# Patient Record
Sex: FEMALE | Race: WHITE | NOT HISPANIC OR LATINO | Employment: OTHER | URBAN - METROPOLITAN AREA
[De-identification: names, ages, dates, MRNs, and addresses within clinical notes are randomized per-mention and may not be internally consistent; named-entity substitution may affect disease eponyms.]

---

## 2018-02-28 ENCOUNTER — OFFICE VISIT CONVERTED (OUTPATIENT)
Dept: ONCOLOGY | Facility: HOSPITAL | Age: 63
End: 2018-02-28
Attending: INTERNAL MEDICINE

## 2018-03-01 ENCOUNTER — CONVERSION ENCOUNTER (OUTPATIENT)
Dept: GASTROENTEROLOGY | Facility: CLINIC | Age: 63
End: 2018-03-01

## 2018-03-01 ENCOUNTER — OFFICE VISIT CONVERTED (OUTPATIENT)
Dept: GASTROENTEROLOGY | Facility: CLINIC | Age: 63
End: 2018-03-01
Attending: INTERNAL MEDICINE

## 2018-03-28 ENCOUNTER — OFFICE VISIT CONVERTED (OUTPATIENT)
Dept: ONCOLOGY | Facility: HOSPITAL | Age: 63
End: 2018-03-28
Attending: INTERNAL MEDICINE

## 2018-06-29 ENCOUNTER — OFFICE VISIT CONVERTED (OUTPATIENT)
Dept: ONCOLOGY | Facility: HOSPITAL | Age: 63
End: 2018-06-29
Attending: INTERNAL MEDICINE

## 2018-08-30 ENCOUNTER — OFFICE VISIT CONVERTED (OUTPATIENT)
Dept: ORTHOPEDIC SURGERY | Facility: CLINIC | Age: 63
End: 2018-08-30
Attending: ORTHOPAEDIC SURGERY

## 2018-08-31 ENCOUNTER — OFFICE VISIT CONVERTED (OUTPATIENT)
Dept: CARDIOLOGY | Facility: CLINIC | Age: 63
End: 2018-08-31
Attending: INTERNAL MEDICINE

## 2018-09-11 ENCOUNTER — OFFICE VISIT CONVERTED (OUTPATIENT)
Dept: ORTHOPEDIC SURGERY | Facility: CLINIC | Age: 63
End: 2018-09-11
Attending: ORTHOPAEDIC SURGERY

## 2018-09-20 ENCOUNTER — OFFICE VISIT CONVERTED (OUTPATIENT)
Dept: NEUROSURGERY | Facility: CLINIC | Age: 63
End: 2018-09-20
Attending: NEUROLOGICAL SURGERY

## 2019-01-09 ENCOUNTER — HOSPITAL ENCOUNTER (OUTPATIENT)
Dept: PHYSICAL THERAPY | Facility: CLINIC | Age: 64
Setting detail: RECURRING SERIES
Discharge: HOME | End: 2019-01-30
Attending: NEUROLOGICAL SURGERY

## 2019-01-14 ENCOUNTER — OFFICE VISIT CONVERTED (OUTPATIENT)
Dept: NEUROSURGERY | Facility: CLINIC | Age: 64
End: 2019-01-14
Attending: NEUROLOGICAL SURGERY

## 2019-09-13 ENCOUNTER — HOSPITAL ENCOUNTER (OUTPATIENT)
Dept: OTHER | Facility: HOSPITAL | Age: 64
Discharge: HOME | End: 2019-09-13
Attending: INTERNAL MEDICINE

## 2019-09-13 ENCOUNTER — CONVERSION ENCOUNTER (OUTPATIENT)
Dept: CARDIOLOGY | Facility: CLINIC | Age: 64
End: 2019-09-13

## 2019-09-13 ENCOUNTER — OFFICE VISIT CONVERTED (OUTPATIENT)
Dept: CARDIOLOGY | Facility: CLINIC | Age: 64
End: 2019-09-13
Attending: INTERNAL MEDICINE

## 2019-09-13 LAB — BNP SERPL-MCNC: 27 PG/ML (ref 0–900)

## 2019-09-26 ENCOUNTER — HOSPITAL ENCOUNTER (OUTPATIENT)
Dept: OTHER | Facility: HOSPITAL | Age: 64
Discharge: HOME | End: 2019-09-26
Attending: INTERNAL MEDICINE

## 2019-09-26 LAB
ANION GAP SERPL CALC-SCNC: 17 MMOL/L (ref 8–19)
BUN SERPL-MCNC: 27 MG/DL (ref 5–25)
BUN/CREAT SERPL: 26 {RATIO} (ref 6–20)
CALCIUM SERPL-MCNC: 9.4 MG/DL (ref 8.7–10.4)
CHLORIDE SERPL-SCNC: 103 MMOL/L (ref 99–111)
CONV CO2: 24 MMOL/L (ref 22–32)
CREAT UR-MCNC: 1.04 MG/DL (ref 0.5–0.9)
GFR SERPLBLD BASED ON 1.73 SQ M-ARVRAT: 57 ML/MIN/{1.73_M2}
GLUCOSE SERPL-MCNC: 121 MG/DL (ref 65–99)
OSMOLALITY SERPL CALC.SUM OF ELEC: 296 MOSM/KG (ref 273–304)
POTASSIUM SERPL-SCNC: 4.1 MMOL/L (ref 3.5–5.3)
SODIUM SERPL-SCNC: 140 MMOL/L (ref 135–147)

## 2020-05-14 ENCOUNTER — TELEMEDICINE CONVERTED (OUTPATIENT)
Dept: CARDIOLOGY | Facility: CLINIC | Age: 65
End: 2020-05-14
Attending: INTERNAL MEDICINE

## 2020-06-15 ENCOUNTER — HOSPITAL ENCOUNTER (OUTPATIENT)
Dept: SLEEP MEDICINE | Facility: HOSPITAL | Age: 65
Discharge: HOME | End: 2020-06-15
Attending: INTERNAL MEDICINE

## 2020-06-15 ENCOUNTER — OUTSIDE FACILITY SERVICE (OUTPATIENT)
Dept: SLEEP MEDICINE | Facility: HOSPITAL | Age: 65
End: 2020-06-15

## 2020-06-15 PROCEDURE — 99204 OFFICE O/P NEW MOD 45 MIN: CPT | Performed by: INTERNAL MEDICINE

## 2020-07-06 ENCOUNTER — HOSPITAL ENCOUNTER (OUTPATIENT)
Dept: PREADMISSION TESTING | Facility: HOSPITAL | Age: 65
Discharge: HOME | End: 2020-07-06
Attending: INTERNAL MEDICINE

## 2020-07-07 LAB — SARS-COV-2 RNA SPEC QL NAA+PROBE: NOT DETECTED

## 2020-07-08 ENCOUNTER — HOSPITAL ENCOUNTER (OUTPATIENT)
Dept: SLEEP MEDICINE | Facility: HOSPITAL | Age: 65
Discharge: HOME | End: 2020-07-08
Attending: INTERNAL MEDICINE

## 2020-07-15 ENCOUNTER — OUTSIDE FACILITY SERVICE (OUTPATIENT)
Dept: SLEEP MEDICINE | Facility: HOSPITAL | Age: 65
End: 2020-07-15

## 2020-07-15 PROCEDURE — 95811 POLYSOM 6/>YRS CPAP 4/> PARM: CPT | Performed by: INTERNAL MEDICINE

## 2020-07-27 ENCOUNTER — HOSPITAL ENCOUNTER (OUTPATIENT)
Dept: PREADMISSION TESTING | Facility: HOSPITAL | Age: 65
Discharge: HOME | End: 2020-07-27
Attending: INTERNAL MEDICINE

## 2020-07-28 LAB — SARS-COV-2 RNA SPEC QL NAA+PROBE: NOT DETECTED

## 2020-07-29 ENCOUNTER — HOSPITAL ENCOUNTER (OUTPATIENT)
Dept: SLEEP MEDICINE | Facility: HOSPITAL | Age: 65
Discharge: HOME | End: 2020-07-29
Attending: INTERNAL MEDICINE

## 2020-08-03 ENCOUNTER — OUTSIDE FACILITY SERVICE (OUTPATIENT)
Dept: SLEEP MEDICINE | Facility: HOSPITAL | Age: 65
End: 2020-08-03

## 2020-08-03 PROCEDURE — 95811 POLYSOM 6/>YRS CPAP 4/> PARM: CPT | Performed by: INTERNAL MEDICINE

## 2020-09-21 ENCOUNTER — OUTSIDE FACILITY SERVICE (OUTPATIENT)
Dept: SLEEP MEDICINE | Facility: HOSPITAL | Age: 65
End: 2020-09-21

## 2020-09-21 ENCOUNTER — HOSPITAL ENCOUNTER (OUTPATIENT)
Dept: SLEEP MEDICINE | Facility: HOSPITAL | Age: 65
Discharge: HOME | End: 2020-09-21
Attending: INTERNAL MEDICINE

## 2020-09-21 PROCEDURE — 99214 OFFICE O/P EST MOD 30 MIN: CPT | Performed by: INTERNAL MEDICINE

## 2021-01-27 ENCOUNTER — HOSPITAL ENCOUNTER (OUTPATIENT)
Dept: SLEEP MEDICINE | Facility: HOSPITAL | Age: 66
Discharge: HOME | End: 2021-01-27
Attending: INTERNAL MEDICINE

## 2021-01-27 ENCOUNTER — OUTSIDE FACILITY SERVICE (OUTPATIENT)
Dept: SLEEP MEDICINE | Facility: HOSPITAL | Age: 66
End: 2021-01-27

## 2021-01-27 PROCEDURE — 99213 OFFICE O/P EST LOW 20 MIN: CPT | Performed by: INTERNAL MEDICINE

## 2021-02-25 ENCOUNTER — OFFICE VISIT CONVERTED (OUTPATIENT)
Dept: CARDIOLOGY | Facility: CLINIC | Age: 66
End: 2021-02-25
Attending: INTERNAL MEDICINE

## 2021-05-10 ENCOUNTER — OUTSIDE FACILITY SERVICE (OUTPATIENT)
Dept: SLEEP MEDICINE | Facility: HOSPITAL | Age: 66
End: 2021-05-10

## 2021-05-10 ENCOUNTER — HOSPITAL ENCOUNTER (OUTPATIENT)
Dept: SLEEP MEDICINE | Facility: HOSPITAL | Age: 66
Discharge: HOME | End: 2021-05-10
Attending: FAMILY MEDICINE

## 2021-05-10 PROCEDURE — 99213 OFFICE O/P EST LOW 20 MIN: CPT | Performed by: INTERNAL MEDICINE

## 2021-05-13 NOTE — PROGRESS NOTES
Progress Note      Patient Name: Gifty Hoover   Patient ID: [id number]   Sex: Female   YOB: 1955    Primary Care Provider: Bogdan Oden MD   Referring Provider: Aga Guy MD    Visit Date: May 14, 2020    Provider: Titi Zavala MD   Location: [facility]   Location Address: [address] [place] [address], [facility]   [place], [place]  [id number]   Location Phone: [phone]          Chief Complaint     Atrial fibrillation.       History Of Present Illness  Video Conferencing Visit  Gifty Hoover is a 64 year old /White female with paroxysmal atrial fibrillation, CAD, hypertension, and dyslipidemia who has been doing well. She has stable shortness of breath and mild lower extremity edema. No significant weight gain. She is presenting for evaluation via video conferencing. Verbal consent obtained before beginning visit. Telehealth visit due to COVID-19.   The following staff were present during this visit: Provider only.   PAST MEDICAL HISTORY: Coronary artery disease; Dyslipidemia; Hypertension; Paroxysmal atrial fibrillation.   FAMILY HISTORY: Positive for diabetes mellitus and hypertension. Negative for heart disease.   PSYCHOSOCIAL HISTORY: Denies alcohol or tobacco use.   CURRENT MEDICATIONS: Lasix 20 mg p.r.n.; Pradaxa 150 mg b.i.d.; rosuvastatin 40 mg daily; telmisartan 80 mg b.i.d.; hydrochlorothiazide 25 mg daily; levothyroxine 88 mcg daily; aspirin 81 mg daily. The dosage and frequency of the medications were reviewed with the patient.       Review of Systems  · Cardiovascular  o Admits  o : swelling (feet, ankles, hands), shortness of breath while walking or lying flat  o Denies  o : palpitations (fast, fluttering, or skipping beats), chest pain or angina pectoris   · Respiratory  o Denies  o : chronic or frequent cough, asthma or wheezing      Vitals     Per patient, at-home vitals:   Blood pressure 122/74.  Heart rate 85.           Assessment     ASSESSMENT  & PLAN:    1.  Paroxysmal atrial fibrillation, symptomatically maintaining normal sinus rhythm.  On Pradaxa for CVA        prevention.    2.  Coronary artery disease, no angina.  On chronic aspirin 81 mg once a day.  3.  Hyperlipidemia.  Is on a statin and tolerating well.  Goal LDL of less than 70.             Electronically Signed by: Rubia Caro-, Other -Author on May 20, 2020 10:17:38 AM  Electronically Co-signed by: Titi Zavala MD -Reviewer on May 20, 2020 03:47:08 PM

## 2021-05-14 VITALS
SYSTOLIC BLOOD PRESSURE: 118 MMHG | HEART RATE: 100 BPM | WEIGHT: 242 LBS | HEIGHT: 60 IN | BODY MASS INDEX: 47.51 KG/M2 | DIASTOLIC BLOOD PRESSURE: 70 MMHG

## 2021-05-14 NOTE — PROGRESS NOTES
Progress Note      Patient Name: Gifty Hoover   Patient ID: [id number]   Sex: Female   YOB: 1955    Primary Care Provider: Bogdan Oden MD   Referring Provider: Aga Guy MD    Visit Date: February 25, 2021    Provider: Titi Zavala MD   Location: [facility]   Location Address: [address] [place] [address], [facility]   [place], [place]  [id number]   Location Phone: [phone]          Chief Complaint     CAD.       History Of Present Illness  REFERRING CARE PROVIDER: Aga Guy MD   Gifty Hoover is a 65 year old /White female with coronary artery disease, paroxysmal atrial fib, hypertension and dyslipidemia who has had some shortness of breath which has been persistent somewhat since her COVID infection in December. She does wear her CPAP at night. She has chronic lower extremity edema as well as cough issues.   PAST MEDICAL HISTORY: Coronary artery disease; Dyslipidemia; Hypertension; Paroxysmal atrial fibrillation.   PSYCHOSOCIAL HISTORY: Denies alcohol use. Denies tobacco use.   CURRENT MEDICATIONS: Medication list was reviewed and is as documented.      ALLERGIES: No known drug allergies.       Review of Systems  · Cardiovascular  o Admits  o : swelling (feet, ankles, hands), shortness of breath while walking or lying flat  o Denies  o : palpitations (fast, fluttering, or skipping beats), chest pain or angina pectoris   · Respiratory  o Admits  o : chronic or frequent cough      Vitals  Date Time BP Position Site L\R Cuff Size HR RR TEMP (F) WT  HT  BMI kg/m2 BSA m2 O2 Sat FR L/min FiO2 HC       02/25/2021 03:33 /70 Sitting    100 - R   241lbs 16oz 5'   47.26 2.16             Physical Examination  · Constitutional  o Appearance  o : Awake, alert, in no acute distress.   · Eyes  o Conjunctivae  o : Normal.  · Ears, Nose, Mouth and Throat  o Oral Cavity  o :   § Oral Mucosa  § : Normal.  · Neck  o Inspection/Palpation  o : No JVD. Good carotid upstroke. No  thyromegaly.  · Respiratory  o Respiratory  o : Good respiratory effort. Clear to percussion and auscultation.  · Cardiovascular  o Heart  o :   § Auscultation of Heart  § : S1, S2 normal. Regular rate and rhythm without murmurs, gallops, or rubs.  o Peripheral Vascular System  o :   § Extremities  § : Good femoral and pedal pulses. No pedal edema.  · Gastrointestinal  o Abdominal Examination  o : Soft. No tenderness or masses felt. No hepatosplenomegaly. Abdominal aorta is not palpable.          Assessment     1.  Atrial fibrillation, symptomatically. The patient is still maintaining normal sinus rhythm. On Pradaxa for CVA prevention, continue current medications.  2.  Coronary artery disease. No anginal discomfort. On chronic aspirin once a day.  3.  Hyperlipidemia. On statin, stable, high intensity. Goal LDL less than 70.      Titi Zavala MD  /               Electronically Signed by: Staci Fisher-, OT -Author on March 7, 2021 08:20:49 AM  Electronically Co-signed by: Titi Zavala MD -Reviewer on March 8, 2021 02:25:43 PM

## 2021-05-15 VITALS
HEIGHT: 60 IN | SYSTOLIC BLOOD PRESSURE: 102 MMHG | HEART RATE: 100 BPM | BODY MASS INDEX: 47.91 KG/M2 | WEIGHT: 244 LBS | DIASTOLIC BLOOD PRESSURE: 78 MMHG

## 2021-05-16 VITALS
DIASTOLIC BLOOD PRESSURE: 80 MMHG | HEART RATE: 86 BPM | BODY MASS INDEX: 43.23 KG/M2 | WEIGHT: 229 LBS | HEIGHT: 61 IN | RESPIRATION RATE: 12 BRPM | OXYGEN SATURATION: 94 % | SYSTOLIC BLOOD PRESSURE: 139 MMHG

## 2021-05-16 VITALS
WEIGHT: 230 LBS | BODY MASS INDEX: 45.16 KG/M2 | DIASTOLIC BLOOD PRESSURE: 87 MMHG | HEIGHT: 60 IN | HEART RATE: 83 BPM | SYSTOLIC BLOOD PRESSURE: 143 MMHG

## 2021-05-16 VITALS
SYSTOLIC BLOOD PRESSURE: 140 MMHG | WEIGHT: 235.5 LBS | BODY MASS INDEX: 46.23 KG/M2 | HEIGHT: 60 IN | DIASTOLIC BLOOD PRESSURE: 73 MMHG

## 2021-05-16 VITALS — WEIGHT: 231.37 LBS | OXYGEN SATURATION: 96 % | HEART RATE: 80 BPM | BODY MASS INDEX: 45.43 KG/M2 | HEIGHT: 60 IN

## 2021-05-16 VITALS — OXYGEN SATURATION: 97 % | BODY MASS INDEX: 45.25 KG/M2 | HEIGHT: 60 IN | WEIGHT: 230.5 LBS | HEART RATE: 73 BPM

## 2021-05-16 VITALS
DIASTOLIC BLOOD PRESSURE: 59 MMHG | BODY MASS INDEX: 45.94 KG/M2 | SYSTOLIC BLOOD PRESSURE: 112 MMHG | WEIGHT: 234 LBS | HEIGHT: 60 IN

## 2021-05-28 VITALS
WEIGHT: 229.28 LBS | OXYGEN SATURATION: 98 % | TEMPERATURE: 97.7 F | HEIGHT: 61 IN | SYSTOLIC BLOOD PRESSURE: 131 MMHG | DIASTOLIC BLOOD PRESSURE: 72 MMHG | WEIGHT: 226.63 LBS | SYSTOLIC BLOOD PRESSURE: 139 MMHG | OXYGEN SATURATION: 96 % | BODY MASS INDEX: 43.29 KG/M2 | BODY MASS INDEX: 42.79 KG/M2 | TEMPERATURE: 97.7 F | HEIGHT: 61 IN | SYSTOLIC BLOOD PRESSURE: 133 MMHG | HEART RATE: 92 BPM | HEART RATE: 104 BPM | DIASTOLIC BLOOD PRESSURE: 42 MMHG | DIASTOLIC BLOOD PRESSURE: 70 MMHG | TEMPERATURE: 97.5 F | WEIGHT: 225.09 LBS | HEIGHT: 61 IN | BODY MASS INDEX: 42.5 KG/M2 | OXYGEN SATURATION: 95 % | HEART RATE: 77 BPM

## 2021-05-28 NOTE — PROGRESS NOTES
Patient: JOSÉ PETERSON     Acct: [id number]     Report: #[id number]  UNIT #: [id number]     : 1955    Encounter Date:2018  PRIMARY CARE: MAHNAZ FANG  ***Signed***  --------------------------------------------------------------------------------------------------------------------  Chief Complaint      Mar 28, 2018      DVT            Current Plan      -DVT      -2018 shows suspected thrombus in the right common femoral vein, right     external  iliac vein, right internal iliac vein and common femoral vein.       - IVC filter placed.      -History of previous blood clots. Use of Warfarin [id number]. Eliquis  -     2018. Developed most recent clot on Eliquis. Now, on Pradaxa 150 mg     BID.       -CBC with differential to evaluate for anemia.       -Stable. Continue close observation.            -Possible rectal bleeding      -Patient noted a redness on her toilet paper for the last 24 hours.      -We'll check a hemoglobin.  Also recommended a Hemoccult card      -Patient's symptoms worsen instructed patient to go to the ER for GI bleeding      -Counseled patient that if she has worsening GI bleeding to hold blood thinner     she voiced understanding      -Colonoscopy was done on .  Mild diverticulosis.  Grade 1 internal     hemorrhoids.      -Patient no longer has GI bleeding.            -Lymphadenopathy      -Denies any B symptoms.       -History of hysterectomy.       -Most recent CT shows in 2018 shows several prominent retroperitoneal     and iliac lymph nodes present.       -Recommend further imaging in 3 months.       -will order GI consult for colonoscopy at the next visit.       -HIV testing, hepatitis testing to evaluate for chronic viral infections.             -Back pain      -Patient has history of arthritis with worsening back pain.       -Immunoglobulins, serum free light chains, and SPEP to evaluate for plasma cell     dyscrasia's.        -Urinalysis to evaluate for hematuria, proteinuria.             -Peripheral Arterial Disease      -Bilateral lower extremity arterial ulcers.       -Follows with wound care.       -Follows with Dr. Aguilera.            -B12 Deficiency      -Vitamin B-12 level 274.      -B12 injections ordered for infusion center.      -Stable            -Today's Evaluation      -Patient's imaging exams, blood tests, physicians' notes, and any new findings     since our last visit were reviewed today to reassess patient's medical     treatment plan.      -Old medical records were reviewed and summarized in chronological order in the     HPI today to maintain an updated medical record.       -Patient's radiology imaging tests from our last visit were reviewed     independently by me by direct visualization of the images.        -Patients current lab tests and medications were carefully reviewed to evaluate     patient's current treatment plan today.       -Patient was advised to call us right away if there are any new symptoms for an     urgent visit for further evaluation. Patient voiced understanding and agreed to     do so.            Dvt femoral (deep venous thrombosis) - I82.419            Notes      New Medications      * hydrOXYzine HCl 25 MG TABLET: 25 MG PO Q6H PRN ITCHING #90      Intensive Monitor for Toxicity:  Labs Reviewed, Meds\Narcotics Reviewed      Labs Reviewed During Visit?:  Yes      Time Spent:  > 50% /Coord Care      Patient Education Provided:  Yes            History and Present Illness      Mrs. Gifty Hoover is a very pleasant 62 year old  female presenting     for evaluation for deep vein thrombosis. The patient reports going to wound     care appointment approximately 2 weeks ago and was found to have right upper     leg swelling with redness and warmth. She was sent for doppler US and found to     have a DVT in 3 different locations. She has a history of previous history of     DVT in  2011 after heart surgery. Subsequently, had a juliet filter placed     in 2011. She was initiated on Warfarin in 2011 and was changed over to Eliquis     in 2012. She was doing well on Eliquis until she developed a new clot in     February of 2018. She was admitted to [facility] for anticoagulation with Heparin     therapy for 5 days, and now is on Pradaxa therapy.  Previous medical history     includes: peripheral artery disease with frequent wound infections,     hypothyroidism, hypertension, osteoarthritis, previous MI in 2008. Surgical     history juliet filter placement 2011, heart surgery. Familial medical     history includes; daughter with previous blood clot, mother with thyroid cancer    , colorectal cancer, sister with eye cancer, brother with previous stroke.     Social history: Denies any previous alcohol, tobacco use or drug history.             -July 2017. CT Chest -  No pulmonary embolism is identified. Mild bilateral     infiltrates and/or atelectasis are identified, as discussed. Please correlate     clinically.      -February 8, 2018. CT abdomen/pelvis -  Cholelithiasis similar to previous exam     from 2012. Left renal cysts and scarring involving the left kidney.     Hysterectomy. There is an IVC filter. There is suspected thrombus in the right     common femoral vein, right external iliac vein, right internal iliac vein and     common femoral vein. There is contrast in the IVC above the filter but not     below the filter suggesting possible IVC thrombus. There is suspected thrombus     in the left superficial femoral vein and possibly common femoral vein. There is     a dilated vein in the left pelvis measuring up to 2.5 cm slightly greater than     prior exam from 2012. There are several prominent retroperitoneal and iliac     lymph nodes.            Vitals      Height 5 ft 1.22 in / 155.5 cm      Weight 226 lbs 10.126 oz / 102.8 kg      BSA 2.17 m2      BMI 42.5 kg/m2      Temperature 97.5  F / 36.39 C - Temporal      Pulse 104      Blood Pressure 133/70 Sitting, Right Arm      Pulse Oximetry 98%, ROOM AIR            Allergies      Coded Allergies:             NO KNOWN ALLERGIES (Unverified , 3/28/18)            Medications      Last Reconciled on 3/30/18 07:51 by BRINA GE MD      hydrOXYzine HCl (hydrOXYzine HCl) 25 Mg Tablet      25 MG PO Q6H Y for ITCHING, #90 TAB 0 Refills         Reported         3/28/18       Dabigatran (Pradaxa) 150 Mg Capsule      150 MG PO BID for 30 Days, #60 CAP         Prov: EMILY CULLEN onc         3/16/18       Acetaminophen* (Tylenol Extra Strength*) 500 Mg Tablet      1000 MG PO Q4-6H Y for PAIN OR FEVER, #100 TAB 0 Refills         Reported         3/6/18       Nitroglycerin (Nitrolingual 400 MCG/SPRAY*) 4.9 Gm [place]      1 SPRAYS SL ASDIR, SPRAYS         Reported         3/6/18       Albuterol (Proair HFA*) 8.5 Gm Inh      1-2 PUFFS INH RTQ6H Y for SHORTNESS OF BREATH, #1 INH 0 Refills         Reported         3/6/18       Rosuvastatin Calcium (Crestor*) 40 Mg Tablet      40 MG PO HS, #30 TAB 0 Refills         Reported         3/6/18       Ferrous Sulfate (Iron Sulfate*) 325 Mg Tablet      325 MG PO QDAY, #30 TAB         Prov: Brina Ge         2/20/18       Famotidine (Famotidine) 20 Mg Tablet      20 MG PO QDAY for 30 Days, #30 TAB         Prov: James Ortega         2/12/18       Ergocalciferol (Vitamin D2*) 50,000 Unit Capsule      06699 UNITS PO WE, TURNER, #8 CAP 0 Refills         Reported         7/23/17       Hydrochlorothiazide (Hydrochlorothiazide*) 25 Mg Tablet      25 MG PO QDAY, #30 TAB 0 Refills         Reported         11/8/16       Valsartan (Diovan) 320 Mg Tablet      320 MG PO QDAY, TAB         Reported         11/8/16       Levothyroxine (Levothyroxine) 0.088 Mg Tablet      0.088 MG PO QDAY@07, #30 TAB 0 Refills         Reported         11/8/16       Sertraline HCl (Zoloft*) 50 Mg Tablet      50 MG PO DAILY, TAB         Reported          1/19/11            General Information      Referring Provider:  Trevor Rueda      Primary Provider:  MAHNAZ FANG            Past Medical History      No Diabetes Type 1, No Diabetes Type 2, No Thyroid Disease, No COPD, No     Emphysema, Yes Hypertension, No Stroke, Yes High Cholesterol, Yes Heart Attack (    2008), No Bleeding Condition, No Low or High RBC Count, No Low or High WBC Count    , No Low or High Platelet Coun, No Hepatitis, No Kidney Disease, No Depression,     No Alzheimer's Disease, No Mental Disease, No Seizures, Yes Arthritis, No     Osteoporosis, No Osteopenia, No Short of Air, No Sleep apnea, No Liver Disease,     No STD, No Enlarged Prostate, Yes Other (BLOOD CLOTS)      Hematology/oncology:  DENIES HX OF: Previous Treatment for CA, Anemia, Bladder     Cancer, Blood cancer, Brain cancer, Breast cancer, Cervical cancer, Coagulopathy    , Colorectal cancer, Endocrine cancer, Eye cancer, GI cancer,  cancer, Kidney     cancer, Leukemia, Leukocytosis, Leukopenia, Liver cancer, Lung cancer, Lymphoma    , Musculoskeletal cancer, Myeloma, Neurologic cancer, Oral cancer, Ovarian     cancer, Skin cancer, Stomach cancer, Thrombocytopenia, Thyroid cancer, Uterine     cancer, Other cancer history, Other hematologic history      Genetic/metabolic:  DENIES HX OF: Cystic fibrosis, Down syndrome, Other genetic     history, Other metabolic history            Past Surgical History      REPORTS HX OF: Hysterectomy, Other Past Surgical Hx (GREEN FILTER- BLOOD CLOT     REMOVAL, BLADDER TACK), DENIES HX OF: Cataract extraction, Thyroid surgery,     Lung biopsy, CABG surgery, Coronary stent, Valve replacement, Appendectomy,     Cholecystectomy, Splenectomy, Bladder surgery, Nephrectomy, Joint replacement,     Frature repair, Skin cancer removal, Melanoma excision, Spinal surgery, Breast     biopsy, Lumpectomy, Mastectomy, bilateral, Mastectomy, right, Mastectomy, left,     Peg Tube Placement, VAD Placement, Biopsy             Family History      REPORTS HX OF: Anemia (MOTHER), Colorectal cancer (MOTHER), Thyroid cancer (    MOTHER), DENIES HX OF: Blood disorders, Blood Cancer, Breast cancer, Cervical     cancer, Coagulopathy, Endocrine Cancer, Eye Cancer, GI Cancer,  Cancer,     Kidney Cancer, Leukemia, Leukocytosis, Leukopenia, Liver Cancer, Lung cancer,     Lymphoma, Melanoma, Musculoskeletal Cancer, Myeloma, Neurologic Cancer, Oral     Cancer, Ovarian cancer, Prostate cancer, Skin Cancer, Stomach Cancer,     Testicular Cancer, Thrombocytopenia, Uterine cancer, Other Cancer History,     Other Hematology History            Social History      Yes            Tobacco Use      Tobacco status:  Never smoker            Alcohol Use      Alcohol intake:  None      Counseling given:  None            Substance Use      Substance use:  Denies use      Counseling given:  None            ROS      General:  Denies: Appetite change, Excessive sweating, Fatigue, Fever, Night     sweats, Weight gain, Weight loss, Other      Eyes:  Complains of: Corrective lenses, Denies: Blurred vision, Diplopia, Eye     irritation, Eye pain, Eye redness, Spots in vision, Vision loss, Other      Ears, nose, mouth, throat:  Denies: Headache, Seizures, Visual Changes, Hearing     loss, Sinus Congestion, Hoarseness, Sore throat, Other      Cardiovascular:  Denies: Chest pain, Irregular heartbeat, Palpitations, Swollen     ankles/legs, Other      Respiratory:  Denies: Chest pain, Shortness of Air, Productive cough, Coughing     blood, Other      Gastrointestinal:  Denies: Nausea, Vomiting, Problem swallowing, Frequent     heartburn, Constipation, Diarrhea, Tarry stools, Bloody stools, Unable to     control bowels, Other      Kidney/Bladder:  Denies: Painful Urination, Change in urinary stream, Blood in     urine, Incontinence, Frequent Urination, Decreased urine stream, Other      Musculoskeletal:  Denies: New Back pain, Leg Cramps, Painful Joints, Swollen      Joints, Muscle Pain, Muscle weakness, Other      Skin:  COMPLAINS OF: Rash (RASH ON ARMS/NECKLINE), DENIES: Jaundice, Easy     Bleeding, Lesions/changes in moles, Nail changes, Skin Discoloration, Other      Neurological:  Denies: Dizziness, Fainting, Numbness\Tingling, Paralysis,     Seizures, Other      Psychiatric:  Complains of: AAO X 3, Denies: Anxiety, Panic attacks, Depression    , Memory loss, Other      Endocrine:  Diabetes      Hematologic/lymphatic:  Denies: Bruising, Bleeding, Enlarged Lymph Nodes,     Recurrent infections, Other      Reproductive:  Denies Pregnant, Denies Menopause, Denies Still Menstruating,     Denies Heavy Periods, Denies Other            Vitals            Vital Signs              Date Time  Temp Pulse Resp B/P (MAP) Pulse Ox O2 Delivery O2 Flow Rate FiO2             2/28/18 13:20 97.7 92  131/42 95 ROOM AIR              General Appearance:  Alert, Oriented X3, Cooperative, No acute distress      Eyes:  Moist Conjunctiva, PERRLA      HEENT:  Orophraynx clear, No Erythema, No Exudates      Neck:  Supple, Full ROM      Respiratory:  CTAB, No Diminished Breath, No Rales, No Crackels      Breast\Chest:  Symmetrical, No Nipple Discharge      Abdomen\Gastro:  Soft, No NABS, No Hernias      Cardio:  RRR, No Murmur, No, Peripheral Edema      Skin:  Normal Temperature, Normal Tone, Normal Texture and Turgor, Other (    bilateral lower extremities with arterial ulcers. Treated by wound care. )      Psychiatric:  Appropriate Affect, Intact Judgement, AAO x3      Neuro:  Cranial Nerve II-XII Inta, No Focal Sensory Deficit      Genitourinary:  No Freedman Catheter, No Bladder Distention      Muscularskeletal:  Normal Gait and Station, Full ROM of extremeties, Full     muscle strength\tone      Extremities:  No Digital Cyanosis, Pedal Pulses Intact, Pedal Pulses Symetrical    , Normal Gait and station      Lymphatic:  Other (Lymphadenopathy seen on CT scan in February 2018: several     prominent  and retropheritoneal ), No Cervical, No Infraclavicular, No Axillary            Lab Results      Laboratory Tests      2/28/18 14:09            Laboratory Tests            Test        2/28/18      14:09             Ferritin        318 ng/mL      ()            Hx Influenza Vaccination:  Yes      Date Influenza Vaccine Given:  Oct 1, 2017      Influenza Vaccine Declined:  No      2 or More Falls Past Year?:  No      Fall Past Year with Injury?:  No      Hx Pneumococcal Vaccination:  Yes      Encouraged to follow-up with:  PCP regarding preventative exams.      Chart initiated by      ROSALINA GALLEGOS                 Disclaimer: Converted document may not contain table formatting or lab diagrams. Please see [employer] for the authenticated document.

## 2021-05-28 NOTE — PROGRESS NOTES
Patient: JOSÉ PETERSON     Acct: [id number]     Report: #[id number]  UNIT #: [id number]     : 1955    Encounter Date:2018  PRIMARY CARE: MAHNAZ FANG  ***Signed***  --------------------------------------------------------------------------------------------------------------------  Chief Complaint      2018      DVT            Current Plan      -DVT      -2018 shows suspected thrombus in the right common femoral vein, right     external  iliac vein, right internal iliac vein and common femoral vein.       - IVC filter placed.      -History of previous blood clots. Use of Warfarin [id number]. Eliquis  -     2018. Developed most recent clot on Eliquis. Now, on Pradaxa 150 mg     BID.       -CBC with differential to evaluate for anemia.       -CMP, LDH to evaluate for abnormal hepatic and renal dysfunction.      -D-dimer to evaluate for elevated d-dimer.             -Possible rectal bleeding      -Patient noted a redness on her toilet paper for the last 24 hours.      -We'll check a hemoglobin.  Also recommended a Hemoccult card      -Patient's symptoms worsen instructed patient to go to the ER for GI bleeding      -Counseled patient that if she has worsening GI bleeding to hold blood thinner     she voiced understanding            -Lymphadenopathy      -Denies any B symptoms.       -History of hysterectomy.       -Most recent CT shows in 2018 shows several prominent retroperitoneal     and iliac lymph nodes present.       -Recommend further imaging in 3 months.       -will order GI consult for colonoscopy at the next visit.       -HIV testing, hepatitis testing to evaluate for chronic viral infections.             -Back pain      -Patient has history of arthritis with worsening back pain.       -Immunoglobulins, serum free light chains, and SPEP to evaluate for plasma cell     dyscrasia's.       -Urinalysis to evaluate for hematuria, proteinuria.              -Peripheral Arterial Disease      -Bilateral lower extremity arterial ulcers.       -Follows with wound care.       -Follows with Dr. Aguilera.            -B12 Deficiency      -Vitamin B-12 level 274.      -B12 injections ordered for infusion center.            -Today's Evaluation      -Patient's imaging exams, blood tests, physicians' notes, and any new findings     since our last visit were reviewed today to reassess patient's medical     treatment plan.      -Old medical records were reviewed and summarized in chronological order in the     HPI today to maintain an updated medical record.       -Patient's radiology imaging tests from our last visit were reviewed     independently by me by direct visualization of the images.        -Patients current lab tests and medications were carefully reviewed to evaluate     patient's current treatment plan today.       -Patient was advised to call us right away if there are any new symptoms for an     urgent visit for further evaluation. Patient voiced understanding and agreed to     do so.            Dvt femoral (deep venous thrombosis) - I82.419            Notes      New Diagnostics      * CMP Comp Metabolic Panel, As Soon As Possible       Dx: Dvt femoral (deep venous thrombosis) - I82.419      * Iron Profile, As Soon As Possible       Dx: Dvt femoral (deep venous thrombosis) - I82.419      * Serum Ferritin Level, As Soon As Possible       Dx: Dvt femoral (deep venous thrombosis) - I82.419      * CBC, As Soon As Possible       Dx: Dvt femoral (deep venous thrombosis) - I82.419      * Reticulocyte Count, As Soon As Possible       Dx: Dvt femoral (deep venous thrombosis) - I82.419      Intensive Monitor for Toxicity:  Labs Reviewed, Meds\Narcotics Reviewed      Labs Reviewed During Visit?:  Yes      Time Spent:  > 50% /Coord Care      Patient Education Provided:  Yes            History and Present Illness      -July 2017. CT Chest -  No pulmonary embolism is  identified. Mild bilateral     infiltrates and/or atelectasis are identified, as discussed. Please correlate     clinically.      -February 8, 2018. CT abdomen/pelvis -  Cholelithiasis similar to previous exam     from 2012. Left renal cysts and scarring involving the left kidney.     Hysterectomy. There is an IVC filter. There is suspected thrombus in the right     common femoral vein, right external iliac vein, right internal iliac vein and     common femoral vein. There is contrast in the IVC above the filter but not     below the filter suggesting possible IVC thrombus. There is suspected thrombus     in the left superficial femoral vein and possibly common femoral vein. There is     a dilated vein in the left pelvis measuring up to 2.5 cm slightly greater than     prior exam from 2012. There are several prominent retroperitoneal and iliac     lymph nodes.             Mrs. Gifty Hoover is a very pleasant 62 year old  female presenting     for evaluation for deep vein thrombosis. The patient reports going to wound     care appointment approximately 2 weeks ago and was found to have right upper     leg swelling with redness and warmth. She was sent for doppler US and found to     have a DVT in 3 different locations. She has a history of previous history of     DVT in 2011 after heart surgery. Subsequently, had a juliet filter placed     in 2011. She was initiated on Warfarin in 2011 and was changed over to Eliquis     in 2012. She was doing well on Eliquis until she developed a new clot in     February of 2018. She was admitted to [facility] for anticoagulation with Heparin     therapy for 5 days, and now is on Pradaxa therapy.  Previous medical history     includes: peripheral artery disease with frequent wound infections,     hypothyroidism, hypertension, osteoarthritis, previous MI in 2008. Surgical     history juliet filter placement 2011, heart surgery. Familial medical     history includes;  daughter with previous blood clot, mother with thyroid cancer    , colorectal cancer, sister with eye cancer, brother with previous stroke.     Social history: Denies any previous alcohol, tobacco use or drug history.            Vitals      Height 5 ft 1.22 in / 155.5 cm      Weight 229 lbs 4.454 oz / 104.0 kg      BSA 2.18 m2      BMI 43.0 kg/m2      Temperature 97.7 F / 36.5 C - Temporal      Pulse 92      Blood Pressure 131/42 Sitting, Left Arm      Pulse Oximetry 95%, ROOM AIR            Allergies      Coded Allergies:             NO KNOWN ALLERGIES (Unverified , 2/28/18)            Medications      Last Reconciled on 3/1/18 19:51 by BRINA RUEDA MD      Ferrous Sulfate (Iron Sulfate*) 325 Mg Tablet      325 MG PO QDAY, #30 TAB         Prov: Brina Rueda         2/20/18       Dabigatran (Pradaxa) 150 Mg Capsule      150 MG PO BID for 30 Days, #60 CAP         Prov: James Ortega         2/12/18       Famotidine (Famotidine) 20 Mg Tablet      20 MG PO QDAY for 30 Days, #30 TAB         Prov: James Ortega         2/12/18       Ergocalciferol (Vitamin D2*) 50,000 Unit Capsule      62779 UNITS PO WEDNESDAY, #8 CAP 0 Refills         Reported         7/23/17       Rosuvastatin Calcium (Crestor*) 20 Mg Tablet      40 MG PO HS, #60 TAB 0 Refills         Reported         7/23/17       Ergocalciferol (Vitamin D2*) 50,000 Unit Capsule      70496 UNITS PO SUNDAY, #8 CAP 0 Refills         Reported         7/23/17       Hydrochlorothiazide (Hydrochlorothiazide*) 25 Mg Tablet      25 MG PO QDAY, #30 TAB 0 Refills         Reported         11/8/16       Valsartan (Diovan) 320 Mg Tablet      320 MG PO QDAY, TAB         Reported         11/8/16       Levothyroxine (Levothyroxine) 0.088 Mg Tablet      0.088 MG PO QDAY@07, #30 TAB 0 Refills         Reported         11/8/16       Sertraline HCl (Zoloft*) 50 Mg Tablet      1 TAB PO DAILY, TAB         Reported         1/19/11            General Information      Referring Provider:   Trevor Rueda      Primary Provider:  MAHNAZ FANG            Past Medical History      No Diabetes Type 1, No Diabetes Type 2, No Thyroid Disease, No COPD, No     Emphysema, Yes Hypertension, No Stroke, Yes High Cholesterol, Yes Heart Attack (    2008), No Bleeding Condition, No Low or High RBC Count, No Low or High WBC Count    , No Low or High Platelet Coun, No Hepatitis, No Kidney Disease, No Depression,     No Alzheimer's Disease, No Mental Disease, No Seizures, Yes Arthritis, No     Osteoporosis, No Osteopenia, No Short of Air, No Sleep apnea, No Liver Disease,     No STD, No Enlarged Prostate, Yes Other (BLOOD CLOTS)      Hematology/oncology:  DENIES HX OF: Previous Treatment for CA, Anemia, Bladder     Cancer, Blood cancer, Brain cancer, Breast cancer, Cervical cancer, Coagulopathy    , Colorectal cancer, Endocrine cancer, Eye cancer, GI cancer,  cancer, Kidney     cancer, Leukemia, Leukocytosis, Leukopenia, Liver cancer, Lung cancer, Lymphoma    , Musculoskeletal cancer, Myeloma, Neurologic cancer, Oral cancer, Ovarian     cancer, Skin cancer, Stomach cancer, Thrombocytopenia, Thyroid cancer, Uterine     cancer, Other cancer history, Other hematologic history      Genetic/metabolic:  DENIES HX OF: Cystic fibrosis, Down syndrome, Other genetic     history, Other metabolic history            Past Surgical History      REPORTS HX OF: Hysterectomy, Other Past Surgical Hx (GREEN FILTER- BLOOD CLOT     REMOVAL, BLADDER TACK), DENIES HX OF: Cataract extraction, Thyroid surgery,     Lung biopsy, CABG surgery, Coronary stent, Valve replacement, Appendectomy,     Cholecystectomy, Splenectomy, Bladder surgery, Nephrectomy, Joint replacement,     Frature repair, Skin cancer removal, Melanoma excision, Spinal surgery, Breast     biopsy, Lumpectomy, Mastectomy, bilateral, Mastectomy, right, Mastectomy, left,     Peg Tube Placement, VAD Placement, Biopsy            Family History      REPORTS HX OF: Anemia (MOTHER),  Colorectal cancer (MOTHER), Thyroid cancer (    MOTHER), DENIES HX OF: Blood disorders, Blood Cancer, Breast cancer, Cervical     cancer, Coagulopathy, Endocrine Cancer, Eye Cancer, GI Cancer,  Cancer,     Kidney Cancer, Leukemia, Leukocytosis, Leukopenia, Liver Cancer, Lung cancer,     Lymphoma, Melanoma, Musculoskeletal Cancer, Myeloma, Neurologic Cancer, Oral     Cancer, Ovarian cancer, Prostate cancer, Skin Cancer, Stomach Cancer,     Testicular Cancer, Thrombocytopenia, Uterine cancer, Other Cancer History,     Other Hematology History            Social History      No            Tobacco Use      Tobacco status:  Never smoker            Alcohol Use      Alcohol intake:  None      Counseling given:  None            Substance Use      Substance use:  Denies use      Counseling given:  None            ROS      General:  Complains of: Appetite change, Denies: Excessive sweating, Fatigue,     Fever, Night sweats, Weight gain, Weight loss, Other      Eyes:  Denies: Blurred vision, Corrective lenses, Diplopia, Eye irritation, Eye     pain, Eye redness, Spots in vision, Vision loss, Other      Ears, nose, mouth, throat:  Denies: Headache, Seizures, Visual Changes, Hearing     loss, Sinus Congestion, Hoarseness, Sore throat, Other      Cardiovascular:  Denies: Chest pain, Irregular heartbeat, Palpitations, Swollen     ankles/legs, Other      Respiratory:  Denies: Chest pain, Shortness of Air, Productive cough, Coughing     blood, Other      Gastrointestinal:  Denies: Nausea, Vomiting, Problem swallowing, Frequent     heartburn, Constipation, Diarrhea, Tarry stools, Bloody stools, Unable to     control bowels, Other      Kidney/Bladder:  Denies: Painful Urination, Change in urinary stream, Blood in     urine, Incontinence, Frequent Urination, Decreased urine stream, Other      Musculoskeletal:  Denies: New Back pain, Leg Cramps, Painful Joints, Swollen     Joints, Muscle Pain, Muscle weakness, Other      Skin:  DENIES:  Jaundice, Easy Bleeding, Lesions/changes in moles, Nail changes,     Skin Discoloration, Rash, Other      Neurological:  Denies: Dizziness, Fainting, Numbness\Tingling, Paralysis,     Seizures, Other      Psychiatric:  Complains of: AAO X 3, Denies: Anxiety, Panic attacks, Depression    , Memory loss, Other      Endocrine:  DiabetesThyroid DisorderOsteoporosisEndocrine Other      Hematologic/lymphatic:  Denies: Bruising, Bleeding, Enlarged Lymph Nodes,     Recurrent infections, Other      Reproductive:  Denies Pregnant, Denies Menopause, Denies Still Menstruating,     Denies Heavy Periods, Denies Other            Vitals            Vital Signs              Date Time  Temp Pulse Resp B/P (MAP) Pulse Ox O2 Delivery O2 Flow Rate FiO2             2/20/18 11:07 97.7 93  146/74 95 ROOM AIR              General Appearance:  Alert, Oriented X3, Cooperative, No acute distress      Eyes:  Moist Conjunctiva, PERRLA      HEENT:  Orophraynx clear, No Erythema, No Exudates      Neck:  Supple, Full ROM      Respiratory:  CTAB, No Diminished Breath, No Rales, No Crackels      Breast\Chest:  Symmetrical, No Nipple Discharge      Abdomen\Gastro:  Soft, No NABS, No Hernias      Cardio:  RRR, No Murmur, No, Peripheral Edema      Skin:  Normal Temperature, Normal Tone, Normal Texture and Turgor, Other (    bilateral lower extremities with arterial ulcers. Treated by wound care. )      Psychiatric:  Appropriate Affect, Intact Judgement, AAO x3      Neuro:  Cranial Nerve II-XII Inta, No Focal Sensory Deficit      Genitourinary:  No Freedman Catheter, No Bladder Distention      Muscularskeletal:  Normal Gait and Station, Full ROM of extremeties, Full     muscle strength\tone      Extremities:  No Digital Cyanosis, Pedal Pulses Intact, Pedal Pulses Symetrical    , Normal Gait and station      Lymphatic:  Other (Lymphadenopathy seen on CT scan in February 2018: several     prominent and retropheritoneal ), No Cervical, No Infraclavicular, No  Axillary            Lab Results      Laboratory Tests      2/20/18 12:25            Laboratory Tests            Test        2/7/18      23:50             Ferritin        335 ng/mL      ()            Hx Influenza Vaccination:  Yes      Date Influenza Vaccine Given:  Oct 1, 2017      Influenza Vaccine Declined:  No      2 or More Falls Past Year?:  No      Fall Past Year with Injury?:  No      Hx Pneumococcal Vaccination:  Yes      Encouraged to follow-up with:  PCP regarding preventative exams.      Chart initiated by      CHRISSIE FERRELL CMA                 Disclaimer: Converted document may not contain table formatting or lab diagrams. Please see [employer] for the authenticated document.

## 2021-05-28 NOTE — PROGRESS NOTES
Patient: JOSÉ PETERSON     Acct: [id number]     Report: #[id number]  UNIT #: [id number]     : 1955    Encounter Date:2018  PRIMARY CARE: MAHNAZ FANG  ***Signed***  --------------------------------------------------------------------------------------------------------------------  Chief Complaint      2018      DVT            Current Plan      -DVT      -2018 shows suspected thrombus in the right common femoral vein, right     external  iliac vein, right internal iliac vein and common femoral vein.       - IVC filter placed.      -History of previous blood clots. Use of Warfarin [id number]. Eliquis  -     2018. Developed most recent clot on Eliquis. Now, on Pradaxa 150 mg     BID.       -CBC with differential to evaluate for anemia.       -Persistent.  Still having swelling. Will check a d-dimer.             -Possible rectal bleeding      -Patient noted a redness on her toilet paper for the last 24 hours.      -We'll check a hemoglobin.  Also recommended a Hemoccult card      -Patient's symptoms worsen instructed patient to go to the ER for GI bleeding      -Counseled patient that if she has worsening GI bleeding to hold blood thinner     she voiced understanding      -Colonoscopy was done on .  Mild diverticulosis.  Grade 1 internal     hemorrhoids.            -Lymphadenopathy      -Denies any B symptoms.       -History of hysterectomy.       -Most recent CT shows in 2018 shows several prominent retroperitoneal     and iliac lymph nodes present.       -Recommend further imaging in 3 months.       -will order GI consult for colonoscopy at the next visit.       -HIV testing, hepatitis testing to evaluate for chronic viral infections.       -Improved.             -Peripheral Arterial Disease      -Bilateral lower extremity arterial ulcers.       -Follows with wound care.       -Follows with Dr. Aguilera.            -Today's Evaluation       -Patient's imaging exams, blood tests, physicians' notes, and any new findings     since our last visit were reviewed today to reassess patient's medical     treatment plan.      -Old medical records were reviewed and summarized in chronological order in the     HPI today to maintain an updated medical record.       -Patient's radiology imaging tests from our last visit were reviewed     independently by me by direct visualization of the images.        -Patients current lab tests and medications were carefully reviewed to evaluate     patient's current treatment plan today.       -Patient was advised to call us right away if there are any new symptoms for an     urgent visit for further evaluation. Patient voiced understanding and agreed to     do so.            Dvt femoral (deep venous thrombosis) - I82.419            Notes      New Diagnostics      * D-Dimer Quantitative, Stat       Dx: Dvt femoral (deep venous thrombosis) - I82.419      * CBC, As Soon As Possible       Dx: Dvt femoral (deep venous thrombosis) - I82.419      * B12    Dx: Dvt femoral (deep venous thrombosis) - I82.419      Intensive Monitor for Toxicity:  Labs Reviewed, Meds\Narcotics Reviewed      Labs Reviewed During Visit?:  Yes      Time Spent:  > 50% /Coord Care      Patient Education Provided:  Yes            History and Present Illness      Mrs. Gifty Hoover is a very pleasant 62 year old  female presenting     for evaluation for deep vein thrombosis. The patient reports going to wound     care appointment approximately 2 weeks ago and was found to have right upper     leg swelling with redness and warmth. She was sent for doppler US and found to     have a DVT in 3 different locations. She has a history of previous history of     DVT in 2011 after heart surgery. Subsequently, had a juliet filter placed     in 2011. She was initiated on Warfarin in 2011 and was changed over to Eliquis     in 2012. She was doing well  on Eliquis until she developed a new clot in     February of 2018. She was admitted to [facility] for anticoagulation with Heparin     therapy for 5 days, and now is on Pradaxa therapy.  Previous medical history     includes: peripheral artery disease with frequent wound infections,     hypothyroidism, hypertension, osteoarthritis, previous MI in 2008. Surgical     history juliet filter placement 2011, heart surgery. Familial medical     history includes; daughter with previous blood clot, mother with thyroid cancer    , colorectal cancer, sister with eye cancer, brother with previous stroke.     Social history: Denies any previous alcohol, tobacco use or drug history.             -July 2017. CT Chest -  No pulmonary embolism is identified. Mild bilateral     infiltrates and/or atelectasis are identified, as discussed. Please correlate     clinically.      -February 8, 2018. CT abdomen/pelvis -  Cholelithiasis similar to previous exam     from 2012. Left renal cysts and scarring involving the left kidney.     Hysterectomy. There is an IVC filter. There is suspected thrombus in the right     common femoral vein, right external iliac vein, right internal iliac vein and     common femoral vein. There is contrast in the IVC above the filter but not     below the filter suggesting possible IVC thrombus. There is suspected thrombus     in the left superficial femoral vein and possibly common femoral vein. There is     a dilated vein in the left pelvis measuring up to 2.5 cm slightly greater than     prior exam from 2012. There are several prominent retroperitoneal and iliac     lymph nodes.       -June .  Lower extremity exam show persistent edema.            Vitals      Height 5 ft 1.22 in / 155.5 cm      Weight 225 lbs 1.435 oz / 102.1 kg      BSA 2.16 m2      BMI 42.2 kg/m2      Temperature 97.7 F / 36.5 C - Temporal      Pulse 77      Blood Pressure 139/72 Sitting, Right Arm      Pulse Oximetry 96%, ROOM AIR             Allergies      Coded Allergies:             NO KNOWN ALLERGIES (Unverified , 6/29/18)            Medications      Last Reconciled on 7/3/18 20:05 by BRINA GE MD      hydrOXYzine HCl (hydrOXYzine HCl) 25 Mg Tablet      25 MG PO Q6H Y for ITCHING, #90 TAB 0 Refills         Reported         3/28/18       Dabigatran (Pradaxa) 150 Mg Capsule      150 MG PO BID for 30 Days, #60 CAP         Prov: EMILY CULLEN onc         3/16/18       Acetaminophen* (Tylenol Extra Strength*) 500 Mg Tablet      1000 MG PO Q4-6H Y for PAIN OR FEVER, #100 TAB 0 Refills         Reported         3/6/18       Nitroglycerin (Nitrolingual 400 MCG/SPRAY*) 4.9 Gm [place]      1 SPRAYS SL ASDIR, SPRAYS         Reported         3/6/18       Albuterol (Proair HFA*) 8.5 Gm Inh      1-2 PUFFS INH RTQ6H Y for SHORTNESS OF BREATH, #1 INH 0 Refills         Reported         3/6/18       Rosuvastatin Calcium (Crestor*) 40 Mg Tablet      40 MG PO HS, #30 TAB 0 Refills         Reported         3/6/18       Ferrous Sulfate (Iron Sulfate*) 325 Mg Tablet      325 MG PO QDAY, #30 TAB         Prov: Brina Ge         2/20/18       Famotidine (Famotidine) 20 Mg Tablet      20 MG PO QDAY for 30 Days, #30 TAB         Prov: James Ortega         2/12/18       Ergocalciferol (Vitamin D2*) 50,000 Unit Capsule      58435 UNITS PO WE, TURNER, #8 CAP 0 Refills         Reported         7/23/17       Hydrochlorothiazide (Hydrochlorothiazide*) 25 Mg Tablet      25 MG PO QDAY, #30 TAB 0 Refills         Reported         11/8/16       Valsartan (Diovan) 320 Mg Tablet      320 MG PO QDAY, TAB         Reported         11/8/16       Levothyroxine (Levothyroxine) 0.088 Mg Tablet      0.088 MG PO QDAY@07, #30 TAB 0 Refills         Reported         11/8/16       Sertraline HCl (Zoloft*) 50 Mg Tablet      50 MG PO DAILY, TAB         Reported         1/19/11            General Information      Referring Provider:  Brina Ge      Primary Provider:  MAHNAZ FANG             Pain and Fatigue Scales      Pain Assessment:           Experiencing any pain?:  No         Pain Scale Used:  Numerical         Pain Intensity:  0      Fatigue:           Experiencing any fatigue?:  Yes         Fatigue (0-10 scale):  7            PAST, FAMILY   Past Medical History      Past Medical History:  No Diabetes Type 1, No Diabetes Type 2, No Thyroid     Disease, No COPD, No Emphysema, Yes Hypertension, No Stroke, Yes High     Cholesterol, Yes Heart Attack (2008), No Bleeding Condition, No Low or High RBC     Count, No Low or High WBC Count, No Low or High Platelet Coun, No Hepatitis, No     Kidney Disease, No Depression, No Alzheimer's Disease, No Mental Disease, No     Seizures, Yes Arthritis, No Osteoporosis, No Osteopenia, No Short of Air, No     Sleep apnea, No Liver Disease, No STD, No Enlarged Prostate, Yes Other (BLOOD     CLOTS)      Hematology/oncology:  DENIES HX OF: Previous Treatment for CA, Anemia, Bladder     Cancer, Blood cancer, Brain cancer, Breast cancer, Cervical cancer, Coagulopathy    , Colorectal cancer, Endocrine cancer, Eye cancer, GI cancer,  cancer, Kidney     cancer, Leukemia, Leukocytosis, Leukopenia, Liver cancer, Lung cancer, Lymphoma    , Musculoskeletal cancer, Myeloma, Neurologic cancer, Oral cancer, Ovarian     cancer, Skin cancer, Stomach cancer, Thrombocytopenia, Thyroid cancer, Uterine     cancer, Other cancer history, Other hematologic history      Genetic/metabolic:  DENIES HX OF: Cystic fibrosis, Down syndrome, Other genetic     history, Other metabolic history            Past Surgical History      REPORTS HX OF: Hysterectomy, Other Past Surgical Hx (GREEN FILTER- BLOOD CLOT     REMOVAL, BLADDER TACK), DENIES HX OF: Cataract extraction, Thyroid surgery,     Lung biopsy, CABG surgery, Coronary stent, Valve replacement, Appendectomy,     Cholecystectomy, Splenectomy, Bladder surgery, Nephrectomy, Joint replacement,     Frature repair, Skin cancer removal, Melanoma  excision, Spinal surgery, Breast     biopsy, Lumpectomy, Mastectomy, bilateral, Mastectomy, right, Mastectomy, left,     Peg Tube Placement, VAD Placement, Biopsy            Family History      REPORTS HX OF: Anemia (MOTHER), Colorectal cancer (MOTHER), Thyroid cancer (    MOTHER), DENIES HX OF: Blood disorders, Blood Cancer, Breast cancer, Cervical     cancer, Coagulopathy, Endocrine Cancer, Eye Cancer, GI Cancer,  Cancer,     Kidney Cancer, Leukemia, Leukocytosis, Leukopenia, Liver Cancer, Lung cancer,     Lymphoma, Melanoma, Musculoskeletal Cancer, Myeloma, Neurologic Cancer, Oral     Cancer, Ovarian cancer, Prostate cancer, Skin Cancer, Stomach Cancer,     Testicular Cancer, Thrombocytopenia, Uterine cancer, Other Cancer History,     Other Hematology History            Social History      Lives independently:  Yes            Tobacco Use      Tobacco status:  Never smoker            Alcohol Use      Alcohol intake:  None      Counseling given:  None            Substance Use      Substance use:  Denies use      Counseling given:  None            REVIEW OF SYSTEMS      General:  Complains of: Fatigue, Denies: Appetite change, Excessive sweating,     Fever, Night sweats, Weight gain, Weight loss, Other      Eyes:  Denies: Blurred vision, Corrective lenses, Diplopia, Eye irritation, Eye     pain, Eye redness, Spots in vision, Vision loss, Other      Ears, nose, mouth, throat:  Denies: Headache, Seizures, Visual Changes, Hearing     loss, Sinus Congestion, Hoarseness, Sore throat, Other      Cardiovascular:  Denies: Chest pain, Irregular heartbeat, Palpitations, Swollen     ankles/legs, Other      Respiratory:  Denies: Chest pain, Shortness of Air, Productive cough, Coughing     blood, Other      Gastrointestinal:  Denies: Nausea, Vomiting, Problem swallowing, Frequent     heartburn, Constipation, Diarrhea, Tarry stools, Bloody stools, Unable to     control bowels, Other      Kidney/Bladder:  Denies: Painful  Urination, Change in urinary stream, Blood in     urine, Incontinence, Frequent Urination, Decreased urine stream, Other      Musculoskeletal:  Denies: New Back pain, Leg Cramps, Painful Joints, Swollen     Joints, Muscle Pain, Muscle weakness, Other      Skin:  DENIES: Jaundice, Easy Bleeding, Lesions/changes in moles, Nail changes,     Skin Discoloration, Rash, Other      Neurological:  Denies: Dizziness, Fainting, Numbness\Tingling, Paralysis,     Seizures, Other      Psychiatric:  Complains of: AAO X 3, Denies: Anxiety, Panic attacks, Depression    , Memory loss, Other      Endocrine:  DiabetesThyroid DisorderOsteoporosisEndocrine Other      Hematologic/lymphatic:  Denies: Bruising, Bleeding, Enlarged Lymph Nodes,     Recurrent infections, Other      Reproductive:  Denies Pregnant, Denies Menopause, Denies Still Menstruating,     Denies Heavy Periods, Denies Other            General Appearance:  Alert, Oriented X3, Cooperative, No acute distress      Eyes:  Moist Conjunctiva, PERRLA      HEENT:  Orophraynx clear, No Erythema, No Exudates      Neck:  Supple, Full ROM      Respiratory:  CTAB, No Diminished Breath, No Rales, No Crackels      Breast\Chest:  Symmetrical, No Nipple Discharge      Abdomen\Gastro:  Soft, No NABS, No Hernias      Cardio:  RRR, No Murmur, No, Peripheral Edema      Skin:  Normal Temperature, Normal Tone, Normal Texture and Turgor, Other (    bilateral lower extremities with arterial ulcers. Treated by wound care. )      Psychiatric:  Appropriate Affect, Intact Judgement, AAO x3      Neuro:  Cranial Nerve II-XII Inta, No Focal Sensory Deficit      Genitourinary:  No Freedman Catheter, No Bladder Distention      Muscularskeletal:  Normal Gait and Station, Full ROM of extremeties, Full     muscle strength\tone      Extremities:  No Digital Cyanosis, Pedal Pulses Intact, Pedal Pulses Symetrical    , Normal Gait and station      Lymphatic:  Other (Lymphadenopathy seen on CT scan in February 2018:  several     prominent and retropheritoneal ), No Cervical, No Infraclavicular, No Axillary            PREVENTION      Hx Influenza Vaccination:  Yes      Date Influenza Vaccine Given:  Oct 1, 2017      Influenza Vaccine Declined:  No      2 or More Falls Past Year?:  No      Fall Past Year with Injury?:  No      Hx Pneumococcal Vaccination:  Yes      Encouraged to follow-up with:  PCP regarding preventative exams.      Chart initiated by      BRITTANY SIMMONS CMA                 Disclaimer: Converted document may not contain table formatting or lab diagrams. Please see [employer] [facility] for the authenticated document.

## 2021-06-03 NOTE — CONSULTS
Patient: JOSÉ PETERSON     Acct: [id number]     Report: #[id number]  MR #:  [id number]     DOS: 2021     : 1955  DICTATING: IOANA GRANGER  ***Signed***  --------------------------------------------------------------------------------------------------------------------                              [facility]                          [place], [place]  [address]           __________________________________________________________________________         Patient Name:                   Attending Physician:    José Peterson [id number] [facility]         Patient Visit # MR #            Admit Date  Disch Date     Location    [id number]    [id number]      05/10/2021                 SLEEP- -         Date of Birth    1955    __________________________________________________________________________    0814 - SLEEP OFFICE VISIT         DATE OF SERVICE:  05/10/2021         REASON FOR VISIT:    Sleep apnea.         INTERIM HISTORY:    This is a 65 year old female who is here for followup.  I saw her in 2021, but at that time, she was unable to use the CPAP because she    recently had COVID infection and was unable to and now she is recovering and    she is here for compliance check.  She reports that she is using the machine    on a regular basis, as before, and feels much better.  She says she still has    some fatigue.  She already had COVID vaccine.  Normally, she goes to bed    around 12:00 midnight and wakes up around 8:00 a.m.  She wakes up one time to    go to the bathroom. She feels more rested, but has daytime fatigue.         PAST MEDICAL HISTORY:    1.  History of sleep apnea with AHI of 7.5 per hour and REM is 62 per hour on    a CPAP for 10 cm.    2.  Hypertension.    3.  Hypothyroidism.    4.  Heart disease.    5.  History of recent COVID infection in 2020.          SOCIAL HISTORY:    She does not smoke cigarettes. She does not drink alcohol. She drinks about    three cups of coffee.         REVIEW OF SYSTEMS:    She complains of shortness of breath and fatigue with abdominal bloating, but    does not have any nasal congestion or postnasal drip.         PHYSICAL EXAMINATION:    VITAL SIGNS: Her blood pressure is 102/64.  Heart rate is 113.  Body mass    index is 41.  She weighs 248 pounds.    GENERAL: This is an adult female who is very pleasant.    RESPIRATORY:  Breath sounds are equal on both sides.  No wheezes or crackles.    CARDIOVASCULAR:  Heart rate is regular without murmur. There is no S3.    EXTREMITIES:  No cyanosis or clubbing.         DOWNLOAD DATA:    I have reviewed the download with the patient.  The download shows the    following.         For the past six weeks, her compliance is 86%.  Average use is about 4 hours    and 33 minutes, more than 4 hours usage also is 70% and residual AHI is 1.5    per hour, less than 5 is normal on CPAP of 10 cm.  Her DME company is    [facility] [employer] and she uses a full face mask.         ASSESSMENT/PLAN:    1.  Obstructive sleep apnea.  The obstructive sleep apnea is well treated on    the CPAP.  I have talked to the patient about the compliance data and    encouraged her to continue to use the CPAP.  CPAP is benefiting the patient    and is medically necessary.  I have also talked to her about getting the    supplies.  I sent a prescription to [facility] to get her supplies.    2.  Fatigue secondary to COVID recent infection and she is recovering.    3.  Snoring, resolved.    4.  Obesity.  Her body mass index is 41.  Again, I have talked to her about    weight reduction, as she is recovering and encouraged to lose weight, as it    is going to improve her severity of sleep apnea.         To be electronically signed in [email or web address]    [id number] IOANA GRANGER M.D.         RR:true    D:  05/10/2021 11:35    T:  05/11/2021 22:15    #[id number]          CC: SLEEP LAB         Until signed, this is an unconfirmed preliminary report that may contain    errors and is subject to change.         Electronically signed by IOANA GRANGER  05/12/2021 13:57     Disclaimer: Converted hospital document may not contain table formatting or lab diagrams. Please see [employer] [facility] for authenticated document.

## 2021-09-13 ENCOUNTER — OFFICE VISIT (OUTPATIENT)
Dept: CARDIOLOGY | Facility: CLINIC | Age: 66
End: 2021-09-13

## 2021-09-13 VITALS
HEART RATE: 91 BPM | BODY MASS INDEX: 47.91 KG/M2 | WEIGHT: 244 LBS | SYSTOLIC BLOOD PRESSURE: 113 MMHG | HEIGHT: 60 IN | DIASTOLIC BLOOD PRESSURE: 59 MMHG

## 2021-09-13 DIAGNOSIS — I25.10 CORONARY ARTERY DISEASE INVOLVING NATIVE CORONARY ARTERY OF NATIVE HEART WITHOUT ANGINA PECTORIS: Primary | ICD-10-CM

## 2021-09-13 DIAGNOSIS — I48.0 PAROXYSMAL ATRIAL FIBRILLATION (HCC): ICD-10-CM

## 2021-09-13 DIAGNOSIS — E78.5 HYPERLIPIDEMIA LDL GOAL <70: ICD-10-CM

## 2021-09-13 DIAGNOSIS — I10 ESSENTIAL HYPERTENSION: ICD-10-CM

## 2021-09-13 PROCEDURE — 99214 OFFICE O/P EST MOD 30 MIN: CPT | Performed by: NURSE PRACTITIONER

## 2021-09-13 RX ORDER — TELMISARTAN 80 MG/1
160 TABLET ORAL DAILY
COMMUNITY
Start: 2021-06-21 | End: 2022-01-03 | Stop reason: SDUPTHER

## 2021-09-13 RX ORDER — ROSUVASTATIN CALCIUM 40 MG/1
40 TABLET, COATED ORAL DAILY
COMMUNITY
Start: 2021-06-21 | End: 2022-01-03

## 2021-09-13 RX ORDER — HYDROCHLOROTHIAZIDE 25 MG/1
25 TABLET ORAL DAILY
COMMUNITY
Start: 2021-06-21 | End: 2022-01-03 | Stop reason: SDUPTHER

## 2021-09-13 RX ORDER — LEVOTHYROXINE SODIUM 88 UG/1
88 TABLET ORAL DAILY
COMMUNITY
Start: 2021-06-21 | End: 2022-01-03 | Stop reason: SDUPTHER

## 2021-09-13 RX ORDER — ATENOLOL 100 MG/1
150 TABLET ORAL 2 TIMES DAILY
COMMUNITY
Start: 2021-06-21 | End: 2022-01-03 | Stop reason: SDUPTHER

## 2021-09-13 RX ORDER — ASPIRIN 81 MG/1
81 TABLET ORAL DAILY
COMMUNITY

## 2021-09-13 RX ORDER — FUROSEMIDE 20 MG/1
TABLET ORAL
COMMUNITY
End: 2022-01-03 | Stop reason: SDUPTHER

## 2021-09-13 RX ORDER — ALBUTEROL SULFATE 90 UG/1
AEROSOL, METERED RESPIRATORY (INHALATION)
COMMUNITY
End: 2022-01-03 | Stop reason: SDUPTHER

## 2021-09-13 NOTE — PROGRESS NOTES
Chief Complaint  Follow-up    Subjective            History of Present Illness  Gifty Hoover is a 65-year-old white/ female patient who presents to the office today for follow-up.  She has history of coronary artery disease, paroxysmal atrial fib, hypertension and hyperlipidemia.  She denies any issues or problems today.  She reports compliance with all her medications.  She denies any chest pain, shortness of breath, lightheadedness, pedal edema, or palpitations.  Her last echocardiogram was 2019 and showed grossly normal ejection fraction of 55% and no valvular disease.    PMH  Past Medical History:   Diagnosis Date   • Anxiety    • Arthritis    • Bladder problem    • CAD 2021   • Depression    • Essential hypertension 2021   • Fracture of distal end of left fibula 10/29/2015    closed fracture, intitial encounter   • Fracture of distal fibula    • Heart attack    • History of pulmonary embolism    • Hyperlipidemia LDL goal <70 2021   • Lumbar radiculopathy 2018    describes sensory S1 radiculopathy but would favor right L5   • Paroxysmal atrial fibrillation 2021   • Seasonal allergies    • Thyroid disorder    • Vaginal bleeding problems          ALLERGY  No Known Allergies       SURGICALHX  Past Surgical History:   Procedure Laterality Date   • BLADDER SURGERY     • CARDIAC CATHETERIZATION     • CARDIAC SURGERY      Open heart surgery   •  SECTION     • CHOLECYSTECTOMY     • COLONOSCOPY     • NICHOLAS FILTER INSERTION JUGULAR     • HYSTERECTOMY            SOC  Social History     Socioeconomic History   • Marital status:      Spouse name: Not on file   • Number of children: Not on file   • Years of education: Not on file   • Highest education level: Not on file   Tobacco Use   • Smoking status: Never Smoker   • Smokeless tobacco: Never Used   Vaping Use   • Vaping Use: Never used   Substance and Sexual Activity   • Alcohol use: Never  "  • Drug use: Never   • Sexual activity: Defer         FAMHX  Family History   Problem Relation Age of Onset   • Stroke Mother    • Cancer Mother    • Colon cancer Mother 71        still living at 73   • Diabetes Mother    • Arthritis Mother    • Stroke Father    • Arthritis Father    • Osteoporosis Father    • Cancer Sister    • Diabetes Brother    • Stroke Brother    • Bleeding Disorder Daughter           MEDSIGONLY  Current Outpatient Medications on File Prior to Visit   Medication Sig   • albuterol sulfate HFA (ProAir HFA) 108 (90 Base) MCG/ACT inhaler ProAir HFA 90 mcg/actuation inhalation HFA aerosol inhaler inhale 1 - 2 puffs (90 - 180 mcg) by inhalation route every 6 hours as needed   Active   • aspirin (aspirin) 81 MG EC tablet aspirin 81 mg oral tablet,delayed release (DR/EC) take 1 tablet by oral route daily   Active   • furosemide (LASIX) 20 MG tablet furosemide 20 mg tablet   TAKE ONE TABLET BY MOUTH EVERY DAY   • hydroCHLOROthiazide (HYDRODIURIL) 25 MG tablet Take 25 mg by mouth Daily.   • levothyroxine (SYNTHROID, LEVOTHROID) 88 MCG tablet Take 88 mcg by mouth Daily.   • Pradaxa 150 MG capsu Take 150 mg by mouth 2 (Two) Times a Day.   • rosuvastatin (CRESTOR) 40 MG tablet Take 40 mg by mouth Daily.   • telmisartan (MICARDIS) 80 MG tablet Take 160 mg by mouth Daily.     No current facility-administered medications on file prior to visit.         Objective   /59   Pulse 91   Ht 152.4 cm (60\")   Wt 111 kg (244 lb)   BMI 47.65 kg/m²     Physical Exam  Constitutional:       Appearance: She is obese.   HENT:      Head: Normocephalic.   Neck:      Vascular: No carotid bruit.   Cardiovascular:      Rate and Rhythm: Normal rate and regular rhythm.      Pulses: Normal pulses.      Heart sounds: Normal heart sounds. No murmur heard.     Pulmonary:      Effort: Pulmonary effort is normal.      Breath sounds: Normal breath sounds.   Musculoskeletal:      Cervical back: Neck supple.      Right lower leg: " No edema.      Left lower leg: No edema.   Skin:     General: Skin is dry.      Capillary Refill: Capillary refill takes less than 2 seconds.   Neurological:      Mental Status: She is alert and oriented to person, place, and time.   Psychiatric:         Mood and Affect: Mood normal.         Behavior: Behavior normal.         Result Review :   The following data was reviewed by: TIEN Peace on 09/13/2021:  No results found for: PROBNP       No results found for: TSH   No results found for: FREET4   No results found for: DDIMERQUANT  No results found for: MG   No results found for: DIGOXIN   No results found for: TROPONINT        She reports no recent labs within the last year.       Assessment and Plan    Diagnoses and all orders for this visit:    1. CAD (Primary)  Currently without anginal symptoms, continue aspirin 81 mg daily.    2. Paroxysmal atrial fibrillation  She denies any racing heart or palpitations.  Continue Pradaxa 150 mg twice daily for CVA prevention.    3. Essential hypertension  Currently controlled and without adverse effect of medication.  Continue Lasix 20 mg daily, hydrochlorothiazide 25 mg daily and telmisartan 160 mg daily.  Obtain BMP to evaluate renal function.  -     Basic Metabolic Panel; Future    4. Hyperlipidemia LDL goal <70  Last lipid panel was over a year ago.  Continue rosuvastatin 40 mg nightly for now and obtain new fasting lipid panel.  -     Lipid Panel; Future  -     Hepatic Function Panel; Future        Follow Up   Return in about 6 months (around 3/13/2022) for Follow up with Dr Zavala.    Patient was given instructions and counseling regarding her condition or for health maintenance advice. Please see specific information pulled into the AVS if appropriate.       Gifty Hoover  reports that she has never smoked. She has never used smokeless tobacco.           TIEN Peace  09/13/21  15:23 EDT    Dictated Utilizing Dragon Dictation

## 2021-09-13 NOTE — PATIENT INSTRUCTIONS
"Low-Sodium Eating Plan  Sodium, which is an element that makes up salt, helps you maintain a healthy balance of fluids in your body. Too much sodium can increase your blood pressure and cause fluid and waste to be held in your body.  Your health care provider or dietitian may recommend following this plan if you have high blood pressure (hypertension), kidney disease, liver disease, or heart failure. Eating less sodium can help lower your blood pressure, reduce swelling, and protect your heart, liver, and kidneys.  What are tips for following this plan?  Reading food labels  · The Nutrition Facts label lists the amount of sodium in one serving of the food. If you eat more than one serving, you must multiply the listed amount of sodium by the number of servings.  · Choose foods with less than 140 mg of sodium per serving.  · Avoid foods with 300 mg of sodium or more per serving.  Shopping    · Look for lower-sodium products, often labeled as \"low-sodium\" or \"no salt added.\"  · Always check the sodium content, even if foods are labeled as \"unsalted\" or \"no salt added.\"  · Buy fresh foods.  ? Avoid canned foods and pre-made or frozen meals.  ? Avoid canned, cured, or processed meats.  · Buy breads that have less than 80 mg of sodium per slice.    Cooking    · Eat more home-cooked food and less restaurant, buffet, and fast food.  · Avoid adding salt when cooking. Use salt-free seasonings or herbs instead of table salt or sea salt. Check with your health care provider or pharmacist before using salt substitutes.  · Cook with plant-based oils, such as canola, sunflower, or olive oil.    Meal planning  · When eating at a restaurant, ask that your food be prepared with less salt or no salt, if possible. Avoid dishes labeled as brined, pickled, cured, smoked, or made with soy sauce, miso, or teriyaki sauce.  · Avoid foods that contain MSG (monosodium glutamate). MSG is sometimes added to Chinese food, bouillon, and some " "canned foods.  · Make meals that can be grilled, baked, poached, roasted, or steamed. These are generally made with less sodium.  General information  Most people on this plan should limit their sodium intake to 1,500-2,000 mg (milligrams) of sodium each day.  What foods should I eat?  Fruits  Fresh, frozen, or canned fruit. Fruit juice.  Vegetables  Fresh or frozen vegetables. \"No salt added\" canned vegetables. \"No salt added\" tomato sauce and paste. Low-sodium or reduced-sodium tomato and vegetable juice.  Grains  Low-sodium cereals, including oats, puffed wheat and rice, and shredded wheat. Low-sodium crackers. Unsalted rice. Unsalted pasta. Low-sodium bread. Whole-grain breads and whole-grain pasta.  Meats and other proteins  Fresh or frozen (no salt added) meat, poultry, seafood, and fish. Low-sodium canned tuna and salmon. Unsalted nuts. Dried peas, beans, and lentils without added salt. Unsalted canned beans. Eggs. Unsalted nut butters.  Dairy  Milk. Soy milk. Cheese that is naturally low in sodium, such as ricotta cheese, fresh mozzarella, or Swiss cheese. Low-sodium or reduced-sodium cheese. Cream cheese. Yogurt.  Seasonings and condiments  Fresh and dried herbs and spices. Salt-free seasonings. Low-sodium mustard and ketchup. Sodium-free salad dressing. Sodium-free light mayonnaise. Fresh or refrigerated horseradish. Lemon juice. Vinegar.  Other foods  Homemade, reduced-sodium, or low-sodium soups. Unsalted popcorn and pretzels. Low-salt or salt-free chips.  The items listed above may not be a complete list of foods and beverages you can eat. Contact a dietitian for more information.  What foods should I avoid?  Vegetables  Sauerkraut, pickled vegetables, and relishes. Olives. French fries. Onion rings. Regular canned vegetables (not low-sodium or reduced-sodium). Regular canned tomato sauce and paste (not low-sodium or reduced-sodium). Regular tomato and vegetable juice (not low-sodium or reduced-sodium). " Frozen vegetables in sauces.  Grains  Instant hot cereals. Bread stuffing, pancake, and biscuit mixes. Croutons. Seasoned rice or pasta mixes. Noodle soup cups. Boxed or frozen macaroni and cheese. Regular salted crackers. Self-rising flour.  Meats and other proteins  Meat or fish that is salted, canned, smoked, spiced, or pickled. Precooked or cured meat, such as sausages or meat loaves. Meeks. Ham. Pepperoni. Hot dogs. Corned beef. Chipped beef. Salt pork. Jerky. Pickled herring. Anchovies and sardines. Regular canned tuna. Salted nuts.  Dairy  Processed cheese and cheese spreads. Hard cheeses. Cheese curds. Blue cheese. Feta cheese. String cheese. Regular cottage cheese. Buttermilk. Canned milk.  Fats and oils  Salted butter. Regular margarine. Ghee. Meeks fat.  Seasonings and condiments  Onion salt, garlic salt, seasoned salt, table salt, and sea salt. Canned and packaged gravies. Worcestershire sauce. Tartar sauce. Barbecue sauce. Teriyaki sauce. Soy sauce, including reduced-sodium. Steak sauce. Fish sauce. Oyster sauce. Cocktail sauce. Horseradish that you find on the shelf. Regular ketchup and mustard. Meat flavorings and tenderizers. Bouillon cubes. Hot sauce. Pre-made or packaged marinades. Pre-made or packaged taco seasonings. Relishes. Regular salad dressings. Salsa.  Other foods  Salted popcorn and pretzels. Corn chips and puffs. Potato and tortilla chips. Canned or dried soups. Pizza. Frozen entrees and pot pies.  The items listed above may not be a complete list of foods and beverages you should avoid. Contact a dietitian for more information.  Summary  · Eating less sodium can help lower your blood pressure, reduce swelling, and protect your heart, liver, and kidneys.  · Most people on this plan should limit their sodium intake to 1,500-2,000 mg (milligrams) of sodium each day.  · Canned, boxed, and frozen foods are high in sodium. Restaurant foods, fast foods, and pizza are also very high in sodium.  You also get sodium by adding salt to food.  · Try to cook at home, eat more fresh fruits and vegetables, and eat less fast food and canned, processed, or prepared foods.  This information is not intended to replace advice given to you by your health care provider. Make sure you discuss any questions you have with your health care provider.  Document Revised: 01/22/2021 Document Reviewed: 11/18/2020  [employer] Patient Education © 2021 [employer]      Cholesterol Content in Foods  Cholesterol is a waxy, fat-like substance that helps to carry fat in the blood. The body needs cholesterol in small amounts, but too much cholesterol can cause damage to the arteries and heart.  Most people should eat less than 200 milligrams (mg) of cholesterol a day.  Foods with cholesterol    Cholesterol is found in animal-based foods, such as meat, seafood, and dairy. Generally, low-fat dairy and lean meats have less cholesterol than full-fat dairy and fatty meats. The milligrams of cholesterol per serving (mg per serving) of common cholesterol-containing foods are listed below.  Meat and other proteins  · Egg -- one large whole egg has 186 mg.  · Veal shank -- 4 oz has 141 mg.  · Lean ground turkey (93% lean) -- 4 oz has 118 mg.  · Fat-trimmed lamb loin -- 4 oz has 106 mg.  · Lean ground beef (90% lean) -- 4 oz has 100 mg.  · Lobster -- 3.5 oz has 90 mg.  · Pork loin chops -- 4 oz has 86 mg.  · Canned salmon -- 3.5 oz has 83 mg.  · Fat-trimmed beef top loin -- 4 oz has 78 mg.  · Frankfurter -- 1 edil (3.5 oz) has 77 mg.  · Crab -- 3.5 oz has 71 mg.  · Roasted chicken without skin, white meat -- 4 oz has 66 mg.  · Light bologna -- 2 oz has 45 mg.  · Deli-cut turkey -- 2 oz has 31 mg.  · Canned tuna -- 3.5 oz has 31 mg.  · Meeks -- 1 oz has 29 mg.  · Oysters and mussels (raw) -- 3.5 oz has 25 mg.  · Mackerel -- 1 oz has 22 mg.  · Trout -- 1 oz has 20 mg.  · Pork sausage -- 1 link (1 oz) has 17 mg.  · [place] -- 1 oz has 16  mg.  · Tilapia -- 1 oz has 14 mg.  Dairy  · Soft-serve ice cream -- ½ cup (4 oz) has 103 mg.  · Whole-milk yogurt -- 1 cup (8 oz) has 29 mg.  · Cheddar cheese -- 1 oz has 28 mg.  · American cheese -- 1 oz has 28 mg.  · Whole milk -- 1 cup (8 oz) has 23 mg.  · 2% milk -- 1 cup (8 oz) has 18 mg.  · Cream cheese -- 1 tablespoon (Tbsp) has 15 mg.  · Cottage cheese -- ½ cup (4 oz) has 14 mg.  · Low-fat (1%) milk -- 1 cup (8 oz) has 10 mg.  · Sour cream -- 1 Tbsp has 8.5 mg.  · Low-fat yogurt -- 1 cup (8 oz) has 8 mg.  · Nonfat Greek yogurt -- 1 cup (8 oz) has 7 mg.  · Half-and-half cream -- 1 Tbsp has 5 mg.  Fats and oils  · Cod liver oil -- 1 tablespoon (Tbsp) has 82 mg.  · Butter -- 1 Tbsp has 15 mg.  · Lard -- 1 Tbsp has 14 mg.  · Meeks grease -- 1 Tbsp has 14 mg.  · Mayonnaise -- 1 Tbsp has 5-10 mg.  · Margarine -- 1 Tbsp has 3-10 mg.  Exact amounts of cholesterol in these foods may vary depending on specific ingredients and brands.  Foods without cholesterol  Most plant-based foods do not have cholesterol unless you combine them with a food that has cholesterol. Foods without cholesterol include:  · Grains and cereals.  · Vegetables.  · Fruits.  · Vegetable oils, such as olive, canola, and sunflower oil.  · Legumes, such as peas, beans, and lentils.  · Nuts and seeds.  · Egg whites.  Summary  · The body needs cholesterol in small amounts, but too much cholesterol can cause damage to the arteries and heart.  · Most people should eat less than 200 milligrams (mg) of cholesterol a day.  This information is not intended to replace advice given to you by your health care provider. Make sure you discuss any questions you have with your health care provider.  Document Revised: 05/10/2021 Document Reviewed: 05/10/2021  [employer] Patient Education © 2021 [employer]

## 2021-09-14 PROBLEM — I10 ESSENTIAL HYPERTENSION: Status: ACTIVE | Noted: 2021-09-14

## 2021-09-14 PROBLEM — E78.5 HYPERLIPIDEMIA LDL GOAL <70: Status: ACTIVE | Noted: 2021-09-14

## 2021-09-14 PROBLEM — I48.0 PAROXYSMAL ATRIAL FIBRILLATION: Status: ACTIVE | Noted: 2021-09-14

## 2021-09-14 PROBLEM — I25.10 CORONARY ARTERY DISEASE INVOLVING NATIVE CORONARY ARTERY OF NATIVE HEART WITHOUT ANGINA PECTORIS: Status: ACTIVE | Noted: 2021-09-14

## 2021-09-16 ENCOUNTER — TELEPHONE (OUTPATIENT)
Dept: FAMILY MEDICINE CLINIC | Facility: CLINIC | Age: 66
End: 2021-09-16

## 2021-09-16 NOTE — TELEPHONE ENCOUNTER
Caller: Gifty Hoover    Relationship to patient: Self    Best call back number: [phone]    Patient is needing: PATIENT CALLED IN AND SAID BEFORE HER OFFICE VISIT ON 10/4/21 SHE WOULD LIKE TO HAVE HER MEDICAL RECORDS SENT FROM DR. FANG'S OFFICE. SHE CONTACTED THAT OFFICE AND WAS TOLD THAT THEY NEED A RELEASE OF RECORDS REQUEST FROM THIS OFFICE. PLEASE CALL PATIENT AND ADVISE.

## 2021-09-17 ENCOUNTER — LAB (OUTPATIENT)
Dept: LAB | Facility: HOSPITAL | Age: 66
End: 2021-09-17

## 2021-09-17 DIAGNOSIS — I10 ESSENTIAL HYPERTENSION: ICD-10-CM

## 2021-09-17 DIAGNOSIS — E78.5 HYPERLIPIDEMIA LDL GOAL <70: ICD-10-CM

## 2021-09-17 LAB
ALBUMIN SERPL-MCNC: 4 G/DL (ref 3.5–5.2)
ALP SERPL-CCNC: 81 U/L (ref 39–117)
ALT SERPL W P-5'-P-CCNC: 17 U/L (ref 1–33)
ANION GAP SERPL CALCULATED.3IONS-SCNC: 10.8 MMOL/L (ref 5–15)
AST SERPL-CCNC: 12 U/L (ref 1–32)
BILIRUB CONJ SERPL-MCNC: <0.2 MG/DL (ref 0–0.3)
BILIRUB INDIRECT SERPL-MCNC: NORMAL MG/DL
BILIRUB SERPL-MCNC: 0.4 MG/DL (ref 0–1.2)
BUN SERPL-MCNC: 27 MG/DL (ref 8–23)
BUN/CREAT SERPL: 17.2 (ref 7–25)
CALCIUM SPEC-SCNC: 9.5 MG/DL (ref 8.6–10.5)
CHLORIDE SERPL-SCNC: 103 MMOL/L (ref 98–107)
CHOLEST SERPL-MCNC: 178 MG/DL (ref 0–200)
CO2 SERPL-SCNC: 25.2 MMOL/L (ref 22–29)
CREAT SERPL-MCNC: 1.57 MG/DL (ref 0.57–1)
GFR SERPL CREATININE-BSD FRML MDRD: 33 ML/MIN/1.73
GLUCOSE SERPL-MCNC: 138 MG/DL (ref 65–99)
HDLC SERPL-MCNC: 42 MG/DL (ref 40–60)
LDLC SERPL CALC-MCNC: 119 MG/DL (ref 0–100)
LDLC/HDLC SERPL: 2.79 {RATIO}
POTASSIUM SERPL-SCNC: 4.3 MMOL/L (ref 3.5–5.2)
PROT SERPL-MCNC: 6.7 G/DL (ref 6–8.5)
SODIUM SERPL-SCNC: 139 MMOL/L (ref 136–145)
TRIGL SERPL-MCNC: 94 MG/DL (ref 0–150)
VLDLC SERPL-MCNC: 17 MG/DL (ref 5–40)

## 2021-09-17 PROCEDURE — 80048 BASIC METABOLIC PNL TOTAL CA: CPT

## 2021-09-17 PROCEDURE — 80076 HEPATIC FUNCTION PANEL: CPT

## 2021-09-17 PROCEDURE — 36415 COLL VENOUS BLD VENIPUNCTURE: CPT

## 2021-09-17 PROCEDURE — 80061 LIPID PANEL: CPT

## 2021-09-17 NOTE — TELEPHONE ENCOUNTER
After looking in the pt's past office notes, looks like we are able to view previous office notes from Dr. Oden going back to 5/28/20. This includes labs. Called pt advised office notes are in our system from Dr. Oden's office and after being seen for her 1st visit if something additional is needed then will request. Did advise pt she may need to have Mammo/Dexa if she has not had one since 2016 (as seen in her chart). Pt said that is why she is changing providers.

## 2021-09-21 ENCOUNTER — TELEPHONE (OUTPATIENT)
Dept: CARDIOLOGY | Facility: CLINIC | Age: 66
End: 2021-09-21

## 2021-09-21 DIAGNOSIS — E78.5 HYPERLIPIDEMIA LDL GOAL <70: Primary | ICD-10-CM

## 2021-09-21 NOTE — TELEPHONE ENCOUNTER
----- Message from TIEN Francis sent at 9/17/2021 11:24 PM EDT -----  LDL is too high, add zetia 10mg daily. Recheck lipid panel in 3 months

## 2021-09-28 RX ORDER — EZETIMIBE 10 MG/1
10 TABLET ORAL DAILY
Qty: 90 TABLET | Refills: 3 | Status: SHIPPED | OUTPATIENT
Start: 2021-09-28 | End: 2022-09-16 | Stop reason: SDUPTHER

## 2021-10-08 PROBLEM — N32.9 BLADDER PROBLEM: Status: ACTIVE | Noted: 2021-10-08

## 2021-10-08 PROBLEM — I26.99 PULMONARY EMBOLISM (HCC): Status: ACTIVE | Noted: 2021-10-08

## 2021-10-08 PROBLEM — R25.2 MUSCLE CRAMPS: Status: ACTIVE | Noted: 2021-10-08

## 2021-10-08 PROBLEM — M54.16 LUMBAR RADICULOPATHY: Status: ACTIVE | Noted: 2018-09-20

## 2021-10-08 PROBLEM — M79.606 LEG PAIN: Status: ACTIVE | Noted: 2021-10-08

## 2021-10-08 PROBLEM — M79.89 LIMB SWELLING: Status: ACTIVE | Noted: 2021-10-08

## 2021-10-08 PROBLEM — M54.50 LOW BACK PAIN: Status: ACTIVE | Noted: 2018-08-16

## 2021-10-08 PROBLEM — M19.90 ARTHRITIS: Status: ACTIVE | Noted: 2021-10-08

## 2021-10-08 PROBLEM — S82.899A CLOSED FRACTURE OF ANKLE: Status: ACTIVE | Noted: 2021-10-08

## 2021-10-08 PROBLEM — M25.559 HIP PAIN: Status: ACTIVE | Noted: 2018-07-31

## 2021-10-08 PROBLEM — U07.1 COVID-19: Status: ACTIVE | Noted: 2020-12-16

## 2021-10-08 PROBLEM — J30.2 SEASONAL ALLERGIC RHINITIS: Status: ACTIVE | Noted: 2021-10-08

## 2021-10-08 PROBLEM — F41.9 ANXIETY: Status: ACTIVE | Noted: 2021-10-08

## 2021-10-08 PROBLEM — J20.0 ACUTE BRONCHITIS DUE TO MYCOPLASMA PNEUMONIAE: Status: ACTIVE | Noted: 2017-06-29

## 2021-10-08 PROBLEM — I21.9 HEART ATTACK: Status: ACTIVE | Noted: 2021-10-08

## 2021-10-08 PROBLEM — E07.9 THYROID DISORDER: Status: ACTIVE | Noted: 2021-10-08

## 2021-10-08 PROBLEM — F32.A DEPRESSION: Status: ACTIVE | Noted: 2021-10-08

## 2021-10-08 PROBLEM — R06.02 SHORTNESS OF BREATH: Status: ACTIVE | Noted: 2021-10-08

## 2021-12-30 ENCOUNTER — LAB (OUTPATIENT)
Dept: LAB | Facility: HOSPITAL | Age: 66
End: 2021-12-30

## 2021-12-30 DIAGNOSIS — E78.5 HYPERLIPIDEMIA LDL GOAL <70: ICD-10-CM

## 2021-12-30 LAB
CHOLEST SERPL-MCNC: 109 MG/DL (ref 0–200)
HDLC SERPL-MCNC: 55 MG/DL (ref 40–60)
LDLC SERPL CALC-MCNC: 38 MG/DL (ref 0–100)
LDLC/HDLC SERPL: 0.7 {RATIO}
TRIGL SERPL-MCNC: 77 MG/DL (ref 0–150)
VLDLC SERPL-MCNC: 16 MG/DL (ref 5–40)

## 2021-12-30 PROCEDURE — 80061 LIPID PANEL: CPT

## 2021-12-30 PROCEDURE — 36415 COLL VENOUS BLD VENIPUNCTURE: CPT

## 2022-01-03 ENCOUNTER — OFFICE VISIT (OUTPATIENT)
Dept: FAMILY MEDICINE CLINIC | Facility: CLINIC | Age: 67
End: 2022-01-03

## 2022-01-03 VITALS
SYSTOLIC BLOOD PRESSURE: 108 MMHG | WEIGHT: 243 LBS | BODY MASS INDEX: 47.71 KG/M2 | TEMPERATURE: 98.6 F | DIASTOLIC BLOOD PRESSURE: 62 MMHG | HEART RATE: 96 BPM | HEIGHT: 60 IN | OXYGEN SATURATION: 99 %

## 2022-01-03 DIAGNOSIS — Z12.31 VISIT FOR SCREENING MAMMOGRAM: ICD-10-CM

## 2022-01-03 DIAGNOSIS — I10 ESSENTIAL HYPERTENSION: Primary | ICD-10-CM

## 2022-01-03 DIAGNOSIS — I10 PRIMARY HYPERTENSION: ICD-10-CM

## 2022-01-03 DIAGNOSIS — E55.9 VITAMIN D DEFICIENCY: ICD-10-CM

## 2022-01-03 DIAGNOSIS — E03.8 OTHER SPECIFIED HYPOTHYROIDISM: ICD-10-CM

## 2022-01-03 DIAGNOSIS — Z78.0 POST-MENOPAUSAL: ICD-10-CM

## 2022-01-03 DIAGNOSIS — E78.5 HYPERLIPIDEMIA LDL GOAL <70: ICD-10-CM

## 2022-01-03 PROBLEM — U07.1 COVID-19: Status: RESOLVED | Noted: 2020-12-16 | Resolved: 2022-01-03

## 2022-01-03 LAB
25(OH)D3 SERPL-MCNC: 38.4 NG/ML
TSH SERPL DL<=0.05 MIU/L-ACNC: 3.92 UIU/ML (ref 0.27–4.2)

## 2022-01-03 PROCEDURE — 84443 ASSAY THYROID STIM HORMONE: CPT | Performed by: NURSE PRACTITIONER

## 2022-01-03 PROCEDURE — 99214 OFFICE O/P EST MOD 30 MIN: CPT | Performed by: NURSE PRACTITIONER

## 2022-01-03 PROCEDURE — 82306 VITAMIN D 25 HYDROXY: CPT | Performed by: NURSE PRACTITIONER

## 2022-01-03 RX ORDER — ATENOLOL 100 MG/1
150 TABLET ORAL 2 TIMES DAILY
Qty: 180 CAPSULE | Refills: 1 | Status: SHIPPED | OUTPATIENT
Start: 2022-01-03 | End: 2022-06-30 | Stop reason: SDUPTHER

## 2022-01-03 RX ORDER — TELMISARTAN 80 MG/1
80 TABLET ORAL DAILY
Qty: 90 TABLET | Refills: 1 | Status: SHIPPED | OUTPATIENT
Start: 2022-01-03 | End: 2022-06-30 | Stop reason: SDUPTHER

## 2022-01-03 RX ORDER — LEVOTHYROXINE SODIUM 88 UG/1
88 TABLET ORAL DAILY
Qty: 90 TABLET | Refills: 1 | Status: SHIPPED | OUTPATIENT
Start: 2022-01-03 | End: 2022-06-30 | Stop reason: SDUPTHER

## 2022-01-03 RX ORDER — ALBUTEROL SULFATE 90 UG/1
2 AEROSOL, METERED RESPIRATORY (INHALATION) EVERY 6 HOURS PRN
Qty: 18 G | Refills: 0 | Status: SHIPPED | OUTPATIENT
Start: 2022-01-03 | End: 2023-01-09 | Stop reason: SDUPTHER

## 2022-01-03 RX ORDER — ROSUVASTATIN CALCIUM 40 MG/1
40 TABLET, COATED ORAL DAILY
Qty: 90 TABLET | Refills: 1 | Status: SHIPPED | OUTPATIENT
Start: 2022-01-03 | End: 2022-06-30 | Stop reason: SDUPTHER

## 2022-01-03 RX ORDER — FUROSEMIDE 20 MG/1
20 TABLET ORAL EVERY MORNING
Qty: 90 TABLET | Refills: 1 | Status: SHIPPED | OUTPATIENT
Start: 2022-01-03 | End: 2022-06-30 | Stop reason: SDUPTHER

## 2022-01-03 RX ORDER — HYDROCHLOROTHIAZIDE 25 MG/1
25 TABLET ORAL DAILY
Qty: 90 TABLET | Refills: 1 | Status: SHIPPED | OUTPATIENT
Start: 2022-01-03 | End: 2022-06-30 | Stop reason: SDUPTHER

## 2022-01-03 NOTE — PROGRESS NOTES
Chief Complaint  Establish Care (Previous PCP: Dr. Cecille Alvarenga), Back Pain, and Med Refill    Subjective          Gifty Hoover is a 66 y.o. female who presents to [facility]    History of Present Illness    HLD - well controlled, managed per cardiology. Pt denies any myalgias with crestor.     HTN- pt admits to being dizzy at times. Pt reports mostly with position change. Pt has noted low b/p at home no elevated pressures.     Back pain chronic- worse with walking long distances and pt will get sob.     Intermittant SOB - pt uses albuterol approx 1-2 times per month.       PHQ-2 Total Score: 1   PHQ-9 Total Score: 1       Review of Systems   Constitutional: Negative for fatigue.   Respiratory: Negative for cough and shortness of breath.    Cardiovascular: Negative for chest pain and palpitations.   Gastrointestinal: Negative for nausea and vomiting.   Endocrine: Negative for cold intolerance and heat intolerance.   Genitourinary: Negative for difficulty urinating and dysuria.   Musculoskeletal: Positive for back pain. Negative for arthralgias, gait problem and joint swelling.   Neurological: Positive for dizziness. Negative for seizures and headaches.   Hematological: Negative for adenopathy. Does not bruise/bleed easily.   Psychiatric/Behavioral: Negative for confusion, dysphoric mood and sleep disturbance. The patient is not nervous/anxious.           Medical History: has a past medical history of Anxiety, Arthritis, Bladder problem, CAD (9/14/2021), Depression, Essential hypertension (9/14/2021), Fracture of distal end of left fibula (10/29/2015), Fracture of distal fibula, Heart attack (2009), History of pulmonary embolism, Hyperlipidemia LDL goal <70 (9/14/2021), Lumbar radiculopathy (09/2018), Paroxysmal atrial fibrillation (9/14/2021), Seasonal allergies, Thyroid disorder, and Vaginal bleeding problems.     Surgical History: has a past surgical history that includes  Bladder surgery ();  section (); Cholecystectomy; Colonoscopy (); Bebo Filter Insertion Jugular; Hysterectomy (); Cardiac surgery (); and Cardiac catheterization.     Family History: family history includes Arthritis in her father and mother; Bleeding Disorder in her daughter; Cancer in her mother and sister; Colon cancer (age of onset: 71) in her mother; Diabetes in her brother and mother; Osteoporosis in her father; Stroke in her brother, father, and mother.     Social History: reports that she has never smoked. She has never used smokeless tobacco. She reports that she does not drink alcohol and does not use drugs.    Allergies: Patient has no known allergies.      Health Maintenance Due   Topic Date Due   • TDAP/TD VACCINES (1 - Tdap) Never done   • ZOSTER VACCINE (1 of 2) Never done   • MAMMOGRAM  2018   • DXA SCAN  2018   • HEPATITIS C SCREENING  Never done   • ANNUAL WELLNESS VISIT  Never done   • COVID-19 Vaccine (3 - Booster for Pfizer series) 10/26/2021            Current Outpatient Medications:   •  albuterol sulfate HFA (ProAir HFA) 108 (90 Base) MCG/ACT inhaler, Inhale 2 puffs Every 6 (Six) Hours As Needed for Wheezing., Disp: 18 g, Rfl: 0  •  aspirin (aspirin) 81 MG EC tablet, aspirin 81 mg oral tablet,delayed release (DR/EC) take 1 tablet by oral route daily   Active, Disp: , Rfl:   •  ezetimibe (ZETIA) 10 MG tablet, Take 1 tablet by mouth Daily., Disp: 90 tablet, Rfl: 3  •  furosemide (LASIX) 20 MG tablet, Take 1 tablet by mouth Every Morning., Disp: 90 tablet, Rfl: 1  •  hydroCHLOROthiazide (HYDRODIURIL) 25 MG tablet, Take 1 tablet by mouth Daily., Disp: 90 tablet, Rfl: 1  •  levothyroxine (SYNTHROID, LEVOTHROID) 88 MCG tablet, Take 1 tablet by mouth Daily., Disp: 90 tablet, Rfl: 1  •  nitroglycerin (Nitrostat) 0.4 MG SL tablet, Nitrostat 0.4 mg sublingual tablet, sublingual place 1 tablet (0.4 mg) by buccal route at the first sign of an attack; no more  "than 3 tablets are recommended within a 15 minute period.   Suspended, Disp: , Rfl:   •  Pradaxa 150 MG capsu, Take 1 capsule by mouth 2 (Two) Times a Day., Disp: 180 capsule, Rfl: 1  •  raNITIdine (Zantac) 150 MG tablet, Zantac 150 mg oral tablet take 1 tablet (150 mg) by oral route once daily at bedtime   Suspended, Disp: , Rfl:   •  rosuvastatin (CRESTOR) 40 MG tablet, Take 1 tablet by mouth Daily., Disp: 90 tablet, Rfl: 1  •  telmisartan (MICARDIS) 80 MG tablet, Take 1 tablet by mouth Daily., Disp: 90 tablet, Rfl: 1      Immunization History   Administered Date(s) Administered   • COVID-19 (PFIZER) 04/03/2021, 04/26/2021   • Flu Vaccine Split Quad 11/06/2019   • Fluad Quad 65+ 10/01/2021   • Pneumococcal Polysaccharide (PPSV23) 10/13/2017         Objective       Vitals:    01/03/22 0836   BP: 108/62   BP Location: Right arm   Patient Position: Sitting   Cuff Size: Adult   Pulse: 96   Temp: 98.6 °F (37 °C)   TempSrc: Temporal   SpO2: 99%   Weight: 110 kg (243 lb)   Height: 152.4 cm (60\")      Body mass index is 47.46 kg/m².   Wt Readings from Last 3 Encounters:   01/03/22 110 kg (243 lb)   09/13/21 111 kg (244 lb)   02/25/21 110 kg (242 lb)      BP Readings from Last 3 Encounters:   01/03/22 108/62   09/13/21 113/59   02/25/21 118/70        Physical Exam  Constitutional:       General: She is not in acute distress.     Appearance: Normal appearance.   Eyes:      General: Lids are normal. No scleral icterus.        Left eye: No discharge.      Conjunctiva/sclera: Conjunctivae normal.      Pupils: Pupils are equal, round, and reactive to light.   Neck:      Thyroid: No thyroid mass, thyromegaly or thyroid tenderness.      Vascular: No carotid bruit.   Cardiovascular:      Rate and Rhythm: Normal rate.      Pulses: Normal pulses.      Heart sounds: Normal heart sounds. No murmur heard.  No friction rub. No gallop.    Pulmonary:      Effort: Pulmonary effort is normal. No retractions.      Breath sounds: Normal " breath sounds.   Abdominal:      General: Abdomen is flat. Bowel sounds are normal. There is no distension.      Palpations: Abdomen is soft.      Tenderness: There is no abdominal tenderness. There is no guarding.   Musculoskeletal:      Cervical back: Full passive range of motion without pain, normal range of motion and neck supple. No rigidity or tenderness.      Right lower le+ Edema present.      Left lower le+ Edema present.   Lymphadenopathy:      Cervical: No cervical adenopathy.   Skin:     General: Skin is warm and dry.      Findings: No lesion or rash.      Nails: There is no clubbing.   Neurological:      General: No focal deficit present.      Mental Status: She is alert and oriented to person, place, and time.      Coordination: Coordination is intact.      Gait: Gait is intact.   Psychiatric:         Attention and Perception: Attention normal.         Mood and Affect: Mood and affect normal.         Speech: Speech normal.         Behavior: Behavior normal. Behavior is cooperative.         Thought Content: Thought content normal.         Cognition and Memory: Cognition normal.         Judgment: Judgment normal.             Result Review :{Labs  Result Review  Imaging  Med Tab  Media :23}     Common labs    Common Labsle 21    0900 0900 0900    Glucose 138 (A)      BUN 27 (A)      Creatinine 1.57 (A)      eGFR Non African Am 33 (A)      Sodium 139      Potassium 4.3      Chloride 103      Calcium 9.5      Albumin   4.00    Total Bilirubin   0.4    Alkaline Phosphatase   81    AST (SGOT)   12    ALT (SGPT)   17    Total Cholesterol  178  109   Triglycerides  94  77   HDL Cholesterol  42  55   LDL Cholesterol   119 (A)  38   (A) Abnormal value              Data reviewed: mammo due             Assessment and Plan        Diagnoses and all orders for this visit:    1. Essential hypertension (Primary)  Assessment & Plan:  Patient advised to monitor blood pressure at  home and journal readings.  Patient informed that a blood pressure reading at home of more than 130/80 is considered hypertension.  For readings greater than 140/90 or higher patient is advised to follow-up in the office with readings for management.  Patient advised to limit sodium intake.      Orders:  -     telmisartan (MICARDIS) 80 MG tablet; Take 1 tablet by mouth Daily.  Dispense: 90 tablet; Refill: 1  -     furosemide (LASIX) 20 MG tablet; Take 1 tablet by mouth Every Morning.  Dispense: 90 tablet; Refill: 1  -     hydroCHLOROthiazide (HYDRODIURIL) 25 MG tablet; Take 1 tablet by mouth Daily.  Dispense: 90 tablet; Refill: 1    2. Hyperlipidemia LDL goal <70  -     rosuvastatin (CRESTOR) 40 MG tablet; Take 1 tablet by mouth Daily.  Dispense: 90 tablet; Refill: 1    3. Other specified hypothyroidism  -     TSH  -     levothyroxine (SYNTHROID, LEVOTHROID) 88 MCG tablet; Take 1 tablet by mouth Daily.  Dispense: 90 tablet; Refill: 1    4. Vitamin D deficiency  -     Vitamin D 25 Hydroxy    5. Post-menopausal  -     DEXA Bone Density Axial; Future    6. Visit for screening mammogram  -     Mammo Screening Digital Tomosynthesis Bilateral With CAD; Future    7. Primary hypertension    Other orders  -     albuterol sulfate HFA (ProAir HFA) 108 (90 Base) MCG/ACT inhaler; Inhale 2 puffs Every 6 (Six) Hours As Needed for Wheezing.  Dispense: 18 g; Refill: 0  -     Pradaxa 150 MG capsu; Take 1 capsule by mouth 2 (Two) Times a Day.  Dispense: 180 capsule; Refill: 1    Micardis  to once at nightly only. Continue water pills in the morning.       Follow Up     Return in about 6 months (around 7/3/2022) for Next scheduled follow up.    Patient was given instructions and counseling regarding her condition or for health maintenance advice. Please see specific information pulled into the AVS if appropriate.     TIEN Perez

## 2022-01-04 ENCOUNTER — PATIENT ROUNDING (BHMG ONLY) (OUTPATIENT)
Dept: FAMILY MEDICINE CLINIC | Facility: CLINIC | Age: 67
End: 2022-01-04

## 2022-03-04 ENCOUNTER — APPOINTMENT (OUTPATIENT)
Dept: MAMMOGRAPHY | Facility: HOSPITAL | Age: 67
End: 2022-03-04

## 2022-03-04 ENCOUNTER — APPOINTMENT (OUTPATIENT)
Dept: BONE DENSITY | Facility: HOSPITAL | Age: 67
End: 2022-03-04

## 2022-03-22 ENCOUNTER — OFFICE VISIT (OUTPATIENT)
Dept: CARDIOLOGY | Facility: CLINIC | Age: 67
End: 2022-03-22

## 2022-03-22 VITALS
HEART RATE: 64 BPM | HEIGHT: 60 IN | DIASTOLIC BLOOD PRESSURE: 54 MMHG | SYSTOLIC BLOOD PRESSURE: 98 MMHG | WEIGHT: 239 LBS | BODY MASS INDEX: 46.92 KG/M2

## 2022-03-22 DIAGNOSIS — I25.10 CORONARY ARTERY DISEASE INVOLVING NATIVE CORONARY ARTERY OF NATIVE HEART WITHOUT ANGINA PECTORIS: Primary | ICD-10-CM

## 2022-03-22 DIAGNOSIS — E78.5 HYPERLIPIDEMIA LDL GOAL <70: ICD-10-CM

## 2022-03-22 DIAGNOSIS — I48.0 PAROXYSMAL ATRIAL FIBRILLATION: ICD-10-CM

## 2022-03-22 DIAGNOSIS — I10 ESSENTIAL HYPERTENSION: ICD-10-CM

## 2022-03-22 PROCEDURE — 99214 OFFICE O/P EST MOD 30 MIN: CPT | Performed by: INTERNAL MEDICINE

## 2022-03-22 NOTE — ASSESSMENT & PLAN NOTE
Patient symptomatically maintained normal sinus rhythm continue with Pradaxa 150 mg twice daily for CVA prevention

## 2022-03-22 NOTE — PROGRESS NOTES
Chief Complaint  Coronary Artery Disease (Follow Up)    Subjective    Patient still experiencing fatigue issues no problems with chest pain    Past Medical History:   Diagnosis Date   • Anxiety    • Arthritis    • Bladder problem    • CAD 9/14/2021   • Depression    • Essential hypertension 9/14/2021   • Fracture of distal end of left fibula 10/29/2015    closed fracture, intitial encounter   • Fracture of distal fibula    • Heart attack 2009   • History of pulmonary embolism    • Hyperlipidemia LDL goal <70 9/14/2021   • Lumbar radiculopathy 09/2018    describes sensory S1 radiculopathy but would favor right L5   • Paroxysmal atrial fibrillation 9/14/2021   • Seasonal allergies    • Thyroid disorder    • Vaginal bleeding problems          Current Outpatient Medications:   •  albuterol sulfate HFA (ProAir HFA) 108 (90 Base) MCG/ACT inhaler, Inhale 2 puffs Every 6 (Six) Hours As Needed for Wheezing., Disp: 18 g, Rfl: 0  •  aspirin 81 MG EC tablet, aspirin 81 mg oral tablet,delayed release (DR/EC) take 1 tablet by oral route daily   Active, Disp: , Rfl:   •  ezetimibe (ZETIA) 10 MG tablet, Take 1 tablet by mouth Daily., Disp: 90 tablet, Rfl: 3  •  furosemide (LASIX) 20 MG tablet, Take 1 tablet by mouth Every Morning., Disp: 90 tablet, Rfl: 1  •  hydroCHLOROthiazide (HYDRODIURIL) 25 MG tablet, Take 1 tablet by mouth Daily., Disp: 90 tablet, Rfl: 1  •  levothyroxine (SYNTHROID, LEVOTHROID) 88 MCG tablet, Take 1 tablet by mouth Daily., Disp: 90 tablet, Rfl: 1  •  nitroglycerin (NITROSTAT) 0.4 MG SL tablet, Nitrostat 0.4 mg sublingual tablet, sublingual place 1 tablet (0.4 mg) by buccal route at the first sign of an attack; no more than 3 tablets are recommended within a 15 minute period.   Suspended, Disp: , Rfl:   •  Pradaxa 150 MG capsu, Take 1 capsule by mouth 2 (Two) Times a Day., Disp: 180 capsule, Rfl: 1  •  raNITIdine (ZANTAC) 150 MG tablet, Zantac 150 mg oral tablet take 1 tablet (150 mg) by oral route once daily  "at bedtime   Suspended, Disp: , Rfl:   •  rosuvastatin (CRESTOR) 40 MG tablet, Take 1 tablet by mouth Daily., Disp: 90 tablet, Rfl: 1  •  telmisartan (MICARDIS) 80 MG tablet, Take 1 tablet by mouth Daily., Disp: 90 tablet, Rfl: 1    There are no discontinued medications.  No Known Allergies     Social History     Tobacco Use   • Smoking status: Never Smoker   • Smokeless tobacco: Never Used   Vaping Use   • Vaping Use: Never used   Substance Use Topics   • Alcohol use: Never   • Drug use: Never       Family History   Problem Relation Age of Onset   • Stroke Mother    • Cancer Mother    • Colon cancer Mother 71        still living at 73   • Diabetes Mother    • Arthritis Mother    • Stroke Father    • Arthritis Father    • Osteoporosis Father    • Cancer Sister    • Diabetes Brother    • Stroke Brother    • Bleeding Disorder Daughter         Objective     BP 98/54 (BP Location: Left arm, Patient Position: Sitting, Cuff Size: Large Adult)   Pulse 64   Ht 152.4 cm (60\")   Wt 108 kg (239 lb)   BMI 46.68 kg/m²       Physical Exam    General Appearance:   · no acute distress  · Alert and oriented x3  HENT:   · lips not cyanotic  · Atraumatic  Neck:  · No jvd   · supple  Respiratory:  · no respiratory distress  · normal breath sounds  · no rales  Cardiovascular:  · Regular rate and rhythm  · no S3, no S4   · no murmur  · no rub  Extremities  · No cyanosis  · lower extremity edema: none    Skin:   · warm, dry  · No rashes      Result Review :     No results found for: PROBNP  CMP    CMP 9/17/21 9/17/21    0900 0900   Glucose 138 (A)    BUN 27 (A)    Creatinine 1.57 (A)    eGFR Non African Am 33 (A)    Sodium 139    Potassium 4.3    Chloride 103    Calcium 9.5    Albumin  4.00   Total Bilirubin  0.4   Alkaline Phosphatase  81   AST (SGOT)  12   ALT (SGPT)  17   (A) Abnormal value               Lab Results   Component Value Date    TSH 3.920 01/03/2022      No results found for: FREET4   No results found for: " DDIMERQUANT  No results found for: MG   No results found for: DIGOXIN   No results found for: TROPONINT        Lipid Panel    Lipid Panel 9/17/21 12/30/21   Total Cholesterol 178 109   Triglycerides 94 77   HDL Cholesterol 42 55   VLDL Cholesterol 17 16   LDL Cholesterol  119 (A) 38   LDL/HDL Ratio 2.79 0.70   (A) Abnormal value            No results found for: POCTROP                   Diagnoses and all orders for this visit:    1. CAD (Primary)  Assessment & Plan:  Patient is doing well no ongoing angina continue with chronic aspirin 81 mg daily        2. Paroxysmal atrial fibrillation  Assessment & Plan:  Patient symptomatically maintained normal sinus rhythm continue with Pradaxa 150 mg twice daily for CVA prevention      3. Essential hypertension  Assessment & Plan:  Well-controlled continue on telmisartan 80 mg once a day and HCTZ 25 mg once a day  Counseled patient on  • low-sodium diet of less than 2 g  • Aerobic activity 30 minutes a day 5 times a week  • Weight loss          4. Hyperlipidemia LDL goal <70          Follow Up     Return in about 6 months (around 9/22/2022) for Follow with Mine Castillo, EKG with F/U.          Patient was given instructions and counseling regarding her condition or for health maintenance advice. Please see specific information pulled into the AVS if appropriate.

## 2022-03-22 NOTE — ASSESSMENT & PLAN NOTE
Well-controlled continue on telmisartan 80 mg once a day and HCTZ 25 mg once a day  Counseled patient on  • low-sodium diet of less than 2 g  • Aerobic activity 30 minutes a day 5 times a week  • Weight loss

## 2022-05-04 ENCOUNTER — HOSPITAL ENCOUNTER (OUTPATIENT)
Dept: MAMMOGRAPHY | Facility: HOSPITAL | Age: 67
Discharge: HOME | End: 2022-05-04

## 2022-05-04 ENCOUNTER — HOSPITAL ENCOUNTER (OUTPATIENT)
Dept: BONE DENSITY | Facility: HOSPITAL | Age: 67
Discharge: HOME | End: 2022-05-04

## 2022-05-04 DIAGNOSIS — Z12.31 VISIT FOR SCREENING MAMMOGRAM: ICD-10-CM

## 2022-05-04 DIAGNOSIS — Z78.0 POST-MENOPAUSAL: ICD-10-CM

## 2022-05-04 PROCEDURE — 77080 DXA BONE DENSITY AXIAL: CPT

## 2022-05-04 PROCEDURE — 77067 SCR MAMMO BI INCL CAD: CPT

## 2022-05-04 PROCEDURE — 77063 BREAST TOMOSYNTHESIS BI: CPT

## 2022-06-19 DIAGNOSIS — E03.8 OTHER SPECIFIED HYPOTHYROIDISM: ICD-10-CM

## 2022-06-19 DIAGNOSIS — I10 ESSENTIAL HYPERTENSION: ICD-10-CM

## 2022-06-19 DIAGNOSIS — E78.5 HYPERLIPIDEMIA LDL GOAL <70: ICD-10-CM

## 2022-06-20 RX ORDER — HYDROCHLOROTHIAZIDE 25 MG/1
TABLET ORAL
Qty: 90 TABLET | Refills: 1 | OUTPATIENT
Start: 2022-06-20

## 2022-06-20 RX ORDER — LEVOTHYROXINE SODIUM 88 UG/1
TABLET ORAL
Qty: 90 TABLET | Refills: 1 | OUTPATIENT
Start: 2022-06-20

## 2022-06-20 RX ORDER — FUROSEMIDE 20 MG/1
TABLET ORAL
Qty: 90 TABLET | Refills: 1 | OUTPATIENT
Start: 2022-06-20

## 2022-06-20 RX ORDER — ROSUVASTATIN CALCIUM 40 MG/1
TABLET, COATED ORAL
Qty: 90 TABLET | Refills: 1 | OUTPATIENT
Start: 2022-06-20

## 2022-06-20 RX ORDER — TELMISARTAN 80 MG/1
TABLET ORAL
Qty: 90 TABLET | Refills: 1 | OUTPATIENT
Start: 2022-06-20

## 2022-06-30 ENCOUNTER — OFFICE VISIT (OUTPATIENT)
Dept: FAMILY MEDICINE CLINIC | Facility: CLINIC | Age: 67
End: 2022-06-30

## 2022-06-30 VITALS
HEIGHT: 60 IN | SYSTOLIC BLOOD PRESSURE: 94 MMHG | OXYGEN SATURATION: 97 % | TEMPERATURE: 98.3 F | WEIGHT: 235.2 LBS | HEART RATE: 81 BPM | BODY MASS INDEX: 46.17 KG/M2 | DIASTOLIC BLOOD PRESSURE: 52 MMHG

## 2022-06-30 DIAGNOSIS — E03.8 OTHER SPECIFIED HYPOTHYROIDISM: ICD-10-CM

## 2022-06-30 DIAGNOSIS — I48.0 PAROXYSMAL ATRIAL FIBRILLATION: ICD-10-CM

## 2022-06-30 DIAGNOSIS — Z00.00 MEDICARE ANNUAL WELLNESS VISIT, SUBSEQUENT: Primary | ICD-10-CM

## 2022-06-30 DIAGNOSIS — Z23 NEED FOR PNEUMOCOCCAL VACCINATION: ICD-10-CM

## 2022-06-30 DIAGNOSIS — I10 ESSENTIAL HYPERTENSION: ICD-10-CM

## 2022-06-30 DIAGNOSIS — E78.5 HYPERLIPIDEMIA LDL GOAL <70: ICD-10-CM

## 2022-06-30 DIAGNOSIS — N18.32 CHRONIC RENAL IMPAIRMENT, STAGE 3B: ICD-10-CM

## 2022-06-30 DIAGNOSIS — K21.9 GASTROESOPHAGEAL REFLUX DISEASE WITHOUT ESOPHAGITIS: ICD-10-CM

## 2022-06-30 PROCEDURE — 80053 COMPREHEN METABOLIC PANEL: CPT | Performed by: NURSE PRACTITIONER

## 2022-06-30 PROCEDURE — 80061 LIPID PANEL: CPT | Performed by: NURSE PRACTITIONER

## 2022-06-30 PROCEDURE — 1159F MED LIST DOCD IN RCRD: CPT | Performed by: NURSE PRACTITIONER

## 2022-06-30 PROCEDURE — 90677 PCV20 VACCINE IM: CPT | Performed by: NURSE PRACTITIONER

## 2022-06-30 PROCEDURE — 99213 OFFICE O/P EST LOW 20 MIN: CPT | Performed by: NURSE PRACTITIONER

## 2022-06-30 PROCEDURE — 85027 COMPLETE CBC AUTOMATED: CPT | Performed by: NURSE PRACTITIONER

## 2022-06-30 PROCEDURE — 84443 ASSAY THYROID STIM HORMONE: CPT | Performed by: NURSE PRACTITIONER

## 2022-06-30 PROCEDURE — G0439 PPPS, SUBSEQ VISIT: HCPCS | Performed by: NURSE PRACTITIONER

## 2022-06-30 PROCEDURE — 1170F FXNL STATUS ASSESSED: CPT | Performed by: NURSE PRACTITIONER

## 2022-06-30 PROCEDURE — G0009 ADMIN PNEUMOCOCCAL VACCINE: HCPCS | Performed by: NURSE PRACTITIONER

## 2022-06-30 RX ORDER — TELMISARTAN 80 MG/1
80 TABLET ORAL DAILY
Qty: 90 TABLET | Refills: 1 | Status: SHIPPED | OUTPATIENT
Start: 2022-06-30 | End: 2022-12-07 | Stop reason: SDUPTHER

## 2022-06-30 RX ORDER — ROSUVASTATIN CALCIUM 40 MG/1
40 TABLET, COATED ORAL DAILY
Qty: 90 TABLET | Refills: 1 | Status: SHIPPED | OUTPATIENT
Start: 2022-06-30 | End: 2022-12-07 | Stop reason: SDUPTHER

## 2022-06-30 RX ORDER — ATENOLOL 100 MG/1
150 TABLET ORAL 2 TIMES DAILY
Qty: 180 CAPSULE | Refills: 1 | Status: SHIPPED | OUTPATIENT
Start: 2022-06-30 | End: 2022-12-07 | Stop reason: SDUPTHER

## 2022-06-30 RX ORDER — HYDROCHLOROTHIAZIDE 25 MG/1
25 TABLET ORAL DAILY
Qty: 90 TABLET | Refills: 1 | Status: SHIPPED | OUTPATIENT
Start: 2022-06-30 | End: 2022-09-22 | Stop reason: ALTCHOICE

## 2022-06-30 RX ORDER — FUROSEMIDE 20 MG/1
20 TABLET ORAL EVERY MORNING
Qty: 90 TABLET | Refills: 1 | Status: SHIPPED | OUTPATIENT
Start: 2022-06-30 | End: 2022-09-22 | Stop reason: ALTCHOICE

## 2022-06-30 RX ORDER — LEVOTHYROXINE SODIUM 88 UG/1
88 TABLET ORAL DAILY
Qty: 90 TABLET | Refills: 1 | Status: SHIPPED | OUTPATIENT
Start: 2022-06-30 | End: 2022-12-07 | Stop reason: SDUPTHER

## 2022-06-30 RX ORDER — OMEPRAZOLE 40 MG/1
40 CAPSULE, DELAYED RELEASE ORAL DAILY
Qty: 90 CAPSULE | Refills: 1 | Status: SHIPPED | OUTPATIENT
Start: 2022-06-30 | End: 2022-12-07 | Stop reason: SDUPTHER

## 2022-06-30 NOTE — PROGRESS NOTES
The ABCs of the Annual Wellness Visit  Initial Medicare Wellness Visit    Chief Complaint   Patient presents with   • Medicare Wellness-Initial Visit     Subjective   History of Present Illness:  Gifty Hoover is a 66 y.o. female who presents for an Initial Medicare Wellness Visit.    The following portions of the patient's history were reviewed and   updated as appropriate: allergies, current medications, past family history, past medical history, past social history, past surgical history and problem list.     Compared to one year ago, the patient feels her physical   health is the same.    Compared to one year ago, the patient feels her mental   health is the same.    GERD-patient is taking over-the-counter Zantac 1-2 times daily without improvement patient reports that she is having flares 3-4 times a week and this feels similar to her previous MI symptoms.  Patient has made dietary changes without improvement.    Chronic renal insufficiency-patient denies taking any over-the-counter NSAIDs patient only takes Tylenol patient reports good water intake.    Hypertension-blood pressure somewhat low and patient denies any dizziness.        Recent Hospitalizations:  She was not admitted to the hospital during the last year.       Current Medical Providers:  Patient Care Team:  Paris Ace APRN as PCP - General (Nurse Practitioner)    Outpatient Medications Prior to Visit   Medication Sig Dispense Refill   • albuterol sulfate HFA (ProAir HFA) 108 (90 Base) MCG/ACT inhaler Inhale 2 puffs Every 6 (Six) Hours As Needed for Wheezing. 18 g 0   • aspirin 81 MG EC tablet aspirin 81 mg oral tablet,delayed release (DR/EC) take 1 tablet by oral route daily   Active     • ezetimibe (ZETIA) 10 MG tablet Take 1 tablet by mouth Daily. 90 tablet 3   • Calcium Carb-Cholecalciferol (Calcium-Vitamin D) 500-400 MG-UNIT tablet Take 1 tablet by mouth 2 (Two) Times a Day. 180 tablet 1   • furosemide (LASIX) 20 MG tablet Take 1 tablet  by mouth Every Morning. 90 tablet 1   • hydroCHLOROthiazide (HYDRODIURIL) 25 MG tablet Take 1 tablet by mouth Daily. 90 tablet 1   • levothyroxine (SYNTHROID, LEVOTHROID) 88 MCG tablet Take 1 tablet by mouth Daily. 90 tablet 1   • Pradaxa 150 MG capsu Take 1 capsule by mouth 2 (Two) Times a Day. 180 capsule 1   • raNITIdine (ZANTAC) 150 MG tablet Zantac 150 mg oral tablet take 1 tablet (150 mg) by oral route once daily at bedtime   Suspended     • rosuvastatin (CRESTOR) 40 MG tablet Take 1 tablet by mouth Daily. 90 tablet 1   • telmisartan (MICARDIS) 80 MG tablet Take 1 tablet by mouth Daily. 90 tablet 1   • nitroglycerin (NITROSTAT) 0.4 MG SL tablet Nitrostat 0.4 mg sublingual tablet, sublingual place 1 tablet (0.4 mg) by buccal route at the first sign of an attack; no more than 3 tablets are recommended within a 15 minute period.   Suspended       No facility-administered medications prior to visit.       No opioid medication identified on active medication list. I have reviewed chart for other potential  high risk medication/s and harmful drug interactions in the elderly.          Aspirin is on active medication list. Aspirin use is indicated based on review of current medical condition/s. Pros and cons of this therapy have been discussed today. Benefits of this medication outweigh potential harm.  Patient has been encouraged to continue taking this medication.  .      Patient Active Problem List   Diagnosis   • CAD   • Essential hypertension   • Hyperlipidemia LDL goal <70   • Paroxysmal atrial fibrillation   • Acute bronchitis due to Mycoplasma pneumoniae   • Acute upper respiratory infection   • Anxiety   • Arthritis   • Bladder problem   • Closed fracture of ankle   • Closed fracture of left distal fibula   • Deep vein phlebitis and thrombophlebitis of the leg (HCC)   • Depression   • Disorder of bone and articular cartilage   • Acquired hypothyroidism   • Thyroid disorder   • Hypothyroidism   • Encounter for  "screening for malignant neoplasm of breast   • Generalized anxiety disorder   • Heart attack (HCC)   • Generalized abdominal pain   • Hip pain   • Leg pain   • History of thromboembolism of vein   • Limb swelling   • Low back pain   • Lumbar radiculopathy   • Major depression, single episode   • Muscle cramps   • Osteoporosis   • Pulmonary embolism (HCC)   • Rash   • Seasonal allergic rhinitis   • Shortness of breath   • Vitamin D deficiency   • Gastroesophageal reflux disease without esophagitis     Advance Care Planning  Advance Directive is on file.  ACP discussion was held with the patient during this visit. will provide information.    Review of Systems   Constitutional: Negative for fatigue and fever.   Respiratory: Negative for cough, chest tightness and shortness of breath.    Gastrointestinal: Positive for abdominal pain.        Reflux and indigestion   Musculoskeletal: Positive for back pain and gait problem.   Neurological: Negative for dizziness.   Psychiatric/Behavioral: Negative for sleep disturbance.        Objective       Vitals:    06/30/22 1427   BP: 94/52   BP Location: Right arm   Patient Position: Sitting   Cuff Size: Large Adult   Pulse: 81   Temp: 98.3 °F (36.8 °C)   TempSrc: Temporal   SpO2: 97%   Weight: 107 kg (235 lb 3.2 oz)   Height: 152.4 cm (60\")     Estimated body mass index is 45.93 kg/m² as calculated from the following:    Height as of this encounter: 152.4 cm (60\").    Weight as of this encounter: 107 kg (235 lb 3.2 oz).    Class 3 Severe Obesity (BMI >=40). Obesity-related health conditions include the following: obstructive sleep apnea, hypertension, coronary heart disease and dyslipidemias. Obesity is unchanged. BMI is is above average; BMI management plan is completed. We discussed portion control and increasing exercise.      Does the patient have evidence of cognitive impairment? No    Physical Exam  Vitals reviewed.   Constitutional:       Appearance: Normal appearance. She " is well-developed.   HENT:      Head: Normocephalic and atraumatic.   Eyes:      Conjunctiva/sclera: Conjunctivae normal.      Pupils: Pupils are equal, round, and reactive to light.   Cardiovascular:      Rate and Rhythm: Normal rate and regular rhythm.      Heart sounds: Normal heart sounds. No murmur heard.  Pulmonary:      Effort: Pulmonary effort is normal.      Breath sounds: Normal breath sounds. No wheezing or rhonchi.   Abdominal:      General: Bowel sounds are normal. There is no distension.      Palpations: Abdomen is soft.      Tenderness: There is no abdominal tenderness.   Skin:     General: Skin is warm and dry.   Neurological:      Mental Status: She is alert and oriented to person, place, and time.   Psychiatric:         Mood and Affect: Mood and affect normal.         Behavior: Behavior normal.         Thought Content: Thought content normal.         Judgment: Judgment normal.               HEALTH RISK ASSESSMENT    Smoking Status:  Social History     Tobacco Use   Smoking Status Never Smoker   Smokeless Tobacco Never Used     Alcohol Consumption:  Social History     Substance and Sexual Activity   Alcohol Use Never     Fall Risk Screen:    [facility] Fall Risk Assessment was completed, and patient is at LOW risk for falls.Assessment completed on:1/3/2022    Depression Screen:   PHQ-2/PHQ-9 Depression Screening 6/30/2022   Retired PHQ-9 Total Score -   Retired Total Score -   Little Interest or Pleasure in Doing Things 0-->not at all   Feeling Down, Depressed or Hopeless 0-->not at all   PHQ-9: Brief Depression Severity Measure Score 0       Health Habits and Functional and Cognitive Screening:  Functional & Cognitive Status 6/30/2022   Do you have difficulty preparing food and eating? No   Do you have difficulty bathing yourself, getting dressed or grooming yourself? No   Do you have difficulty using the toilet? No   Do you have difficulty moving around from place to place? No   Do you have trouble  with steps or getting out of a bed or a chair? Yes   Current Diet Other   Dental Exam Up to date        Dental Exam Comment bland   Eye Exam Up to date   Exercise (times per week) 4 times per week   Current Exercises Include Home Exercise Program (TV, Computer, Etc.)   Do you need help using the phone?  No   Are you deaf or do you have serious difficulty hearing?  No   Do you need help with transportation? No   Do you need help shopping? No   Do you need help preparing meals?  No   Do you need help with housework?  No   Do you need help with laundry? No   Do you need help taking your medications? No   Do you need help managing money? No   Do you ever drive or ride in a car without wearing a seat belt? No   Have you felt unusual stress, anger or loneliness in the last month? No   Who do you live with? Alone   If you need help, do you have trouble finding someone available to you? No   Have you been bothered in the last four weeks by sexual problems? No   Do you have difficulty concentrating, remembering or making decisions? No       Age-appropriate Screening Schedule:  Refer to the list below for future screening recommendations based on patient's age, sex and/or medical conditions. Orders for these recommended tests are listed in the plan section. The patient has been provided with a written plan.    Health Maintenance   Topic Date Due   • TDAP/TD VACCINES (1 - Tdap) Never done   • ZOSTER VACCINE (1 of 2) Never done   • INFLUENZA VACCINE  10/01/2022   • LIPID PANEL  12/30/2022   • MAMMOGRAM  05/04/2024   • DXA SCAN  05/04/2024            Assessment & Plan   CMS Preventative Services Quick Reference  Risk Factors Identified During Encounter  Cardiovascular Disease  Immunizations Discussed/Encouraged (specific Immunizations; Prevnar 20 (Pneumococcal 20-valent conjugate)  The above risks/problems have been discussed with the patient.  Follow up actions/plans if indicated are seen below in the Assessment/Plan  Section.  Pertinent information has been shared with the patient in the After Visit Summary.    Diagnoses and all orders for this visit:    1. Medicare annual wellness visit, subsequent (Primary)    2. Other specified hypothyroidism  -     levothyroxine (SYNTHROID, LEVOTHROID) 88 MCG tablet; Take 1 tablet by mouth Daily.  Dispense: 90 tablet; Refill: 1  -     TSH    3. Essential hypertension  -     hydroCHLOROthiazide (HYDRODIURIL) 25 MG tablet; Take 1 tablet by mouth Daily.  Dispense: 90 tablet; Refill: 1  -     furosemide (LASIX) 20 MG tablet; Take 1 tablet by mouth Every Morning.  Dispense: 90 tablet; Refill: 1  -     telmisartan (MICARDIS) 80 MG tablet; Take 1 tablet by mouth Daily.  Dispense: 90 tablet; Refill: 1  -     Comprehensive Metabolic Panel    4. Hyperlipidemia LDL goal <70  -     rosuvastatin (CRESTOR) 40 MG tablet; Take 1 tablet by mouth Daily.  Dispense: 90 tablet; Refill: 1  -     Lipid Panel    5. Paroxysmal atrial fibrillation  -     Pradaxa 150 MG capsu; Take 1 capsule by mouth 2 (Two) Times a Day.  Dispense: 180 capsule; Refill: 1  -     CBC (No Diff)    6. Chronic renal impairment, stage 3b (HCC)  -     Comprehensive Metabolic Panel    7. Gastroesophageal reflux disease without esophagitis  -     omeprazole (priLOSEC) 40 MG capsule; Take 1 capsule by mouth Daily.  Dispense: 90 capsule; Refill: 1    8. Need for pneumococcal vaccination  -     Pneumococcal Conjugate Vaccine 20-[facility] All    Other orders  -     Calcium Carb-Cholecalciferol (Calcium-Vitamin D) 500-400 MG-UNIT tablet; Take 1 tablet by mouth 2 (Two) Times a Day.  Dispense: 180 tablet; Refill: 1    Due to uncontrolled reflux with over-the-counter medications, omeprazole 40 mg prescription is started patient will continue with her diet modifications and can take over-the-counter Pepcid if needed for reflux control.    Follow Up:  Return in about 6 months (around 12/30/2022) for Next scheduled follow up.     An After Visit Summary and  PPPS were made available to the patient.    Updated annual wellness visit checklist.  Immunizations discussed.  Screening discussed and/or ordered.  Recommend yearly dental and eye exams. Also discussed monitoring of blood pressure and lipids.        TIEN Perez

## 2022-06-30 NOTE — PATIENT INSTRUCTIONS
Preventive Care 65 Years and Older, Female  Preventive care refers to lifestyle choices and visits with your health care provider that can promote health and wellness. This includes:  A yearly physical exam. This is also called an annual wellness visit.  Regular dental and eye exams.  Immunizations.  Screening for certain conditions.  Healthy lifestyle choices, such as:  Eating a healthy diet.  Getting regular exercise.  Not using drugs or products that contain nicotine and tobacco.  Limiting alcohol use.  What can I expect for my preventive care visit?  Physical exam  Your health care provider will check your:  Height and weight. These may be used to calculate your BMI (body mass index). BMI is a measurement that tells if you are at a healthy weight.  Heart rate and blood pressure.  Body temperature.  Skin for abnormal spots.  Counseling  Your health care provider may ask you questions about your:  Past medical problems.  Family's medical history.  Alcohol, tobacco, and drug use.  Emotional well-being.  Home life and relationship well-being.  Sexual activity.  Diet, exercise, and sleep habits.  History of falls.  Memory and ability to understand (cognition).  Work and work environment.  Pregnancy and menstrual history.  Access to firearms.  What immunizations do I need?    Vaccines are usually given at various ages, according to a schedule. Your health care provider will recommend vaccines for you based on your age, medical history, and lifestyle or other factors, such as travel or where you work.  What tests do I need?  Blood tests  Lipid and cholesterol levels. These may be checked every 5 years, or more often depending on your overall health.  Hepatitis C test.  Hepatitis B test.  Screening  Lung cancer screening. You may have this screening every year starting at age 55 if you have a 30-pack-year history of smoking and currently smoke or have quit within the past 15 years.  Colorectal cancer screening.  All  adults should have this screening starting at age 50 and continuing until age 75.  Your health care provider may recommend screening at age 45 if you are at increased risk.  You will have tests every 1-10 years, depending on your results and the type of screening test.  Diabetes screening.  This is done by checking your blood sugar (glucose) after you have not eaten for a while (fasting).  You may have this done every 1-3 years.  Mammogram.  This may be done every 1-2 years.  Talk with your health care provider about how often you should have regular mammograms.  Abdominal aortic aneurysm (AAA) screening. You may need this if you are a current or former smoker.  BRCA-related cancer screening. This may be done if you have a family history of breast, ovarian, tubal, or peritoneal cancers.  Other tests  STD (sexually transmitted disease) testing, if you are at risk.  Bone density scan. This is done to screen for osteoporosis. You may have this done starting at age 65.  Talk with your health care provider about your test results, treatment options, and if necessary, the need for more tests.  Follow these instructions at home:  Eating and drinking    Eat a diet that includes fresh fruits and vegetables, whole grains, lean protein, and low-fat dairy products. Limit your intake of foods with high amounts of sugar, saturated fats, and salt.  Take vitamin and mineral supplements as recommended by your health care provider.  Do not drink alcohol if your health care provider tells you not to drink.  If you drink alcohol:  Limit how much you have to 0-1 drink a day.  Be aware of how much alcohol is in your drink. In the [place] one drink equals one 12 oz bottle of beer (355 mL), one 5 oz glass of wine (148 mL), or one 1½ oz glass of hard liquor (44 mL).    Lifestyle  Take daily care of your teeth and gums. Brush your teeth every morning and night with fluoride toothpaste. Floss one time each day.  Stay active. Exercise for at  least 30 minutes 5 or more days each week.  Do not use any products that contain nicotine or tobacco, such as cigarettes, e-cigarettes, and chewing tobacco. If you need help quitting, ask your health care provider.  Do not use drugs.  If you are sexually active, practice safe sex. Use a condom or other form of protection in order to prevent STIs (sexually transmitted infections).  Talk with your health care provider about taking a low-dose aspirin or statin.  Find healthy ways to cope with stress, such as:  Meditation, yoga, or listening to music.  Journaling.  Talking to a trusted person.  Spending time with friends and family.  Safety  Always wear your seat belt while driving or riding in a vehicle.  Do not drive:  If you have been drinking alcohol. Do not ride with someone who has been drinking.  When you are tired or distracted.  While texting.  Wear a helmet and other protective equipment during sports activities.  If you have firearms in your house, make sure you follow all gun safety procedures.  What's next?  Visit your health care provider once a year for an annual wellness visit.  Ask your health care provider how often you should have your eyes and teeth checked.  Stay up to date on all vaccines.  This information is not intended to replace advice given to you by your health care provider. Make sure you discuss any questions you have with your health care provider.  Document Revised: 12/08/2021 Document Reviewed: 12/12/2019  [employer] Patient Education © 2021 [employer]  Gastroesophageal Reflux Disease, Adult    Gastroesophageal reflux (ZEB) happens when acid from the stomach flows up into the tube that connects the mouth and the stomach (esophagus). Normally, food travels down the esophagus and stays in the stomach to be digested. With ZEB, food and stomach acid sometimes move back up into the esophagus.  You may have a disease called gastroesophageal reflux disease (GERD) if the reflux:  Happens  often.  Causes frequent or very bad symptoms.  Causes problems such as damage to the esophagus.  When this happens, the esophagus becomes sore and swollen. Over time, GERD can make small holes (ulcers) in the lining of the esophagus.  What are the causes?  This condition is caused by a problem with the muscle between the esophagus and the stomach. When this muscle is weak or not normal, it does not close properly to keep food and acid from coming back up from the stomach.  The muscle can be weak because of:  Tobacco use.  Pregnancy.  Having a certain type of hernia (hiatal hernia).  Alcohol use.  Certain foods and drinks, such as coffee, chocolate, onions, and peppermint.  What increases the risk?  Being overweight.  Having a disease that affects your connective tissue.  Taking NSAIDs, such a ibuprofen.  What are the signs or symptoms?  Heartburn.  Difficult or painful swallowing.  The feeling of having a lump in the throat.  A bitter taste in the mouth.  Bad breath.  Having a lot of saliva.  Having an upset or bloated stomach.  Burping.  Chest pain. Different conditions can cause chest pain. Make sure you see your doctor if you have chest pain.  Shortness of breath or wheezing.  A long-term cough or a cough at night.  Wearing away of the surface of teeth (tooth enamel).  Weight loss.  How is this treated?  Making changes to your diet.  Taking medicine.  Having surgery.  Treatment will depend on how bad your symptoms are.  Follow these instructions at home:  Eating and drinking    Follow a diet as told by your doctor. You may need to avoid foods and drinks such as:  Coffee and tea, with or without caffeine.  Drinks that contain alcohol.  Energy drinks and sports drinks.  Bubbly (carbonated) drinks or sodas.  Chocolate and cocoa.  Peppermint and mint flavorings.  Garlic and onions.  Horseradish.  Spicy and acidic foods. These include peppers, chili powder, barahona powder, vinegar, hot sauces, and BBQ sauce.  Citrus  fruit juices and citrus fruits, such as oranges, sean, and limes.  Tomato-based foods. These include red sauce, chili, salsa, and pizza with red sauce.  Fried and fatty foods. These include donuts, french fries, potato chips, and high-fat dressings.  High-fat meats. These include hot dogs, rib eye steak, sausage, ham, and teresa.  High-fat dairy items, such as whole milk, butter, and cream cheese.  Eat small meals often. Avoid eating large meals.  Avoid drinking large amounts of liquid with your meals.  Avoid eating meals during the 2-3 hours before bedtime.  Avoid lying down right after you eat.  Do not exercise right after you eat.    Lifestyle    Do not smoke or use any products that contain nicotine or tobacco. If you need help quitting, ask your doctor.  Try to lower your stress. If you need help doing this, ask your doctor.  If you are overweight, lose an amount of weight that is healthy for you. Ask your doctor about a safe weight loss goal.    General instructions  Pay attention to any changes in your symptoms.  Take over-the-counter and prescription medicines only as told by your doctor.  Do not take aspirin, ibuprofen, or other NSAIDs unless your doctor says it is okay.  Wear loose clothes. Do not wear anything tight around your waist.  Raise (elevate) the head of your bed about 6 inches (15 cm). You may need to use a wedge to do this.  Avoid bending over if this makes your symptoms worse.  Keep all follow-up visits.  Contact a doctor if:  You have new symptoms.  You lose weight and you do not know why.  You have trouble swallowing or it hurts to swallow.  You have wheezing or a cough that keeps happening.  You have a hoarse voice.  Your symptoms do not get better with treatment.  Get help right away if:  You have sudden pain in your arms, neck, jaw, teeth, or back.  You suddenly feel sweaty, dizzy, or light-headed.  You have chest pain or shortness of breath.  You vomit and the vomit is green, yellow, or  "black, or it looks like blood or coffee grounds.  You faint.  Your poop (stool) is red, bloody, or black.  You cannot swallow, drink, or eat.  These symptoms may represent a serious problem that is an emergency. Do not wait to see if the symptoms will go away. Get medical help right away. Call your local emergency services (911 in the [place] Do not drive yourself to the hospital.  Summary  If a person has gastroesophageal reflux disease (GERD), food and stomach acid move back up into the esophagus and cause symptoms or problems such as damage to the esophagus.  Treatment will depend on how bad your symptoms are.  Follow a diet as told by your doctor.  Take all medicines only as told by your doctor.  This information is not intended to replace advice given to you by your health care provider. Make sure you discuss any questions you have with your health care provider.  Document Revised: 06/28/2021 Document Reviewed: 06/28/2021  [employer] Patient Education © 2021 [employer]  Critical care medicine: Principles of diagnosis and management in the adult (4th ed., pp. 7249-4792). [employer].\"> Caro's anesthesia (8th ed., pp. 232-250). [employer].\">   Advance Directive    Advance directives are legal documents that allow you to make decisions about your health care and medical treatment in case you become unable to communicate for yourself. Advance directives let your wishes be known to family, friends, and health care providers.  Discussing and writing advance directives should happen over time rather than all at once. Advance directives can be changed and updated at any time. There are different types of advance directives, such as:  Medical power of .  Living will.  Do not resuscitate (DNR) order or do not attempt resuscitation (DNAR) order.  Health care proxy and medical power of   A health care proxy is also called a health care agent. This person is appointed to make medical decisions for you when you " are unable to make decisions for yourself. Generally, people ask a trusted friend or family member to act as their proxy and represent their preferences. Make sure you have an agreement with your trusted person to act as your proxy. A proxy may have to make a medical decision on your behalf if your wishes are not known.  A medical power of , also called a durable power of  for health care, is a legal document that names your health care proxy. Depending on the laws in your state, the document may need to be:  Signed.  Notarized.  Dated.  Copied.  Witnessed.  Incorporated into your medical record.  You may also want to appoint a trusted person to manage your money in the event you are unable to do so. This is called a durable power of  for finances. It is a separate legal document from the durable power of  for health care. You may choose your health care proxy or someone different to act as your agent in money matters.  If you do not appoint a proxy, or there is a concern that the proxy is not acting in your best interest, a court may appoint a guardian to act on your behalf.  Living will  A living will is a set of instructions that state your wishes about medical care when you cannot express them yourself. Health care providers should keep a copy of your living will in your medical record. You may want to give a copy to family members or friends. To alert caregivers in case of an emergency, you can place a card in your wallet to let them know that you have a living will and where they can find it. A living will is used if you become:  Terminally ill.  Disabled.  Unable to communicate or make decisions.  The following decisions should be included in your living will:  To use or not to use life support equipment, such as dialysis machines and breathing machines (ventilators).  Whether you want a DNR or DNAR order. This tells health care providers not to use cardiopulmonary  resuscitation (CPR) if breathing or heartbeat stops.  To use or not to use tube feeding.  To be given or not to be given food and fluids.  Whether you want comfort (palliative) care when the goal becomes comfort rather than a cure.  Whether you want to donate your organs and tissues.  A living will does not give instructions for distributing your money and property if you should pass away.  DNR or DNAR  A DNR or DNAR order is a request not to have CPR in the event that your heart stops beating or you stop breathing. If a DNR or DNAR order has not been made and shared, a health care provider will try to help any patient whose heart has stopped or who has stopped breathing. If you plan to have surgery, talk with your health care provider about how your DNR or DNAR order will be followed if problems occur.  What if I do not have an advance directive?  Some states assign family decision makers to act on your behalf if you do not have an advance directive. Each state has its own laws about advance directives. You may want to check with your health care provider, , or state representative about the laws in your state.  Summary  Advance directives are legal documents that allow you to make decisions about your health care and medical treatment in case you become unable to communicate for yourself.  The process of discussing and writing advance directives should happen over time. You can change and update advance directives at any time.  Advance directives may include a medical power of , a living will, and a DNR or DNAR order.  This information is not intended to replace advice given to you by your health care provider. Make sure you discuss any questions you have with your health care provider.  Document Revised: 09/21/2021 Document Reviewed: 09/21/2021  [employer] Patient Education © 2021 [employer].

## 2022-07-01 DIAGNOSIS — D64.9 LOW HEMOGLOBIN: Primary | ICD-10-CM

## 2022-07-01 LAB
ALBUMIN SERPL-MCNC: 4.1 G/DL (ref 3.5–5.2)
ALBUMIN/GLOB SERPL: 1.4 G/DL
ALP SERPL-CCNC: 134 U/L (ref 39–117)
ALT SERPL W P-5'-P-CCNC: 138 U/L (ref 1–33)
ANION GAP SERPL CALCULATED.3IONS-SCNC: 13.3 MMOL/L (ref 5–15)
AST SERPL-CCNC: 66 U/L (ref 1–32)
BILIRUB SERPL-MCNC: 0.5 MG/DL (ref 0–1.2)
BUN SERPL-MCNC: 40 MG/DL (ref 8–23)
BUN/CREAT SERPL: 25 (ref 7–25)
CALCIUM SPEC-SCNC: 9.7 MG/DL (ref 8.6–10.5)
CHLORIDE SERPL-SCNC: 97 MMOL/L (ref 98–107)
CHOLEST SERPL-MCNC: 105 MG/DL (ref 0–200)
CO2 SERPL-SCNC: 25.7 MMOL/L (ref 22–29)
CREAT SERPL-MCNC: 1.6 MG/DL (ref 0.57–1)
DEPRECATED RDW RBC AUTO: 44.5 FL (ref 37–54)
EGFRCR SERPLBLD CKD-EPI 2021: 35.4 ML/MIN/1.73
ERYTHROCYTE [DISTWIDTH] IN BLOOD BY AUTOMATED COUNT: 13.4 % (ref 12.3–15.4)
GLOBULIN UR ELPH-MCNC: 2.9 GM/DL
GLUCOSE SERPL-MCNC: 191 MG/DL (ref 65–99)
HCT VFR BLD AUTO: 29.2 % (ref 34–46.6)
HDLC SERPL-MCNC: 58 MG/DL (ref 40–60)
HGB BLD-MCNC: 9.5 G/DL (ref 12–15.9)
LDLC SERPL CALC-MCNC: 33 MG/DL (ref 0–100)
LDLC/HDLC SERPL: 0.58 {RATIO}
MCH RBC QN AUTO: 29.7 PG (ref 26.6–33)
MCHC RBC AUTO-ENTMCNC: 32.5 G/DL (ref 31.5–35.7)
MCV RBC AUTO: 91.3 FL (ref 79–97)
PLATELET # BLD AUTO: 209 10*3/MM3 (ref 140–450)
PMV BLD AUTO: 10.8 FL (ref 6–12)
POTASSIUM SERPL-SCNC: 4.1 MMOL/L (ref 3.5–5.2)
PROT SERPL-MCNC: 7 G/DL (ref 6–8.5)
RBC # BLD AUTO: 3.2 10*6/MM3 (ref 3.77–5.28)
SODIUM SERPL-SCNC: 136 MMOL/L (ref 136–145)
TRIGL SERPL-MCNC: 66 MG/DL (ref 0–150)
TSH SERPL DL<=0.05 MIU/L-ACNC: 1.21 UIU/ML (ref 0.27–4.2)
VLDLC SERPL-MCNC: 14 MG/DL (ref 5–40)
WBC NRBC COR # BLD: 3.48 10*3/MM3 (ref 3.4–10.8)

## 2022-07-07 ENCOUNTER — CLINICAL SUPPORT (OUTPATIENT)
Dept: FAMILY MEDICINE CLINIC | Facility: CLINIC | Age: 67
End: 2022-07-07

## 2022-07-07 DIAGNOSIS — D64.9 LOW HEMOGLOBIN: ICD-10-CM

## 2022-07-07 LAB
BASOPHILS # BLD AUTO: 0.03 10*3/MM3 (ref 0–0.2)
BASOPHILS NFR BLD AUTO: 0.7 % (ref 0–1.5)
DEPRECATED RDW RBC AUTO: 45.5 FL (ref 37–54)
EOSINOPHIL # BLD AUTO: 0.2 10*3/MM3 (ref 0–0.4)
EOSINOPHIL NFR BLD AUTO: 4.7 % (ref 0.3–6.2)
ERYTHROCYTE [DISTWIDTH] IN BLOOD BY AUTOMATED COUNT: 13.4 % (ref 12.3–15.4)
HCT VFR BLD AUTO: 31.2 % (ref 34–46.6)
HGB BLD-MCNC: 9.9 G/DL (ref 12–15.9)
IMM GRANULOCYTES # BLD AUTO: 0.02 10*3/MM3 (ref 0–0.05)
IMM GRANULOCYTES NFR BLD AUTO: 0.5 % (ref 0–0.5)
LYMPHOCYTES # BLD AUTO: 0.65 10*3/MM3 (ref 0.7–3.1)
LYMPHOCYTES NFR BLD AUTO: 15.2 % (ref 19.6–45.3)
MCH RBC QN AUTO: 29.3 PG (ref 26.6–33)
MCHC RBC AUTO-ENTMCNC: 31.7 G/DL (ref 31.5–35.7)
MCV RBC AUTO: 92.3 FL (ref 79–97)
MONOCYTES # BLD AUTO: 0.39 10*3/MM3 (ref 0.1–0.9)
MONOCYTES NFR BLD AUTO: 9.1 % (ref 5–12)
NEUTROPHILS NFR BLD AUTO: 2.99 10*3/MM3 (ref 1.7–7)
NEUTROPHILS NFR BLD AUTO: 69.8 % (ref 42.7–76)
NRBC BLD AUTO-RTO: 0 /100 WBC (ref 0–0.2)
PLATELET # BLD AUTO: 213 10*3/MM3 (ref 140–450)
PMV BLD AUTO: 10.9 FL (ref 6–12)
RBC # BLD AUTO: 3.38 10*6/MM3 (ref 3.77–5.28)
WBC NRBC COR # BLD: 4.28 10*3/MM3 (ref 3.4–10.8)

## 2022-07-07 PROCEDURE — 85025 COMPLETE CBC W/AUTO DIFF WBC: CPT | Performed by: NURSE PRACTITIONER

## 2022-07-07 PROCEDURE — 36415 COLL VENOUS BLD VENIPUNCTURE: CPT | Performed by: NURSE PRACTITIONER

## 2022-07-12 ENCOUNTER — CONSULT (OUTPATIENT)
Dept: ONCOLOGY | Facility: HOSPITAL | Age: 67
End: 2022-07-12

## 2022-07-12 ENCOUNTER — LAB (OUTPATIENT)
Dept: ONCOLOGY | Facility: HOSPITAL | Age: 67
End: 2022-07-12

## 2022-07-12 VITALS
HEART RATE: 106 BPM | OXYGEN SATURATION: 95 % | TEMPERATURE: 98 F | WEIGHT: 231.04 LBS | BODY MASS INDEX: 45.12 KG/M2 | RESPIRATION RATE: 20 BRPM | DIASTOLIC BLOOD PRESSURE: 51 MMHG | SYSTOLIC BLOOD PRESSURE: 78 MMHG

## 2022-07-12 DIAGNOSIS — D64.9 LOW HEMOGLOBIN: ICD-10-CM

## 2022-07-12 PROBLEM — E66.9 OBESITY: Status: ACTIVE | Noted: 2022-07-12

## 2022-07-12 LAB
FERRITIN SERPL-MCNC: 241.5 NG/ML (ref 13–150)
FOLATE SERPL-MCNC: 12.6 NG/ML (ref 4.78–24.2)
IRON 24H UR-MRATE: 59 MCG/DL (ref 37–145)
IRON SATN MFR SERPL: 15 % (ref 20–50)
TIBC SERPL-MCNC: 390 MCG/DL (ref 298–536)
TRANSFERRIN SERPL-MCNC: 262 MG/DL (ref 200–360)
VIT B12 BLD-MCNC: 395 PG/ML (ref 211–946)

## 2022-07-12 PROCEDURE — 82607 VITAMIN B-12: CPT

## 2022-07-12 PROCEDURE — 99204 OFFICE O/P NEW MOD 45 MIN: CPT | Performed by: INTERNAL MEDICINE

## 2022-07-12 PROCEDURE — 82728 ASSAY OF FERRITIN: CPT

## 2022-07-12 PROCEDURE — G0463 HOSPITAL OUTPT CLINIC VISIT: HCPCS

## 2022-07-12 PROCEDURE — 82746 ASSAY OF FOLIC ACID SERUM: CPT

## 2022-07-12 PROCEDURE — 36415 COLL VENOUS BLD VENIPUNCTURE: CPT

## 2022-07-12 PROCEDURE — 83540 ASSAY OF IRON: CPT

## 2022-07-12 PROCEDURE — 84466 ASSAY OF TRANSFERRIN: CPT

## 2022-07-12 NOTE — PROGRESS NOTES
Gifty Hoover   : 1955     LOCATION: [facility]EMATOLOGY & [facility]OGY     Chief Complaint  Low Hemoglobin    Referring Provider: TIEN Perez  PCP: Paris Ace APRN    Oncology/Hematology History    No history exists.           Subjective        History of Present Illness   Pt presents for consultation eval for anemia  Labs 22 WBC 4.28 hgb 9.9 hct 31.2  plt 215  Pt denies any blood in stool or any GI bleeding   Last colonoscopy 2018  PMH  She  Has CKD with GFR 35mls/min  CAD   HTN  Review of Systems  Current Outpatient Medications on File Prior to Visit   Medication Sig Dispense Refill   • albuterol sulfate HFA (ProAir HFA) 108 (90 Base) MCG/ACT inhaler Inhale 2 puffs Every 6 (Six) Hours As Needed for Wheezing. 18 g 0   • aspirin 81 MG EC tablet aspirin 81 mg oral tablet,delayed release (DR/EC) take 1 tablet by oral route daily   Active     • Calcium Carb-Cholecalciferol (Calcium-Vitamin D) 500-400 MG-UNIT tablet Take 1 tablet by mouth 2 (Two) Times a Day. 180 tablet 1   • ezetimibe (ZETIA) 10 MG tablet Take 1 tablet by mouth Daily. 90 tablet 3   • furosemide (LASIX) 20 MG tablet Take 1 tablet by mouth Every Morning. 90 tablet 1   • hydroCHLOROthiazide (HYDRODIURIL) 25 MG tablet Take 1 tablet by mouth Daily. 90 tablet 1   • levothyroxine (SYNTHROID, LEVOTHROID) 88 MCG tablet Take 1 tablet by mouth Daily. 90 tablet 1   • omeprazole (priLOSEC) 40 MG capsule Take 1 capsule by mouth Daily. 90 capsule 1   • Pradaxa 150 MG capsu Take 1 capsule by mouth 2 (Two) Times a Day. 180 capsule 1   • rosuvastatin (CRESTOR) 40 MG tablet Take 1 tablet by mouth Daily. 90 tablet 1   • telmisartan (MICARDIS) 80 MG tablet Take 1 tablet by mouth Daily. 90 tablet 1     No current facility-administered medications on file prior to visit.       No Known Allergies    Past Medical History:   Diagnosis Date   • Anxiety    • Arthritis    • Bladder problem    • CAD 2021   • Depression    •  Essential hypertension 2021   • Fracture of distal end of left fibula 10/29/2015    closed fracture, intitial encounter   • Fracture of distal fibula    • Heart attack    • History of pulmonary embolism    • Hyperlipidemia LDL goal <70 2021   • Lumbar radiculopathy 2018    describes sensory S1 radiculopathy but would favor right L5   • Paroxysmal atrial fibrillation 2021   • Seasonal allergies    • Thyroid disorder    • Vaginal bleeding problems      Past Surgical History:   Procedure Laterality Date   • BLADDER SURGERY     • CARDIAC CATHETERIZATION     • CARDIAC SURGERY      Open heart surgery   •  SECTION     • CHOLECYSTECTOMY     • COLONOSCOPY     • NICHOLAS FILTER INSERTION JUGULAR     • HYSTERECTOMY       Social History     Socioeconomic History   • Marital status:    Tobacco Use   • Smoking status: Never Smoker   • Smokeless tobacco: Never Used   Vaping Use   • Vaping Use: Never used   Substance and Sexual Activity   • Alcohol use: Never   • Drug use: Never   • Sexual activity: Defer     Family History   Problem Relation Age of Onset   • Stroke Mother    • Cancer Mother    • Colon cancer Mother 71        still living at 73   • Diabetes Mother    • Arthritis Mother    • Stroke Father    • Arthritis Father    • Osteoporosis Father    • Cancer Sister    • Diabetes Brother    • Stroke Brother    • Bleeding Disorder Daughter      Immunization History   Administered Date(s) Administered   • COVID-19 (PFIZER) PURPLE CAP 2021, 2021   • Flu Vaccine Split Quad 2019   • Fluad Quad 65+ 10/01/2021   • Pneumococcal Conjugate 20-Valent (PCV20) 2022   • Pneumococcal Polysaccharide (PPSV23) 10/13/2017       Objective     Vitals:    22 1049   BP: (!) 78/51   Pulse: 106   Resp: 20   Temp: 98 °F (36.7 °C)   TempSrc: Temporal   SpO2: 95%   Weight: 105 kg (231 lb 0.7 oz)     Body mass index is 45.12 kg/m².     Physical Exam  Constitutional:        Appearance: Normal appearance. She is obese.   HENT:      Head: Normocephalic and atraumatic.      Nose: Nose normal.   Eyes:      Pupils: Pupils are equal, round, and reactive to light.   Pulmonary:      Effort: Pulmonary effort is normal.   Musculoskeletal:         General: Normal range of motion.      Cervical back: Normal range of motion.   Skin:     General: Skin is warm and dry.   Neurological:      Mental Status: She is alert.   Psychiatric:         Mood and Affect: Mood normal.               No results found for: IRON, LABIRON, TRANSFERRIN, TIBC, FERRITIN, [id number], FOLATE              PHQ-9 Total Score:           Result Review :   The following data was reviewed by: Caet Shaw MA on 07/12/2022:  Lab Results   Component Value Date    HGB 9.9 (L) 07/07/2022    HCT 31.2 (L) 07/07/2022    MCV 92.3 07/07/2022     07/07/2022    WBC 4.28 07/07/2022    NEUTROABS 2.99 07/07/2022    LYMPHSABS 0.65 (L) 07/07/2022    MONOSABS 0.39 07/07/2022    EOSABS 0.20 07/07/2022    BASOSABS 0.03 07/07/2022     Lab Results   Component Value Date    GLUCOSE 191 (H) 06/30/2022    BUN 40 (H) 06/30/2022    CREATININE 1.60 (H) 06/30/2022     06/30/2022    K 4.1 06/30/2022    CL 97 (L) 06/30/2022    CO2 25.7 06/30/2022    CALCIUM 9.7 06/30/2022    PROTEINTOT 7.0 06/30/2022    ALBUMIN 4.10 06/30/2022    BILITOT 0.5 06/30/2022    ALKPHOS 134 (H) 06/30/2022    AST 66 (H) 06/30/2022     (H) 06/30/2022         Data reviewed: labs          Assessment and Plan      ASSESSMENT   anemia  PLAN/RECOMMENDATIONS  Will check iron profile, B12 and folate to r/o deficiencies  Anemia could be related to her CKD and pt may benefit  from erythropoietin  I do not suspect primary bone marrow process given the normal wbc and the normal platelet count    2 Bp is low-- I advised pt to contact pcp re BP management    3 May benefit from nephrology evaluation for her CKD.    Diagnoses and all orders for this visit:    1. Low  hemoglobin      I spent 30 minutes caring for Gifty on this date of service. This time includes time spent by me in the following activities: reviewing tests, obtaining and/or reviewing a separately obtained history, counseling and educating the patient/family/caregiver, ordering medications, tests, or procedures, documenting information in the medical record and independently interpreting results and communicating that information with the patient/family/caregiver    Patient was given instructions and counseling regarding her condition or for health maintenance advice. Please see specific information pulled into the AVS if appropriate.     Cate Shaw MA    7/12/2022

## 2022-07-13 ENCOUNTER — TELEPHONE (OUTPATIENT)
Dept: FAMILY MEDICINE CLINIC | Facility: CLINIC | Age: 67
End: 2022-07-13

## 2022-07-13 NOTE — TELEPHONE ENCOUNTER
Caller: Gifty Hoover    Relationship: Self    Best call back number:[phone]    What is the best time to reach you: ANY ONE     Who are you requesting to speak with (clinical staff, provider,  specific staff member) CLINCIAL         What was the call regarding: PATIENT STATES THAT SHE SAW HER HEMATOLOGIST ON 07/12/2022 AND WAS ADVISED TO DISCUSS WITH PCP ABOUT STOPPING BLOOD PRESSURE MEDICATION AND WATER PILLS UNTIL LAB RESULTS ARE BACK IN DUE TO THE PATIENT'S BLOOD PRESSURE BEING LOW     Do you require a callback: YES

## 2022-07-14 ENCOUNTER — LAB (OUTPATIENT)
Dept: LAB | Facility: HOSPITAL | Age: 67
End: 2022-07-14

## 2022-07-14 LAB — HEMOCCULT STL QL IA: POSITIVE

## 2022-07-14 PROCEDURE — 82274 ASSAY TEST FOR BLOOD FECAL: CPT | Performed by: INTERNAL MEDICINE

## 2022-07-21 ENCOUNTER — TELEPHONE (OUTPATIENT)
Dept: ONCOLOGY | Facility: HOSPITAL | Age: 67
End: 2022-07-21

## 2022-07-21 DIAGNOSIS — R19.5 POSITIVE OCCULT STOOL BLOOD TEST: Primary | ICD-10-CM

## 2022-07-21 NOTE — TELEPHONE ENCOUNTER
----- Message from Blanca King MD sent at 7/15/2022  4:55 PM EDT -----  Please advise pt of positive stool occult blood   Please get Amy's permission to refer to GI for eval  ----- Message -----  From: Lab, Background User  Sent: 7/12/2022  12:24 PM EDT  To: Blanca King MD

## 2022-07-21 NOTE — TELEPHONE ENCOUNTER
Spoke with patient and informed her that  Her stool was positive for blood and that can indicate a GI bleed. She needs to see a GI doctor. Patient states that she has seen Dr. Vasquez in the past. I tld her that someone will call her with that appt.

## 2022-07-27 ENCOUNTER — APPOINTMENT (OUTPATIENT)
Dept: ONCOLOGY | Facility: HOSPITAL | Age: 67
End: 2022-07-27

## 2022-08-03 ENCOUNTER — OFFICE VISIT (OUTPATIENT)
Dept: ONCOLOGY | Facility: HOSPITAL | Age: 67
End: 2022-08-03

## 2022-08-03 ENCOUNTER — LAB (OUTPATIENT)
Dept: ONCOLOGY | Facility: HOSPITAL | Age: 67
End: 2022-08-03

## 2022-08-03 VITALS
SYSTOLIC BLOOD PRESSURE: 142 MMHG | WEIGHT: 229.72 LBS | OXYGEN SATURATION: 100 % | HEART RATE: 107 BPM | TEMPERATURE: 97.6 F | BODY MASS INDEX: 44.86 KG/M2 | DIASTOLIC BLOOD PRESSURE: 73 MMHG | RESPIRATION RATE: 18 BRPM

## 2022-08-03 DIAGNOSIS — D64.9 LOW HEMOGLOBIN: ICD-10-CM

## 2022-08-03 DIAGNOSIS — N18.32 STAGE 3B CHRONIC KIDNEY DISEASE: Primary | ICD-10-CM

## 2022-08-03 DIAGNOSIS — R19.5 POSITIVE OCCULT STOOL BLOOD TEST: ICD-10-CM

## 2022-08-03 DIAGNOSIS — R74.01 TRANSAMINITIS: ICD-10-CM

## 2022-08-03 DIAGNOSIS — N18.32 STAGE 3B CHRONIC KIDNEY DISEASE: ICD-10-CM

## 2022-08-03 DIAGNOSIS — R79.89 ELEVATED FERRITIN: ICD-10-CM

## 2022-08-03 LAB
ALBUMIN SERPL-MCNC: 3.58 G/DL (ref 3.5–5.2)
ALBUMIN/GLOB SERPL: 1.2 G/DL
ALP SERPL-CCNC: 158 U/L (ref 39–117)
ALT SERPL W P-5'-P-CCNC: 71 U/L (ref 1–33)
ANION GAP SERPL CALCULATED.3IONS-SCNC: 12.2 MMOL/L (ref 5–15)
AST SERPL-CCNC: 32 U/L (ref 1–32)
BASOPHILS # BLD AUTO: 0.03 10*3/MM3 (ref 0–0.2)
BASOPHILS NFR BLD AUTO: 0.9 % (ref 0–1.5)
BILIRUB SERPL-MCNC: 0.5 MG/DL (ref 0–1.2)
BUN SERPL-MCNC: 17 MG/DL (ref 8–23)
BUN/CREAT SERPL: 15.9 (ref 7–25)
CALCIUM SPEC-SCNC: 9.4 MG/DL (ref 8.6–10.5)
CHLORIDE SERPL-SCNC: 105 MMOL/L (ref 98–107)
CO2 SERPL-SCNC: 21.8 MMOL/L (ref 22–29)
CREAT SERPL-MCNC: 1.07 MG/DL (ref 0.57–1)
DEPRECATED RDW RBC AUTO: 48.3 FL (ref 37–54)
EGFRCR SERPLBLD CKD-EPI 2021: 57.4 ML/MIN/1.73
EOSINOPHIL # BLD AUTO: 0.1 10*3/MM3 (ref 0–0.4)
EOSINOPHIL NFR BLD AUTO: 3.1 % (ref 0.3–6.2)
ERYTHROCYTE [DISTWIDTH] IN BLOOD BY AUTOMATED COUNT: 14.1 % (ref 12.3–15.4)
ERYTHROCYTE [SEDIMENTATION RATE] IN BLOOD: 35 MM/HR (ref 0–30)
GLOBULIN UR ELPH-MCNC: 2.9 GM/DL
GLUCOSE SERPL-MCNC: 165 MG/DL (ref 65–99)
HAV IGM SERPL QL IA: NORMAL
HBV CORE IGM SERPL QL IA: NORMAL
HBV SURFACE AG SERPL QL IA: NORMAL
HCT VFR BLD AUTO: 32.2 % (ref 34–46.6)
HCV AB SER DONR QL: NORMAL
HGB BLD-MCNC: 10.1 G/DL (ref 12–15.9)
IMM GRANULOCYTES # BLD AUTO: 0.01 10*3/MM3 (ref 0–0.05)
IMM GRANULOCYTES NFR BLD AUTO: 0.3 % (ref 0–0.5)
LYMPHOCYTES # BLD AUTO: 0.44 10*3/MM3 (ref 0.7–3.1)
LYMPHOCYTES NFR BLD AUTO: 13.5 % (ref 19.6–45.3)
MCH RBC QN AUTO: 28.9 PG (ref 26.6–33)
MCHC RBC AUTO-ENTMCNC: 31.4 G/DL (ref 31.5–35.7)
MCV RBC AUTO: 92.3 FL (ref 79–97)
MONOCYTES # BLD AUTO: 0.32 10*3/MM3 (ref 0.1–0.9)
MONOCYTES NFR BLD AUTO: 9.8 % (ref 5–12)
NEUTROPHILS NFR BLD AUTO: 2.37 10*3/MM3 (ref 1.7–7)
NEUTROPHILS NFR BLD AUTO: 72.4 % (ref 42.7–76)
PLATELET # BLD AUTO: 241 10*3/MM3 (ref 140–450)
PMV BLD AUTO: 10.2 FL (ref 6–12)
POTASSIUM SERPL-SCNC: 3.5 MMOL/L (ref 3.5–5.2)
PROT SERPL-MCNC: 6.5 G/DL (ref 6–8.5)
RBC # BLD AUTO: 3.49 10*6/MM3 (ref 3.77–5.28)
SODIUM SERPL-SCNC: 139 MMOL/L (ref 136–145)
WBC NRBC COR # BLD: 3.27 10*3/MM3 (ref 3.4–10.8)

## 2022-08-03 PROCEDURE — 80074 ACUTE HEPATITIS PANEL: CPT

## 2022-08-03 PROCEDURE — 85652 RBC SED RATE AUTOMATED: CPT | Performed by: NURSE PRACTITIONER

## 2022-08-03 PROCEDURE — 99214 OFFICE O/P EST MOD 30 MIN: CPT | Performed by: NURSE PRACTITIONER

## 2022-08-03 PROCEDURE — G0463 HOSPITAL OUTPT CLINIC VISIT: HCPCS | Performed by: NURSE PRACTITIONER

## 2022-08-03 PROCEDURE — 84165 PROTEIN E-PHORESIS SERUM: CPT

## 2022-08-03 PROCEDURE — 36415 COLL VENOUS BLD VENIPUNCTURE: CPT | Performed by: NURSE PRACTITIONER

## 2022-08-03 PROCEDURE — 85025 COMPLETE CBC W/AUTO DIFF WBC: CPT | Performed by: NURSE PRACTITIONER

## 2022-08-03 PROCEDURE — 80053 COMPREHEN METABOLIC PANEL: CPT | Performed by: NURSE PRACTITIONER

## 2022-08-03 NOTE — PROGRESS NOTES
Chief Complaint  low hemoglobin    Malka, Paris, APRN  Ace, Paris, APRN      Subjective          Gifty Hoover presents to [facility] for anemia    History of Present Illness   Ms. Gifty Hoover presents for follow after initial consultation for anemia with Dr. King. On day of consult, she was hypotensive. She reports her BP medications have been adjusted now. BP today in office is 142/73. Reports she feels the fatigue has improved as well. Has positive occult blood on consult labs. GI referral was made and does not have consult appointment until 10/14/22. Previous colonoscopy with diverticulosis and grade I hemmorrhoids.     Also with transaminitis on labs from 6/30/33. Hemoglobin is in the 9.9 range. She has CKD as well with cr of 1.60 and GFR of 35. Iron studies with normal iron level, but iron sat was 15%, ferritin was elevated at 241. No prior labs to compare to on the ferritin.       Oncology/Hematology History    No history exists.       Review of Systems   Constitutional: Positive for fatigue.   HENT: Negative.    Eyes: Negative.    Respiratory: Negative.    Cardiovascular: Negative.    Gastrointestinal: Negative.    Endocrine: Negative.    Genitourinary: Positive for frequency (pt is urinating less- yellow colored urine).   Musculoskeletal: Negative.    Allergic/Immunologic: Negative.    Neurological: Negative.    Hematological: Negative.    Psychiatric/Behavioral: Negative.    All other systems reviewed and are negative.      Current Outpatient Medications on File Prior to Visit   Medication Sig Dispense Refill   • albuterol sulfate HFA (ProAir HFA) 108 (90 Base) MCG/ACT inhaler Inhale 2 puffs Every 6 (Six) Hours As Needed for Wheezing. 18 g 0   • aspirin 81 MG EC tablet aspirin 81 mg oral tablet,delayed release (DR/EC) take 1 tablet by oral route daily   Active     • Calcium Carb-Cholecalciferol (Calcium-Vitamin D) 500-400 MG-UNIT tablet Take 1 tablet by  mouth 2 (Two) Times a Day. 180 tablet 1   • ezetimibe (ZETIA) 10 MG tablet Take 1 tablet by mouth Daily. 90 tablet 3   • furosemide (LASIX) 20 MG tablet Take 1 tablet by mouth Every Morning. 90 tablet 1   • hydroCHLOROthiazide (HYDRODIURIL) 25 MG tablet Take 1 tablet by mouth Daily. 90 tablet 1   • levothyroxine (SYNTHROID, LEVOTHROID) 88 MCG tablet Take 1 tablet by mouth Daily. 90 tablet 1   • omeprazole (priLOSEC) 40 MG capsule Take 1 capsule by mouth Daily. 90 capsule 1   • Pradaxa 150 MG capsu Take 1 capsule by mouth 2 (Two) Times a Day. 180 capsule 1   • rosuvastatin (CRESTOR) 40 MG tablet Take 1 tablet by mouth Daily. 90 tablet 1   • telmisartan (MICARDIS) 80 MG tablet Take 1 tablet by mouth Daily. 90 tablet 1     No current facility-administered medications on file prior to visit.       No Known Allergies  Past Medical History:   Diagnosis Date   • Anxiety    • Arthritis    • Bladder problem    • CAD 2021   • Depression    • Essential hypertension 2021   • Fracture of distal end of left fibula 10/29/2015    closed fracture, intitial encounter   • Fracture of distal fibula    • Heart attack    • History of pulmonary embolism    • Hyperlipidemia LDL goal <70 2021   • Lumbar radiculopathy 2018    describes sensory S1 radiculopathy but would favor right L5   • Paroxysmal atrial fibrillation 2021   • Seasonal allergies    • Thyroid disorder    • Vaginal bleeding problems      Past Surgical History:   Procedure Laterality Date   • BLADDER SURGERY     • CARDIAC CATHETERIZATION     • CARDIAC SURGERY      Open heart surgery   •  SECTION     • CHOLECYSTECTOMY     • COLONOSCOPY     • NICHOLAS FILTER INSERTION JUGULAR     • HYSTERECTOMY       Social History     Socioeconomic History   • Marital status:    Tobacco Use   • Smoking status: Never Smoker   • Smokeless tobacco: Never Used   Vaping Use   • Vaping Use: Never used   Substance and Sexual Activity   •  Alcohol use: Never   • Drug use: Never   • Sexual activity: Defer     Family History   Problem Relation Age of Onset   • Stroke Mother    • Cancer Mother    • Colon cancer Mother 71        still living at 73   • Diabetes Mother    • Arthritis Mother    • Stroke Father    • Arthritis Father    • Osteoporosis Father    • Cancer Sister    • Diabetes Brother    • Stroke Brother    • Bleeding Disorder Daughter      Immunization History   Administered Date(s) Administered   • COVID-19 (PFIZER) PURPLE CAP 04/03/2021, 04/26/2021   • Flu Vaccine Split Quad 11/06/2019   • Fluad Quad 65+ 10/01/2021   • Pneumococcal Conjugate 20-Valent (PCV20) 06/30/2022   • Pneumococcal Polysaccharide (PPSV23) 10/13/2017       Objective   Physical Exam  Vitals and nursing note reviewed.   Constitutional:       Appearance: Normal appearance. She is obese.   HENT:      Head: Normocephalic.      Nose: Nose normal.      Mouth/Throat:      Mouth: Mucous membranes are moist.   Eyes:      Pupils: Pupils are equal, round, and reactive to light.   Cardiovascular:      Rate and Rhythm: Normal rate and regular rhythm.      Pulses: Normal pulses.      Heart sounds: Normal heart sounds.   Pulmonary:      Effort: Pulmonary effort is normal.      Breath sounds: Normal breath sounds.   Abdominal:      General: Bowel sounds are normal.      Palpations: Abdomen is soft.   Musculoskeletal:         General: Normal range of motion.      Cervical back: Normal range of motion and neck supple.   Skin:     General: Skin is warm and dry.      Capillary Refill: Capillary refill takes less than 2 seconds.   Neurological:      General: No focal deficit present.      Mental Status: She is alert and oriented to person, place, and time.   Psychiatric:         Mood and Affect: Mood normal.         Behavior: Behavior normal.         Thought Content: Thought content normal.         Judgment: Judgment normal.         Vitals:    08/03/22 1139   BP: 142/73   Pulse: 107   Resp: 18    Temp: 97.6 °F (36.4 °C)   SpO2: 100%   Weight: 104 kg (229 lb 11.5 oz)   PainSc: 0-No pain     ECOG score: 1         ECOG: (1) Restricted in Physically Strenuous Activity, Ambulatory & Able to Do Work of Light Nature  Fall Risk Assessment was completed, and patient is at low risk for falls.  PHQ-9 Total Score: 0       The patient is  experiencing fatigue. Fatigue score: 7    PT/OT Functional Screening: PT fx screen: No needs identified  Speech Functional Screening: Speech fx screen: No needs identified  Rehab to be ordered: Rehab to be ordered: No needs identified        Result Review :   The following data was reviewed by: TIEN Ruiz on 08/03/2022:  Lab Results   Component Value Date    HGB 9.9 (L) 07/07/2022    HCT 31.2 (L) 07/07/2022    MCV 92.3 07/07/2022     07/07/2022    WBC 4.28 07/07/2022    NEUTROABS 2.99 07/07/2022    LYMPHSABS 0.65 (L) 07/07/2022    MONOSABS 0.39 07/07/2022    EOSABS 0.20 07/07/2022    BASOSABS 0.03 07/07/2022     Lab Results   Component Value Date    GLUCOSE 191 (H) 06/30/2022    BUN 40 (H) 06/30/2022    CREATININE 1.60 (H) 06/30/2022     06/30/2022    K 4.1 06/30/2022    CL 97 (L) 06/30/2022    CO2 25.7 06/30/2022    CALCIUM 9.7 06/30/2022    PROTEINTOT 7.0 06/30/2022    ALBUMIN 4.10 06/30/2022    BILITOT 0.5 06/30/2022    ALKPHOS 134 (H) 06/30/2022    AST 66 (H) 06/30/2022     (H) 06/30/2022          Assessment and Plan    Diagnoses and all orders for this visit:    1. Stage 3b chronic kidney disease (HCC) (Primary)  -     CBC & Differential  -     Protein Elec + Interp, Serum; Future    2. Positive occult stool blood test  -     CBC & Differential    3. Low hemoglobin  -     CBC & Differential    4. Transaminitis  -     Comprehensive Metabolic Panel  -     US Abdomen Complete; Future  -     Hepatitis Panel, Acute; Future    5. Elevated ferritin  -     Sedimentation Rate    Recheck CBC today with diff and peripheral smear today. Will check SPEP  since she has anemia and  CKD. Also, with transaminitis. No other labs to compare to. Will check a hepatitis panel and check a abdominal US for fatty liver.     Has GI consult scheduled for October 14,2022. Will see if we can move this consult up closer since she has anemia, OB + and transaminitis. Needs a nephrology consult as well.     Consideration for starting erythropoietin agents once GI consult completed to evaluate for any bleeding of the colon.     She will follow up in 6 weeks with MD for results of lab work.     I spent 20 minutes caring for Gifty on this date of service. This time includes time spent by me in the following activities:preparing for the visit, reviewing tests, obtaining and/or reviewing a separately obtained history, performing a medically appropriate examination and/or evaluation , counseling and educating the patient/family/caregiver, ordering medications, tests, or procedures, referring and communicating with other health care professionals , documenting information in the medical record and independently interpreting results and communicating that information with the patient/family/caregiver    Patient Follow Up: 6 weeks with MD  Patient was given instructions and counseling regarding her condition or for health maintenance advice. Please see specific information pulled into the AVS if appropriate.     Shobha Zaman, APRN    8/3/2022

## 2022-08-04 LAB
ALBUMIN SERPL ELPH-MCNC: 3.1 G/DL (ref 2.9–4.4)
ALBUMIN/GLOB SERPL: 1.1 {RATIO} (ref 0.7–1.7)
ALPHA1 GLOB SERPL ELPH-MCNC: 0.2 G/DL (ref 0–0.4)
ALPHA2 GLOB SERPL ELPH-MCNC: 0.8 G/DL (ref 0.4–1)
B-GLOBULIN SERPL ELPH-MCNC: 0.9 G/DL (ref 0.7–1.3)
GAMMA GLOB SERPL ELPH-MCNC: 0.9 G/DL (ref 0.4–1.8)
GLOBULIN SER CALC-MCNC: 2.9 G/DL (ref 2.2–3.9)
LABORATORY COMMENT REPORT: NORMAL
M PROTEIN SERPL ELPH-MCNC: NORMAL G/DL
PROT PATTERN SERPL ELPH-IMP: NORMAL
PROT SERPL-MCNC: 6 G/DL (ref 6–8.5)

## 2022-08-11 ENCOUNTER — HOSPITAL ENCOUNTER (OUTPATIENT)
Dept: ULTRASOUND IMAGING | Facility: HOSPITAL | Age: 67
Discharge: HOME | End: 2022-08-11
Admitting: NURSE PRACTITIONER

## 2022-08-11 DIAGNOSIS — R74.01 TRANSAMINITIS: ICD-10-CM

## 2022-08-11 PROCEDURE — 76700 US EXAM ABDOM COMPLETE: CPT

## 2022-09-07 ENCOUNTER — OFFICE VISIT (OUTPATIENT)
Dept: GASTROENTEROLOGY | Facility: CLINIC | Age: 67
End: 2022-09-07

## 2022-09-07 ENCOUNTER — PREP FOR SURGERY (OUTPATIENT)
Dept: OTHER | Facility: HOSPITAL | Age: 67
End: 2022-09-07

## 2022-09-07 ENCOUNTER — TELEPHONE (OUTPATIENT)
Dept: ONCOLOGY | Facility: HOSPITAL | Age: 67
End: 2022-09-07

## 2022-09-07 VITALS
SYSTOLIC BLOOD PRESSURE: 134 MMHG | WEIGHT: 229.9 LBS | HEIGHT: 60 IN | DIASTOLIC BLOOD PRESSURE: 79 MMHG | HEART RATE: 103 BPM | BODY MASS INDEX: 45.14 KG/M2 | OXYGEN SATURATION: 99 %

## 2022-09-07 DIAGNOSIS — D50.9 IRON DEFICIENCY ANEMIA, UNSPECIFIED IRON DEFICIENCY ANEMIA TYPE: ICD-10-CM

## 2022-09-07 DIAGNOSIS — R93.2 ABNORMAL FINDINGS ON DIAGNOSTIC IMAGING OF LIVER AND BILIARY TRACT: ICD-10-CM

## 2022-09-07 DIAGNOSIS — R74.8 ELEVATED LIVER ENZYMES: ICD-10-CM

## 2022-09-07 DIAGNOSIS — R94.5 ABNORMAL RESULTS OF LIVER FUNCTION STUDIES: ICD-10-CM

## 2022-09-07 DIAGNOSIS — R14.0 BLOATING: ICD-10-CM

## 2022-09-07 DIAGNOSIS — R19.5 OCCULT BLOOD POSITIVE STOOL: ICD-10-CM

## 2022-09-07 DIAGNOSIS — Z86.010 HISTORY OF COLON POLYPS: ICD-10-CM

## 2022-09-07 DIAGNOSIS — K21.9 GASTROESOPHAGEAL REFLUX DISEASE, UNSPECIFIED WHETHER ESOPHAGITIS PRESENT: Primary | ICD-10-CM

## 2022-09-07 DIAGNOSIS — K76.0 HEPATIC STEATOSIS: ICD-10-CM

## 2022-09-07 DIAGNOSIS — E66.01 CLASS 3 SEVERE OBESITY WITH BODY MASS INDEX (BMI) OF 40.0 TO 44.9 IN ADULT, UNSPECIFIED OBESITY TYPE, UNSPECIFIED WHETHER SERIOUS COMORBIDITY PRESENT: ICD-10-CM

## 2022-09-07 PROBLEM — Z86.0100 HISTORY OF COLON POLYPS: Status: ACTIVE | Noted: 2022-09-07

## 2022-09-07 PROCEDURE — 99214 OFFICE O/P EST MOD 30 MIN: CPT

## 2022-09-07 RX ORDER — FAMOTIDINE 40 MG/1
40 TABLET, FILM COATED ORAL
Qty: 90 TABLET | Refills: 1 | Status: SHIPPED | OUTPATIENT
Start: 2022-09-07 | End: 2023-03-30

## 2022-09-07 RX ORDER — POLYETHYLENE GLYCOL 3350, SODIUM SULFATE ANHYDROUS, SODIUM BICARBONATE, SODIUM CHLORIDE, POTASSIUM CHLORIDE 227.1; 21.5; 6.36; 5.53; .754 G/L; G/L; G/L; G/L; G/L
4000 POWDER, FOR SOLUTION ORAL ONCE
Qty: 1 EACH | Refills: 0 | Status: SHIPPED | OUTPATIENT
Start: 2022-09-07 | End: 2022-09-07

## 2022-09-07 RX ORDER — FERROUS FUMARATE 324(106)MG
1 TABLET ORAL EVERY OTHER DAY
COMMUNITY
Start: 2022-08-08

## 2022-09-07 NOTE — PROGRESS NOTES
Chief Complaint  Heartburn (- every time pt eats ), positive occult stool, and Bloated    Gifty Hoover is a 66 y.o. female who presents to [facility] on referral from [facility] for a gastroenterology evaluation of heartburn, anemia, occult blood positive and elevated liver enzymes..        History of Present Illness  New patient presents to the office for heartburn, anemia, occult blood positive and elevated liver enzymes.  Patient was placed on omeprazole 40 mg over the summer and reports some improvement in heartburn. She still has heartburn about 3 to 4 times a week. She has a lot of bloating. Denies nausea, vomiting, epigastric pain, and dysphagia.  She had occult blood positive with her PCP but denies seeing any obvious blood in her stool.  Denies change in bowel habits or unintentional weight loss.    US abdomen 2020 hepatic steatosis, CBD 3 mm, simple cysts on left kidney, cholelithiasis without acute cholecystitis    Colonoscopy 3/8/2018 by Dr. Vasquez-diverticula in the sigmoid colon, grade 1 internal hemorrhoids, tattoo site of prior polypectomy in ascending colon with no recurrent polyps seen.  Recommended repeat colonoscopy in 5 years.      Past Medical History:   Diagnosis Date   • Anxiety    • Arthritis    • Bladder problem    • CAD 2021   • Depression    • Essential hypertension 2021   • Fracture of distal end of left fibula 10/29/2015    closed fracture, intitial encounter   • Fracture of distal fibula    • Heart attack    • History of pulmonary embolism    • Hyperlipidemia LDL goal <70 2021   • Lumbar radiculopathy 2018    describes sensory S1 radiculopathy but would favor right L5   • Paroxysmal atrial fibrillation 2021   • Seasonal allergies    • Thyroid disorder    • Vaginal bleeding problems        Past Surgical History:   Procedure Laterality Date   • ABDOMINAL SURGERY         • BLADDER  SURGERY     • CARDIAC CATHETERIZATION     • CARDIAC SURGERY      Open heart surgery   •  SECTION     • CHOLECYSTECTOMY     • COLONOSCOPY  2018   • NICHOLAS FILTER INSERTION JUGULAR     • HYSTERECTOMY           Current Outpatient Medications:   •  albuterol sulfate HFA (ProAir HFA) 108 (90 Base) MCG/ACT inhaler, Inhale 2 puffs Every 6 (Six) Hours As Needed for Wheezing., Disp: 18 g, Rfl: 0  •  aspirin 81 MG EC tablet, aspirin 81 mg oral tablet,delayed release (DR/EC) take 1 tablet by oral route daily   Active, Disp: , Rfl:   •  Calcium Carb-Cholecalciferol (Calcium-Vitamin D) 500-400 MG-UNIT tablet, Take 1 tablet by mouth 2 (Two) Times a Day., Disp: 180 tablet, Rfl: 1  •  ezetimibe (ZETIA) 10 MG tablet, Take 1 tablet by mouth Daily., Disp: 90 tablet, Rfl: 3  •  Ferrous Fumarate 324 (106 Fe) MG tablet, Take 1 tablet by mouth Every Other Day., Disp: , Rfl:   •  levothyroxine (SYNTHROID, LEVOTHROID) 88 MCG tablet, Take 1 tablet by mouth Daily., Disp: 90 tablet, Rfl: 1  •  omeprazole (priLOSEC) 40 MG capsule, Take 1 capsule by mouth Daily., Disp: 90 capsule, Rfl: 1  •  Pradaxa 150 MG capsu, Take 1 capsule by mouth 2 (Two) Times a Day., Disp: 180 capsule, Rfl: 1  •  rosuvastatin (CRESTOR) 40 MG tablet, Take 1 tablet by mouth Daily., Disp: 90 tablet, Rfl: 1  •  telmisartan (MICARDIS) 80 MG tablet, Take 1 tablet by mouth Daily., Disp: 90 tablet, Rfl: 1  •  famotidine (Pepcid) 40 MG tablet, Take 1 tablet by mouth every night at bedtime., Disp: 90 tablet, Rfl: 1  •  furosemide (LASIX) 20 MG tablet, Take 1 tablet by mouth Every Morning., Disp: 90 tablet, Rfl: 1  •  hydroCHLOROthiazide (HYDRODIURIL) 25 MG tablet, Take 1 tablet by mouth Daily., Disp: 90 tablet, Rfl: 1  •  PEG 3350-KCl-NaBcb-NaCl-NaSulf (Golytely) 227.1 g pack, Take 4,000 mL by mouth 1 (One) Time for 1 dose. Take as directed by the office for colon prep, Disp: 1 each, Rfl: 0     No Known Allergies    Family History   Problem Relation Age  "of Onset   • Stroke Mother    • Cancer Mother    • Colon cancer Mother 71        still living at 73   • Diabetes Mother    • Arthritis Mother    • Colon polyps Mother    • Stroke Father    • Arthritis Father    • Osteoporosis Father    • Inflammatory bowel disease Father    • Cancer Sister    • Diabetes Brother    • Stroke Brother    • Bleeding Disorder Daughter         Social History     Social History Narrative   • Not on file       Immunization:  Immunization History   Administered Date(s) Administered   • COVID-19 (PFIZER) PURPLE CAP 04/03/2021, 04/26/2021   • Flu Vaccine Split Quad 11/06/2019   • Fluad Quad 65+ 10/01/2021   • Pneumococcal Conjugate 20-Valent (PCV20) 06/30/2022   • Pneumococcal Polysaccharide (PPSV23) 10/13/2017        Objective     Vital Signs:   /79 (BP Location: Left arm, Patient Position: Sitting, Cuff Size: Adult)   Pulse 103   Ht 152.4 cm (60\")   Wt 104 kg (229 lb 14.4 oz)   SpO2 99%   BMI 44.90 kg/m²       Physical Exam  Constitutional:       Appearance: Normal appearance.   HENT:      Head: Normocephalic.   Cardiovascular:      Rate and Rhythm: Normal rate and regular rhythm.      Heart sounds: Normal heart sounds.   Pulmonary:      Effort: Pulmonary effort is normal.      Breath sounds: Normal breath sounds.   Abdominal:      General: Bowel sounds are normal.      Palpations: Abdomen is soft.   Skin:     General: Skin is warm and dry.   Neurological:      Mental Status: She is alert and oriented to person, place, and time. Mental status is at baseline.   Psychiatric:         Mood and Affect: Mood normal.         Behavior: Behavior normal.         Thought Content: Thought content normal.         Judgment: Judgment normal.         Result Review :     CBC w/diff    CBC w/Diff 6/30/22 7/7/22 8/3/22   WBC 3.48 4.28 3.27 (A)   RBC 3.20 (A) 3.38 (A) 3.49 (A)   Hemoglobin 9.5 (A) 9.9 (A) 10.1 (A)   Hematocrit 29.2 (A) 31.2 (A) 32.2 (A)   MCV 91.3 92.3 92.3   MCH 29.7 29.3 28.9   MCHC " 32.5 31.7 31.4 (A)   RDW 13.4 13.4 14.1   Platelets 209 213 241   Neutrophil Rel %  69.8 72.4   Immature Granulocyte Rel %  0.5 0.3   Lymphocyte Rel %  15.2 (A) 13.5 (A)   Monocyte Rel %  9.1 9.8   Eosinophil Rel %  4.7 3.1   Basophil Rel %  0.7 0.9   (A) Abnormal value            CMP    CMP 9/17/21 9/17/21 6/30/22 8/3/22 8/3/22    0900 0900  1226 1226   Glucose 138 (A)  191 (A) 165 (A)    BUN 27 (A)  40 (A) 17    Creatinine 1.57 (A)  1.60 (A) 1.07 (A)    eGFR Non African Am 33 (A)       Sodium 139  136 139    Potassium 4.3  4.1 3.5    Chloride 103  97 (A) 105    Calcium 9.5  9.7 9.4    Total Protein     6.0   Albumin  4.00 4.10 3.58 3.1   Globulin     2.9   Total Bilirubin  0.4 0.5 0.5    Alkaline Phosphatase  81 134 (A) 158 (A)    AST (SGOT)  12 66 (A) 32    ALT (SGPT)  17 138 (A) 71 (A)    (A) Abnormal value              Iron Profile   Iron   Date Value Ref Range Status   07/12/2022 59 37 - 145 mcg/dL Final     TIBC   Date Value Ref Range Status   07/12/2022 390 298 - 536 mcg/dL Final     Iron Saturation   Date Value Ref Range Status   07/12/2022 15 (L) 20 - 50 % Final     Transferrin   Date Value Ref Range Status   07/12/2022 262 200 - 360 mg/dL Final     Ferritin   Ferritin   Date Value Ref Range Status   07/12/2022 241.50 (H) 13.00 - 150.00 ng/mL Final     Acute HEP Panel   Hepatitis B Surface Ag   Date Value Ref Range Status   08/03/2022 Non-Reactive Non-Reactive Final     Hep A IgM   Date Value Ref Range Status   08/03/2022 Non-Reactive Non-Reactive Final     Hep B C IgM   Date Value Ref Range Status   08/03/2022 Non-Reactive Non-Reactive Final     Hepatitis C Ab   Date Value Ref Range Status   08/03/2022 Non-Reactive Non-Reactive Final           Assessment and Plan    Diagnoses and all orders for this visit:    1. Gastroesophageal reflux disease, unspecified whether esophagitis present (Primary)    2. Bloating    3. Occult blood positive stool    4. Iron deficiency anemia, unspecified iron deficiency anemia  type    5. History of colon polyps    6. Elevated liver enzymes  -     AFP Tumor Marker; Future  -     Alpha - 1 - Antitrypsin; Future  -     FABIAN; Future  -     Hepatic Function Panel; Future  -     Iron Profile; Future  -     Ferritin; Future  -     Copper, Serum; Future  -     Protime-INR; Future  -     Ceruloplasmin; Future  -     Mitochondrial Antibodies, M2; Future  -     Anti-Smooth Muscle Antibody Titer; Future    7. Hepatic steatosis    8. Class 3 severe obesity with body mass index (BMI) of 40.0 to 44.9 in adult, unspecified obesity type, unspecified whether serious comorbidity present (HCC)    9. Abnormal findings on diagnostic imaging of liver and biliary tract   -     AFP Tumor Marker; Future    10. Abnormal results of liver function studies   -     Protime-INR; Future    Other orders  -     PEG 3350-KCl-NaBcb-NaCl-NaSulf (Golytely) 227.1 g pack; Take 4,000 mL by mouth 1 (One) Time for 1 dose. Take as directed by the office for colon prep  Dispense: 1 each; Refill: 0  -     famotidine (Pepcid) 40 MG tablet; Take 1 tablet by mouth every night at bedtime.  Dispense: 90 tablet; Refill: 1    66-year-old patient presents to the office for anemia, occult blood positive, persistent heartburn, bloating, history of colon polyps, elevated liver enzymes, and hepatic steatosis on most recent ultrasound.  Patient will continue omeprazole 40 mg in the morning and I have added Pepcid 40 mg at night.  I have advised patient to proceed with EGD and colonoscopy, we will schedule at her earliest convenience.  Discussed elevated liver enzymes and liver work-up ordered with the labs listed above. We have discussed the possible etiology related to elevated liver enzymes. We discussed fatty liver disease and the importance of living a healthy lifestyle to include weight loss of 10%, cutting back on carbs, sugars, and fried fatty foods, limiting intake of soda and sugary drinks, and abstain from alcohol. Patient is agreeable to  this plan and will call with any questions or concerns.    EGD/COLONOSCOPY Surgical Risk and Benefits: Possible risk/complications, benefits, and alternatives to surgical or invasive procedure have been explained to patient and/or legal guardian. Risks include bleeding, infection, and perforation. Patient has been evaluated and can tolerate anesthesia and/or sedation. Risk, benefits, and alternatives to anesthesia and sedation have been explained to patient and/or legal guardian.     Follow Up   No follow-ups on file.  Patient was given instructions and counseling regarding her condition or for health maintenance advice. Please see specific information pulled into the AVS if appropriate.

## 2022-09-09 ENCOUNTER — PATIENT ROUNDING (BHMG ONLY) (OUTPATIENT)
Dept: GASTROENTEROLOGY | Facility: CLINIC | Age: 67
End: 2022-09-09

## 2022-09-09 ENCOUNTER — LAB (OUTPATIENT)
Dept: LAB | Facility: HOSPITAL | Age: 67
End: 2022-09-09

## 2022-09-09 DIAGNOSIS — R94.5 ABNORMAL RESULTS OF LIVER FUNCTION STUDIES: ICD-10-CM

## 2022-09-09 DIAGNOSIS — R74.8 ELEVATED LIVER ENZYMES: ICD-10-CM

## 2022-09-09 DIAGNOSIS — R93.2 ABNORMAL FINDINGS ON DIAGNOSTIC IMAGING OF LIVER AND BILIARY TRACT: ICD-10-CM

## 2022-09-09 LAB
ALBUMIN SERPL-MCNC: 3.6 G/DL (ref 3.5–5.2)
ALP SERPL-CCNC: 200 U/L (ref 39–117)
ALPHA-FETOPROTEIN: <2 NG/ML (ref 0–8.3)
ALPHA1 GLOB MFR UR ELPH: 180 MG/DL (ref 90–200)
ALT SERPL W P-5'-P-CCNC: 158 U/L (ref 1–33)
AST SERPL-CCNC: 115 U/L (ref 1–32)
BILIRUB CONJ SERPL-MCNC: 0.3 MG/DL (ref 0–0.3)
BILIRUB INDIRECT SERPL-MCNC: 0.6 MG/DL
BILIRUB SERPL-MCNC: 0.9 MG/DL (ref 0–1.2)
CERULOPLASMIN SERPL-MCNC: 30 MG/DL (ref 19–39)
FERRITIN SERPL-MCNC: 88.9 NG/ML (ref 13–150)
INR PPP: 1.37 (ref 0.86–1.15)
IRON 24H UR-MRATE: 51 MCG/DL (ref 37–145)
IRON SATN MFR SERPL: 12 % (ref 20–50)
PROT SERPL-MCNC: 7.5 G/DL (ref 6–8.5)
PROTHROMBIN TIME: 17.1 SECONDS (ref 11.8–14.9)
TIBC SERPL-MCNC: 429 MCG/DL (ref 298–536)
TRANSFERRIN SERPL-MCNC: 288 MG/DL (ref 200–360)

## 2022-09-09 PROCEDURE — 82525 ASSAY OF COPPER: CPT

## 2022-09-09 PROCEDURE — 86015 ACTIN ANTIBODY EACH: CPT

## 2022-09-09 PROCEDURE — 83540 ASSAY OF IRON: CPT

## 2022-09-09 PROCEDURE — 82103 ALPHA-1-ANTITRYPSIN TOTAL: CPT

## 2022-09-09 PROCEDURE — 80076 HEPATIC FUNCTION PANEL: CPT

## 2022-09-09 PROCEDURE — 86038 ANTINUCLEAR ANTIBODIES: CPT

## 2022-09-09 PROCEDURE — 86225 DNA ANTIBODY NATIVE: CPT

## 2022-09-09 PROCEDURE — 36415 COLL VENOUS BLD VENIPUNCTURE: CPT

## 2022-09-09 PROCEDURE — 84466 ASSAY OF TRANSFERRIN: CPT

## 2022-09-09 PROCEDURE — 82728 ASSAY OF FERRITIN: CPT

## 2022-09-09 PROCEDURE — 82105 ALPHA-FETOPROTEIN SERUM: CPT

## 2022-09-09 PROCEDURE — 86381 MITOCHONDRIAL ANTIBODY EACH: CPT

## 2022-09-09 PROCEDURE — 82390 ASSAY OF CERULOPLASMIN: CPT

## 2022-09-09 PROCEDURE — 85610 PROTHROMBIN TIME: CPT

## 2022-09-09 NOTE — PROGRESS NOTES
A [employer] message has been sent to the patient on behalf of [employer] [employer] with [facility] [place] [facility] [place].

## 2022-09-10 LAB
DSDNA IGG SERPL IA-ACNC: NEGATIVE [IU]/ML
MITOCHONDRIA M2 IGG SER-ACNC: <20 UNITS (ref 0–20)
NUCLEAR IGG SER IA-RTO: NEGATIVE
SMA IGG SER-ACNC: 6 UNITS (ref 0–19)

## 2022-09-14 ENCOUNTER — APPOINTMENT (OUTPATIENT)
Dept: ONCOLOGY | Facility: HOSPITAL | Age: 67
End: 2022-09-14

## 2022-09-14 LAB — COPPER SERPL-MCNC: 143 UG/DL (ref 80–158)

## 2022-09-16 ENCOUNTER — TELEPHONE (OUTPATIENT)
Dept: GASTROENTEROLOGY | Facility: CLINIC | Age: 67
End: 2022-09-16

## 2022-09-16 RX ORDER — EZETIMIBE 10 MG/1
10 TABLET ORAL DAILY
Qty: 90 TABLET | Refills: 3 | Status: SHIPPED | OUTPATIENT
Start: 2022-09-16

## 2022-09-16 NOTE — TELEPHONE ENCOUNTER
S/w pt. Aware of results and suggested diet. Agrees to repeat labs in a month ---- Message from TIEN Durant sent at 9/14/2022  4:19 PM EDT -----  I have reviewed the patient's most recent liver work-up which is normal ruling out underlying hepatitis or autoimmune cause for elevated liver enzymes.  Liver enzymes continue to increase.  Please emphasize the importance of maintaining a healthy lifestyle: weight loss of 10%, cutting back on carbs, sugars, and fried fatty foods, limiting intake of soda and sugary drinks, and abstain from alcohol. Please have patient repeat liver enzymes 1 month after following recommendations.

## 2022-09-22 ENCOUNTER — OFFICE VISIT (OUTPATIENT)
Dept: CARDIOLOGY | Facility: CLINIC | Age: 67
End: 2022-09-22

## 2022-09-22 VITALS
WEIGHT: 224.38 LBS | SYSTOLIC BLOOD PRESSURE: 120 MMHG | DIASTOLIC BLOOD PRESSURE: 68 MMHG | BODY MASS INDEX: 44.05 KG/M2 | HEART RATE: 84 BPM | HEIGHT: 60 IN

## 2022-09-22 DIAGNOSIS — I10 ESSENTIAL HYPERTENSION: ICD-10-CM

## 2022-09-22 DIAGNOSIS — I25.10 CORONARY ARTERY DISEASE INVOLVING NATIVE CORONARY ARTERY OF NATIVE HEART WITHOUT ANGINA PECTORIS: Primary | ICD-10-CM

## 2022-09-22 DIAGNOSIS — E78.5 HYPERLIPIDEMIA LDL GOAL <70: ICD-10-CM

## 2022-09-22 DIAGNOSIS — I48.0 PAROXYSMAL ATRIAL FIBRILLATION: ICD-10-CM

## 2022-09-22 PROBLEM — N18.32 STAGE 3B CHRONIC KIDNEY DISEASE: Status: ACTIVE | Noted: 2022-08-08

## 2022-09-22 PROCEDURE — 93000 ELECTROCARDIOGRAM COMPLETE: CPT | Performed by: NURSE PRACTITIONER

## 2022-09-22 PROCEDURE — 99214 OFFICE O/P EST MOD 30 MIN: CPT | Performed by: NURSE PRACTITIONER

## 2022-09-27 ENCOUNTER — APPOINTMENT (OUTPATIENT)
Dept: ONCOLOGY | Facility: HOSPITAL | Age: 67
End: 2022-09-27

## 2022-09-27 ENCOUNTER — TELEPHONE (OUTPATIENT)
Dept: ONCOLOGY | Facility: HOSPITAL | Age: 67
End: 2022-09-27

## 2022-10-05 ENCOUNTER — TELEPHONE (OUTPATIENT)
Dept: GASTROENTEROLOGY | Facility: CLINIC | Age: 67
End: 2022-10-05

## 2022-10-06 ENCOUNTER — TRANSCRIBE ORDERS (OUTPATIENT)
Dept: LAB | Facility: HOSPITAL | Age: 67
End: 2022-10-06

## 2022-10-06 ENCOUNTER — OFFICE VISIT (OUTPATIENT)
Dept: ONCOLOGY | Facility: HOSPITAL | Age: 67
End: 2022-10-06

## 2022-10-06 ENCOUNTER — LAB (OUTPATIENT)
Dept: LAB | Facility: HOSPITAL | Age: 67
End: 2022-10-06

## 2022-10-06 VITALS
HEART RATE: 100 BPM | WEIGHT: 223.55 LBS | RESPIRATION RATE: 18 BRPM | SYSTOLIC BLOOD PRESSURE: 118 MMHG | DIASTOLIC BLOOD PRESSURE: 68 MMHG | BODY MASS INDEX: 43.66 KG/M2 | OXYGEN SATURATION: 98 % | TEMPERATURE: 97.3 F

## 2022-10-06 DIAGNOSIS — E11.9 DIABETES MELLITUS WITHOUT COMPLICATION: ICD-10-CM

## 2022-10-06 DIAGNOSIS — R74.8 ELEVATED LIVER ENZYMES: ICD-10-CM

## 2022-10-06 DIAGNOSIS — D64.9 LOW HEMOGLOBIN: Primary | ICD-10-CM

## 2022-10-06 DIAGNOSIS — E55.9 VITAMIN D DEFICIENCY: ICD-10-CM

## 2022-10-06 DIAGNOSIS — E83.42 HYPOMAGNESEMIA: ICD-10-CM

## 2022-10-06 DIAGNOSIS — R94.5 ABNORMAL RESULTS OF LIVER FUNCTION STUDIES: ICD-10-CM

## 2022-10-06 DIAGNOSIS — E83.42 HYPOMAGNESEMIA: Primary | ICD-10-CM

## 2022-10-06 DIAGNOSIS — I10 HYPERTENSION, ESSENTIAL: ICD-10-CM

## 2022-10-06 LAB
25(OH)D3 SERPL-MCNC: 31.2 NG/ML (ref 30–100)
ALBUMIN SERPL-MCNC: 4 G/DL (ref 3.5–5.2)
ALBUMIN UR-MCNC: <1.2 MG/DL
ALP SERPL-CCNC: 201 U/L (ref 39–117)
ALT SERPL W P-5'-P-CCNC: 129 U/L (ref 1–33)
ANION GAP SERPL CALCULATED.3IONS-SCNC: 10 MMOL/L (ref 5–15)
AST SERPL-CCNC: 57 U/L (ref 1–32)
BACTERIA UR QL AUTO: ABNORMAL /HPF
BASOPHILS # BLD AUTO: 0.03 10*3/MM3 (ref 0–0.2)
BASOPHILS NFR BLD AUTO: 0.7 % (ref 0–1.5)
BILIRUB CONJ SERPL-MCNC: 0.4 MG/DL (ref 0–0.3)
BILIRUB INDIRECT SERPL-MCNC: 0.5 MG/DL
BILIRUB SERPL-MCNC: 0.9 MG/DL (ref 0–1.2)
BILIRUB UR QL STRIP: NEGATIVE
BUN SERPL-MCNC: 16 MG/DL (ref 8–23)
BUN/CREAT SERPL: 16.2 (ref 7–25)
CALCIUM SPEC-SCNC: 9.3 MG/DL (ref 8.6–10.5)
CHLORIDE SERPL-SCNC: 103 MMOL/L (ref 98–107)
CHOLEST SERPL-MCNC: 114 MG/DL (ref 0–200)
CLARITY UR: ABNORMAL
CO2 SERPL-SCNC: 26 MMOL/L (ref 22–29)
COD CRY URNS QL: ABNORMAL /HPF
COLOR UR: ABNORMAL
CREAT SERPL-MCNC: 0.99 MG/DL (ref 0.57–1)
CREAT UR-MCNC: 127.9 MG/DL
CREAT UR-MCNC: 152.8 MG/DL
DEPRECATED RDW RBC AUTO: 42.1 FL (ref 37–54)
EGFRCR SERPLBLD CKD-EPI 2021: 63 ML/MIN/1.73
EOSINOPHIL # BLD AUTO: 0.12 10*3/MM3 (ref 0–0.4)
EOSINOPHIL NFR BLD AUTO: 3 % (ref 0.3–6.2)
ERYTHROCYTE [DISTWIDTH] IN BLOOD BY AUTOMATED COUNT: 12.8 % (ref 12.3–15.4)
GLUCOSE SERPL-MCNC: 199 MG/DL (ref 65–99)
GLUCOSE UR STRIP-MCNC: NEGATIVE MG/DL
HBA1C MFR BLD: 7.5 % (ref 4.8–5.6)
HCT VFR BLD AUTO: 35.7 % (ref 34–46.6)
HDLC SERPL-MCNC: 39 MG/DL (ref 40–60)
HGB BLD-MCNC: 11.5 G/DL (ref 12–15.9)
HGB UR QL STRIP.AUTO: NEGATIVE
HYALINE CASTS UR QL AUTO: ABNORMAL /LPF
IMM GRANULOCYTES # BLD AUTO: 0.01 10*3/MM3 (ref 0–0.05)
IMM GRANULOCYTES NFR BLD AUTO: 0.2 % (ref 0–0.5)
INR PPP: 1.66 (ref 0.86–1.15)
KETONES UR QL STRIP: NEGATIVE
LDLC SERPL CALC-MCNC: 59 MG/DL (ref 0–100)
LDLC/HDLC SERPL: 1.53 {RATIO}
LEUKOCYTE ESTERASE UR QL STRIP.AUTO: ABNORMAL
LYMPHOCYTES # BLD AUTO: 0.53 10*3/MM3 (ref 0.7–3.1)
LYMPHOCYTES NFR BLD AUTO: 13.2 % (ref 19.6–45.3)
MAGNESIUM SERPL-MCNC: 2 MG/DL (ref 1.6–2.4)
MCH RBC QN AUTO: 29 PG (ref 26.6–33)
MCHC RBC AUTO-ENTMCNC: 32.2 G/DL (ref 31.5–35.7)
MCV RBC AUTO: 89.9 FL (ref 79–97)
MICROALBUMIN/CREAT UR: NORMAL MG/G{CREAT}
MONOCYTES # BLD AUTO: 0.36 10*3/MM3 (ref 0.1–0.9)
MONOCYTES NFR BLD AUTO: 9 % (ref 5–12)
NEUTROPHILS NFR BLD AUTO: 2.97 10*3/MM3 (ref 1.7–7)
NEUTROPHILS NFR BLD AUTO: 73.9 % (ref 42.7–76)
NITRITE UR QL STRIP: NEGATIVE
NRBC BLD AUTO-RTO: 0 /100 WBC (ref 0–0.2)
PH UR STRIP.AUTO: 6 [PH] (ref 5–8)
PLATELET # BLD AUTO: 218 10*3/MM3 (ref 140–450)
PMV BLD AUTO: 11.1 FL (ref 6–12)
POTASSIUM SERPL-SCNC: 3.4 MMOL/L (ref 3.5–5.2)
PROT ?TM UR-MCNC: 12 MG/DL
PROT SERPL-MCNC: 7.3 G/DL (ref 6–8.5)
PROT UR QL STRIP: ABNORMAL
PROT/CREAT UR: 0.08 MG/G{CREAT}
PROTHROMBIN TIME: 19.9 SECONDS (ref 11.8–14.9)
RBC # BLD AUTO: 3.97 10*6/MM3 (ref 3.77–5.28)
RBC # UR STRIP: ABNORMAL /HPF
REF LAB TEST METHOD: ABNORMAL
SODIUM SERPL-SCNC: 139 MMOL/L (ref 136–145)
SP GR UR STRIP: 1.02 (ref 1–1.03)
SQUAMOUS #/AREA URNS HPF: ABNORMAL /HPF
TRIGL SERPL-MCNC: 76 MG/DL (ref 0–150)
UROBILINOGEN UR QL STRIP: ABNORMAL
VLDLC SERPL-MCNC: 16 MG/DL (ref 5–40)
WBC # UR STRIP: ABNORMAL /HPF
WBC NRBC COR # BLD: 4.02 10*3/MM3 (ref 3.4–10.8)

## 2022-10-06 PROCEDURE — 80048 BASIC METABOLIC PNL TOTAL CA: CPT

## 2022-10-06 PROCEDURE — 36415 COLL VENOUS BLD VENIPUNCTURE: CPT

## 2022-10-06 PROCEDURE — 85610 PROTHROMBIN TIME: CPT

## 2022-10-06 PROCEDURE — 82570 ASSAY OF URINE CREATININE: CPT

## 2022-10-06 PROCEDURE — 81001 URINALYSIS AUTO W/SCOPE: CPT

## 2022-10-06 PROCEDURE — 82043 UR ALBUMIN QUANTITATIVE: CPT

## 2022-10-06 PROCEDURE — 84156 ASSAY OF PROTEIN URINE: CPT

## 2022-10-06 PROCEDURE — 82306 VITAMIN D 25 HYDROXY: CPT

## 2022-10-06 PROCEDURE — G0463 HOSPITAL OUTPT CLINIC VISIT: HCPCS

## 2022-10-06 PROCEDURE — 83735 ASSAY OF MAGNESIUM: CPT

## 2022-10-06 PROCEDURE — 99213 OFFICE O/P EST LOW 20 MIN: CPT | Performed by: INTERNAL MEDICINE

## 2022-10-06 PROCEDURE — 83036 HEMOGLOBIN GLYCOSYLATED A1C: CPT

## 2022-10-06 PROCEDURE — 80076 HEPATIC FUNCTION PANEL: CPT

## 2022-10-06 PROCEDURE — 85025 COMPLETE CBC W/AUTO DIFF WBC: CPT

## 2022-10-06 PROCEDURE — 80061 LIPID PANEL: CPT

## 2022-10-06 NOTE — PROGRESS NOTES
Chief Complaint  Low blood counts    Malka, Paris, APRN  Ace, Paris, APRN      Subjective          Gifty Hoover presents to [facility] for anemia    History of Present Illness   Ms. Gifty Hoover is a 67 yo F presenting for follow up for anemia She has a history of CAD, HTN, hepatic steatosis, GERD, obesity, HLD.  She remains fatigued daily.  She does try and remain active.  She denies any bleeding, blood in stool, dark stool.  She has been put on iron replacement pill by her nephrologist.  She takes this every other day and is tolerating it well so far.  She denies any fevers or infections at this time.  She has EGD scheduled for November with gastroenterology.      Hematology History   7/7/22 CBC with Hgb 9.9, WBC 4.28, plt 213, MCV 92.3. Positive occult blood. Also with transaminitis on labs from 6/30/22. Hemoglobin is in the 9.9 range. She has CKD as well with cr of 1.60 and GFR of 35. Iron studies with normal iron level, but iron sat was 15%, ferritin was elevated at 241. No prior labs to compare to on the ferritin. Previous colonoscopy with diverticulosis and grade I hemmorrhoids    8/3 CBC with improved hgb to 10.1, MCV 92.3, plt 241, WBC of 3.27. Abdominal ultrasound showed hepatic steatosis. SPEP without M-spike.     9/9/22 Iron studies: Ferritin down to 88.9, T sat of 12%, TIBC 429.     Oncology/Hematology History    No history exists.       Review of Systems   Constitutional: Positive for fatigue.   HENT: Negative.    Eyes: Negative.    Respiratory: Positive for shortness of breath.    Cardiovascular: Negative.    Gastrointestinal: Negative.    Endocrine: Negative.    Genitourinary: Positive for frequency (pt is urinating less- yellow colored urine).   Musculoskeletal: Negative.    Allergic/Immunologic: Negative.    Neurological: Negative.    Hematological: Negative.    Psychiatric/Behavioral: Negative.    All other systems reviewed and are  negative.      Current Outpatient Medications on File Prior to Visit   Medication Sig Dispense Refill   • albuterol sulfate HFA (ProAir HFA) 108 (90 Base) MCG/ACT inhaler Inhale 2 puffs Every 6 (Six) Hours As Needed for Wheezing. 18 g 0   • aspirin 81 MG EC tablet aspirin 81 mg oral tablet,delayed release (DR/EC) take 1 tablet by oral route daily   Active     • Calcium Carb-Cholecalciferol (Calcium-Vitamin D) 500-400 MG-UNIT tablet Take 1 tablet by mouth 2 (Two) Times a Day. 180 tablet 1   • ezetimibe (ZETIA) 10 MG tablet Take 1 tablet by mouth Daily. 90 tablet 3   • famotidine (Pepcid) 40 MG tablet Take 1 tablet by mouth every night at bedtime. 90 tablet 1   • Ferrous Fumarate 324 (106 Fe) MG tablet Take 1 tablet by mouth Every Other Day.     • levothyroxine (SYNTHROID, LEVOTHROID) 88 MCG tablet Take 1 tablet by mouth Daily. 90 tablet 1   • omeprazole (priLOSEC) 40 MG capsule Take 1 capsule by mouth Daily. 90 capsule 1   • Pradaxa 150 MG capsu Take 1 capsule by mouth 2 (Two) Times a Day. 180 capsule 1   • rosuvastatin (CRESTOR) 40 MG tablet Take 1 tablet by mouth Daily. 90 tablet 1   • telmisartan (MICARDIS) 80 MG tablet Take 1 tablet by mouth Daily. 90 tablet 1     No current facility-administered medications on file prior to visit.       No Known Allergies  Past Medical History:   Diagnosis Date   • Anxiety    • Arthritis    • Bladder problem    • CAD 9/14/2021   • Depression    • Essential hypertension 9/14/2021   • Fracture of distal end of left fibula 10/29/2015    closed fracture, intitial encounter   • Fracture of distal fibula    • Heart attack 2009   • History of pulmonary embolism    • Hyperlipidemia LDL goal <70 9/14/2021   • Lumbar radiculopathy 09/2018    describes sensory S1 radiculopathy but would favor right L5   • Paroxysmal atrial fibrillation 9/14/2021   • Seasonal allergies    • Thyroid disorder    • Vaginal bleeding problems      Past Surgical History:   Procedure Laterality Date   • ABDOMINAL  SURGERY  1982       • BLADDER SURGERY     • CARDIAC CATHETERIZATION     • CARDIAC SURGERY      Open heart surgery   •  SECTION     • CHOLECYSTECTOMY     • COLONOSCOPY     • NICHOLAS FILTER INSERTION JUGULAR     • HYSTERECTOMY       Social History     Socioeconomic History   • Marital status:    Tobacco Use   • Smoking status: Never Smoker   • Smokeless tobacco: Never Used   Vaping Use   • Vaping Use: Never used   Substance and Sexual Activity   • Alcohol use: Never   • Drug use: Never   • Sexual activity: Not Currently     Family History   Problem Relation Age of Onset   • Stroke Mother    • Cancer Mother    • Colon cancer Mother 71        still living at 73   • Diabetes Mother    • Arthritis Mother    • Colon polyps Mother    • Stroke Father    • Arthritis Father    • Osteoporosis Father    • Inflammatory bowel disease Father    • Cancer Sister    • Diabetes Brother    • Stroke Brother    • Bleeding Disorder Daughter      Immunization History   Administered Date(s) Administered   • COVID-19 (PFIZER) PURPLE CAP 2021, 2021   • Flu Vaccine Split Quad 2019   • Fluad Quad 65+ 10/01/2021   • Pneumococcal Conjugate 20-Valent (PCV20) 2022   • Pneumococcal Polysaccharide (PPSV23) 10/13/2017       Objective   Physical Exam  Vitals and nursing note reviewed.   Constitutional:       Appearance: Normal appearance. She is obese.   HENT:      Head: Normocephalic.      Nose: Nose normal.      Mouth/Throat:      Mouth: Mucous membranes are moist.   Pulmonary:      Effort: Pulmonary effort is normal.   Abdominal:      General: Bowel sounds are normal.      Palpations: Abdomen is soft.   Musculoskeletal:         General: Normal range of motion.      Cervical back: Normal range of motion and neck supple.   Skin:     General: Skin is warm and dry.   Neurological:      General: No focal deficit present.      Mental Status: She is alert and oriented to person, place,  and time.   Psychiatric:         Mood and Affect: Mood normal.         Behavior: Behavior normal.         Thought Content: Thought content normal.         Judgment: Judgment normal.         Vitals:    10/06/22 0801   PainSc: 0-No pain     ECOG score: 0         ECOG: (1) Restricted in Physically Strenuous Activity, Ambulatory & Able to Do Work of Light Nature  Fall Risk Assessment was completed, and patient is at low risk for falls.  PHQ-9 Total Score:         The patient is  experiencing fatigue. Fatigue score: 7    PT/OT Functional Screening: PT fx screen: No needs identified  Speech Functional Screening: Speech fx screen: No needs identified  Rehab to be ordered: Rehab to be ordered: No needs identified        Result Review :   The following data was reviewed by: Consuelo Oswald MA on 08/03/2022:  Lab Results   Component Value Date    HGB 10.1 (L) 08/03/2022    HCT 32.2 (L) 08/03/2022    MCV 92.3 08/03/2022     08/03/2022    WBC 3.27 (L) 08/03/2022    NEUTROABS 2.37 08/03/2022    LYMPHSABS 0.44 (L) 08/03/2022    MONOSABS 0.32 08/03/2022    EOSABS 0.10 08/03/2022    BASOSABS 0.03 08/03/2022     Lab Results   Component Value Date    GLUCOSE 165 (H) 08/03/2022    BUN 17 08/03/2022    CREATININE 1.07 (H) 08/03/2022     08/03/2022    K 3.5 08/03/2022     08/03/2022    CO2 21.8 (L) 08/03/2022    CALCIUM 9.4 08/03/2022    PROTEINTOT 7.5 09/09/2022    ALBUMIN 3.60 09/09/2022    BILITOT 0.9 09/09/2022    ALKPHOS 200 (H) 09/09/2022     (H) 09/09/2022     (H) 09/09/2022     Iron labs reviewed and revealed low T saturation and ferritin down to 88.9.     Abdominal ultrasound report reviewed and consistent with hepatic steatosis.      Assessment and Plan    Diagnoses and all orders for this visit:    1. Low hemoglobin (Primary)  -     CBC & Differential; Future  -     Iron Profile; Future  -     Ferritin; Future    Patient's hemoglobin is slowly improving and is up to 10.1 on most recent check  with normal MCV. However, her iron studies on 9/9/22 are slightly worse but her ferritin remains in normal range at 88.9. She is on iron supplementation orally every other day and is tolerating it well. Recommend to continue this. Patient has EGD per GI in November. This will help evaluate for cause of slow bleeding in GI tract. Recommend continued diet and exercise due to fatty liver. Plan to repeat CBC with iron studies in 6 months to allow time for iron levels to improve after EGD.         CKD  SPEP has been checked and was without M-spike. Patient is following with nephrology.       I spent 20 minutes caring for Gifty on this date of service. This time includes time spent by me in the following activities:preparing for the visit, reviewing tests, obtaining and/or reviewing a separately obtained history, performing a medically appropriate examination and/or evaluation , counseling and educating the patient/family/caregiver, ordering medications, tests, or procedures, referring and communicating with other health care professionals , documenting information in the medical record and independently interpreting results and communicating that information with the patient/family/caregiver    Patient Follow Up: 6 months with labs  Patient was given instructions and counseling regarding her condition or for health maintenance advice. Please see specific information pulled into the AVS if appropriate.     Toño Carroll MD    10/6/2022

## 2022-10-07 ENCOUNTER — TELEPHONE (OUTPATIENT)
Dept: GASTROENTEROLOGY | Facility: CLINIC | Age: 67
End: 2022-10-07

## 2022-10-07 NOTE — TELEPHONE ENCOUNTER
----- Message from TIEN Durant sent at 10/6/2022  4:00 PM EDT -----  I reviewed patient's most recent liver enzymes which have improved.  Emphasized the importance of continuing to maintain a healthy lifestyle: weight loss of 10%, cutting back on carbs, sugars, and fried fatty foods, limiting intake of soda and sugary drinks, and abstain from alcohol.

## 2022-10-25 ENCOUNTER — TELEPHONE (OUTPATIENT)
Dept: GASTROENTEROLOGY | Facility: CLINIC | Age: 67
End: 2022-10-25

## 2022-11-08 ENCOUNTER — TELEPHONE (OUTPATIENT)
Dept: GASTROENTEROLOGY | Facility: CLINIC | Age: 67
End: 2022-11-08

## 2022-11-08 NOTE — TELEPHONE ENCOUNTER
I left  reminding Ms Sneha of her scheduled egd/colonoscopy on 11.22.2022, arrival time of 10:00am. Reminded of liquid diet the day prior. Reminded of bowel prep and instructions.   Stated I would also send [employer] message too..    Still waiting on Pradaxa clearance from cardio, waiting after echo on 11.11.22.     melvi

## 2022-11-11 ENCOUNTER — LAB (OUTPATIENT)
Dept: LAB | Facility: HOSPITAL | Age: 67
End: 2022-11-11

## 2022-11-11 ENCOUNTER — TRANSCRIBE ORDERS (OUTPATIENT)
Dept: LAB | Facility: HOSPITAL | Age: 67
End: 2022-11-11

## 2022-11-11 DIAGNOSIS — N18.30 STAGE 3 CHRONIC KIDNEY DISEASE, UNSPECIFIED WHETHER STAGE 3A OR 3B CKD: Primary | ICD-10-CM

## 2022-11-11 DIAGNOSIS — N18.30 STAGE 3 CHRONIC KIDNEY DISEASE, UNSPECIFIED WHETHER STAGE 3A OR 3B CKD: ICD-10-CM

## 2022-11-11 LAB
ALBUMIN SERPL-MCNC: 3.8 G/DL (ref 3.5–5.2)
ALBUMIN/GLOB SERPL: 1.3 G/DL
ALP SERPL-CCNC: 160 U/L (ref 39–117)
ALT SERPL W P-5'-P-CCNC: 23 U/L (ref 1–33)
ANION GAP SERPL CALCULATED.3IONS-SCNC: 9.3 MMOL/L (ref 5–15)
AST SERPL-CCNC: 24 U/L (ref 1–32)
BILIRUB SERPL-MCNC: 0.4 MG/DL (ref 0–1.2)
BUN SERPL-MCNC: 19 MG/DL (ref 8–23)
BUN/CREAT SERPL: 20 (ref 7–25)
CALCIUM SPEC-SCNC: 9.5 MG/DL (ref 8.6–10.5)
CHLORIDE SERPL-SCNC: 106 MMOL/L (ref 98–107)
CO2 SERPL-SCNC: 25.7 MMOL/L (ref 22–29)
CREAT SERPL-MCNC: 0.95 MG/DL (ref 0.57–1)
EGFRCR SERPLBLD CKD-EPI 2021: 66.2 ML/MIN/1.73
GLOBULIN UR ELPH-MCNC: 3 GM/DL
GLUCOSE SERPL-MCNC: 115 MG/DL (ref 65–99)
POTASSIUM SERPL-SCNC: 3.7 MMOL/L (ref 3.5–5.2)
PROT SERPL-MCNC: 6.8 G/DL (ref 6–8.5)
SODIUM SERPL-SCNC: 141 MMOL/L (ref 136–145)

## 2022-11-11 PROCEDURE — 36415 COLL VENOUS BLD VENIPUNCTURE: CPT

## 2022-11-11 PROCEDURE — 80053 COMPREHEN METABOLIC PANEL: CPT

## 2022-11-14 ENCOUNTER — TELEPHONE (OUTPATIENT)
Dept: CARDIOLOGY | Facility: CLINIC | Age: 67
End: 2022-11-14

## 2022-11-14 NOTE — TELEPHONE ENCOUNTER
----- Message from TIEN Francis sent at 11/11/2022  3:20 PM EST -----  Notify pt echo result: EF 57% and no valve disease noted. Follow up as scheduled

## 2022-11-14 NOTE — TELEPHONE ENCOUNTER
Procedure: Colonoscopy and/or EGD    Med Directive: Pradaxa    PMH: CAD, HTN, HLD, afib    Last Seen: 9/22/22    ECHO 11/11/22-normal

## 2022-11-15 ENCOUNTER — TELEPHONE (OUTPATIENT)
Dept: GASTROENTEROLOGY | Facility: CLINIC | Age: 67
End: 2022-11-15

## 2022-11-16 ENCOUNTER — TELEPHONE (OUTPATIENT)
Dept: GASTROENTEROLOGY | Facility: CLINIC | Age: 67
End: 2022-11-16

## 2022-11-16 NOTE — TELEPHONE ENCOUNTER
SPOKE TO PT, WENT OVER HOW SHE NEEDS TO STOP HER PRADAXA 3 DAYS PRIOR TO HER SCOPE. SHE STATED HER UNDERSTANDING. MARILEE

## 2022-11-18 PROBLEM — R14.0 BLOATING: Status: ACTIVE | Noted: 2022-09-07

## 2022-11-22 ENCOUNTER — HOSPITAL ENCOUNTER (OUTPATIENT)
Facility: HOSPITAL | Age: 67
Setting detail: HOSPITAL OUTPATIENT SURGERY
Discharge: HOME | End: 2022-11-22
Attending: INTERNAL MEDICINE | Admitting: INTERNAL MEDICINE

## 2022-11-22 ENCOUNTER — ANESTHESIA (OUTPATIENT)
Dept: GASTROENTEROLOGY | Facility: HOSPITAL | Age: 67
End: 2022-11-22

## 2022-11-22 ENCOUNTER — ANESTHESIA EVENT (OUTPATIENT)
Dept: GASTROENTEROLOGY | Facility: HOSPITAL | Age: 67
End: 2022-11-22

## 2022-11-22 VITALS
HEART RATE: 80 BPM | DIASTOLIC BLOOD PRESSURE: 66 MMHG | BODY MASS INDEX: 41.76 KG/M2 | WEIGHT: 213.85 LBS | RESPIRATION RATE: 19 BRPM | OXYGEN SATURATION: 98 % | SYSTOLIC BLOOD PRESSURE: 103 MMHG | TEMPERATURE: 97.7 F

## 2022-11-22 DIAGNOSIS — D50.9 IRON DEFICIENCY ANEMIA, UNSPECIFIED IRON DEFICIENCY ANEMIA TYPE: ICD-10-CM

## 2022-11-22 DIAGNOSIS — R19.5 OCCULT BLOOD POSITIVE STOOL: ICD-10-CM

## 2022-11-22 DIAGNOSIS — R14.0 BLOATING: ICD-10-CM

## 2022-11-22 DIAGNOSIS — K21.9 GASTROESOPHAGEAL REFLUX DISEASE, UNSPECIFIED WHETHER ESOPHAGITIS PRESENT: ICD-10-CM

## 2022-11-22 DIAGNOSIS — Z86.010 HISTORY OF COLON POLYPS: ICD-10-CM

## 2022-11-22 LAB — GLUCOSE BLDC GLUCOMTR-MCNC: 99 MG/DL (ref 70–99)

## 2022-11-22 PROCEDURE — 88305 TISSUE EXAM BY PATHOLOGIST: CPT | Performed by: INTERNAL MEDICINE

## 2022-11-22 PROCEDURE — 82962 GLUCOSE BLOOD TEST: CPT

## 2022-11-22 PROCEDURE — 43239 EGD BIOPSY SINGLE/MULTIPLE: CPT | Performed by: INTERNAL MEDICINE

## 2022-11-22 PROCEDURE — G0105 COLORECTAL SCRN; HI RISK IND: HCPCS | Performed by: INTERNAL MEDICINE

## 2022-11-22 PROCEDURE — 25010000002 PROPOFOL 10 MG/ML EMULSION: Performed by: NURSE ANESTHETIST, CERTIFIED REGISTERED

## 2022-11-22 RX ORDER — LIDOCAINE HYDROCHLORIDE 20 MG/ML
INJECTION, SOLUTION EPIDURAL; INFILTRATION; INTRACAUDAL; PERINEURAL AS NEEDED
Status: DISCONTINUED | OUTPATIENT
Start: 2022-11-22 | End: 2022-11-22 | Stop reason: SURG

## 2022-11-22 RX ORDER — PROPOFOL 10 MG/ML
VIAL (ML) INTRAVENOUS AS NEEDED
Status: DISCONTINUED | OUTPATIENT
Start: 2022-11-22 | End: 2022-11-22 | Stop reason: SURG

## 2022-11-22 RX ORDER — SODIUM CHLORIDE, SODIUM LACTATE, POTASSIUM CHLORIDE, CALCIUM CHLORIDE 600; 310; 30; 20 MG/100ML; MG/100ML; MG/100ML; MG/100ML
30 INJECTION, SOLUTION INTRAVENOUS CONTINUOUS
Status: DISCONTINUED | OUTPATIENT
Start: 2022-11-22 | End: 2022-11-22 | Stop reason: HOSPADM

## 2022-11-22 RX ADMIN — SODIUM CHLORIDE, POTASSIUM CHLORIDE, SODIUM LACTATE AND CALCIUM CHLORIDE 30 ML/HR: 600; 310; 30; 20 INJECTION, SOLUTION INTRAVENOUS at 11:04

## 2022-11-22 RX ADMIN — PROPOFOL 80 MG: 10 INJECTION, EMULSION INTRAVENOUS at 12:07

## 2022-11-22 RX ADMIN — PROPOFOL 175 MCG/KG/MIN: 10 INJECTION, EMULSION INTRAVENOUS at 12:07

## 2022-11-22 RX ADMIN — LIDOCAINE HYDROCHLORIDE 100 MG: 20 INJECTION, SOLUTION EPIDURAL; INFILTRATION; INTRACAUDAL; PERINEURAL at 12:07

## 2022-11-22 NOTE — H&P
Pre Procedure History & Physical    Chief Complaint:   gerd  Bloating  Iron def anemia    Subjective     HPI:   As above    Past Medical History:   Past Medical History:   Diagnosis Date   • Anxiety    • Arthritis    • Bladder problem    • CAD 2021   • Depression    • Diabetes mellitus (HCC)    • Elevated cholesterol    • Essential hypertension 2021   • Fracture of distal end of left fibula 10/29/2015    closed fracture, intitial encounter   • Fracture of distal fibula    • GERD (gastroesophageal reflux disease)    • Heart attack    • History of pulmonary embolism    • Hyperlipidemia LDL goal <70 2021   • Lumbar radiculopathy 2018    describes sensory S1 radiculopathy but would favor right L5   • Paroxysmal atrial fibrillation 2021   • Pulmonary embolism (HCC)    • Seasonal allergies    • Sleep apnea     cpap   • SOB (shortness of breath) on exertion    • Thyroid disorder    • Vaginal bleeding problems        Past Surgical History:  Past Surgical History:   Procedure Laterality Date   • BLADDER SURGERY     • CARDIAC CATHETERIZATION     • CARDIAC SURGERY      Open heart surgery   •  SECTION     • COLONOSCOPY     • NICHOLAS FILTER INSERTION JUGULAR     • HYSTERECTOMY         Family History:  Family History   Problem Relation Age of Onset   • Stroke Mother    • Cancer Mother    • Colon cancer Mother 71        still living at 73   • Diabetes Mother    • Arthritis Mother    • Colon polyps Mother    • Stroke Father    • Arthritis Father    • Osteoporosis Father    • Inflammatory bowel disease Father    • Cancer Sister    • Diabetes Brother    • Stroke Brother    • Bleeding Disorder Daughter    • Malig Hyperthermia Neg Hx        Social History:   reports that she has never smoked. She has never used smokeless tobacco. She reports that she does not drink alcohol and does not use drugs.    Medications:   Medications Prior to Admission   Medication Sig  Dispense Refill Last Dose   • aspirin 81 MG EC tablet aspirin 81 mg oral tablet,delayed release (DR/EC) take 1 tablet by oral route daily   Active   11/20/2022   • Calcium Carb-Cholecalciferol (Calcium-Vitamin D) 500-400 MG-UNIT tablet Take 1 tablet by mouth 2 (Two) Times a Day. 180 tablet 1 11/20/2022   • ezetimibe (ZETIA) 10 MG tablet Take 1 tablet by mouth Daily. 90 tablet 3 11/21/2022   • famotidine (Pepcid) 40 MG tablet Take 1 tablet by mouth every night at bedtime. 90 tablet 1 11/21/2022   • Ferrous Fumarate 324 (106 Fe) MG tablet Take 1 tablet by mouth Every Other Day.   Past Week   • levothyroxine (SYNTHROID, LEVOTHROID) 88 MCG tablet Take 1 tablet by mouth Daily. 90 tablet 1 11/21/2022   • metFORMIN (GLUCOPHAGE) 500 MG tablet Take 500 mg by mouth 2 (Two) Times a Day With Meals.   11/21/2022 at 1000   • omeprazole (priLOSEC) 40 MG capsule Take 1 capsule by mouth Daily. 90 capsule 1 11/21/2022   • Pradaxa 150 MG capsu Take 1 capsule by mouth 2 (Two) Times a Day. 180 capsule 1 11/18/2022 at 2200   • rosuvastatin (CRESTOR) 40 MG tablet Take 1 tablet by mouth Daily. 90 tablet 1 11/21/2022   • telmisartan (MICARDIS) 80 MG tablet Take 1 tablet by mouth Daily. 90 tablet 1 11/20/2022   • albuterol sulfate HFA (ProAir HFA) 108 (90 Base) MCG/ACT inhaler Inhale 2 puffs Every 6 (Six) Hours As Needed for Wheezing. 18 g 0 Unknown       Allergies:  Patient has no known allergies.        Objective     Blood pressure 119/71, pulse 82, temperature 98.2 °F (36.8 °C), temperature source Temporal, resp. rate 20, weight 97 kg (213 lb 13.5 oz), SpO2 96 %.    Physical Exam   Constitutional: Pt is oriented to person, place, and time and well-developed, well-nourished, and in no distress.   Mouth/Throat: Oropharynx is clear and moist.   Neck: Normal range of motion.   Cardiovascular: Normal rate, regular rhythm and normal heart sounds.    Pulmonary/Chest: Effort normal and breath sounds normal.   Abdominal: Soft. Nontender  Skin:  Skin is warm and dry.   Psychiatric: Mood, memory, affect and judgment normal.     Assessment & Plan     Diagnosis:  gerd   Bloating  Iron def anemia    Anticipated Surgical Procedure:  egd  colonoscopy    The risks, benefits, and alternatives of this procedure have been discussed with the patient or the responsible party- the patient understands and agrees to proceed.

## 2022-11-22 NOTE — ANESTHESIA POSTPROCEDURE EVALUATION
Patient: Gifty Hoover    Procedure Summary     Date: 11/22/22 Room / Location: [facility] ENDOSCOPY 2 / [facility]    Anesthesia Start: 1204 Anesthesia Stop: 1235    Procedures:       ESOPHAGOGASTRODUODENOSCOPY WITH BX      COLONOSCOPY Diagnosis:       Gastroesophageal reflux disease, unspecified whether esophagitis present      Bloating      Occult blood positive stool      Iron deficiency anemia, unspecified iron deficiency anemia type      History of colon polyps      (Gastroesophageal reflux disease, unspecified whether esophagitis present [K21.9])      (Bloating [R14.0])      (Occult blood positive stool [R19.5])      (Iron deficiency anemia, unspecified iron deficiency anemia type [D50.9])      (History of colon polyps [Z86.010])    Surgeons: Ta Vasquez MD Provider: Sanjay Wood CRNA    Anesthesia Type: general ASA Status: 4          Anesthesia Type: general    Vitals  Vitals Value Taken Time   /66 11/22/22 1303   Temp 36.5 °C (97.7 °F) 11/22/22 1303   Pulse 80 11/22/22 1303   Resp 19 11/22/22 1303   SpO2 98 % 11/22/22 1303           Post Anesthesia Care and Evaluation    Patient location during evaluation: bedside  Patient participation: complete - patient participated  Level of consciousness: awake  Pain management: adequate    Airway patency: patent  Anesthetic complications: No anesthetic complications  PONV Status: none  Cardiovascular status: acceptable and stable  Respiratory status: acceptable  Hydration status: acceptable    Comments: An Anesthesiologist personally participated in the most demanding procedures (including induction and emergence if applicable) in the anesthesia plan, monitored the course of anesthesia administration at frequent intervals and remained physically present and available for immediate diagnosis and treatment of emergencies.

## 2022-11-22 NOTE — ANESTHESIA PREPROCEDURE EVALUATION
Anesthesia Evaluation     Patient summary reviewed and Nursing notes reviewed   no history of anesthetic complications:  NPO Solid Status: > 8 hours  NPO Liquid Status: > 2 hours           Airway   Mallampati: II  TM distance: >3 FB  Neck ROM: full  No difficulty expected  Dental    (+) upper dentures and lower dentures    Pulmonary - normal exam    breath sounds clear to auscultation  (+) sleep apnea,   Cardiovascular - normal exam  Exercise tolerance: good (4-7 METS)    Rhythm: regular  Rate: normal    (+) hypertension, past MI , CAD, dysrhythmias Paroxysmal Atrial Fib,       Neuro/Psych- negative ROS  GI/Hepatic/Renal/Endo    (+)  GERD,  renal disease CRI, diabetes mellitus, thyroid problem     Musculoskeletal (-) negative ROS    Abdominal    Substance History - negative use     OB/GYN negative ob/gyn ROS         Other - negative ROS       ROS/Med Hx Other: PAT Nursing Notes unavailable.                   Anesthesia Plan    ASA 4     general       Anesthetic plan, risks, benefits, and alternatives have been provided, discussed and informed consent has been obtained with: patient and child.        CODE STATUS:

## 2022-11-23 ENCOUNTER — APPOINTMENT (OUTPATIENT)
Dept: CT IMAGING | Facility: HOSPITAL | Age: 67
End: 2022-11-23

## 2022-11-23 ENCOUNTER — HOSPITAL ENCOUNTER (INPATIENT)
Facility: HOSPITAL | Age: 67
LOS: 3 days | Discharge: HOME | End: 2022-11-26
Attending: EMERGENCY MEDICINE | Admitting: INTERNAL MEDICINE

## 2022-11-23 ENCOUNTER — APPOINTMENT (OUTPATIENT)
Dept: MRI IMAGING | Facility: HOSPITAL | Age: 67
End: 2022-11-23

## 2022-11-23 ENCOUNTER — TELEPHONE (OUTPATIENT)
Dept: GASTROENTEROLOGY | Facility: CLINIC | Age: 67
End: 2022-11-23

## 2022-11-23 DIAGNOSIS — K80.50 CHOLEDOCHOLITHIASIS: ICD-10-CM

## 2022-11-23 DIAGNOSIS — R10.13 EPIGASTRIC ABDOMINAL PAIN: Primary | ICD-10-CM

## 2022-11-23 DIAGNOSIS — R93.5 ABNORMAL ABDOMINAL CT SCAN: ICD-10-CM

## 2022-11-23 PROBLEM — R79.89 ELEVATED LFTS: Status: ACTIVE | Noted: 2022-11-23

## 2022-11-23 PROBLEM — K80.20 CALCULUS OF GALLBLADDER: Status: ACTIVE | Noted: 2022-11-23

## 2022-11-23 PROBLEM — D64.89 OTHER SPECIFIED ANEMIAS: Status: ACTIVE | Noted: 2022-11-23

## 2022-11-23 LAB
ALBUMIN SERPL-MCNC: 4.3 G/DL (ref 3.5–5.2)
ALBUMIN/GLOB SERPL: 1.4 G/DL
ALP SERPL-CCNC: 239 U/L (ref 39–117)
ALT SERPL W P-5'-P-CCNC: 121 U/L (ref 1–33)
ANION GAP SERPL CALCULATED.3IONS-SCNC: 13.7 MMOL/L (ref 5–15)
APTT PPP: 40.5 SECONDS (ref 24.2–34.2)
AST SERPL-CCNC: 209 U/L (ref 1–32)
BACTERIA UR QL AUTO: NORMAL /HPF
BASOPHILS # BLD AUTO: 0.02 10*3/MM3 (ref 0–0.2)
BASOPHILS NFR BLD AUTO: 0.3 % (ref 0–1.5)
BILIRUB SERPL-MCNC: 1.1 MG/DL (ref 0–1.2)
BILIRUB UR QL STRIP: NEGATIVE
BUN SERPL-MCNC: 18 MG/DL (ref 8–23)
BUN/CREAT SERPL: 18.8 (ref 7–25)
CALCIUM SPEC-SCNC: 9.6 MG/DL (ref 8.6–10.5)
CHLORIDE SERPL-SCNC: 104 MMOL/L (ref 98–107)
CLARITY UR: CLEAR
CO2 SERPL-SCNC: 24.3 MMOL/L (ref 22–29)
COLOR UR: ABNORMAL
CREAT SERPL-MCNC: 0.96 MG/DL (ref 0.57–1)
CYTO UR: NORMAL
D-LACTATE SERPL-SCNC: 1.4 MMOL/L (ref 0.5–2)
DEPRECATED RDW RBC AUTO: 44.8 FL (ref 37–54)
EGFRCR SERPLBLD CKD-EPI 2021: 65.4 ML/MIN/1.73
EOSINOPHIL # BLD AUTO: 0.02 10*3/MM3 (ref 0–0.4)
EOSINOPHIL NFR BLD AUTO: 0.3 % (ref 0.3–6.2)
ERYTHROCYTE [DISTWIDTH] IN BLOOD BY AUTOMATED COUNT: 14 % (ref 12.3–15.4)
GLOBULIN UR ELPH-MCNC: 3.1 GM/DL
GLUCOSE BLDC GLUCOMTR-MCNC: 187 MG/DL (ref 70–99)
GLUCOSE SERPL-MCNC: 209 MG/DL (ref 65–99)
GLUCOSE UR STRIP-MCNC: NEGATIVE MG/DL
HCT VFR BLD AUTO: 30.6 % (ref 34–46.6)
HGB BLD-MCNC: 9.5 G/DL (ref 12–15.9)
HGB UR QL STRIP.AUTO: ABNORMAL
HOLD SPECIMEN: NORMAL
HOLD SPECIMEN: NORMAL
HYALINE CASTS UR QL AUTO: NORMAL /LPF
IMM GRANULOCYTES # BLD AUTO: 0.01 10*3/MM3 (ref 0–0.05)
IMM GRANULOCYTES NFR BLD AUTO: 0.1 % (ref 0–0.5)
INR PPP: 1.21 (ref 0.86–1.15)
KETONES UR QL STRIP: ABNORMAL
LAB AP CASE REPORT: NORMAL
LAB AP CLINICAL INFORMATION: NORMAL
LEUKOCYTE ESTERASE UR QL STRIP.AUTO: NEGATIVE
LIPASE SERPL-CCNC: 77 U/L (ref 13–60)
LYMPHOCYTES # BLD AUTO: 0.28 10*3/MM3 (ref 0.7–3.1)
LYMPHOCYTES NFR BLD AUTO: 4.1 % (ref 19.6–45.3)
MCH RBC QN AUTO: 27.2 PG (ref 26.6–33)
MCHC RBC AUTO-ENTMCNC: 31 G/DL (ref 31.5–35.7)
MCV RBC AUTO: 87.7 FL (ref 79–97)
MONOCYTES # BLD AUTO: 0.3 10*3/MM3 (ref 0.1–0.9)
MONOCYTES NFR BLD AUTO: 4.4 % (ref 5–12)
NEUTROPHILS NFR BLD AUTO: 6.15 10*3/MM3 (ref 1.7–7)
NEUTROPHILS NFR BLD AUTO: 90.8 % (ref 42.7–76)
NITRITE UR QL STRIP: NEGATIVE
NRBC BLD AUTO-RTO: 0 /100 WBC (ref 0–0.2)
PATH REPORT.FINAL DX SPEC: NORMAL
PATH REPORT.GROSS SPEC: NORMAL
PH UR STRIP.AUTO: 6 [PH] (ref 5–8)
PLATELET # BLD AUTO: 245 10*3/MM3 (ref 140–450)
PMV BLD AUTO: 10.2 FL (ref 6–12)
POTASSIUM SERPL-SCNC: 3.7 MMOL/L (ref 3.5–5.2)
PROT SERPL-MCNC: 7.4 G/DL (ref 6–8.5)
PROT UR QL STRIP: ABNORMAL
PROTHROMBIN TIME: 15.5 SECONDS (ref 11.8–14.9)
QT INTERVAL: 351 MS
RBC # BLD AUTO: 3.49 10*6/MM3 (ref 3.77–5.28)
RBC # UR STRIP: NORMAL /HPF
REF LAB TEST METHOD: NORMAL
SODIUM SERPL-SCNC: 142 MMOL/L (ref 136–145)
SP GR UR STRIP: >1.03 (ref 1–1.03)
SQUAMOUS #/AREA URNS HPF: NORMAL /HPF
TROPONIN I SERPL-MCNC: 0 NG/ML (ref 0–0.08)
UROBILINOGEN UR QL STRIP: ABNORMAL
WBC # UR STRIP: NORMAL /HPF
WBC NRBC COR # BLD: 6.78 10*3/MM3 (ref 3.4–10.8)
WHOLE BLOOD HOLD COAG: NORMAL
WHOLE BLOOD HOLD SPECIMEN: NORMAL

## 2022-11-23 PROCEDURE — 99222 1ST HOSP IP/OBS MODERATE 55: CPT | Performed by: INTERNAL MEDICINE

## 2022-11-23 PROCEDURE — 85025 COMPLETE CBC W/AUTO DIFF WBC: CPT | Performed by: EMERGENCY MEDICINE

## 2022-11-23 PROCEDURE — 93005 ELECTROCARDIOGRAM TRACING: CPT | Performed by: EMERGENCY MEDICINE

## 2022-11-23 PROCEDURE — 82962 GLUCOSE BLOOD TEST: CPT

## 2022-11-23 PROCEDURE — 25010000002 PIPERACILLIN SOD-TAZOBACTAM PER 1 G: Performed by: INTERNAL MEDICINE

## 2022-11-23 PROCEDURE — 83690 ASSAY OF LIPASE: CPT | Performed by: EMERGENCY MEDICINE

## 2022-11-23 PROCEDURE — 85730 THROMBOPLASTIN TIME PARTIAL: CPT | Performed by: INTERNAL MEDICINE

## 2022-11-23 PROCEDURE — 63710000001 INSULIN REGULAR HUMAN PER 5 UNITS: Performed by: INTERNAL MEDICINE

## 2022-11-23 PROCEDURE — 81001 URINALYSIS AUTO W/SCOPE: CPT | Performed by: EMERGENCY MEDICINE

## 2022-11-23 PROCEDURE — 25010000002 ONDANSETRON PER 1 MG: Performed by: EMERGENCY MEDICINE

## 2022-11-23 PROCEDURE — 0 IOPAMIDOL PER 1 ML: Performed by: EMERGENCY MEDICINE

## 2022-11-23 PROCEDURE — 80053 COMPREHEN METABOLIC PANEL: CPT | Performed by: EMERGENCY MEDICINE

## 2022-11-23 PROCEDURE — 25010000002 HYDROMORPHONE 1 MG/ML SOLUTION: Performed by: EMERGENCY MEDICINE

## 2022-11-23 PROCEDURE — 84484 ASSAY OF TROPONIN QUANT: CPT

## 2022-11-23 PROCEDURE — 85610 PROTHROMBIN TIME: CPT | Performed by: INTERNAL MEDICINE

## 2022-11-23 PROCEDURE — 74177 CT ABD & PELVIS W/CONTRAST: CPT

## 2022-11-23 PROCEDURE — 74181 MRI ABDOMEN W/O CONTRAST: CPT

## 2022-11-23 PROCEDURE — 83605 ASSAY OF LACTIC ACID: CPT | Performed by: EMERGENCY MEDICINE

## 2022-11-23 PROCEDURE — 99285 EMERGENCY DEPT VISIT HI MDM: CPT

## 2022-11-23 PROCEDURE — 99223 1ST HOSP IP/OBS HIGH 75: CPT | Performed by: INTERNAL MEDICINE

## 2022-11-23 RX ORDER — BISACODYL 10 MG
10 SUPPOSITORY, RECTAL RECTAL DAILY PRN
Status: DISCONTINUED | OUTPATIENT
Start: 2022-11-23 | End: 2022-11-26 | Stop reason: HOSPADM

## 2022-11-23 RX ORDER — PANTOPRAZOLE SODIUM 40 MG/1
40 TABLET, DELAYED RELEASE ORAL EVERY MORNING
Status: DISCONTINUED | OUTPATIENT
Start: 2022-11-24 | End: 2022-11-26 | Stop reason: HOSPADM

## 2022-11-23 RX ORDER — CALCIUM CARBONATE 200(500)MG
2 TABLET,CHEWABLE ORAL 2 TIMES DAILY PRN
Status: DISCONTINUED | OUTPATIENT
Start: 2022-11-23 | End: 2022-11-26 | Stop reason: HOSPADM

## 2022-11-23 RX ORDER — NICOTINE POLACRILEX 4 MG
15 LOZENGE BUCCAL
Status: DISCONTINUED | OUTPATIENT
Start: 2022-11-23 | End: 2022-11-26 | Stop reason: HOSPADM

## 2022-11-23 RX ORDER — ACETAMINOPHEN 650 MG/1
650 SUPPOSITORY RECTAL EVERY 4 HOURS PRN
Status: DISCONTINUED | OUTPATIENT
Start: 2022-11-23 | End: 2022-11-26 | Stop reason: HOSPADM

## 2022-11-23 RX ORDER — ALUMINA, MAGNESIA, AND SIMETHICONE 2400; 2400; 240 MG/30ML; MG/30ML; MG/30ML
15 SUSPENSION ORAL EVERY 6 HOURS PRN
Status: DISCONTINUED | OUTPATIENT
Start: 2022-11-23 | End: 2022-11-26 | Stop reason: HOSPADM

## 2022-11-23 RX ORDER — ACETAMINOPHEN 160 MG/5ML
650 SOLUTION ORAL EVERY 4 HOURS PRN
Status: DISCONTINUED | OUTPATIENT
Start: 2022-11-23 | End: 2022-11-26 | Stop reason: HOSPADM

## 2022-11-23 RX ORDER — ACETAMINOPHEN 325 MG/1
650 TABLET ORAL EVERY 4 HOURS PRN
Status: DISCONTINUED | OUTPATIENT
Start: 2022-11-23 | End: 2022-11-26 | Stop reason: HOSPADM

## 2022-11-23 RX ORDER — SODIUM CHLORIDE 9 MG/ML
100 INJECTION, SOLUTION INTRAVENOUS CONTINUOUS
Status: DISCONTINUED | OUTPATIENT
Start: 2022-11-23 | End: 2022-11-25

## 2022-11-23 RX ORDER — TRAMADOL HYDROCHLORIDE 50 MG/1
50 TABLET ORAL EVERY 6 HOURS PRN
Status: DISCONTINUED | OUTPATIENT
Start: 2022-11-23 | End: 2022-11-26 | Stop reason: HOSPADM

## 2022-11-23 RX ORDER — DEXTROSE MONOHYDRATE 25 G/50ML
25 INJECTION, SOLUTION INTRAVENOUS
Status: DISCONTINUED | OUTPATIENT
Start: 2022-11-23 | End: 2022-11-26 | Stop reason: HOSPADM

## 2022-11-23 RX ORDER — KETOROLAC TROMETHAMINE 15 MG/ML
15 INJECTION, SOLUTION INTRAMUSCULAR; INTRAVENOUS EVERY 6 HOURS PRN
Status: DISCONTINUED | OUTPATIENT
Start: 2022-11-23 | End: 2022-11-26 | Stop reason: HOSPADM

## 2022-11-23 RX ORDER — NALOXONE HCL 0.4 MG/ML
0.4 VIAL (ML) INJECTION
Status: DISCONTINUED | OUTPATIENT
Start: 2022-11-23 | End: 2022-11-26 | Stop reason: HOSPADM

## 2022-11-23 RX ORDER — FERROUS SULFATE 325(65) MG
325 TABLET ORAL
Status: DISCONTINUED | OUTPATIENT
Start: 2022-11-24 | End: 2022-11-26 | Stop reason: HOSPADM

## 2022-11-23 RX ORDER — AMOXICILLIN 250 MG
2 CAPSULE ORAL 2 TIMES DAILY
Status: DISCONTINUED | OUTPATIENT
Start: 2022-11-23 | End: 2022-11-26 | Stop reason: HOSPADM

## 2022-11-23 RX ORDER — ONDANSETRON 2 MG/ML
4 INJECTION INTRAMUSCULAR; INTRAVENOUS ONCE
Status: COMPLETED | OUTPATIENT
Start: 2022-11-23 | End: 2022-11-23

## 2022-11-23 RX ORDER — SODIUM CHLORIDE 0.9 % (FLUSH) 0.9 %
10 SYRINGE (ML) INJECTION AS NEEDED
Status: DISCONTINUED | OUTPATIENT
Start: 2022-11-23 | End: 2022-11-26 | Stop reason: HOSPADM

## 2022-11-23 RX ORDER — ONDANSETRON 2 MG/ML
4 INJECTION INTRAMUSCULAR; INTRAVENOUS EVERY 6 HOURS PRN
Status: DISCONTINUED | OUTPATIENT
Start: 2022-11-23 | End: 2022-11-26 | Stop reason: HOSPADM

## 2022-11-23 RX ORDER — OXYCODONE HYDROCHLORIDE 5 MG/1
5 TABLET ORAL EVERY 8 HOURS PRN
Status: DISCONTINUED | OUTPATIENT
Start: 2022-11-23 | End: 2022-11-26 | Stop reason: HOSPADM

## 2022-11-23 RX ORDER — SODIUM CHLORIDE 0.9 % (FLUSH) 0.9 %
10 SYRINGE (ML) INJECTION EVERY 12 HOURS SCHEDULED
Status: DISCONTINUED | OUTPATIENT
Start: 2022-11-23 | End: 2022-11-26 | Stop reason: HOSPADM

## 2022-11-23 RX ORDER — BISACODYL 5 MG/1
5 TABLET, DELAYED RELEASE ORAL DAILY PRN
Status: DISCONTINUED | OUTPATIENT
Start: 2022-11-23 | End: 2022-11-26 | Stop reason: HOSPADM

## 2022-11-23 RX ORDER — POLYETHYLENE GLYCOL 3350 17 G/17G
17 POWDER, FOR SOLUTION ORAL DAILY PRN
Status: DISCONTINUED | OUTPATIENT
Start: 2022-11-23 | End: 2022-11-26 | Stop reason: HOSPADM

## 2022-11-23 RX ORDER — SODIUM CHLORIDE 0.9 % (FLUSH) 0.9 %
10 SYRINGE (ML) INJECTION AS NEEDED
Status: DISCONTINUED | OUTPATIENT
Start: 2022-11-23 | End: 2022-11-23

## 2022-11-23 RX ORDER — LEVOTHYROXINE SODIUM 88 UG/1
88 TABLET ORAL DAILY
Status: DISCONTINUED | OUTPATIENT
Start: 2022-11-23 | End: 2022-11-26 | Stop reason: HOSPADM

## 2022-11-23 RX ORDER — SODIUM CHLORIDE 9 MG/ML
40 INJECTION, SOLUTION INTRAVENOUS AS NEEDED
Status: DISCONTINUED | OUTPATIENT
Start: 2022-11-23 | End: 2022-11-26 | Stop reason: HOSPADM

## 2022-11-23 RX ORDER — FAMOTIDINE 20 MG/1
40 TABLET, FILM COATED ORAL DAILY
Status: DISCONTINUED | OUTPATIENT
Start: 2022-11-23 | End: 2022-11-26 | Stop reason: HOSPADM

## 2022-11-23 RX ORDER — CHOLECALCIFEROL (VITAMIN D3) 125 MCG
5 CAPSULE ORAL NIGHTLY PRN
Status: DISCONTINUED | OUTPATIENT
Start: 2022-11-23 | End: 2022-11-26 | Stop reason: HOSPADM

## 2022-11-23 RX ADMIN — HYDROMORPHONE HYDROCHLORIDE 1 MG: 1 INJECTION, SOLUTION INTRAMUSCULAR; INTRAVENOUS; SUBCUTANEOUS at 11:39

## 2022-11-23 RX ADMIN — FAMOTIDINE 40 MG: 20 TABLET ORAL at 21:05

## 2022-11-23 RX ADMIN — LEVOTHYROXINE SODIUM 88 MCG: 0.09 TABLET ORAL at 21:06

## 2022-11-23 RX ADMIN — Medication 10 ML: at 21:06

## 2022-11-23 RX ADMIN — TAZOBACTAM SODIUM AND PIPERACILLIN SODIUM 3.38 G: 375; 3 INJECTION, SOLUTION INTRAVENOUS at 21:05

## 2022-11-23 RX ADMIN — IOPAMIDOL 100 ML: 755 INJECTION, SOLUTION INTRAVENOUS at 12:12

## 2022-11-23 RX ADMIN — ONDANSETRON 4 MG: 2 INJECTION INTRAMUSCULAR; INTRAVENOUS at 11:39

## 2022-11-23 RX ADMIN — INSULIN HUMAN 2 UNITS: 100 INJECTION, SOLUTION PARENTERAL at 21:05

## 2022-11-23 RX ADMIN — SENNOSIDES AND DOCUSATE SODIUM 2 TABLET: 8.6; 5 TABLET ORAL at 21:06

## 2022-11-23 RX ADMIN — SODIUM CHLORIDE 100 ML/HR: 9 INJECTION, SOLUTION INTRAVENOUS at 21:05

## 2022-11-24 ENCOUNTER — PREP FOR SURGERY (OUTPATIENT)
Dept: OTHER | Facility: HOSPITAL | Age: 67
End: 2022-11-24

## 2022-11-24 DIAGNOSIS — K80.50 CHOLEDOCHOLITHIASIS: Primary | ICD-10-CM

## 2022-11-24 LAB
ALBUMIN SERPL-MCNC: 3.3 G/DL (ref 3.5–5.2)
ALBUMIN/GLOB SERPL: 1.3 G/DL
ALP SERPL-CCNC: 165 U/L (ref 39–117)
ALT SERPL W P-5'-P-CCNC: 142 U/L (ref 1–33)
ANION GAP SERPL CALCULATED.3IONS-SCNC: 7.5 MMOL/L (ref 5–15)
AST SERPL-CCNC: 155 U/L (ref 1–32)
BASOPHILS # BLD AUTO: 0.02 10*3/MM3 (ref 0–0.2)
BASOPHILS NFR BLD AUTO: 0.3 % (ref 0–1.5)
BILIRUB SERPL-MCNC: 1.1 MG/DL (ref 0–1.2)
BUN SERPL-MCNC: 20 MG/DL (ref 8–23)
BUN/CREAT SERPL: 19.4 (ref 7–25)
CALCIUM SPEC-SCNC: 8.8 MG/DL (ref 8.6–10.5)
CHLORIDE SERPL-SCNC: 107 MMOL/L (ref 98–107)
CO2 SERPL-SCNC: 26.5 MMOL/L (ref 22–29)
CREAT SERPL-MCNC: 1.03 MG/DL (ref 0.57–1)
DEPRECATED RDW RBC AUTO: 45 FL (ref 37–54)
EGFRCR SERPLBLD CKD-EPI 2021: 60.1 ML/MIN/1.73
EOSINOPHIL # BLD AUTO: 0.01 10*3/MM3 (ref 0–0.4)
EOSINOPHIL NFR BLD AUTO: 0.1 % (ref 0.3–6.2)
ERYTHROCYTE [DISTWIDTH] IN BLOOD BY AUTOMATED COUNT: 13.8 % (ref 12.3–15.4)
GLOBULIN UR ELPH-MCNC: 2.6 GM/DL
GLUCOSE BLDC GLUCOMTR-MCNC: 100 MG/DL (ref 70–99)
GLUCOSE BLDC GLUCOMTR-MCNC: 144 MG/DL (ref 70–99)
GLUCOSE BLDC GLUCOMTR-MCNC: 88 MG/DL (ref 70–99)
GLUCOSE BLDC GLUCOMTR-MCNC: 91 MG/DL (ref 70–99)
GLUCOSE SERPL-MCNC: 111 MG/DL (ref 65–99)
HCT VFR BLD AUTO: 25.7 % (ref 34–46.6)
HGB BLD-MCNC: 7.9 G/DL (ref 12–15.9)
IMM GRANULOCYTES # BLD AUTO: 0.03 10*3/MM3 (ref 0–0.05)
IMM GRANULOCYTES NFR BLD AUTO: 0.4 % (ref 0–0.5)
IRON 24H UR-MRATE: 16 MCG/DL (ref 37–145)
IRON SATN MFR SERPL: 4 % (ref 20–50)
LYMPHOCYTES # BLD AUTO: 0.7 10*3/MM3 (ref 0.7–3.1)
LYMPHOCYTES NFR BLD AUTO: 9.1 % (ref 19.6–45.3)
MCH RBC QN AUTO: 27.3 PG (ref 26.6–33)
MCHC RBC AUTO-ENTMCNC: 30.7 G/DL (ref 31.5–35.7)
MCV RBC AUTO: 88.9 FL (ref 79–97)
MONOCYTES # BLD AUTO: 0.67 10*3/MM3 (ref 0.1–0.9)
MONOCYTES NFR BLD AUTO: 8.7 % (ref 5–12)
NEUTROPHILS NFR BLD AUTO: 6.27 10*3/MM3 (ref 1.7–7)
NEUTROPHILS NFR BLD AUTO: 81.4 % (ref 42.7–76)
NRBC BLD AUTO-RTO: 0 /100 WBC (ref 0–0.2)
PLATELET # BLD AUTO: 181 10*3/MM3 (ref 140–450)
PMV BLD AUTO: 10.4 FL (ref 6–12)
POTASSIUM SERPL-SCNC: 4.1 MMOL/L (ref 3.5–5.2)
PROT SERPL-MCNC: 5.9 G/DL (ref 6–8.5)
RBC # BLD AUTO: 2.89 10*6/MM3 (ref 3.77–5.28)
SODIUM SERPL-SCNC: 141 MMOL/L (ref 136–145)
TIBC SERPL-MCNC: 417 MCG/DL (ref 298–536)
TRANSFERRIN SERPL-MCNC: 280 MG/DL (ref 200–360)
WBC NRBC COR # BLD: 7.7 10*3/MM3 (ref 3.4–10.8)

## 2022-11-24 PROCEDURE — 84466 ASSAY OF TRANSFERRIN: CPT | Performed by: INTERNAL MEDICINE

## 2022-11-24 PROCEDURE — 25010000002 PIPERACILLIN SOD-TAZOBACTAM PER 1 G: Performed by: INTERNAL MEDICINE

## 2022-11-24 PROCEDURE — 25010000002 NA FERRIC GLUC CPLX PER 12.5 MG: Performed by: INTERNAL MEDICINE

## 2022-11-24 PROCEDURE — 99233 SBSQ HOSP IP/OBS HIGH 50: CPT | Performed by: INTERNAL MEDICINE

## 2022-11-24 PROCEDURE — 83540 ASSAY OF IRON: CPT | Performed by: INTERNAL MEDICINE

## 2022-11-24 PROCEDURE — 80053 COMPREHEN METABOLIC PANEL: CPT | Performed by: INTERNAL MEDICINE

## 2022-11-24 PROCEDURE — 85025 COMPLETE CBC W/AUTO DIFF WBC: CPT | Performed by: INTERNAL MEDICINE

## 2022-11-24 PROCEDURE — 82962 GLUCOSE BLOOD TEST: CPT

## 2022-11-24 PROCEDURE — 99232 SBSQ HOSP IP/OBS MODERATE 35: CPT | Performed by: INTERNAL MEDICINE

## 2022-11-24 RX ADMIN — TAZOBACTAM SODIUM AND PIPERACILLIN SODIUM 3.38 G: 375; 3 INJECTION, SOLUTION INTRAVENOUS at 13:05

## 2022-11-24 RX ADMIN — LEVOTHYROXINE SODIUM 88 MCG: 0.09 TABLET ORAL at 10:40

## 2022-11-24 RX ADMIN — TAZOBACTAM SODIUM AND PIPERACILLIN SODIUM 3.38 G: 375; 3 INJECTION, SOLUTION INTRAVENOUS at 20:21

## 2022-11-24 RX ADMIN — SENNOSIDES AND DOCUSATE SODIUM 2 TABLET: 8.6; 5 TABLET ORAL at 10:40

## 2022-11-24 RX ADMIN — FERROUS SULFATE TAB 325 MG (65 MG ELEMENTAL FE) 325 MG: 325 (65 FE) TAB at 10:40

## 2022-11-24 RX ADMIN — SODIUM CHLORIDE 125 MG: 9 INJECTION, SOLUTION INTRAVENOUS at 10:39

## 2022-11-24 RX ADMIN — ACETAMINOPHEN 650 MG: 325 TABLET ORAL at 11:01

## 2022-11-24 RX ADMIN — ACETAMINOPHEN 650 MG: 325 TABLET ORAL at 22:06

## 2022-11-24 RX ADMIN — Medication 10 ML: at 20:23

## 2022-11-24 RX ADMIN — PANTOPRAZOLE SODIUM 40 MG: 40 TABLET, DELAYED RELEASE ORAL at 10:40

## 2022-11-24 RX ADMIN — FAMOTIDINE 40 MG: 20 TABLET ORAL at 10:40

## 2022-11-24 RX ADMIN — SODIUM CHLORIDE 100 ML/HR: 9 INJECTION, SOLUTION INTRAVENOUS at 12:41

## 2022-11-25 ENCOUNTER — ANESTHESIA EVENT (OUTPATIENT)
Dept: GASTROENTEROLOGY | Facility: HOSPITAL | Age: 67
End: 2022-11-25

## 2022-11-25 ENCOUNTER — ANESTHESIA (OUTPATIENT)
Dept: GASTROENTEROLOGY | Facility: HOSPITAL | Age: 67
End: 2022-11-25

## 2022-11-25 ENCOUNTER — APPOINTMENT (OUTPATIENT)
Dept: GENERAL RADIOLOGY | Facility: HOSPITAL | Age: 67
End: 2022-11-25

## 2022-11-25 LAB
ABO GROUP BLD: NORMAL
ABO GROUP BLD: NORMAL
ALBUMIN SERPL-MCNC: 3.1 G/DL (ref 3.5–5.2)
ALBUMIN/GLOB SERPL: 1.2 G/DL
ALP SERPL-CCNC: 145 U/L (ref 39–117)
ALT SERPL W P-5'-P-CCNC: 94 U/L (ref 1–33)
ANION GAP SERPL CALCULATED.3IONS-SCNC: 8.6 MMOL/L (ref 5–15)
AST SERPL-CCNC: 74 U/L (ref 1–32)
BASOPHILS # BLD AUTO: 0.02 10*3/MM3 (ref 0–0.2)
BASOPHILS NFR BLD AUTO: 0.5 % (ref 0–1.5)
BILIRUB SERPL-MCNC: 0.5 MG/DL (ref 0–1.2)
BLD GP AB SCN SERPL QL: NEGATIVE
BUN SERPL-MCNC: 15 MG/DL (ref 8–23)
BUN/CREAT SERPL: 14 (ref 7–25)
CALCIUM SPEC-SCNC: 8.6 MG/DL (ref 8.6–10.5)
CHLORIDE SERPL-SCNC: 109 MMOL/L (ref 98–107)
CO2 SERPL-SCNC: 23.4 MMOL/L (ref 22–29)
CREAT SERPL-MCNC: 1.07 MG/DL (ref 0.57–1)
DEPRECATED RDW RBC AUTO: 46.4 FL (ref 37–54)
EGFRCR SERPLBLD CKD-EPI 2021: 57.4 ML/MIN/1.73
EOSINOPHIL # BLD AUTO: 0.13 10*3/MM3 (ref 0–0.4)
EOSINOPHIL NFR BLD AUTO: 3.6 % (ref 0.3–6.2)
ERYTHROCYTE [DISTWIDTH] IN BLOOD BY AUTOMATED COUNT: 13.9 % (ref 12.3–15.4)
GLOBULIN UR ELPH-MCNC: 2.6 GM/DL
GLUCOSE BLDC GLUCOMTR-MCNC: 112 MG/DL (ref 70–99)
GLUCOSE BLDC GLUCOMTR-MCNC: 80 MG/DL (ref 70–99)
GLUCOSE BLDC GLUCOMTR-MCNC: 87 MG/DL (ref 70–99)
GLUCOSE BLDC GLUCOMTR-MCNC: 97 MG/DL (ref 70–99)
GLUCOSE SERPL-MCNC: 79 MG/DL (ref 65–99)
HCT VFR BLD AUTO: 25.1 % (ref 34–46.6)
HGB BLD-MCNC: 7.6 G/DL (ref 12–15.9)
IMM GRANULOCYTES # BLD AUTO: 0.01 10*3/MM3 (ref 0–0.05)
IMM GRANULOCYTES NFR BLD AUTO: 0.3 % (ref 0–0.5)
LYMPHOCYTES # BLD AUTO: 0.55 10*3/MM3 (ref 0.7–3.1)
LYMPHOCYTES NFR BLD AUTO: 15.1 % (ref 19.6–45.3)
MCH RBC QN AUTO: 27.3 PG (ref 26.6–33)
MCHC RBC AUTO-ENTMCNC: 30.3 G/DL (ref 31.5–35.7)
MCV RBC AUTO: 90.3 FL (ref 79–97)
MONOCYTES # BLD AUTO: 0.45 10*3/MM3 (ref 0.1–0.9)
MONOCYTES NFR BLD AUTO: 12.4 % (ref 5–12)
NEUTROPHILS NFR BLD AUTO: 2.48 10*3/MM3 (ref 1.7–7)
NEUTROPHILS NFR BLD AUTO: 68.1 % (ref 42.7–76)
NRBC BLD AUTO-RTO: 0 /100 WBC (ref 0–0.2)
PLATELET # BLD AUTO: 158 10*3/MM3 (ref 140–450)
PMV BLD AUTO: 11 FL (ref 6–12)
POTASSIUM SERPL-SCNC: 4.1 MMOL/L (ref 3.5–5.2)
PROT SERPL-MCNC: 5.7 G/DL (ref 6–8.5)
RBC # BLD AUTO: 2.78 10*6/MM3 (ref 3.77–5.28)
RH BLD: NEGATIVE
RH BLD: NEGATIVE
SODIUM SERPL-SCNC: 141 MMOL/L (ref 136–145)
T&S EXPIRATION DATE: NORMAL
WBC NRBC COR # BLD: 3.64 10*3/MM3 (ref 3.4–10.8)

## 2022-11-25 PROCEDURE — C1769 GUIDE WIRE: HCPCS | Performed by: INTERNAL MEDICINE

## 2022-11-25 PROCEDURE — 86900 BLOOD TYPING SEROLOGIC ABO: CPT | Performed by: INTERNAL MEDICINE

## 2022-11-25 PROCEDURE — 43273 ENDOSCOPIC PANCREATOSCOPY: CPT | Performed by: INTERNAL MEDICINE

## 2022-11-25 PROCEDURE — 25010000002 PIPERACILLIN SOD-TAZOBACTAM PER 1 G: Performed by: INTERNAL MEDICINE

## 2022-11-25 PROCEDURE — 94799 UNLISTED PULMONARY SVC/PX: CPT

## 2022-11-25 PROCEDURE — 86900 BLOOD TYPING SEROLOGIC ABO: CPT

## 2022-11-25 PROCEDURE — 99233 SBSQ HOSP IP/OBS HIGH 50: CPT | Performed by: INTERNAL MEDICINE

## 2022-11-25 PROCEDURE — C2625 STENT, NON-COR, TEM W/DEL SY: HCPCS | Performed by: INTERNAL MEDICINE

## 2022-11-25 PROCEDURE — 74330 X-RAY BILE/PANC ENDOSCOPY: CPT

## 2022-11-25 PROCEDURE — 82962 GLUCOSE BLOOD TEST: CPT

## 2022-11-25 PROCEDURE — 43264 ERCP REMOVE DUCT CALCULI: CPT | Performed by: INTERNAL MEDICINE

## 2022-11-25 PROCEDURE — 0F798DZ DILATION OF COMMON BILE DUCT WITH INTRALUMINAL DEVICE, VIA NATURAL OR ARTIFICIAL OPENING ENDOSCOPIC: ICD-10-PCS | Performed by: INTERNAL MEDICINE

## 2022-11-25 PROCEDURE — 86901 BLOOD TYPING SEROLOGIC RH(D): CPT

## 2022-11-25 PROCEDURE — 0FC98ZZ EXTIRPATION OF MATTER FROM COMMON BILE DUCT, VIA NATURAL OR ARTIFICIAL OPENING ENDOSCOPIC: ICD-10-PCS | Performed by: INTERNAL MEDICINE

## 2022-11-25 PROCEDURE — 80053 COMPREHEN METABOLIC PANEL: CPT | Performed by: INTERNAL MEDICINE

## 2022-11-25 PROCEDURE — 86850 RBC ANTIBODY SCREEN: CPT | Performed by: INTERNAL MEDICINE

## 2022-11-25 PROCEDURE — 86901 BLOOD TYPING SEROLOGIC RH(D): CPT | Performed by: INTERNAL MEDICINE

## 2022-11-25 PROCEDURE — 85025 COMPLETE CBC W/AUTO DIFF WBC: CPT | Performed by: INTERNAL MEDICINE

## 2022-11-25 PROCEDURE — 43274 ERCP DUCT STENT PLACEMENT: CPT | Performed by: INTERNAL MEDICINE

## 2022-11-25 PROCEDURE — BF131ZZ FLUOROSCOPY OF GALLBLADDER AND BILE DUCTS USING LOW OSMOLAR CONTRAST: ICD-10-PCS | Performed by: INTERNAL MEDICINE

## 2022-11-25 PROCEDURE — 25010000002 PROPOFOL 10 MG/ML EMULSION: Performed by: NURSE ANESTHETIST, CERTIFIED REGISTERED

## 2022-11-25 DEVICE — BILIARY STENT
Type: IMPLANTABLE DEVICE | Site: BILE DUCT | Status: FUNCTIONAL
Brand: ADVANIX™ BILIARY

## 2022-11-25 RX ORDER — LIDOCAINE HYDROCHLORIDE 20 MG/ML
INJECTION, SOLUTION EPIDURAL; INFILTRATION; INTRACAUDAL; PERINEURAL AS NEEDED
Status: DISCONTINUED | OUTPATIENT
Start: 2022-11-25 | End: 2022-11-25 | Stop reason: SURG

## 2022-11-25 RX ORDER — SODIUM CHLORIDE, SODIUM LACTATE, POTASSIUM CHLORIDE, CALCIUM CHLORIDE 600; 310; 30; 20 MG/100ML; MG/100ML; MG/100ML; MG/100ML
INJECTION, SOLUTION INTRAVENOUS CONTINUOUS PRN
Status: DISCONTINUED | OUTPATIENT
Start: 2022-11-25 | End: 2022-11-25 | Stop reason: SURG

## 2022-11-25 RX ORDER — INDOMETHACIN 100 MG
100 SUPPOSITORY, RECTAL RECTAL
Status: COMPLETED | OUTPATIENT
Start: 2022-11-25 | End: 2022-11-25

## 2022-11-25 RX ORDER — PROPOFOL 10 MG/ML
VIAL (ML) INTRAVENOUS AS NEEDED
Status: DISCONTINUED | OUTPATIENT
Start: 2022-11-25 | End: 2022-11-25 | Stop reason: SURG

## 2022-11-25 RX ADMIN — TAZOBACTAM SODIUM AND PIPERACILLIN SODIUM 3.38 G: 375; 3 INJECTION, SOLUTION INTRAVENOUS at 12:21

## 2022-11-25 RX ADMIN — SENNOSIDES AND DOCUSATE SODIUM 2 TABLET: 8.6; 5 TABLET ORAL at 20:13

## 2022-11-25 RX ADMIN — SODIUM CHLORIDE, POTASSIUM CHLORIDE, SODIUM LACTATE AND CALCIUM CHLORIDE: 600; 310; 30; 20 INJECTION, SOLUTION INTRAVENOUS at 08:23

## 2022-11-25 RX ADMIN — TAZOBACTAM SODIUM AND PIPERACILLIN SODIUM 3.38 G: 375; 3 INJECTION, SOLUTION INTRAVENOUS at 20:13

## 2022-11-25 RX ADMIN — SENNOSIDES AND DOCUSATE SODIUM 2 TABLET: 8.6; 5 TABLET ORAL at 10:13

## 2022-11-25 RX ADMIN — Medication 10 ML: at 10:13

## 2022-11-25 RX ADMIN — LIDOCAINE HYDROCHLORIDE 60 MG: 20 INJECTION, SOLUTION EPIDURAL; INFILTRATION; INTRACAUDAL; PERINEURAL at 08:23

## 2022-11-25 RX ADMIN — Medication 100 MG: at 08:04

## 2022-11-25 RX ADMIN — Medication 10 ML: at 20:13

## 2022-11-25 RX ADMIN — FERROUS SULFATE TAB 325 MG (65 MG ELEMENTAL FE) 325 MG: 325 (65 FE) TAB at 10:13

## 2022-11-25 RX ADMIN — PROPOFOL 150 MCG/KG/MIN: 10 INJECTION, EMULSION INTRAVENOUS at 08:23

## 2022-11-25 RX ADMIN — FAMOTIDINE 40 MG: 20 TABLET ORAL at 10:13

## 2022-11-25 RX ADMIN — LEVOTHYROXINE SODIUM 88 MCG: 0.09 TABLET ORAL at 10:14

## 2022-11-25 RX ADMIN — PROPOFOL 80 MG: 10 INJECTION, EMULSION INTRAVENOUS at 08:23

## 2022-11-25 RX ADMIN — TAZOBACTAM SODIUM AND PIPERACILLIN SODIUM 3.38 G: 375; 3 INJECTION, SOLUTION INTRAVENOUS at 04:00

## 2022-11-25 NOTE — ANESTHESIA POSTPROCEDURE EVALUATION
Patient: Gifty Hoover    Procedure Summary     Date: 11/25/22 Room / Location: [facility] ENDOSCOPY 4 / [facility] ENDOSCOPY    Anesthesia Start: 0820 Anesthesia Stop: 0853    Procedure: ENDOSCOPIC RETROGRADE CHOLANGIOPANCREATOGRAPHY WITH SPHINCTEROTOMY, STONE REMOVAL, STENT PLACEMENT Diagnosis:       Choledocholithiasis      (Choledocholithiasis [K80.50])    Surgeons: Arden Garcia MD Provider: Renato Smart MD    Anesthesia Type: general ASA Status: 4          Anesthesia Type: general    Vitals  Vitals Value Taken Time   /61 11/25/22 0850   Temp 37 °C (98.6 °F) 11/25/22 0850   Pulse 73 11/25/22 0854   Resp 16 11/25/22 0850   SpO2 100 % 11/25/22 0854   Vitals shown include unvalidated device data.        Post Anesthesia Care and Evaluation    Patient location during evaluation: bedside  Patient participation: complete - patient participated  Level of consciousness: awake  Pain management: adequate    Airway patency: patent  Anesthetic complications: No anesthetic complications  PONV Status: none  Cardiovascular status: acceptable and stable  Respiratory status: acceptable  Hydration status: acceptable    Comments: An Anesthesiologist personally participated in the most demanding procedures (including induction and emergence if applicable) in the anesthesia plan, monitored the course of anesthesia administration at frequent intervals and remained physically present and available for immediate diagnosis and treatment of emergencies.

## 2022-11-25 NOTE — ANESTHESIA PREPROCEDURE EVALUATION
Anesthesia Evaluation     Patient summary reviewed and Nursing notes reviewed                Airway   Mallampati: I  TM distance: >3 FB  Neck ROM: full  No difficulty expected  Dental      Pulmonary - normal exam    breath sounds clear to auscultation  (+) pulmonary embolism, shortness of breath, sleep apnea,   Cardiovascular - normal exam    Rhythm: regular  Rate: normal    (+) hypertension, past MI , CAD, dysrhythmias Paroxysmal Atrial Fib, hyperlipidemia,       Neuro/Psych  (+) numbness, psychiatric history Anxiety and Depression,    GI/Hepatic/Renal/Endo    (+) morbid obesity, GERD,  renal disease, diabetes mellitus, thyroid problem     Musculoskeletal     Abdominal   (+) obese,    Substance History - negative use     OB/GYN negative ob/gyn ROS         Other   arthritis,                      Anesthesia Plan    ASA 4     general     intravenous induction     Anesthetic plan, risks, benefits, and alternatives have been provided, discussed and informed consent has been obtained with: patient.        CODE STATUS:    Code Status (Patient has no pulse and is not breathing): CPR (Attempt to Resuscitate)  Medical Interventions (Patient has pulse or is breathing): Full Support

## 2022-11-26 ENCOUNTER — READMISSION MANAGEMENT (OUTPATIENT)
Dept: CALL CENTER | Facility: HOSPITAL | Age: 67
End: 2022-11-26

## 2022-11-26 VITALS
BODY MASS INDEX: 42.98 KG/M2 | OXYGEN SATURATION: 96 % | HEIGHT: 60 IN | HEART RATE: 69 BPM | RESPIRATION RATE: 16 BRPM | SYSTOLIC BLOOD PRESSURE: 132 MMHG | TEMPERATURE: 98.4 F | WEIGHT: 218.92 LBS | DIASTOLIC BLOOD PRESSURE: 61 MMHG

## 2022-11-26 PROBLEM — K80.50 CHOLEDOCHOLITHIASIS: Status: RESOLVED | Noted: 2022-11-23 | Resolved: 2022-11-26

## 2022-11-26 PROBLEM — E66.01 OBESITY, CLASS III, BMI 40-49.9 (MORBID OBESITY): Status: ACTIVE | Noted: 2022-11-26

## 2022-11-26 PROBLEM — R10.13 EPIGASTRIC PAIN: Status: RESOLVED | Noted: 2022-11-23 | Resolved: 2022-11-26

## 2022-11-26 PROBLEM — E66.813 OBESITY, CLASS III, BMI 40-49.9 (MORBID OBESITY): Status: ACTIVE | Noted: 2022-11-26

## 2022-11-26 PROBLEM — R10.13 EPIGASTRIC ABDOMINAL PAIN: Status: RESOLVED | Noted: 2022-11-23 | Resolved: 2022-11-26

## 2022-11-26 LAB
ALBUMIN SERPL-MCNC: 3.2 G/DL (ref 3.5–5.2)
ALBUMIN/GLOB SERPL: 1.2 G/DL
ALP SERPL-CCNC: 128 U/L (ref 39–117)
ALT SERPL W P-5'-P-CCNC: 68 U/L (ref 1–33)
ANION GAP SERPL CALCULATED.3IONS-SCNC: 7.6 MMOL/L (ref 5–15)
AST SERPL-CCNC: 39 U/L (ref 1–32)
BASOPHILS # BLD AUTO: 0.02 10*3/MM3 (ref 0–0.2)
BASOPHILS NFR BLD AUTO: 0.6 % (ref 0–1.5)
BILIRUB SERPL-MCNC: 0.5 MG/DL (ref 0–1.2)
BUN SERPL-MCNC: 12 MG/DL (ref 8–23)
BUN/CREAT SERPL: 11.1 (ref 7–25)
CALCIUM SPEC-SCNC: 8.9 MG/DL (ref 8.6–10.5)
CHLORIDE SERPL-SCNC: 109 MMOL/L (ref 98–107)
CO2 SERPL-SCNC: 25.4 MMOL/L (ref 22–29)
CREAT SERPL-MCNC: 1.08 MG/DL (ref 0.57–1)
DEPRECATED RDW RBC AUTO: 44.6 FL (ref 37–54)
EGFRCR SERPLBLD CKD-EPI 2021: 56.8 ML/MIN/1.73
EOSINOPHIL # BLD AUTO: 0.16 10*3/MM3 (ref 0–0.4)
EOSINOPHIL NFR BLD AUTO: 5 % (ref 0.3–6.2)
ERYTHROCYTE [DISTWIDTH] IN BLOOD BY AUTOMATED COUNT: 13.8 % (ref 12.3–15.4)
GLOBULIN UR ELPH-MCNC: 2.7 GM/DL
GLUCOSE BLDC GLUCOMTR-MCNC: 115 MG/DL (ref 70–99)
GLUCOSE BLDC GLUCOMTR-MCNC: 93 MG/DL (ref 70–99)
GLUCOSE SERPL-MCNC: 97 MG/DL (ref 65–99)
HCT VFR BLD AUTO: 25.1 % (ref 34–46.6)
HGB BLD-MCNC: 7.8 G/DL (ref 12–15.9)
IMM GRANULOCYTES # BLD AUTO: 0.03 10*3/MM3 (ref 0–0.05)
IMM GRANULOCYTES NFR BLD AUTO: 0.9 % (ref 0–0.5)
LYMPHOCYTES # BLD AUTO: 0.51 10*3/MM3 (ref 0.7–3.1)
LYMPHOCYTES NFR BLD AUTO: 15.8 % (ref 19.6–45.3)
MCH RBC QN AUTO: 27.5 PG (ref 26.6–33)
MCHC RBC AUTO-ENTMCNC: 31.1 G/DL (ref 31.5–35.7)
MCV RBC AUTO: 88.4 FL (ref 79–97)
MONOCYTES # BLD AUTO: 0.44 10*3/MM3 (ref 0.1–0.9)
MONOCYTES NFR BLD AUTO: 13.6 % (ref 5–12)
NEUTROPHILS NFR BLD AUTO: 2.07 10*3/MM3 (ref 1.7–7)
NEUTROPHILS NFR BLD AUTO: 64.1 % (ref 42.7–76)
NRBC BLD AUTO-RTO: 0 /100 WBC (ref 0–0.2)
PLATELET # BLD AUTO: 184 10*3/MM3 (ref 140–450)
PMV BLD AUTO: 10.1 FL (ref 6–12)
POTASSIUM SERPL-SCNC: 4.2 MMOL/L (ref 3.5–5.2)
PROT SERPL-MCNC: 5.9 G/DL (ref 6–8.5)
RBC # BLD AUTO: 2.84 10*6/MM3 (ref 3.77–5.28)
SODIUM SERPL-SCNC: 142 MMOL/L (ref 136–145)
WBC NRBC COR # BLD: 3.23 10*3/MM3 (ref 3.4–10.8)

## 2022-11-26 PROCEDURE — 82962 GLUCOSE BLOOD TEST: CPT

## 2022-11-26 PROCEDURE — 99232 SBSQ HOSP IP/OBS MODERATE 35: CPT | Performed by: INTERNAL MEDICINE

## 2022-11-26 PROCEDURE — 25010000002 PIPERACILLIN SOD-TAZOBACTAM PER 1 G: Performed by: INTERNAL MEDICINE

## 2022-11-26 PROCEDURE — 99239 HOSP IP/OBS DSCHRG MGMT >30: CPT | Performed by: INTERNAL MEDICINE

## 2022-11-26 PROCEDURE — 80053 COMPREHEN METABOLIC PANEL: CPT | Performed by: INTERNAL MEDICINE

## 2022-11-26 PROCEDURE — 85025 COMPLETE CBC W/AUTO DIFF WBC: CPT | Performed by: INTERNAL MEDICINE

## 2022-11-26 PROCEDURE — 94799 UNLISTED PULMONARY SVC/PX: CPT

## 2022-11-26 RX ADMIN — LEVOTHYROXINE SODIUM 88 MCG: 0.09 TABLET ORAL at 08:13

## 2022-11-26 RX ADMIN — PIPERACILLIN SODIUM AND TAZOBACTAM SODIUM 4.5 G: 4; .5 INJECTION, POWDER, LYOPHILIZED, FOR SOLUTION INTRAVENOUS at 13:17

## 2022-11-26 RX ADMIN — FERROUS SULFATE TAB 325 MG (65 MG ELEMENTAL FE) 325 MG: 325 (65 FE) TAB at 08:14

## 2022-11-26 RX ADMIN — ACETAMINOPHEN 650 MG: 325 TABLET ORAL at 07:06

## 2022-11-26 RX ADMIN — PANTOPRAZOLE SODIUM 40 MG: 40 TABLET, DELAYED RELEASE ORAL at 07:04

## 2022-11-26 RX ADMIN — FAMOTIDINE 40 MG: 20 TABLET ORAL at 08:13

## 2022-11-26 RX ADMIN — TAZOBACTAM SODIUM AND PIPERACILLIN SODIUM 3.38 G: 375; 3 INJECTION, SOLUTION INTRAVENOUS at 05:33

## 2022-11-26 NOTE — OUTREACH NOTE
Prep Survey    Flowsheet Row Responses   [facility] facility patient discharged from? [facility]   Is LACE score < 7 ? No   Emergency Room discharge w/ pulse ox? No   Eligibility [facility]   Date of Admission 11/23/22   Date of Discharge 11/26/22   Discharge Disposition Home or Self Care   Discharge diagnosis Endoscopic sphincterotomy stone removal/stent placement   Does the patient have one of the following disease processes/diagnoses(primary or secondary)? Other   Does the patient have Home health ordered? No   Is there a DME ordered? No   Prep survey completed? Yes          [facility] - Registered Nurse

## 2022-11-28 ENCOUNTER — TRANSITIONAL CARE MANAGEMENT TELEPHONE ENCOUNTER (OUTPATIENT)
Dept: CALL CENTER | Facility: HOSPITAL | Age: 67
End: 2022-11-28

## 2022-11-28 DIAGNOSIS — R79.89 ELEVATED LFTS: ICD-10-CM

## 2022-11-28 DIAGNOSIS — D64.9 LOW HEMOGLOBIN: Primary | ICD-10-CM

## 2022-11-28 NOTE — OUTREACH NOTE
Call Center TCM Note    Flowsheet Row Responses   [facility] patient discharged from? [facility]   Does the patient have one of the following disease processes/diagnoses(primary or secondary)? Other   TCM attempt successful? Yes   Call start time 1029   Call end time 1033   Discharge diagnosis Endoscopic sphincterotomy stone removal/stent placement   Meds reviewed with patient/caregiver? Yes   Does the patient have all medications ordered at discharge? N/A   Is the patient taking all medications as directed (includes completed medication regime)? Yes   Medication comments No med changes. Pt has RTn meds   Comments HOSP DC FU appt 12/7/22 @ 2:15 pm.    Does the patient have an appointment with their PCP within 7 days of discharge? Yes   Has home health visited the patient within 72 hours of discharge? N/A   Psychosocial issues? No   Did the patient receive a copy of their discharge instructions? Yes   Nursing interventions Reviewed instructions with patient   What is the patient's perception of their health status since discharge? Improving   Is the patient/caregiver able to teach back signs and symptoms related to disease process for when to call PCP? Yes   Is the patient/caregiver able to teach back signs and symptoms related to disease process for when to call 911? Yes   Is the patient/caregiver able to teach back the hierarchy of who to call/visit for symptoms/problems? PCP, Specialist, Home health nurse, Urgent Care, ED, 911 Yes   TCM call completed? Yes   Wrap up additional comments Pt reports she is doing ok. Pt aware to call GI for appt.    Call end time 1033          Rufina Camilo RN    11/28/2022, 10:35 EST

## 2022-11-29 ENCOUNTER — LAB (OUTPATIENT)
Dept: LAB | Facility: HOSPITAL | Age: 67
End: 2022-11-29

## 2022-11-29 DIAGNOSIS — R79.89 ELEVATED LFTS: ICD-10-CM

## 2022-11-29 DIAGNOSIS — D64.9 LOW HEMOGLOBIN: ICD-10-CM

## 2022-11-29 PROCEDURE — 36415 COLL VENOUS BLD VENIPUNCTURE: CPT

## 2022-11-29 PROCEDURE — 80053 COMPREHEN METABOLIC PANEL: CPT

## 2022-11-29 PROCEDURE — 85025 COMPLETE CBC W/AUTO DIFF WBC: CPT

## 2022-11-30 ENCOUNTER — TELEPHONE (OUTPATIENT)
Dept: GASTROENTEROLOGY | Facility: CLINIC | Age: 67
End: 2022-11-30

## 2022-11-30 LAB
ALBUMIN SERPL-MCNC: 3.6 G/DL (ref 3.5–5.2)
ALBUMIN/GLOB SERPL: 1.2 G/DL
ALP SERPL-CCNC: 121 U/L (ref 39–117)
ALT SERPL W P-5'-P-CCNC: 31 U/L (ref 1–33)
ANION GAP SERPL CALCULATED.3IONS-SCNC: 11.8 MMOL/L (ref 5–15)
AST SERPL-CCNC: 22 U/L (ref 1–32)
BASOPHILS # BLD AUTO: 0.03 10*3/MM3 (ref 0–0.2)
BASOPHILS NFR BLD AUTO: 0.7 % (ref 0–1.5)
BILIRUB SERPL-MCNC: 0.3 MG/DL (ref 0–1.2)
BUN SERPL-MCNC: 12 MG/DL (ref 8–23)
BUN/CREAT SERPL: 14 (ref 7–25)
CALCIUM SPEC-SCNC: 9.3 MG/DL (ref 8.6–10.5)
CHLORIDE SERPL-SCNC: 107 MMOL/L (ref 98–107)
CO2 SERPL-SCNC: 23.2 MMOL/L (ref 22–29)
CREAT SERPL-MCNC: 0.86 MG/DL (ref 0.57–1)
DEPRECATED RDW RBC AUTO: 44.1 FL (ref 37–54)
EGFRCR SERPLBLD CKD-EPI 2021: 74.6 ML/MIN/1.73
EOSINOPHIL # BLD AUTO: 0.15 10*3/MM3 (ref 0–0.4)
EOSINOPHIL NFR BLD AUTO: 3.4 % (ref 0.3–6.2)
ERYTHROCYTE [DISTWIDTH] IN BLOOD BY AUTOMATED COUNT: 13.6 % (ref 12.3–15.4)
GLOBULIN UR ELPH-MCNC: 3 GM/DL
GLUCOSE SERPL-MCNC: 96 MG/DL (ref 65–99)
HCT VFR BLD AUTO: 32.4 % (ref 34–46.6)
HGB BLD-MCNC: 9.9 G/DL (ref 12–15.9)
IMM GRANULOCYTES # BLD AUTO: 0.07 10*3/MM3 (ref 0–0.05)
IMM GRANULOCYTES NFR BLD AUTO: 1.6 % (ref 0–0.5)
LYMPHOCYTES # BLD AUTO: 0.83 10*3/MM3 (ref 0.7–3.1)
LYMPHOCYTES NFR BLD AUTO: 18.8 % (ref 19.6–45.3)
MCH RBC QN AUTO: 27.2 PG (ref 26.6–33)
MCHC RBC AUTO-ENTMCNC: 30.6 G/DL (ref 31.5–35.7)
MCV RBC AUTO: 89 FL (ref 79–97)
MONOCYTES # BLD AUTO: 0.28 10*3/MM3 (ref 0.1–0.9)
MONOCYTES NFR BLD AUTO: 6.3 % (ref 5–12)
NEUTROPHILS NFR BLD AUTO: 3.06 10*3/MM3 (ref 1.7–7)
NEUTROPHILS NFR BLD AUTO: 69.2 % (ref 42.7–76)
NRBC BLD AUTO-RTO: 0 /100 WBC (ref 0–0.2)
PLATELET # BLD AUTO: 248 10*3/MM3 (ref 140–450)
PMV BLD AUTO: 10.5 FL (ref 6–12)
POTASSIUM SERPL-SCNC: 3.7 MMOL/L (ref 3.5–5.2)
PROT SERPL-MCNC: 6.6 G/DL (ref 6–8.5)
RBC # BLD AUTO: 3.64 10*6/MM3 (ref 3.77–5.28)
SODIUM SERPL-SCNC: 142 MMOL/L (ref 136–145)
WBC NRBC COR # BLD: 4.42 10*3/MM3 (ref 3.4–10.8)

## 2022-11-30 RX ORDER — CIPROFLOXACIN 500 MG/1
TABLET, FILM COATED ORAL
COMMUNITY
Start: 2022-10-13 | End: 2022-12-07

## 2022-11-30 NOTE — TELEPHONE ENCOUNTER
I have reviewed the patients upper endoscopy and pathology. Esophageal, stomach, and duodenum biopsies are  Normal. Biopsies are negative for H. Pylori, dysplasia, metaplasia, and malignancy.    I have reviewed the patients colonoscopy report. The report showed a normal colonoscopy with diverticulosis.  No polyps removed or specimens collected.  Repeat colonoscopy in 5 years due to history of colon polyps. Please place in recall.

## 2022-11-30 NOTE — TELEPHONE ENCOUNTER
Per Dr. Garcia, pt should be set up for ERCP with stent removal after she has her GB removed (4 weeks after). I have s/w pt. She is going to call me tomorrow after her visit with Dr. Dacosta. Once she is scheduled for her GB removal, her and I will pick out a date for her ERCP w/ Stent Removal.

## 2022-12-01 ENCOUNTER — OFFICE VISIT (OUTPATIENT)
Dept: SURGERY | Facility: CLINIC | Age: 67
End: 2022-12-01

## 2022-12-01 VITALS — BODY MASS INDEX: 42.8 KG/M2 | RESPIRATION RATE: 17 BRPM | WEIGHT: 218 LBS | HEIGHT: 60 IN

## 2022-12-01 DIAGNOSIS — K80.20 CALCULUS OF GALLBLADDER WITHOUT CHOLECYSTITIS WITHOUT OBSTRUCTION: Primary | ICD-10-CM

## 2022-12-01 PROCEDURE — 99213 OFFICE O/P EST LOW 20 MIN: CPT | Performed by: SURGERY

## 2022-12-01 RX ORDER — ONDANSETRON 2 MG/ML
4 INJECTION INTRAMUSCULAR; INTRAVENOUS EVERY 6 HOURS PRN
Status: CANCELLED | OUTPATIENT
Start: 2022-12-01

## 2022-12-01 NOTE — PROGRESS NOTES
Chief Complaint: Follow-up    Subjective         History of Present Illness  Gifty Hoover is a 66 y.o. female presents to [facility] to be seen for gallstones.  She is sp ercp for commmon bile duct stone.     Objective     Past Medical History:   Diagnosis Date   • Anemia    • Anxiety    • Arthritis    • Asthma     Shortness of breath after walking long periods of time.   • Bladder problem    • Breast mass     Upper right breast was benign   • CAD 2021   • Cholelithiasis    • Chronic kidney disease    • Colon polyp    • Deep vein thrombosis (HCC)     Green filter in place.   • Depression    • Diabetes mellitus (HCC)    • Elevated cholesterol    • Essential hypertension 2021   • Fracture of distal end of left fibula 10/29/2015    closed fracture, intitial encounter   • Fracture of distal fibula    • GERD (gastroesophageal reflux disease)    • Heart attack    • History of pulmonary embolism    • Hyperlipidemia LDL goal <70 2021   • Lumbar radiculopathy 2018    describes sensory S1 radiculopathy but would favor right L5   • Paroxysmal atrial fibrillation 2021   • Pulmonary embolism (HCC)    • Seasonal allergies    • Sleep apnea     cpap   • SOB (shortness of breath) on exertion    • Thyroid disorder    • Vaginal bleeding problems        Past Surgical History:   Procedure Laterality Date   • BLADDER SURGERY     • BREAST BIOPSY      Upper right breast   • CARDIAC CATHETERIZATION     • CARDIAC SURGERY      Open heart surgery   •  SECTION     • COLONOSCOPY     • COLONOSCOPY N/A 2022    Procedure: COLONOSCOPY;  Surgeon: Ta Vasquez MD;  Location: [facility] END[facility]COPY;  Service: Gastroenterology;  Laterality: N/A;  DIVERTICULOSIS   • ENDOSCOPY N/A 2022    Procedure: ESOPHAGOGASTRODUODENOSCOPY WITH BX;  Surgeon: Ta Vasquez MD;  Location: [facility] ENDOSCOPY;  Service:  Gastroenterology;  Laterality: N/A;  SMALL HIATAL HERNIA   • ERCP N/A 11/25/2022    Procedure: ENDOSCOPIC RETROGRADE CHOLANGIOPANCREATOGRAPHY WITH SPHINCTEROTOMY, STONE REMOVAL, STENT PLACEMENT;  Surgeon: Arden Garcia MD;  Location: [facility] ENDOSCOPY;  Service: Gastroenterology;  Laterality: N/A;  BILE DUCT STONES   • NICHOLAS FILTER INSERTION JUGULAR  2011   • HYSTERECTOMY  1983         Current Outpatient Medications:   •  albuterol sulfate HFA (ProAir HFA) 108 (90 Base) MCG/ACT inhaler, Inhale 2 puffs Every 6 (Six) Hours As Needed for Wheezing., Disp: 18 g, Rfl: 0  •  aspirin 81 MG EC tablet, Take 81 mg by mouth Daily., Disp: , Rfl:   •  Calcium Carb-Cholecalciferol (Calcium-Vitamin D) 500-400 MG-UNIT tablet, Take 1 tablet by mouth 2 (Two) Times a Day., Disp: 180 tablet, Rfl: 1  •  ciprofloxacin (CIPRO) 500 MG tablet, Take 1 tablet (500 mg total) by mouth 2 (two) times a day for 7 days, Disp: , Rfl:   •  ezetimibe (ZETIA) 10 MG tablet, Take 1 tablet by mouth Daily., Disp: 90 tablet, Rfl: 3  •  famotidine (Pepcid) 40 MG tablet, Take 1 tablet by mouth every night at bedtime., Disp: 90 tablet, Rfl: 1  •  Ferrous Fumarate 324 (106 Fe) MG tablet, Take 1 tablet by mouth Every Other Day., Disp: , Rfl:   •  levothyroxine (SYNTHROID, LEVOTHROID) 88 MCG tablet, Take 1 tablet by mouth Daily., Disp: 90 tablet, Rfl: 1  •  metFORMIN (GLUCOPHAGE) 500 MG tablet, Take 500 mg by mouth 2 (Two) Times a Day With Meals., Disp: , Rfl:   •  metFORMIN (GLUCOPHAGE) 500 MG tablet, , Disp: , Rfl:   •  omeprazole (priLOSEC) 40 MG capsule, Take 1 capsule by mouth Daily., Disp: 90 capsule, Rfl: 1  •  Pradaxa 150 MG capsu, Take 1 capsule by mouth 2 (Two) Times a Day., Disp: 180 capsule, Rfl: 1  •  rosuvastatin (CRESTOR) 40 MG tablet, Take 1 tablet by mouth Daily., Disp: 90 tablet, Rfl: 1  •  telmisartan (MICARDIS) 80 MG tablet, Take 1 tablet by mouth Daily., Disp: 90 tablet, Rfl: 1    No Known Allergies     Family History   Problem  "Relation Age of Onset   • Stroke Mother    • Cancer Mother         Colon   • Colon cancer Mother 71        still living at 73   • Diabetes Mother    • Arthritis Mother    • Colon polyps Mother    • Hypertension Mother    • Kidney disease Mother    • Miscarriages / Stillbirths Mother    • Stroke Father    • Arthritis Father         Padgets Disease   • Osteoporosis Father    • Inflammatory bowel disease Father    • Cancer Sister    • Diabetes Brother    • Stroke Brother    • Bleeding Disorder Daughter    • Diabetes Brother    • Hypertension Brother    • Malig Hyperthermia Neg Hx        Social History     Socioeconomic History   • Marital status:    Tobacco Use   • Smoking status: Never   • Smokeless tobacco: Never   Vaping Use   • Vaping Use: Never used   Substance and Sexual Activity   • Alcohol use: Never   • Drug use: Never   • Sexual activity: Not Currently     Partners: Male     Birth control/protection: Hysterectomy       Vital Signs:   Resp 17   Ht 152.4 cm (60\")   Wt 98.9 kg (218 lb)   BMI 42.58 kg/m²    Review of Systems    Physical Exam  Vitals and nursing note reviewed.   Constitutional:       General: She is not in acute distress.     Appearance: Normal appearance. She is well-developed.   HENT:      Head: Normocephalic and atraumatic.   Eyes:      Extraocular Movements: Extraocular movements intact.      Pupils: Pupils are equal, round, and reactive to light.   Cardiovascular:      Pulses: Normal pulses.   Pulmonary:      Effort: Pulmonary effort is normal. No retractions.      Breath sounds: Normal air entry. No wheezing.   Abdominal:      General: There is no distension.      Palpations: Abdomen is soft.      Tenderness: There is no abdominal tenderness.      Hernia: No hernia is present.   Musculoskeletal:         General: No swelling or deformity.      Cervical back: Neck supple.   Skin:     General: Skin is warm and dry.      Findings: No erythema.   Neurological:      General: No focal " deficit present.      Mental Status: She is alert and oriented to person, place, and time.      Motor: Motor function is intact.   Psychiatric:         Mood and Affect: Mood normal.         Thought Content: Thought content normal.          Result Review :               Assessment and Plan    Diagnoses and all orders for this visit:    1. Calculus of gallbladder without cholecystitis without obstruction (Primary)  -     Case Request; Standing  -     Follow Anesthesia Guidelines / Protocol; Standing  -     Verify / Perform Chlorhexidine Skin Prep; Standing  -     Verify / Perform Chlorhexidine Skin Prep if Indicated (If Not Already Completed); Standing  -     clindamycin (CLEOCIN) 900 mg in dextrose (D5W) 5 % 100 mL IVPB  -     ondansetron (ZOFRAN) injection 4 mg  -     Obtain Informed Consent; Standing  -     Case Request        Follow Up   No follow-ups on file.  Patient was given instructions and counseling regarding her condition or for health maintenance advice. Please see specific information pulled into the AVS if appropriate.         This document has been electronically signed by Krystal Dacosta MD  December 1, 2022 12:00 EST

## 2022-12-01 NOTE — H&P (VIEW-ONLY)
Chief Complaint: Follow-up    Subjective         History of Present Illness  Gifty Hoover is a 66 y.o. female presents to [facility] to be seen for gallstones.  She is sp ercp for commmon bile duct stone.     Objective     Past Medical History:   Diagnosis Date   • Anemia    • Anxiety    • Arthritis    • Asthma     Shortness of breath after walking long periods of time.   • Bladder problem    • Breast mass     Upper right breast was benign   • CAD 2021   • Cholelithiasis    • Chronic kidney disease    • Colon polyp    • Deep vein thrombosis (HCC)     Green filter in place.   • Depression    • Diabetes mellitus (HCC)    • Elevated cholesterol    • Essential hypertension 2021   • Fracture of distal end of left fibula 10/29/2015    closed fracture, intitial encounter   • Fracture of distal fibula    • GERD (gastroesophageal reflux disease)    • Heart attack    • History of pulmonary embolism    • Hyperlipidemia LDL goal <70 2021   • Lumbar radiculopathy 2018    describes sensory S1 radiculopathy but would favor right L5   • Paroxysmal atrial fibrillation 2021   • Pulmonary embolism (HCC)    • Seasonal allergies    • Sleep apnea     cpap   • SOB (shortness of breath) on exertion    • Thyroid disorder    • Vaginal bleeding problems        Past Surgical History:   Procedure Laterality Date   • BLADDER SURGERY     • BREAST BIOPSY      Upper right breast   • CARDIAC CATHETERIZATION     • CARDIAC SURGERY      Open heart surgery   •  SECTION     • COLONOSCOPY     • COLONOSCOPY N/A 2022    Procedure: COLONOSCOPY;  Surgeon: Ta Vasquez MD;  Location: [facility] ENDOSCOPY;  Service: Gastroenterology;  Laterality: N/A;  DIVERTICULOSIS   • ENDOSCOPY N/A 2022    Procedure: ESOPHAGOGASTRODUODENOSCOPY WITH BX;  Surgeon: Ta Vasquez MD;  Location: [facility] ENDOSCOPY;  Service:  Gastroenterology;  Laterality: N/A;  SMALL HIATAL HERNIA   • ERCP N/A 11/25/2022    Procedure: ENDOSCOPIC RETROGRADE CHOLANGIOPANCREATOGRAPHY WITH SPHINCTEROTOMY, STONE REMOVAL, STENT PLACEMENT;  Surgeon: Arden Garcia MD;  Location: [facility] ENDOSCOPY;  Service: Gastroenterology;  Laterality: N/A;  BILE DUCT STONES   • NICHOLAS FILTER INSERTION JUGULAR  2011   • HYSTERECTOMY  1983         Current Outpatient Medications:   •  albuterol sulfate HFA (ProAir HFA) 108 (90 Base) MCG/ACT inhaler, Inhale 2 puffs Every 6 (Six) Hours As Needed for Wheezing., Disp: 18 g, Rfl: 0  •  aspirin 81 MG EC tablet, Take 81 mg by mouth Daily., Disp: , Rfl:   •  Calcium Carb-Cholecalciferol (Calcium-Vitamin D) 500-400 MG-UNIT tablet, Take 1 tablet by mouth 2 (Two) Times a Day., Disp: 180 tablet, Rfl: 1  •  ciprofloxacin (CIPRO) 500 MG tablet, Take 1 tablet (500 mg total) by mouth 2 (two) times a day for 7 days, Disp: , Rfl:   •  ezetimibe (ZETIA) 10 MG tablet, Take 1 tablet by mouth Daily., Disp: 90 tablet, Rfl: 3  •  famotidine (Pepcid) 40 MG tablet, Take 1 tablet by mouth every night at bedtime., Disp: 90 tablet, Rfl: 1  •  Ferrous Fumarate 324 (106 Fe) MG tablet, Take 1 tablet by mouth Every Other Day., Disp: , Rfl:   •  levothyroxine (SYNTHROID, LEVOTHROID) 88 MCG tablet, Take 1 tablet by mouth Daily., Disp: 90 tablet, Rfl: 1  •  metFORMIN (GLUCOPHAGE) 500 MG tablet, Take 500 mg by mouth 2 (Two) Times a Day With Meals., Disp: , Rfl:   •  metFORMIN (GLUCOPHAGE) 500 MG tablet, , Disp: , Rfl:   •  omeprazole (priLOSEC) 40 MG capsule, Take 1 capsule by mouth Daily., Disp: 90 capsule, Rfl: 1  •  Pradaxa 150 MG capsu, Take 1 capsule by mouth 2 (Two) Times a Day., Disp: 180 capsule, Rfl: 1  •  rosuvastatin (CRESTOR) 40 MG tablet, Take 1 tablet by mouth Daily., Disp: 90 tablet, Rfl: 1  •  telmisartan (MICARDIS) 80 MG tablet, Take 1 tablet by mouth Daily., Disp: 90 tablet, Rfl: 1    No Known Allergies     Family History   Problem  "Relation Age of Onset   • Stroke Mother    • Cancer Mother         Colon   • Colon cancer Mother 71        still living at 73   • Diabetes Mother    • Arthritis Mother    • Colon polyps Mother    • Hypertension Mother    • Kidney disease Mother    • Miscarriages / Stillbirths Mother    • Stroke Father    • Arthritis Father         Padgets Disease   • Osteoporosis Father    • Inflammatory bowel disease Father    • Cancer Sister    • Diabetes Brother    • Stroke Brother    • Bleeding Disorder Daughter    • Diabetes Brother    • Hypertension Brother    • Malig Hyperthermia Neg Hx        Social History     Socioeconomic History   • Marital status:    Tobacco Use   • Smoking status: Never   • Smokeless tobacco: Never   Vaping Use   • Vaping Use: Never used   Substance and Sexual Activity   • Alcohol use: Never   • Drug use: Never   • Sexual activity: Not Currently     Partners: Male     Birth control/protection: Hysterectomy       Vital Signs:   Resp 17   Ht 152.4 cm (60\")   Wt 98.9 kg (218 lb)   BMI 42.58 kg/m²    Review of Systems    Physical Exam  Vitals and nursing note reviewed.   Constitutional:       General: She is not in acute distress.     Appearance: Normal appearance. She is well-developed.   HENT:      Head: Normocephalic and atraumatic.   Eyes:      Extraocular Movements: Extraocular movements intact.      Pupils: Pupils are equal, round, and reactive to light.   Cardiovascular:      Pulses: Normal pulses.   Pulmonary:      Effort: Pulmonary effort is normal. No retractions.      Breath sounds: Normal air entry. No wheezing.   Abdominal:      General: There is no distension.      Palpations: Abdomen is soft.      Tenderness: There is no abdominal tenderness.      Hernia: No hernia is present.   Musculoskeletal:         General: No swelling or deformity.      Cervical back: Neck supple.   Skin:     General: Skin is warm and dry.      Findings: No erythema.   Neurological:      General: No focal " deficit present.      Mental Status: She is alert and oriented to person, place, and time.      Motor: Motor function is intact.   Psychiatric:         Mood and Affect: Mood normal.         Thought Content: Thought content normal.          Result Review :               Assessment and Plan    Diagnoses and all orders for this visit:    1. Calculus of gallbladder without cholecystitis without obstruction (Primary)  -     Case Request; Standing  -     Follow Anesthesia Guidelines / Protocol; Standing  -     Verify / Perform Chlorhexidine Skin Prep; Standing  -     Verify / Perform Chlorhexidine Skin Prep if Indicated (If Not Already Completed); Standing  -     clindamycin (CLEOCIN) 900 mg in dextrose (D5W) 5 % 100 mL IVPB  -     ondansetron (ZOFRAN) injection 4 mg  -     Obtain Informed Consent; Standing  -     Case Request        Follow Up   No follow-ups on file.  Patient was given instructions and counseling regarding her condition or for health maintenance advice. Please see specific information pulled into the AVS if appropriate.         This document has been electronically signed by Krystal Dacosta MD  December 1, 2022 12:00 EST

## 2022-12-05 ENCOUNTER — TELEPHONE (OUTPATIENT)
Dept: GASTROENTEROLOGY | Facility: CLINIC | Age: 67
End: 2022-12-05

## 2022-12-05 NOTE — TELEPHONE ENCOUNTER
----- Message from April Etelvina sent at 12/2/2022  9:40 AM EST -----  Per Dr. Garcia, pt should be set up for ERCP with stent removal after she has her GB removed (4 weeks after). I have s/w pt. She is going to call our office after her visit with Dr. Dacosta. Once she is scheduled for her GB removal, we will pick out a date for her ERCP w/ Stent Removal.

## 2022-12-05 NOTE — TELEPHONE ENCOUNTER
ERCP 11/25/2022: Stent was placed in the common bile duct, repeat ERCP to remove the stent, patient is being set up for gallbladder removal-see message below

## 2022-12-06 ENCOUNTER — READMISSION MANAGEMENT (OUTPATIENT)
Dept: CALL CENTER | Facility: HOSPITAL | Age: 67
End: 2022-12-06

## 2022-12-06 ENCOUNTER — TELEPHONE (OUTPATIENT)
Dept: SURGERY | Facility: CLINIC | Age: 67
End: 2022-12-06

## 2022-12-06 ENCOUNTER — PREP FOR SURGERY (OUTPATIENT)
Dept: OTHER | Facility: HOSPITAL | Age: 67
End: 2022-12-06

## 2022-12-06 DIAGNOSIS — Z46.89 ENCOUNTER FOR REMOVAL OF BILIARY STENT: Primary | ICD-10-CM

## 2022-12-06 NOTE — OUTREACH NOTE
Medical Week 2 Survey    Flowsheet Row Responses   [facility] facility patient discharged from? [facility]   Does the patient have one of the following disease processes/diagnoses(primary or secondary)? Other   Week 2 attempt successful? No   Unsuccessful attempts Attempt 1          TIRSO HIGGINS - Registered Nurse

## 2022-12-07 ENCOUNTER — OFFICE VISIT (OUTPATIENT)
Dept: FAMILY MEDICINE CLINIC | Facility: CLINIC | Age: 67
End: 2022-12-07

## 2022-12-07 VITALS
SYSTOLIC BLOOD PRESSURE: 131 MMHG | DIASTOLIC BLOOD PRESSURE: 72 MMHG | OXYGEN SATURATION: 98 % | HEART RATE: 102 BPM | TEMPERATURE: 98 F | BODY MASS INDEX: 42.07 KG/M2 | WEIGHT: 215.4 LBS

## 2022-12-07 DIAGNOSIS — Z23 NEED FOR INFLUENZA VACCINATION: ICD-10-CM

## 2022-12-07 DIAGNOSIS — E11.9 TYPE 2 DIABETES MELLITUS WITHOUT COMPLICATION, WITHOUT LONG-TERM CURRENT USE OF INSULIN: Primary | ICD-10-CM

## 2022-12-07 DIAGNOSIS — K21.9 GASTROESOPHAGEAL REFLUX DISEASE WITHOUT ESOPHAGITIS: ICD-10-CM

## 2022-12-07 DIAGNOSIS — I48.0 PAROXYSMAL ATRIAL FIBRILLATION: ICD-10-CM

## 2022-12-07 DIAGNOSIS — K80.10 CALCULUS OF GALLBLADDER WITH CHRONIC CHOLECYSTITIS WITHOUT OBSTRUCTION: ICD-10-CM

## 2022-12-07 DIAGNOSIS — I10 ESSENTIAL HYPERTENSION: ICD-10-CM

## 2022-12-07 DIAGNOSIS — E78.5 HYPERLIPIDEMIA LDL GOAL <70: ICD-10-CM

## 2022-12-07 DIAGNOSIS — E03.9 ACQUIRED HYPOTHYROIDISM: ICD-10-CM

## 2022-12-07 DIAGNOSIS — M81.0 OSTEOPOROSIS, UNSPECIFIED OSTEOPOROSIS TYPE, UNSPECIFIED PATHOLOGICAL FRACTURE PRESENCE: ICD-10-CM

## 2022-12-07 PROCEDURE — 99495 TRANSJ CARE MGMT MOD F2F 14D: CPT | Performed by: NURSE PRACTITIONER

## 2022-12-07 PROCEDURE — G0008 ADMIN INFLUENZA VIRUS VAC: HCPCS | Performed by: NURSE PRACTITIONER

## 2022-12-07 PROCEDURE — 90662 IIV NO PRSV INCREASED AG IM: CPT | Performed by: NURSE PRACTITIONER

## 2022-12-07 PROCEDURE — 1111F DSCHRG MED/CURRENT MED MERGE: CPT | Performed by: NURSE PRACTITIONER

## 2022-12-07 RX ORDER — TELMISARTAN 80 MG/1
80 TABLET ORAL DAILY
Qty: 90 TABLET | Refills: 1 | Status: SHIPPED | OUTPATIENT
Start: 2022-12-07 | End: 2023-01-09 | Stop reason: SDUPTHER

## 2022-12-07 RX ORDER — ATENOLOL 100 MG/1
150 TABLET ORAL 2 TIMES DAILY
Qty: 180 CAPSULE | Refills: 1 | Status: SHIPPED | OUTPATIENT
Start: 2022-12-07 | End: 2023-01-09 | Stop reason: SDUPTHER

## 2022-12-07 RX ORDER — OMEPRAZOLE 40 MG/1
40 CAPSULE, DELAYED RELEASE ORAL DAILY
Qty: 90 CAPSULE | Refills: 1 | Status: SHIPPED | OUTPATIENT
Start: 2022-12-07 | End: 2023-01-09 | Stop reason: SDUPTHER

## 2022-12-07 RX ORDER — LEVOTHYROXINE SODIUM 88 UG/1
88 TABLET ORAL DAILY
Qty: 90 TABLET | Refills: 1 | Status: SHIPPED | OUTPATIENT
Start: 2022-12-07 | End: 2023-01-09 | Stop reason: SDUPTHER

## 2022-12-07 RX ORDER — ROSUVASTATIN CALCIUM 40 MG/1
40 TABLET, COATED ORAL DAILY
Qty: 90 TABLET | Refills: 1 | Status: SHIPPED | OUTPATIENT
Start: 2022-12-07 | End: 2023-01-09 | Stop reason: SDUPTHER

## 2022-12-07 NOTE — PROGRESS NOTES
Transitional Care Follow Up Visit  Subjective     Gifty Hoover is a 66 y.o. female who presents for a transitional care management visit.    Within 48 business hours after discharge our office contacted her via telephone to coordinate her care and needs.      I reviewed and discussed the details of that call along with the discharge summary, hospital problems, inpatient lab results, inpatient diagnostic studies, and consultation reports with Gifty.     Current outpatient and discharge medications have been reconciled for the patient.  Reviewed by: TIEN Perez      Date of TCM Phone Call 11/26/2022   [facility]   Date of Admission 11/23/2022   Date of Discharge 11/26/2022   Discharge Disposition Home or Self Care     Risk for Readmission (LACE) Score: 8 (11/26/2022  6:00 AM)      History of Present Illness   Course During Hospital Stay:  Pt was admitted to hospital with cholecystitis and has upcoming gallbladder surgery. Pt had stone in common bile duct and dr. Garcia removed.     Pt reports doing well eating oatmeal and soft foods.     Pt was dx as diabetes. hgba1c was 7.5. pt is tolerating metformin well. Pt has made diet modifications.     Anemia - pt is taking iron and reports that she is taking every other day. Pt monitors constipation and takes otc med prn.      The following portions of the patient's history were reviewed and updated as appropriate: allergies, current medications, past family history, past medical history, past social history, past surgical history and problem list.    Review of Systems   Constitutional: Negative for chills, fatigue and fever.   Respiratory: Negative for cough and shortness of breath.    Cardiovascular: Negative for chest pain and palpitations.   Gastrointestinal: Positive for abdominal pain. Negative for constipation, diarrhea, nausea and vomiting.   Musculoskeletal: Negative for back pain and neck pain.   Skin: Negative for rash.   Neurological: Negative  for dizziness and headaches.       Objective   Physical Exam  Vitals reviewed.   Constitutional:       Appearance: Normal appearance. She is well-developed.   HENT:      Head: Normocephalic and atraumatic.   Eyes:      Conjunctiva/sclera: Conjunctivae normal.      Pupils: Pupils are equal, round, and reactive to light.   Cardiovascular:      Rate and Rhythm: Normal rate and regular rhythm.      Heart sounds: Normal heart sounds. No murmur heard.  Pulmonary:      Effort: Pulmonary effort is normal.      Breath sounds: Normal breath sounds. No wheezing or rhonchi.   Abdominal:      General: Bowel sounds are normal. There is no distension.      Palpations: Abdomen is soft.      Tenderness: There is no abdominal tenderness.   Skin:     General: Skin is warm and dry.   Neurological:      Mental Status: She is alert and oriented to person, place, and time.   Psychiatric:         Mood and Affect: Mood and affect normal.         Behavior: Behavior normal.         Thought Content: Thought content normal.         Judgment: Judgment normal.         Assessment & Plan   Diagnoses and all orders for this visit:    1. Type 2 diabetes mellitus without complication, without long-term current use of insulin ([facility]) (Primary)    2. Calculus of gallbladder with chronic cholecystitis without obstruction  Comments:  Keep follow up with gastro and surgery.    3. Gastroesophageal reflux disease without esophagitis  -     omeprazole (priLOSEC) 40 MG capsule; Take 1 capsule by mouth Daily.  Dispense: 90 capsule; Refill: 1    4. Paroxysmal atrial fibrillation  -     Pradaxa 150 MG capsu; Take 1 capsule by mouth 2 (Two) Times a Day.  Dispense: 180 capsule; Refill: 1    5. Hyperlipidemia LDL goal <70  -     rosuvastatin (CRESTOR) 40 MG tablet; Take 1 tablet by mouth Daily.  Dispense: 90 tablet; Refill: 1    6. Essential hypertension  -     telmisartan (MICARDIS) 80 MG tablet; Take 1 tablet by mouth Daily.  Dispense: 90 tablet; Refill: 1    7.  Osteoporosis, unspecified osteoporosis type, unspecified pathological fracture presence  Comments:  continue calcium    8. Acquired hypothyroidism  -     levothyroxine (SYNTHROID, LEVOTHROID) 88 MCG tablet; Take 1 tablet by mouth Daily.  Dispense: 90 tablet; Refill: 1    9. Need for influenza vaccination  -     Fluzone High-Dose 65+yrs       The patient is asked to make an attempt to improve diet and exercise patterns to aid in medical management of these diagnoses.      Return for Next scheduled follow up, Keep follow up as scheduled.     TIEN Perez

## 2022-12-07 NOTE — TELEPHONE ENCOUNTER
S/w patient; she is now scheduled for ERCP on 1.12.23. I have mailed out her appt reminder along with ERCP instructions.

## 2022-12-12 ENCOUNTER — READMISSION MANAGEMENT (OUTPATIENT)
Dept: CALL CENTER | Facility: HOSPITAL | Age: 67
End: 2022-12-12

## 2022-12-12 NOTE — OUTREACH NOTE
Medical Week 2 Survey    Flowsheet Row Responses   [facility] [place] facility patient discharged from? [facility]   Does the patient have one of the following disease processes/diagnoses(primary or secondary)? Other   Week 2 attempt successful? No   Unsuccessful attempts Attempt 2          BASIL Ward Registered Nurse

## 2022-12-13 NOTE — PRE-PROCEDURE INSTRUCTIONS
PRE-OP INSTRUCTIONS REVIEWED WITH PATIENT: FASTING/BATHING/ARRIVAL PROCEDURES.  INSTRUCTED TO TAKE A.M. DAY OF SURGERY: ALBUTEROL INH AS NEEDED.  TAKE ZETIA, LEVOTHYROXINE, OMEPRAZOLE  UNDERSTANDING VERBALIZED.

## 2022-12-14 ENCOUNTER — ANESTHESIA EVENT (OUTPATIENT)
Dept: PERIOP | Facility: HOSPITAL | Age: 67
End: 2022-12-14

## 2022-12-16 ENCOUNTER — HOSPITAL ENCOUNTER (OUTPATIENT)
Facility: HOSPITAL | Age: 67
Setting detail: HOSPITAL OUTPATIENT SURGERY
Discharge: HOME | End: 2022-12-16
Attending: SURGERY | Admitting: SURGERY

## 2022-12-16 ENCOUNTER — ANESTHESIA (OUTPATIENT)
Dept: PERIOP | Facility: HOSPITAL | Age: 67
End: 2022-12-16

## 2022-12-16 VITALS
SYSTOLIC BLOOD PRESSURE: 138 MMHG | BODY MASS INDEX: 41.94 KG/M2 | HEART RATE: 87 BPM | HEIGHT: 60 IN | WEIGHT: 213.63 LBS | OXYGEN SATURATION: 92 % | DIASTOLIC BLOOD PRESSURE: 77 MMHG | RESPIRATION RATE: 16 BRPM | TEMPERATURE: 98.5 F

## 2022-12-16 DIAGNOSIS — K80.20 CALCULUS OF GALLBLADDER WITHOUT CHOLECYSTITIS WITHOUT OBSTRUCTION: ICD-10-CM

## 2022-12-16 LAB — GLUCOSE BLDC GLUCOMTR-MCNC: 163 MG/DL (ref 70–99)

## 2022-12-16 PROCEDURE — 25010000002 PROPOFOL 10 MG/ML EMULSION: Performed by: MARRIAGE & FAMILY THERAPIST

## 2022-12-16 PROCEDURE — 25010000002 DEXAMETHASONE PER 1 MG: Performed by: MARRIAGE & FAMILY THERAPIST

## 2022-12-16 PROCEDURE — 82962 GLUCOSE BLOOD TEST: CPT

## 2022-12-16 PROCEDURE — 47562 LAPAROSCOPIC CHOLECYSTECTOMY: CPT | Performed by: SURGERY

## 2022-12-16 PROCEDURE — 25010000002 FENTANYL CITRATE (PF) 50 MCG/ML SOLUTION: Performed by: MARRIAGE & FAMILY THERAPIST

## 2022-12-16 PROCEDURE — C1889 IMPLANT/INSERT DEVICE, NOC: HCPCS | Performed by: SURGERY

## 2022-12-16 PROCEDURE — 25010000002 ONDANSETRON PER 1 MG: Performed by: MARRIAGE & FAMILY THERAPIST

## 2022-12-16 PROCEDURE — 88304 TISSUE EXAM BY PATHOLOGIST: CPT | Performed by: SURGERY

## 2022-12-16 DEVICE — ENDOPATH ETS45 2.5MM RELOADS (VASCULAR/THIN)
Type: IMPLANTABLE DEVICE | Site: ABDOMEN | Status: FUNCTIONAL
Brand: ENDOPATH

## 2022-12-16 DEVICE — LIGACLIP 10-M/L, 10MM ENDOSCOPIC ROTATING MULTIPLE CLIP APPLIERS
Type: IMPLANTABLE DEVICE | Site: ABDOMEN | Status: FUNCTIONAL
Brand: LIGACLIP

## 2022-12-16 RX ORDER — ACETAMINOPHEN 500 MG
1000 TABLET ORAL ONCE
Status: COMPLETED | OUTPATIENT
Start: 2022-12-16 | End: 2022-12-16

## 2022-12-16 RX ORDER — CLINDAMYCIN PHOSPHATE 900 MG/50ML
900 INJECTION INTRAVENOUS ONCE
Status: COMPLETED | OUTPATIENT
Start: 2022-12-16 | End: 2022-12-16

## 2022-12-16 RX ORDER — ONDANSETRON 2 MG/ML
INJECTION INTRAMUSCULAR; INTRAVENOUS AS NEEDED
Status: DISCONTINUED | OUTPATIENT
Start: 2022-12-16 | End: 2022-12-16 | Stop reason: SURG

## 2022-12-16 RX ORDER — FENTANYL CITRATE 50 UG/ML
INJECTION, SOLUTION INTRAMUSCULAR; INTRAVENOUS AS NEEDED
Status: DISCONTINUED | OUTPATIENT
Start: 2022-12-16 | End: 2022-12-16 | Stop reason: SURG

## 2022-12-16 RX ORDER — ONDANSETRON 2 MG/ML
4 INJECTION INTRAMUSCULAR; INTRAVENOUS ONCE AS NEEDED
Status: DISCONTINUED | OUTPATIENT
Start: 2022-12-16 | End: 2022-12-16 | Stop reason: HOSPADM

## 2022-12-16 RX ORDER — PROMETHAZINE HYDROCHLORIDE 25 MG/1
25 SUPPOSITORY RECTAL ONCE AS NEEDED
Status: DISCONTINUED | OUTPATIENT
Start: 2022-12-16 | End: 2022-12-16 | Stop reason: HOSPADM

## 2022-12-16 RX ORDER — PROMETHAZINE HYDROCHLORIDE 12.5 MG/1
25 TABLET ORAL ONCE AS NEEDED
Status: DISCONTINUED | OUTPATIENT
Start: 2022-12-16 | End: 2022-12-16 | Stop reason: HOSPADM

## 2022-12-16 RX ORDER — LIDOCAINE HYDROCHLORIDE 20 MG/ML
INJECTION, SOLUTION EPIDURAL; INFILTRATION; INTRACAUDAL; PERINEURAL AS NEEDED
Status: DISCONTINUED | OUTPATIENT
Start: 2022-12-16 | End: 2022-12-16 | Stop reason: SURG

## 2022-12-16 RX ORDER — BUPIVACAINE HYDROCHLORIDE AND EPINEPHRINE 2.5; 5 MG/ML; UG/ML
INJECTION, SOLUTION EPIDURAL; INFILTRATION; INTRACAUDAL; PERINEURAL AS NEEDED
Status: DISCONTINUED | OUTPATIENT
Start: 2022-12-16 | End: 2022-12-16 | Stop reason: HOSPADM

## 2022-12-16 RX ORDER — METOPROLOL TARTRATE 5 MG/5ML
INJECTION INTRAVENOUS AS NEEDED
Status: DISCONTINUED | OUTPATIENT
Start: 2022-12-16 | End: 2022-12-16 | Stop reason: SURG

## 2022-12-16 RX ORDER — INDOCYANINE GREEN AND WATER 25 MG
2.5 KIT INJECTION ONCE
Status: COMPLETED | OUTPATIENT
Start: 2022-12-16 | End: 2022-12-16

## 2022-12-16 RX ORDER — PROPOFOL 10 MG/ML
VIAL (ML) INTRAVENOUS AS NEEDED
Status: DISCONTINUED | OUTPATIENT
Start: 2022-12-16 | End: 2022-12-16 | Stop reason: SURG

## 2022-12-16 RX ORDER — DEXAMETHASONE SODIUM PHOSPHATE 4 MG/ML
INJECTION, SOLUTION INTRA-ARTICULAR; INTRALESIONAL; INTRAMUSCULAR; INTRAVENOUS; SOFT TISSUE AS NEEDED
Status: DISCONTINUED | OUTPATIENT
Start: 2022-12-16 | End: 2022-12-16 | Stop reason: SURG

## 2022-12-16 RX ORDER — HYDROCODONE BITARTRATE AND ACETAMINOPHEN 5; 325 MG/1; MG/1
1-2 TABLET ORAL EVERY 4 HOURS PRN
Qty: 20 TABLET | Refills: 0 | Status: SHIPPED | OUTPATIENT
Start: 2022-12-16 | End: 2023-01-06

## 2022-12-16 RX ORDER — DEXMEDETOMIDINE HYDROCHLORIDE 100 UG/ML
INJECTION, SOLUTION INTRAVENOUS AS NEEDED
Status: DISCONTINUED | OUTPATIENT
Start: 2022-12-16 | End: 2022-12-16 | Stop reason: SURG

## 2022-12-16 RX ORDER — GLYCOPYRROLATE 0.2 MG/ML
INJECTION INTRAMUSCULAR; INTRAVENOUS AS NEEDED
Status: DISCONTINUED | OUTPATIENT
Start: 2022-12-16 | End: 2022-12-16 | Stop reason: SURG

## 2022-12-16 RX ORDER — SODIUM CHLORIDE, SODIUM LACTATE, POTASSIUM CHLORIDE, CALCIUM CHLORIDE 600; 310; 30; 20 MG/100ML; MG/100ML; MG/100ML; MG/100ML
9 INJECTION, SOLUTION INTRAVENOUS CONTINUOUS PRN
Status: DISCONTINUED | OUTPATIENT
Start: 2022-12-16 | End: 2022-12-16 | Stop reason: HOSPADM

## 2022-12-16 RX ORDER — PHENYLEPHRINE HCL IN 0.9% NACL 1 MG/10 ML
SYRINGE (ML) INTRAVENOUS AS NEEDED
Status: DISCONTINUED | OUTPATIENT
Start: 2022-12-16 | End: 2022-12-16 | Stop reason: SURG

## 2022-12-16 RX ORDER — MIDAZOLAM HYDROCHLORIDE 1 MG/ML
2 INJECTION INTRAMUSCULAR; INTRAVENOUS ONCE
Status: DISCONTINUED | OUTPATIENT
Start: 2022-12-16 | End: 2022-12-16 | Stop reason: HOSPADM

## 2022-12-16 RX ORDER — ONDANSETRON 4 MG/1
4 TABLET, FILM COATED ORAL ONCE AS NEEDED
Status: DISCONTINUED | OUTPATIENT
Start: 2022-12-16 | End: 2022-12-16 | Stop reason: HOSPADM

## 2022-12-16 RX ORDER — OXYCODONE HYDROCHLORIDE 5 MG/1
5 TABLET ORAL
Status: DISCONTINUED | OUTPATIENT
Start: 2022-12-16 | End: 2022-12-16 | Stop reason: HOSPADM

## 2022-12-16 RX ORDER — ROCURONIUM BROMIDE 10 MG/ML
INJECTION, SOLUTION INTRAVENOUS AS NEEDED
Status: DISCONTINUED | OUTPATIENT
Start: 2022-12-16 | End: 2022-12-16 | Stop reason: SURG

## 2022-12-16 RX ORDER — MAGNESIUM HYDROXIDE 1200 MG/15ML
LIQUID ORAL AS NEEDED
Status: DISCONTINUED | OUTPATIENT
Start: 2022-12-16 | End: 2022-12-16 | Stop reason: HOSPADM

## 2022-12-16 RX ORDER — MEPERIDINE HYDROCHLORIDE 25 MG/ML
12.5 INJECTION INTRAMUSCULAR; INTRAVENOUS; SUBCUTANEOUS
Status: DISCONTINUED | OUTPATIENT
Start: 2022-12-16 | End: 2022-12-16 | Stop reason: HOSPADM

## 2022-12-16 RX ADMIN — ROCURONIUM BROMIDE 50 MG: 10 INJECTION INTRAVENOUS at 12:37

## 2022-12-16 RX ADMIN — INDOCYANINE GREEN AND WATER 2.5 MG: KIT at 11:49

## 2022-12-16 RX ADMIN — ROCURONIUM BROMIDE 10 MG: 10 INJECTION INTRAVENOUS at 13:16

## 2022-12-16 RX ADMIN — LIDOCAINE HYDROCHLORIDE 50 MG: 20 INJECTION, SOLUTION EPIDURAL; INFILTRATION; INTRACAUDAL; PERINEURAL at 12:37

## 2022-12-16 RX ADMIN — DEXMEDETOMIDINE HYDROCHLORIDE 40 MCG: 100 INJECTION, SOLUTION, CONCENTRATE INTRAVENOUS at 12:37

## 2022-12-16 RX ADMIN — METOPROLOL TARTRATE 1 MG: 5 INJECTION INTRAVENOUS at 13:44

## 2022-12-16 RX ADMIN — GLYCOPYRROLATE 0.1 MG: 0.2 INJECTION INTRAMUSCULAR; INTRAVENOUS at 12:37

## 2022-12-16 RX ADMIN — ONDANSETRON 4 MG: 2 INJECTION INTRAMUSCULAR; INTRAVENOUS at 13:11

## 2022-12-16 RX ADMIN — ROCURONIUM BROMIDE 10 MG: 10 INJECTION INTRAVENOUS at 14:34

## 2022-12-16 RX ADMIN — DEXAMETHASONE SODIUM PHOSPHATE 4 MG: 4 INJECTION, SOLUTION INTRA-ARTICULAR; INTRALESIONAL; INTRAMUSCULAR; INTRAVENOUS; SOFT TISSUE at 13:11

## 2022-12-16 RX ADMIN — OXYCODONE 5 MG: 5 TABLET ORAL at 15:47

## 2022-12-16 RX ADMIN — ROCURONIUM BROMIDE 10 MG: 10 INJECTION INTRAVENOUS at 13:55

## 2022-12-16 RX ADMIN — Medication 100 MCG: at 12:52

## 2022-12-16 RX ADMIN — SUGAMMADEX 200 MG: 100 INJECTION, SOLUTION INTRAVENOUS at 15:05

## 2022-12-16 RX ADMIN — SODIUM CHLORIDE, POTASSIUM CHLORIDE, SODIUM LACTATE AND CALCIUM CHLORIDE 9 ML/HR: 600; 310; 30; 20 INJECTION, SOLUTION INTRAVENOUS at 10:08

## 2022-12-16 RX ADMIN — ROCURONIUM BROMIDE 10 MG: 10 INJECTION INTRAVENOUS at 14:09

## 2022-12-16 RX ADMIN — PROPOFOL 150 MG: 10 INJECTION, EMULSION INTRAVENOUS at 12:37

## 2022-12-16 RX ADMIN — METOPROLOL TARTRATE 2 MG: 5 INJECTION INTRAVENOUS at 13:16

## 2022-12-16 RX ADMIN — FENTANYL CITRATE 50 MCG: 50 INJECTION, SOLUTION INTRAMUSCULAR; INTRAVENOUS at 15:02

## 2022-12-16 RX ADMIN — CLINDAMYCIN IN 5 PERCENT DEXTROSE 900 MG: 18 INJECTION, SOLUTION INTRAVENOUS at 12:41

## 2022-12-16 RX ADMIN — FENTANYL CITRATE 50 MCG: 50 INJECTION, SOLUTION INTRAMUSCULAR; INTRAVENOUS at 12:37

## 2022-12-16 RX ADMIN — ACETAMINOPHEN 1000 MG: 500 TABLET ORAL at 10:08

## 2022-12-16 RX ADMIN — SODIUM CHLORIDE, POTASSIUM CHLORIDE, SODIUM LACTATE AND CALCIUM CHLORIDE: 600; 310; 30; 20 INJECTION, SOLUTION INTRAVENOUS at 14:56

## 2022-12-16 RX ADMIN — ROCURONIUM BROMIDE 20 MG: 10 INJECTION INTRAVENOUS at 13:34

## 2022-12-16 NOTE — DISCHARGE INSTRUCTIONS
DISCHARGE INSTRUCTIONS LAPAROSCOPIC CHOLECYSTECTOMY/APPENDECTOMY  (GALL BLADDER)      For your surgery you had:  General anesthesia (you may have a sore throat for the first 24 hours)  IV sedation  You may experience dizziness, drowsiness, or light-headedness for several hours following surgery.  Do not stay alone tonight.  Limit your activity for 24 hours.  Resume your diet slowly.  Follow whatever special dietary instructions you may have been given by your doctor.  You should not drive, operate machinery, drink alcohol, or sign legally binding documents for 24 hours or while you are taking pain medication.  Last dose of pain medication was given at:   .    NOTIFY YOUR DOCTOR IF YOU EXPERIENCE ANY OF THE FOLLOWING:  Temperature greater than 101 degrees Fahrenheit  Shaking Chills  Redness or excessive drainage from incision  Nausea, vomiting and/or pain that is not controlled by prescribed medications  Increase in bleeding or bleeding that is excessive  Unable to urinate in 6 hours after surgery  If unable to reach your doctor, please go to the closest Emergency Room You may remove Band-Aid/dressing   Sunday .  You may shower Sunday  .  Apply an ice pack 24-48 hours.  You may experience gas discomfort 24-48 hours after discharge, especially in chest and shoulders.  Changing position frequently may alleviate this discomfort.  If you have excessive pain, swelling, redness, drainage or other problems, notify your physician.  If unable to urinate in 6 to 8 hours after surgery or urinating frequently in small amounts, notify your doctor or go to the nearest Emergency Room.  Medications per physician instructions as indicated on Discharge Medication Information Sheet.  You should see   for follow-up care  on  .  Phone number:      SPECIAL INSTRUCTIONS:                        I have read and received the above instructions.     Patient/Responsible Party's Signature Date/Time     RN Signature Date/Time

## 2022-12-16 NOTE — ANESTHESIA POSTPROCEDURE EVALUATION
Patient: Gifty Hoover    Procedure Summary     Date: 12/16/22 Room / Location: [facility]  /  [facility]    Anesthesia Start: 1231 Anesthesia Stop: 1515    Procedure: laparoscopic cholecystectomy, possible open (Abdomen) Diagnosis:       Calculus of gallbladder without cholecystitis without obstruction      (Calculus of gallbladder without cholecystitis without obstruction [K80.20])    Surgeons: Krystal Dacosta MD Provider: Srikanth Marcus MD    Anesthesia Type: general ASA Status: 4          Anesthesia Type: general    Vitals  Vitals Value Taken Time   /61 12/16/22 1519   Temp 36.3 °C (97.3 °F) 12/16/22 1512   Pulse 76 12/16/22 1518   Resp 17 12/16/22 1512   SpO2 98 % 12/16/22 1518   Vitals shown include unvalidated device data.        Post Anesthesia Care and Evaluation    Patient location during evaluation: bedside  Patient participation: complete - patient participated  Level of consciousness: awake  Pain management: adequate    Airway patency: patent  Anesthetic complications: No anesthetic complications  PONV Status: none  Cardiovascular status: acceptable and stable  Respiratory status: acceptable  Hydration status: acceptable    Comments: An Anesthesiologist personally participated in the most demanding procedures (including induction and emergence if applicable) in the anesthesia plan, monitored the course of anesthesia administration at frequent intervals and remained physically present and available for immediate diagnosis and treatment of emergencies.

## 2022-12-16 NOTE — OP NOTE
CHOLECYSTECTOMY LAPAROSCOPIC  Procedure Report    Patient Name:  Gifty Hoover  YOB: 1955    Date of Surgery:  12/16/2022     Indications:  Biliary colic    Pre-op Diagnosis:   Calculus of gallbladder without cholecystitis without obstruction [K80.20]       Post-Op Diagnosis Codes:     * Calculus of gallbladder without cholecystitis without obstruction [K80.20]    Procedure/CPT® Codes:      Procedure(s):  laparoscopic cholecystectomy    Staff:  Surgeon(s):  Krystal Dacosta MD    Assistant: Favian Moreno    Anesthesia: General    Estimated Blood Loss: 15 cc    Implants:    Implant Name Type Inv. Item Serial No.  Lot No. LRB No. Used Action   CLIPAPPLR M/ ENDO LIGACLIP ROT 10MM MD/LG - [id number] Implant CLIPAPPLR M/ ENDO LIGACLIP ROT 10MM MD/LG  [employer]  DIV OF J AND J [id number] N/A 1 Implanted   RELOAD STPLR ENDOPATH/ETS LNR/CUT THN 45MM WHT - [id number] Implant RELOAD STPLR ENDOPATH/ETS LNR/CUT THN 45MM WHT  [employer]  DIV OF J AND J [id number] N/A 1 Implanted       Specimen:          Specimens     ID Source Type Tests Collected By Collected At Frozen?    A Gallbladder Tissue · TISSUE PATHOLOGY EXAM   Krystal Dacosta MD 12/16/22 1258     Description: Gallbladder and contents     This specimen was not marked as sent.              Findings: none    Complications: none    Description of Procedure:   After informed consent was obtained the patient was taken to the operating room placed supine on the table. The patient was prepped and draped in normal sterile fashion. A veress needle was inserted into the left upper quadrant in standard fashion and the abdomen was insufflated without complication. We began by inserting a 5 mm periumbilical optiview trocar under direct visualization. The abdomen was insufflated, and an epigastric 10/12 mm port as well as 2 RUQ 5 mm ports were placed under direct visualization. The gallbladder was grasped and retracted cephalad  and the omental attachments were bluntly dissected off. The gallbladder had the colon densely adherent to the superior portion of it and it appeared to be trying to fisulize to it... we tried to say as close to the gallbladder as possible.  The gallbladder was full of purulent material.  It contained no bile. Lower down closer to the infundibulum the duodenum was plastered to the gallbladder anteriorly and it appeared that a large 3 cm stone impacted in the neck was trying to fisutlize into the duodenum.  We were able to very carefully dissect the duodenum off and inspect it very closely and did not see any fistulous openings ( the patient had been given IC green and there was no bile leaking.)  The gallbladder was adhered and twisting the common bile duct and cystic duct due to attachments to the liver.  I took those down and then were able to manipulate the gallbladder.  The front of the gallbladder was opened and the stone was removed and placed in a bag to help make it easier to dissect out the cystic duct and artery and to identify the anatomy.  The infundibulum was grasped and retracted laterally. At this point in time I used [place] dissector to dissect out the cystic artery and cystic duct. The critical view was obtained and we were able to see the liver between the cystic duct and cystic artery. IC Green and SPY technology were used to evaluate the cystic and common bile ducts.  Both the cystic duct and cystic artery were both ligated.   The cystic duct was stapled at the junction with the gallbladder to stay as high as possible and the cystic artery was clipped and then ligated. The gallbladder was then grasped and retracted upwards and removed from the gallbladder fossa using electrocautery. It was placed into a bag and removed from the epigastric port along with the stone bag. The trocars were reinserted and the gallbladder fossa was inspected and the bleeding gallbladder fossa was controlled using  electrocautery for hemostasis. The 10/12 mm port site was closed with mersilene and then the ports removed under direct visualization and the abdomen was desufflated. The skin sites were closed using subcuticular Vicryl stitches and local anesthetic was injected. The patient was awakened and taken to the recovery room in good condition. All counts were correct at the end of the case  Assistant: Favian Moreno  was responsible for performing the following activities: Retraction, Suction and Held/Positioned Camera and their skilled assistance was necessary for the success of this case.    Krystal Dacosta MD     Date: 12/16/2022  Time: 16:06 EST

## 2022-12-16 NOTE — ANESTHESIA PREPROCEDURE EVALUATION
Anesthesia Evaluation     Patient summary reviewed and Nursing notes reviewed                Airway   Mallampati: I  TM distance: >3 FB  Neck ROM: full  No difficulty expected  Dental    (+) lower dentures and upper dentures    Pulmonary - normal exam    breath sounds clear to auscultation  (+) asthma,shortness of breath, sleep apnea,   Cardiovascular - normal exam    Rhythm: regular  Rate: normal    (+) hypertension, past MI , CAD, dysrhythmias Paroxysmal Atrial Fib, DVT, hyperlipidemia,       Neuro/Psych  (+) numbness, psychiatric history Anxiety and Depression,    GI/Hepatic/Renal/Endo    (+) morbid obesity, GERD,  renal disease, diabetes mellitus, thyroid problem hypothyroidism    Musculoskeletal     Abdominal    Substance History - negative use     OB/GYN negative ob/gyn ROS         Other   arthritis,                      Anesthesia Plan    ASA 4     general     intravenous induction     Anesthetic plan, risks, benefits, and alternatives have been provided, discussed and informed consent has been obtained with: patient.        CODE STATUS:

## 2022-12-16 NOTE — INTERVAL H&P NOTE
H&P updated. The patient was examined and the following changes are noted:  Risks and benefits discussed with patient and allergies reivewed.

## 2022-12-20 LAB
CYTO UR: NORMAL
LAB AP CASE REPORT: NORMAL
LAB AP CLINICAL INFORMATION: NORMAL
PATH REPORT.FINAL DX SPEC: NORMAL
PATH REPORT.GROSS SPEC: NORMAL

## 2022-12-21 ENCOUNTER — TELEPHONE (OUTPATIENT)
Dept: SURGERY | Facility: CLINIC | Age: 67
End: 2022-12-21

## 2022-12-21 ENCOUNTER — READMISSION MANAGEMENT (OUTPATIENT)
Dept: CALL CENTER | Facility: HOSPITAL | Age: 67
End: 2022-12-21

## 2022-12-21 NOTE — TELEPHONE ENCOUNTER
Per Dr. Dacosta, I called the patient and scheduled an appointment for patient to be seen 12/22/22 at 11:40, at the end of clinic.

## 2022-12-21 NOTE — PROGRESS NOTES
"Chief Complaint  Follow-up    Subjective          History of Present Illness  The patient is here to follow up on lap cholecystectomy.  They are doing well and have no complaints.  Pathology is shown below:    Results for orders placed or performed during the hospital encounter of 12/16/22   POC Glucose Once    Specimen: Blood   Result Value Ref Range    Glucose 163 (H) 70 - 99 mg/dL   Tissue Pathology Exam    Specimen: Gallbladder; Tissue   Result Value Ref Range    Case Report       Surgical Pathology Report                         Case: [id number]                                  Authorizing Provider:  Krystal Dacosta MD    Collected:           12/16/2022 12:58 PM          Ordering Location:     [facility]  Received:            12/19/2022 09:20 AM                                 OR                                                                           Pathologist:           Yoselin Landeros MD                                                     Specimen:    Gallbladder, Gallbladder and contents                                                      Clinical Information       Calculus of gallbladder without cholecystitis without obstruction      Final Diagnosis       Gallbladder, cholecystectomy:    - Chronic cholecystitis    - Cholelithiasis         Gross Description       1. Gallbladder.  The specimen is received in 1 formalin filled container labeled \"gallbladder and contents\" and consists of a 7.0 x 3.0 x 3.0 cm disrupted gallbladder received with a clipped duct margin.  The dusky, fatty serosa is smooth, and the opposing cauterized adventitia is shaggy.  The cystic duct is obstructed by palpable stones.  Opening reveals a 4.0 cm black, granular, ovoid gallstone, tan, velvety, focally hemorrhagic mucosa, and an average wall thickness of 0.2 cm.  No distinct lesions or lymph nodes are identified.  Representative sections are submitted in 1 cassette. PETERSON        Microscopic Description   " "     She has been havin  Objective   Vital Signs:   Resp 17   Ht 152.4 cm (60\")   Wt 96.6 kg (213 lb)   BMI 41.60 kg/m²     Physical Exam  Vitals and nursing note reviewed.   Constitutional:       General: She is not in acute distress.     Appearance: Normal appearance. She is well-developed.   HENT:      Head: Normocephalic and atraumatic.   Eyes:      Extraocular Movements: Extraocular movements intact.      Pupils: Pupils are equal, round, and reactive to light.   Cardiovascular:      Pulses: Normal pulses.   Pulmonary:      Effort: Pulmonary effort is normal. No retractions.      Breath sounds: Normal air entry. No wheezing.   Abdominal:      General: There is no distension.      Palpations: Abdomen is soft.      Tenderness: There is no abdominal tenderness.      Hernia: No hernia is present.   Musculoskeletal:         General: No swelling or deformity.      Cervical back: Neck supple.   Skin:     General: Skin is warm and dry.      Findings: No erythema.      Comments: Surgical Incision Healing Well   Neurological:      General: No focal deficit present.      Mental Status: She is alert and oriented to person, place, and time.      Motor: Motor function is intact.   Psychiatric:         Mood and Affect: Mood normal.         Thought Content: Thought content normal.        Epigastric wound with some serous drainage and that was packed with no surrounding erythema    Result Review :                 Assessment and Plan    Diagnoses and all orders for this visit:    1. Calculus of gallbladder without cholecystitis without obstruction (Primary)    Other orders  -     sulfamethoxazole-trimethoprim (Bactrim DS) 800-160 MG per tablet; Take 1 tablet by mouth 2 (Two) Times a Day for 10 days.  Dispense: 20 tablet; Refill: 0    Followup in 2 weeks  Continue local wound care    Follow Up   Return in about 2 weeks (around 1/5/2023).  Patient was given instructions and counseling regarding her condition or for health " maintenance advice. Please see specific information pulled into the AVS if appropriate.     Answers for HPI/ROS submitted by the patient on 12/21/2022  Please describe your symptoms.: Drainage coming from one of the surgical incision sites and no bowel movement yet since gallbladder removal. Still having some sharp pains also.  Have you had these symptoms before?: No  How long have you been having these symptoms?: 1-4 days  Please list any medications you are currently taking for this condition.: Over counter stool softener. , Drainage just keeping site clean and dry.  Please describe any probable cause for these symptoms. : Gallbladder surgery and pain medication.  What is the primary reason for your visit?: Other

## 2022-12-21 NOTE — OUTREACH NOTE
Medical Week 3 Survey    Flowsheet Row Responses   [facility] patient discharged from? [facility]   Does the patient have one of the following disease processes/diagnoses(primary or secondary)? Other   Week 3 attempt successful? Yes   Call start time 1604   Call end time 1608   Person spoke with today (if not patient) and relationship Aster-daughter.   Meds reviewed with patient/caregiver? Yes   Is the patient having any side effects they believe may be caused by any medication additions or changes? No   Does the patient have all medications ordered at discharge? Yes   Is the patient taking all medications as directed (includes completed medication regime)? Yes   Comments regarding appointments Surgeon appt 12/22/22 post op cholecystectomy.   Does the patient have a primary care provider?  Yes   Does the patient have an appointment with their PCP within 7 days of discharge? Yes   Has the patient kept scheduled appointments due by today? Yes   Has home health visited the patient within 72 hours of discharge? N/A   Psychosocial issues? No   What is the patient's perception of their health status since discharge? Improving   Is the patient/caregiver able to teach back signs and symptoms related to disease process for when to call PCP? Yes   Is the patient/caregiver able to teach back signs and symptoms related to disease process for when to call 911? Yes   Is the patient/caregiver able to teach back the hierarchy of who to call/visit for symptoms/problems? PCP, Specialist, Home health nurse, Urgent Care, ED, 911 Yes   If the patient is a current smoker, are they able to teach back resources for cessation? Not a smoker   Additional teach back comments Daughter states aware of s/s of infection.   Week 3 Call Completed? Yes   Graduated Yes   Is the patient interested in additional calls from an ambulatory ?  NOTE:  applies to high risk patients requiring additional follow-up. No   Wrap up additional  comments Daughter states patient is improving. States will see surgeon tomorrow due to slight leakage from one incision, constipation issues. States patient is otherwise doing well. Denies any needs/concerns. Declined any further f/u calls.          TIRSO HGIGINS - Registered Nurse

## 2022-12-21 NOTE — TELEPHONE ENCOUNTER
Laparoscopic cholecystectomy on 12/16/22.    Patient said that on Friday, her largest incision site started oozing a thin, watery, cloudy brown drainage.  It streamed down her stomach.  It is still oozing, and she has it covered.  It is sore.  Her steri strips are still on.  It is sore.    Also, she hasn't had a bowel movement yet.  She is taking stool softeners, because she is taking the pain medication.

## 2022-12-22 ENCOUNTER — OFFICE VISIT (OUTPATIENT)
Dept: SURGERY | Facility: CLINIC | Age: 67
End: 2022-12-22

## 2022-12-22 VITALS — RESPIRATION RATE: 17 BRPM | WEIGHT: 213 LBS | BODY MASS INDEX: 41.82 KG/M2 | HEIGHT: 60 IN

## 2022-12-22 DIAGNOSIS — K80.20 CALCULUS OF GALLBLADDER WITHOUT CHOLECYSTITIS WITHOUT OBSTRUCTION: Primary | ICD-10-CM

## 2022-12-22 PROCEDURE — 99024 POSTOP FOLLOW-UP VISIT: CPT | Performed by: SURGERY

## 2022-12-22 RX ORDER — SULFAMETHOXAZOLE AND TRIMETHOPRIM 800; 160 MG/1; MG/1
1 TABLET ORAL 2 TIMES DAILY
Qty: 20 TABLET | Refills: 0 | Status: SHIPPED | OUTPATIENT
Start: 2022-12-22 | End: 2023-01-01

## 2022-12-27 NOTE — PROGRESS NOTES
Chief Complaint  Diabetes (Follow up)    Subjective          Gifty Hoover is a 67 y.o. female who presents to [facility]    History of Present Illness    Upper respiratory infection ongoing for 3 to 4 days.  Patient has been taken over-the-counter medications.  With some relief.  Occasional cough now now patient complains mostly of the postnasal drainage.    Hypertension-blood pressure well controlled.    Hyperlipidemia-patient denies any myalgias.    Shortness of breath with exertion-patient reports that she has rarely had to use her albuterol inhaler.  She usually has to use it when she is sweeping the floor or doing such activity.    Diabetes-patient does not always check her sugar at home but most recently it was 89 at home.  Hemoglobin A1c was 7.5 3 months ago.     Review of Systems   Constitutional: Negative for chills, fatigue and fever.   HENT: Positive for postnasal drip.    Respiratory: Positive for cough and shortness of breath.    Cardiovascular: Negative for chest pain and palpitations.   Gastrointestinal: Negative for constipation, diarrhea, nausea and vomiting.   Musculoskeletal: Negative for back pain and neck pain.   Skin: Negative for rash.   Neurological: Negative for dizziness and headaches.          Medical History: has a past medical history of Anemia (2022), Anxiety, Arthritis, Asthma (2020), Bladder problem, CAD (09/14/2021), Cholelithiasis (2022), Chronic kidney disease (2022), Colon polyp (2008), Deep vein thrombosis (HCC) (2011), Depression, Diabetes mellitus (HCC), Elevated cholesterol, Essential hypertension (09/14/2021), GERD (gastroesophageal reflux disease), Heart attack (2009), History of pulmonary embolism, Lumbar radiculopathy (09/2018), Paroxysmal atrial fibrillation (09/14/2021), Seasonal allergies, Sleep apnea, SOB (shortness of breath) on exertion, and Thyroid disorder.     Surgical History: has a past surgical history that includes Bladder  surgery ();  section (); Colonoscopy (); Bebo Filter Insertion Jugular (); Hysterectomy (); Cardiac catheterization (); Esophagogastroduodenoscopy (N/A, 2022); Colonoscopy (N/A, 2022); ERCP (N/A, 2022); Breast biopsy (); Cardiac surgery; Cholecystectomy (N/A, 2022); and Breast surgery.     Family History: family history includes Arthritis in her father and mother; Bleeding Disorder in her daughter; Cancer in her mother and sister; Colon cancer (age of onset: 71) in her mother; Colon polyps in her mother; Diabetes in her brother, brother, and mother; Hypertension in her brother and mother; Inflammatory bowel disease in her father; Kidney disease in her mother; Miscarriages / Stillbirths in her mother; Osteoporosis in her father; Stroke in her brother, father, and mother.     Social History: reports that she has never smoked. She has never used smokeless tobacco. She reports that she does not drink alcohol and does not use drugs.    Allergies: Patient has no known allergies.      Health Maintenance Due   Topic Date Due   • TDAP/TD VACCINES (1 - Tdap) Never done   • ZOSTER VACCINE (1 of 2) Never done   • COVID-19 Vaccine (3 - Booster for Pfizer series) 2021   • DIABETIC FOOT EXAM  Never done   • DIABETIC EYE EXAM  Never done            Current Outpatient Medications:   •  albuterol sulfate HFA (ProAir HFA) 108 (90 Base) MCG/ACT inhaler, Inhale 2 puffs Every 6 (Six) Hours As Needed for Wheezing., Disp: 18 g, Rfl: 0  •  aspirin 81 MG EC tablet, Take 81 mg by mouth Daily. LAST DOSE 22, Disp: , Rfl:   •  Calcium Carb-Cholecalciferol (Calcium-Vitamin D) 500-400 MG-UNIT tablet, Take 1 tablet by mouth 2 (Two) Times a Day. (Patient taking differently: Take 1 tablet by mouth 2 (Two) Times a Day. LAST DOSE 22), Disp: 180 tablet, Rfl: 1  •  ezetimibe (ZETIA) 10 MG tablet, Take 1 tablet by mouth Daily., Disp: 90 tablet, Rfl: 3  •  famotidine  (Pepcid) 40 MG tablet, Take 1 tablet by mouth every night at bedtime., Disp: 90 tablet, Rfl: 1  •  Ferrous Fumarate 324 (106 Fe) MG tablet, Take 1 tablet by mouth Every Other Day. LAST DOSE 12/15/22, Disp: , Rfl:   •  levothyroxine (SYNTHROID, LEVOTHROID) 88 MCG tablet, Take 1 tablet by mouth Daily., Disp: 90 tablet, Rfl: 1  •  metFORMIN (GLUCOPHAGE) 500 MG tablet, Take 500 mg by mouth 2 (Two) Times a Day With Meals. INST PER ANESTHESIA PROTOCOL, Disp: , Rfl:   •  omeprazole (priLOSEC) 40 MG capsule, Take 1 capsule by mouth Daily., Disp: 90 capsule, Rfl: 1  •  Pradaxa 150 MG capsu, Take 1 capsule by mouth 2 (Two) Times a Day., Disp: 180 capsule, Rfl: 1  •  rosuvastatin (CRESTOR) 40 MG tablet, Take 1 tablet by mouth Every Night., Disp: 90 tablet, Rfl: 1  •  telmisartan (MICARDIS) 80 MG tablet, Take 1 tablet by mouth Every Night., Disp: 90 tablet, Rfl: 1      Immunization History   Administered Date(s) Administered   • COVID-19 (PFIZER) PURPLE CAP 04/03/2021, 04/26/2021   • Flu Vaccine Split Quad 11/06/2019   • FluLaval/Fluzone >6mos 11/06/2019   • Fluad Quad 65+ 10/01/2021   • Fluzone High-Dose 65+yrs 12/07/2022   • Influenza, Unspecified 10/01/2021   • Pneumococcal Conjugate 13-Valent (PCV13) 06/30/2022   • Pneumococcal Conjugate 20-Valent (PCV20) 06/30/2022   • Pneumococcal Polysaccharide (PPSV23) 10/13/2017         Objective       Vitals:    01/09/23 0815   BP: 120/59   Pulse: 94   Temp: 98.4 °F (36.9 °C)   TempSrc: Temporal   SpO2: 97%   Weight: 97.2 kg (214 lb 3.2 oz)      Body mass index is 41.83 kg/m².   Wt Readings from Last 3 Encounters:   01/09/23 97.2 kg (214 lb 3.2 oz)   01/05/23 96.2 kg (212 lb)   12/22/22 96.6 kg (213 lb)      BP Readings from Last 3 Encounters:   01/09/23 120/59   12/16/22 138/77   12/07/22 131/72        Physical Exam  Vitals reviewed.   Constitutional:       Appearance: Normal appearance. She is well-developed.   HENT:      Head: Normocephalic and atraumatic.   Eyes:       Conjunctiva/sclera: Conjunctivae normal.      Pupils: Pupils are equal, round, and reactive to light.   Cardiovascular:      Rate and Rhythm: Normal rate and regular rhythm.      Heart sounds: Normal heart sounds. No murmur heard.  Pulmonary:      Effort: Pulmonary effort is normal.      Breath sounds: Normal breath sounds. No wheezing or rhonchi.   Abdominal:      General: Bowel sounds are normal. There is no distension.      Palpations: Abdomen is soft.      Tenderness: There is no abdominal tenderness.   Skin:     General: Skin is warm and dry.   Neurological:      Mental Status: She is alert and oriented to person, place, and time.   Psychiatric:         Mood and Affect: Mood and affect normal.         Behavior: Behavior normal.         Thought Content: Thought content normal.         Judgment: Judgment normal.             Result Review :       Common labs    Common Labs 11/25/22 11/25/22 11/26/22 11/26/22 11/29/22 11/29/22    0359 0359 0434 0434 1215 1215   Glucose  79  97  96   BUN  15  12  12   Creatinine  1.07 (A)  1.08 (A)  0.86   Sodium  141  142  142   Potassium  4.1  4.2  3.7   Chloride  109 (A)  109 (A)  107   Calcium  8.6  8.9  9.3   Albumin  3.10 (A)  3.20 (A)  3.60   Total Bilirubin  0.5  0.5  0.3   Alkaline Phosphatase  145 (A)  128 (A)  121 (A)   AST (SGOT)  74 (A)  39 (A)  22   ALT (SGPT)  94 (A)  68 (A)  31   WBC 3.64  3.23 (A)  4.42    Hemoglobin 7.6 (A)  7.8 (A)  9.9 (A)    Hematocrit 25.1 (A)  25.1 (A)  32.4 (A)    Platelets 158  184  248    (A) Abnormal value                             Assessment and Plan        Diagnoses and all orders for this visit:    1. Type 2 diabetes mellitus with hyperglycemia, without long-term current use of insulin (HCC) (Primary)  -     Comprehensive Metabolic Panel  -     Lipid Panel  -     Hemoglobin A1c    2. Acquired hypothyroidism  -     levothyroxine (SYNTHROID, LEVOTHROID) 88 MCG tablet; Take 1 tablet by mouth Daily.  Dispense: 90 tablet; Refill: 1    3.  Gastroesophageal reflux disease without esophagitis  -     omeprazole (priLOSEC) 40 MG capsule; Take 1 capsule by mouth Daily.  Dispense: 90 capsule; Refill: 1    4. Paroxysmal atrial fibrillation  -     Pradaxa 150 MG capsu; Take 1 capsule by mouth 2 (Two) Times a Day.  Dispense: 180 capsule; Refill: 1    5. Hyperlipidemia LDL goal <70  -     rosuvastatin (CRESTOR) 40 MG tablet; Take 1 tablet by mouth Every Night.  Dispense: 90 tablet; Refill: 1    6. Essential hypertension  -     telmisartan (MICARDIS) 80 MG tablet; Take 1 tablet by mouth Every Night.  Dispense: 90 tablet; Refill: 1    7. SOB (shortness of breath) on exertion  -     albuterol sulfate HFA (ProAir HFA) 108 (90 Base) MCG/ACT inhaler; Inhale 2 puffs Every 6 (Six) Hours As Needed for Wheezing.  Dispense: 18 g; Refill: 0          Follow Up     Return in about 6 months (around 7/9/2023) for Next scheduled follow up.    Patient was given instructions and counseling regarding her condition or for health maintenance advice. Please see specific information pulled into the AVS if appropriate.     TIEN Perez

## 2022-12-28 ENCOUNTER — TELEPHONE (OUTPATIENT)
Dept: SURGERY | Facility: CLINIC | Age: 67
End: 2022-12-28

## 2022-12-28 NOTE — TELEPHONE ENCOUNTER
"Patient had lap riley on 12/16.      Her daughter, Aster, said the wound had drainage, so she brought her back in on 12/22/22, and Krystal showed her how to pack the wound.    Daughter has been packing the wound daily at home, and the wound looks good, but now when she pushes around the wound to get drainage out, the drainage is red.  She is only to get about 2\" of packing into the wound, and then it hurts too bad for her to put more in.    She said the patient called and spoke to someone at the office regarding this yesterday.    Please call daughter at [phone].  "

## 2023-01-04 NOTE — PROGRESS NOTES
Chief Complaint  Follow-up    Subjective          History of Present Illness  The patient is here to follow up on lap cholecystectomy.  They are doing well and have no complaints.  Pathology is shown below:    Results for orders placed or performed during the hospital encounter of 12/16/22   POC Glucose Once    Specimen: Blood   Result Value Ref Range    Glucose 163 (H) 70 - 99 mg/dL   Tissue Pathology Exam    Specimen: Gallbladder; Tissue   Result Value Ref Range    Case Report       Surgical Pathology Report                         Case: [id number]                                  Authorizing Provider:  rKystal Dacosta MD    Collected:           12/16/2022 12:58 PM          Ordering Location:     [facility]  Received:            12/19/2022 09:20 AM                                 OR                                                                           Pathologist:           Yoselin Landeros MD                                                     Specimen:    Gallbladder, Gallbladder and contents                                                      Clinical Information       Calculus of gallbladder without cholecystitis without obstruction      Final Diagnosis       Gallbladder, cholecystectomy:    - Chronic cholecystitis    - Cholelithiasis         Gross Description       1. Gallbladder.  The specimen is received in 1 formalin filled container labeled \"gallbladder and contents\" and consists of a 7.0 x 3.0 x 3.0 cm disrupted gallbladder received with a clipped duct margin.  The dusky, fatty serosa is smooth, and the opposing cauterized adventitia is shaggy.  The cystic duct is obstructed by palpable stones.  Opening reveals a 4.0 cm black, granular, ovoid gallstone, tan, velvety, focally hemorrhagic mucosa, and an average wall thickness of 0.2 cm.  No distinct lesions or lymph nodes are identified.  Representative sections are submitted in 1 cassette. PETERSON        Microscopic Description         She has been havin  Objective   Vital Signs:   Resp 17   Ht 152.4 cm (60\")   Wt 96.2 kg (212 lb)   BMI 41.40 kg/m²     Physical Exam  Vitals and nursing note reviewed.   Constitutional:       General: She is not in acute distress.     Appearance: Normal appearance. She is well-developed.   HENT:      Head: Normocephalic and atraumatic.   Eyes:      Extraocular Movements: Extraocular movements intact.      Pupils: Pupils are equal, round, and reactive to light.   Cardiovascular:      Pulses: Normal pulses.   Pulmonary:      Effort: Pulmonary effort is normal. No retractions.      Breath sounds: Normal air entry. No wheezing.   Abdominal:      General: There is no distension.      Palpations: Abdomen is soft.      Tenderness: There is no abdominal tenderness.      Hernia: No hernia is present.   Musculoskeletal:         General: No swelling or deformity.      Cervical back: Neck supple.   Skin:     General: Skin is warm and dry.      Findings: No erythema.      Comments: Surgical Incision Healing Well   Neurological:      General: No focal deficit present.      Mental Status: She is alert and oriented to person, place, and time.      Motor: Motor function is intact.   Psychiatric:         Mood and Affect: Mood normal.         Thought Content: Thought content normal.        Epigastric wound completely healed              Assessment and Plan    Diagnoses and all orders for this visit:    1. Calculus of gallbladder without cholecystitis without obstruction (Primary)    Followup prn      Follow Up   Return if symptoms worsen or fail to improve.  Patient was given instructions and counseling regarding her condition or for health maintenance advice. Please see specific information pulled into the AVS if appropriate.     Answers for HPI/ROS submitted by the patient on 12/21/2022  Please describe your symptoms.: Drainage coming from one of the surgical incision sites and no bowel movement yet since gallbladder  removal. Still having some sharp pains also.  Have you had these symptoms before?: No  How long have you been having these symptoms?: 1-4 days  Please list any medications you are currently taking for this condition.: Over counter stool softener. , Drainage just keeping site clean and dry.  Please describe any probable cause for these symptoms. : Gallbladder surgery and pain medication.  What is the primary reason for your visit?: Other

## 2023-01-05 ENCOUNTER — OFFICE VISIT (OUTPATIENT)
Dept: SURGERY | Facility: CLINIC | Age: 68
End: 2023-01-05
Payer: MEDICARE

## 2023-01-05 VITALS — RESPIRATION RATE: 17 BRPM | BODY MASS INDEX: 41.62 KG/M2 | HEIGHT: 60 IN | WEIGHT: 212 LBS

## 2023-01-05 DIAGNOSIS — K80.20 CALCULUS OF GALLBLADDER WITHOUT CHOLECYSTITIS WITHOUT OBSTRUCTION: Primary | ICD-10-CM

## 2023-01-05 PROCEDURE — 99024 POSTOP FOLLOW-UP VISIT: CPT | Performed by: SURGERY

## 2023-01-09 ENCOUNTER — OFFICE VISIT (OUTPATIENT)
Dept: FAMILY MEDICINE CLINIC | Facility: CLINIC | Age: 68
End: 2023-01-09
Payer: MEDICARE

## 2023-01-09 VITALS
HEART RATE: 94 BPM | BODY MASS INDEX: 41.83 KG/M2 | WEIGHT: 214.2 LBS | DIASTOLIC BLOOD PRESSURE: 59 MMHG | TEMPERATURE: 98.4 F | OXYGEN SATURATION: 97 % | SYSTOLIC BLOOD PRESSURE: 120 MMHG

## 2023-01-09 DIAGNOSIS — R06.02 SOB (SHORTNESS OF BREATH) ON EXERTION: ICD-10-CM

## 2023-01-09 DIAGNOSIS — I48.0 PAROXYSMAL ATRIAL FIBRILLATION: ICD-10-CM

## 2023-01-09 DIAGNOSIS — E11.65 TYPE 2 DIABETES MELLITUS WITH HYPERGLYCEMIA, WITHOUT LONG-TERM CURRENT USE OF INSULIN: Primary | ICD-10-CM

## 2023-01-09 DIAGNOSIS — E78.5 HYPERLIPIDEMIA LDL GOAL <70: ICD-10-CM

## 2023-01-09 DIAGNOSIS — K21.9 GASTROESOPHAGEAL REFLUX DISEASE WITHOUT ESOPHAGITIS: ICD-10-CM

## 2023-01-09 DIAGNOSIS — I10 ESSENTIAL HYPERTENSION: ICD-10-CM

## 2023-01-09 DIAGNOSIS — E03.9 ACQUIRED HYPOTHYROIDISM: ICD-10-CM

## 2023-01-09 LAB
ALBUMIN SERPL-MCNC: 3.6 G/DL (ref 3.5–5.2)
ALBUMIN/GLOB SERPL: 1.2 G/DL
ALP SERPL-CCNC: 90 U/L (ref 39–117)
ALT SERPL W P-5'-P-CCNC: 12 U/L (ref 1–33)
ANION GAP SERPL CALCULATED.3IONS-SCNC: 9.3 MMOL/L (ref 5–15)
AST SERPL-CCNC: 19 U/L (ref 1–32)
BILIRUB SERPL-MCNC: 0.2 MG/DL (ref 0–1.2)
BUN SERPL-MCNC: 26 MG/DL (ref 8–23)
BUN/CREAT SERPL: 28.3 (ref 7–25)
CALCIUM SPEC-SCNC: 9.8 MG/DL (ref 8.6–10.5)
CHLORIDE SERPL-SCNC: 107 MMOL/L (ref 98–107)
CHOLEST SERPL-MCNC: 111 MG/DL (ref 0–200)
CO2 SERPL-SCNC: 26.7 MMOL/L (ref 22–29)
CREAT SERPL-MCNC: 0.92 MG/DL (ref 0.57–1)
EGFRCR SERPLBLD CKD-EPI 2021: 68.4 ML/MIN/1.73
GLOBULIN UR ELPH-MCNC: 3.1 GM/DL
GLUCOSE SERPL-MCNC: 122 MG/DL (ref 65–99)
HBA1C MFR BLD: 6.3 % (ref 4.8–5.6)
HDLC SERPL-MCNC: 55 MG/DL (ref 40–60)
LDLC SERPL CALC-MCNC: 41 MG/DL (ref 0–100)
LDLC/HDLC SERPL: 0.76 {RATIO}
POTASSIUM SERPL-SCNC: 4.7 MMOL/L (ref 3.5–5.2)
PROT SERPL-MCNC: 6.7 G/DL (ref 6–8.5)
SODIUM SERPL-SCNC: 143 MMOL/L (ref 136–145)
TRIGL SERPL-MCNC: 70 MG/DL (ref 0–150)
VLDLC SERPL-MCNC: 15 MG/DL (ref 5–40)

## 2023-01-09 PROCEDURE — 83036 HEMOGLOBIN GLYCOSYLATED A1C: CPT | Performed by: NURSE PRACTITIONER

## 2023-01-09 PROCEDURE — 80061 LIPID PANEL: CPT | Performed by: NURSE PRACTITIONER

## 2023-01-09 PROCEDURE — 80053 COMPREHEN METABOLIC PANEL: CPT | Performed by: NURSE PRACTITIONER

## 2023-01-09 PROCEDURE — 99214 OFFICE O/P EST MOD 30 MIN: CPT | Performed by: NURSE PRACTITIONER

## 2023-01-09 RX ORDER — LEVOTHYROXINE SODIUM 88 UG/1
88 TABLET ORAL DAILY
Qty: 90 TABLET | Refills: 1 | Status: SHIPPED | OUTPATIENT
Start: 2023-01-09

## 2023-01-09 RX ORDER — ROSUVASTATIN CALCIUM 40 MG/1
40 TABLET, COATED ORAL NIGHTLY
Qty: 90 TABLET | Refills: 1 | Status: SHIPPED | OUTPATIENT
Start: 2023-01-09

## 2023-01-09 RX ORDER — OMEPRAZOLE 40 MG/1
40 CAPSULE, DELAYED RELEASE ORAL DAILY
Qty: 90 CAPSULE | Refills: 1 | Status: SHIPPED | OUTPATIENT
Start: 2023-01-09

## 2023-01-09 RX ORDER — TELMISARTAN 80 MG/1
80 TABLET ORAL NIGHTLY
Qty: 90 TABLET | Refills: 1 | Status: SHIPPED | OUTPATIENT
Start: 2023-01-09

## 2023-01-09 RX ORDER — ATENOLOL 100 MG/1
150 TABLET ORAL 2 TIMES DAILY
Qty: 180 CAPSULE | Refills: 1 | Status: SHIPPED | OUTPATIENT
Start: 2023-01-09

## 2023-01-09 RX ORDER — ALBUTEROL SULFATE 90 UG/1
2 AEROSOL, METERED RESPIRATORY (INHALATION) EVERY 6 HOURS PRN
Qty: 18 G | Refills: 0 | Status: SHIPPED | OUTPATIENT
Start: 2023-01-09

## 2023-01-09 NOTE — PATIENT INSTRUCTIONS
Diabetes Mellitus and Exercise  Exercising regularly is important for overall health, especially for people who have diabetes mellitus. Exercising is not only about losing weight. It has many other health benefits, such as increasing muscle strength and bone density and reducing body fat and stress. This leads to improved fitness, flexibility, and endurance, all of which result in better overall health.  What are the benefits of exercise if I have diabetes?  Exercise has many benefits for people with diabetes. They include:  Helping to lower and control blood sugar (glucose).  Helping the body to respond better to the hormone insulin by improving insulin sensitivity.  Reducing how much insulin the body needs.  Lowering the risk for heart disease by:  Lowering \"bad\" cholesterol and triglyceride levels.  Increasing \"good\" cholesterol levels.  Lowering blood pressure.  Lowering blood glucose levels.  What is my activity plan?  Your health care provider or certified diabetes educator can help you make a plan for the type and frequency of exercise that works for you. This is called your activity plan. Be sure to:  Get at least 150 minutes of medium-intensity or high-intensity exercise each week. Exercises may include brisk walking, biking, or water aerobics.  Do stretching and strengthening exercises, such as yoga or weight lifting, at least 2 times a week.  Spread out your activity over at least 3 days of the week.  Get some form of physical activity each day.  Do not go more than 2 days in a row without some kind of physical activity.  Avoid being inactive for more than 90 minutes at a time. Take frequent breaks to walk or stretch.  Choose exercises or activities that you enjoy. Set realistic goals.  Start slowly and gradually increase your exercise intensity over time.  How do I manage my diabetes during exercise?  Monitor your blood glucose  Check your blood glucose before and after exercising. If your blood glucose  is:  240 mg/dL (13.3 mmol/L) or higher before you exercise, check your urine for ketones. These are chemicals created by the liver. If you have ketones in your urine, do not exercise until your blood glucose returns to normal.  100 mg/dL (5.6 mmol/L) or lower, eat a snack containing 15-20 grams of carbohydrate. Check your blood glucose 15 minutes after the snack to make sure that your glucose level is above 100 mg/dL (5.6 mmol/L) before you start your exercise.  Know the symptoms of low blood glucose (hypoglycemia) and how to treat it. Your risk for hypoglycemia increases during and after exercise.  Follow these tips and your health care provider's instructions  Keep a carbohydrate snack that is fast-acting for use before, during, and after exercise to help prevent or treat hypoglycemia.  Avoid injecting insulin into areas of the body that are going to be exercised. For example, avoid injecting insulin into:  Your arms, when you are about to play tennis.  Your legs, when you are about to go jogging.  Keep records of your exercise habits. Doing this can help you and your health care provider adjust your diabetes management plan as needed. Write down:  Food that you eat before and after you exercise.  Blood glucose levels before and after you exercise.  The type and amount of exercise you have done.  Work with your health care provider when you start a new exercise or activity. He or she may need to:  Make sure that the activity is safe for you.  Adjust your insulin, other medicines, and food that you eat.  Drink plenty of water while you exercise. This prevents loss of water (dehydration) and problems caused by a lot of heat in the body (heat stroke).  Where to find more information  American Diabetes Association: www.diabetes.org  Summary  Exercising regularly is important for overall health, especially for people who have diabetes mellitus.  Exercising has many health benefits. It increases muscle strength and bone  density and reduces body fat and stress. It also lowers and controls blood glucose.  Your health care provider or certified diabetes educator can help you make an activity plan for the type and frequency of exercise that works for you.  Work with your health care provider to make sure any new activity is safe for you. Also work with your health care provider to adjust your insulin, other medicines, and the food you eat.  This information is not intended to replace advice given to you by your health care provider. Make sure you discuss any questions you have with your health care provider.  Document Revised: 09/14/2020 Document Reviewed: 09/14/2020  [employer] Patient Education © 2022 [employer]  Diabetes Mellitus and Nutrition, Adult  When you have diabetes, or diabetes mellitus, it is very important to have healthy eating habits because your blood sugar (glucose) levels are greatly affected by what you eat and drink. Eating healthy foods in the right amounts, at about the same times every day, can help you:  Manage your blood glucose.  Lower your risk of heart disease.  Improve your blood pressure.  Reach or maintain a healthy weight.  What can affect my meal plan?  Every person with diabetes is different, and each person has different needs for a meal plan. Your health care provider may recommend that you work with a dietitian to make a meal plan that is best for you. Your meal plan may vary depending on factors such as:  The calories you need.  The medicines you take.  Your weight.  Your blood glucose, blood pressure, and cholesterol levels.  Your activity level.  Other health conditions you have, such as heart or kidney disease.  How do carbohydrates affect me?  Carbohydrates, also called carbs, affect your blood glucose level more than any other type of food. Eating carbs raises the amount of glucose in your blood.  It is important to know how many carbs you can safely have in each meal. This is different for  every person. Your dietitian can help you calculate how many carbs you should have at each meal and for each snack.  How does alcohol affect me?  Alcohol can cause a decrease in blood glucose (hypoglycemia), especially if you use insulin or take certain diabetes medicines by mouth. Hypoglycemia can be a life-threatening condition. Symptoms of hypoglycemia, such as sleepiness, dizziness, and confusion, are similar to symptoms of having too much alcohol.  Do not drink alcohol if:  Your health care provider tells you not to drink.  You are pregnant, may be pregnant, or are planning to become pregnant.  If you drink alcohol:  Limit how much you have to:  0-1 drink a day for women.  0-2 drinks a day for men.  Know how much alcohol is in your drink. In the [place] one drink equals one 12 oz bottle of beer (355 mL), one 5 oz glass of wine (148 mL), or one 1½ oz glass of hard liquor (44 mL).  Keep yourself hydrated with water, diet soda, or unsweetened iced tea. Keep in mind that regular soda, juice, and other mixers may contain a lot of sugar and must be counted as carbs.  What are tips for following this plan?  Reading food labels  Start by checking the serving size on the Nutrition Facts label of packaged foods and drinks. The number of calories and the amount of carbs, fats, and other nutrients listed on the label are based on one serving of the item. Many items contain more than one serving per package.  Check the total grams (g) of carbs in one serving.  Check the number of grams of saturated fats and trans fats in one serving. Choose foods that have a low amount or none of these fats.  Check the number of milligrams (mg) of salt (sodium) in one serving. Most people should limit total sodium intake to less than 2,300 mg per day.  Always check the nutrition information of foods labeled as \"low-fat\" or \"nonfat.\" These foods may be higher in added sugar or refined carbs and should be avoided.  Talk to your dietitian to  identify your daily goals for nutrients listed on the label.  Shopping  Avoid buying canned, pre-made, or processed foods. These foods tend to be high in fat, sodium, and added sugar.  Shop around the outside edge of the grocery store. This is where you will most often find fresh fruits and vegetables, bulk grains, fresh meats, and fresh dairy products.  Cooking  Use low-heat cooking methods, such as baking, instead of high-heat cooking methods, such as deep frying.  Cook using healthy oils, such as olive, canola, or sunflower oil.  Avoid cooking with butter, cream, or high-fat meats.  Meal planning  Eat meals and snacks regularly, preferably at the same times every day. Avoid going long periods of time without eating.  Eat foods that are high in fiber, such as fresh fruits, vegetables, beans, and whole grains.  Eat 4-6 oz (112-168 g) of lean protein each day, such as lean meat, chicken, fish, eggs, or tofu. One ounce (oz) (28 g) of lean protein is equal to:  1 oz (28 g) of meat, chicken, or fish.  1 egg.  ¼ cup (62 g) of tofu.  Eat some foods each day that contain healthy fats, such as avocado, nuts, seeds, and fish.  What foods should I eat?  Fruits  Berries. Apples. Oranges. Peaches. Apricots. Plums. Grapes. Mangoes. Papayas. Pomegranates. Kiwi. Cherries.  Vegetables  Leafy greens, including lettuce, spinach, kale, chard, anita greens, mustard greens, and cabbage. Beets. Cauliflower. Broccoli. Carrots. Green beans. Tomatoes. Peppers. Onions. Cucumbers. [place] sprouts.  Grains  Whole grains, such as whole-wheat or whole-grain bread, crackers, tortillas, cereal, and pasta. Unsweetened oatmeal. Quinoa. Brown or wild rice.  Meats and other proteins  Seafood. Poultry without skin. Lean cuts of poultry and beef. Tofu. Nuts. Seeds.  Dairy  Low-fat or fat-free dairy products such as milk, yogurt, and cheese.  The items listed above may not be a complete list of foods and beverages you can eat and drink. Contact a  dietitian for more information.  What foods should I avoid?  Fruits  Fruits canned with syrup.  Vegetables  Canned vegetables. Frozen vegetables with butter or cream sauce.  Grains  Refined white flour and flour products such as bread, pasta, snack foods, and cereals. Avoid all processed foods.  Meats and other proteins  Fatty cuts of meat. Poultry with skin. Breaded or fried meats. Processed meat. Avoid saturated fats.  Dairy  Full-fat yogurt, cheese, or milk.  Beverages  Sweetened drinks, such as soda or iced tea.  The items listed above may not be a complete list of foods and beverages you should avoid. Contact a dietitian for more information.  Questions to ask a health care provider  Do I need to meet with a certified diabetes care and ?  Do I need to meet with a dietitian?  What number can I call if I have questions?  When are the best times to check my blood glucose?  Where to find more information:  American Diabetes Association: diabetes.org  Academy of Nutrition and Dietetics: eatright.org  National [place] of Diabetes and Digestive and Kidney Diseases: niddk.nih.gov  Association of Diabetes Care & Education Specialists: diabeteseducator.org  Summary  It is important to have healthy eating habits because your blood sugar (glucose) levels are greatly affected by what you eat and drink. It is important to use alcohol carefully.  A healthy meal plan will help you manage your blood glucose and lower your risk of heart disease.  Your health care provider may recommend that you work with a dietitian to make a meal plan that is best for you.  This information is not intended to replace advice given to you by your health care provider. Make sure you discuss any questions you have with your health care provider.  Document Revised: 07/21/2021 Document Reviewed: 07/21/2021  [employer] Patient Education © 2022 [employer]

## 2023-01-12 ENCOUNTER — HOSPITAL ENCOUNTER (OUTPATIENT)
Facility: HOSPITAL | Age: 68
Setting detail: HOSPITAL OUTPATIENT SURGERY
Discharge: HOME | End: 2023-01-12
Attending: INTERNAL MEDICINE | Admitting: INTERNAL MEDICINE
Payer: MEDICARE

## 2023-01-12 ENCOUNTER — ANESTHESIA EVENT (OUTPATIENT)
Dept: GASTROENTEROLOGY | Facility: HOSPITAL | Age: 68
End: 2023-01-12
Payer: MEDICARE

## 2023-01-12 ENCOUNTER — ANESTHESIA (OUTPATIENT)
Dept: GASTROENTEROLOGY | Facility: HOSPITAL | Age: 68
End: 2023-01-12
Payer: MEDICARE

## 2023-01-12 ENCOUNTER — APPOINTMENT (OUTPATIENT)
Dept: GENERAL RADIOLOGY | Facility: HOSPITAL | Age: 68
End: 2023-01-12
Payer: MEDICARE

## 2023-01-12 VITALS
OXYGEN SATURATION: 95 % | HEIGHT: 60 IN | SYSTOLIC BLOOD PRESSURE: 136 MMHG | RESPIRATION RATE: 19 BRPM | DIASTOLIC BLOOD PRESSURE: 58 MMHG | TEMPERATURE: 97.8 F | BODY MASS INDEX: 41.38 KG/M2 | WEIGHT: 210.76 LBS | HEART RATE: 95 BPM

## 2023-01-12 DIAGNOSIS — Z46.89 ENCOUNTER FOR REMOVAL OF BILIARY STENT: ICD-10-CM

## 2023-01-12 LAB — GLUCOSE BLDC GLUCOMTR-MCNC: 129 MG/DL (ref 70–99)

## 2023-01-12 PROCEDURE — C1769 GUIDE WIRE: HCPCS | Performed by: INTERNAL MEDICINE

## 2023-01-12 PROCEDURE — 74330 X-RAY BILE/PANC ENDOSCOPY: CPT

## 2023-01-12 PROCEDURE — 25010000002 PROPOFOL 10 MG/ML EMULSION: Performed by: NURSE ANESTHETIST, CERTIFIED REGISTERED

## 2023-01-12 PROCEDURE — 43273 ENDOSCOPIC PANCREATOSCOPY: CPT | Performed by: INTERNAL MEDICINE

## 2023-01-12 PROCEDURE — 43275 ERCP REMOVE FORGN BODY DUCT: CPT | Performed by: INTERNAL MEDICINE

## 2023-01-12 PROCEDURE — 43264 ERCP REMOVE DUCT CALCULI: CPT | Performed by: INTERNAL MEDICINE

## 2023-01-12 PROCEDURE — 25010000002 ONDANSETRON PER 1 MG: Performed by: ANESTHESIOLOGY

## 2023-01-12 PROCEDURE — 82962 GLUCOSE BLOOD TEST: CPT

## 2023-01-12 PROCEDURE — C1726 CATH, BAL DIL, NON-VASCULAR: HCPCS | Performed by: INTERNAL MEDICINE

## 2023-01-12 RX ORDER — PROPOFOL 10 MG/ML
VIAL (ML) INTRAVENOUS AS NEEDED
Status: DISCONTINUED | OUTPATIENT
Start: 2023-01-12 | End: 2023-01-12 | Stop reason: SURG

## 2023-01-12 RX ORDER — PHENYLEPHRINE HCL IN 0.9% NACL 1 MG/10 ML
SYRINGE (ML) INTRAVENOUS AS NEEDED
Status: DISCONTINUED | OUTPATIENT
Start: 2023-01-12 | End: 2023-01-12 | Stop reason: SURG

## 2023-01-12 RX ORDER — ONDANSETRON 2 MG/ML
4 INJECTION INTRAMUSCULAR; INTRAVENOUS ONCE
Status: COMPLETED | OUTPATIENT
Start: 2023-01-12 | End: 2023-01-12

## 2023-01-12 RX ORDER — SODIUM CHLORIDE, SODIUM LACTATE, POTASSIUM CHLORIDE, CALCIUM CHLORIDE 600; 310; 30; 20 MG/100ML; MG/100ML; MG/100ML; MG/100ML
30 INJECTION, SOLUTION INTRAVENOUS CONTINUOUS
Status: DISCONTINUED | OUTPATIENT
Start: 2023-01-12 | End: 2023-01-12 | Stop reason: HOSPADM

## 2023-01-12 RX ORDER — LIDOCAINE HYDROCHLORIDE 20 MG/ML
INJECTION, SOLUTION EPIDURAL; INFILTRATION; INTRACAUDAL; PERINEURAL AS NEEDED
Status: DISCONTINUED | OUTPATIENT
Start: 2023-01-12 | End: 2023-01-12 | Stop reason: SURG

## 2023-01-12 RX ADMIN — LIDOCAINE HYDROCHLORIDE 100 MG: 20 INJECTION, SOLUTION EPIDURAL; INFILTRATION; INTRACAUDAL; PERINEURAL at 08:14

## 2023-01-12 RX ADMIN — Medication 100 MCG: at 08:25

## 2023-01-12 RX ADMIN — SODIUM CHLORIDE, POTASSIUM CHLORIDE, SODIUM LACTATE AND CALCIUM CHLORIDE 30 ML/HR: 600; 310; 30; 20 INJECTION, SOLUTION INTRAVENOUS at 07:41

## 2023-01-12 RX ADMIN — PROPOFOL 200 MCG/KG/MIN: 10 INJECTION, EMULSION INTRAVENOUS at 08:14

## 2023-01-12 RX ADMIN — PROPOFOL 50 MG: 10 INJECTION, EMULSION INTRAVENOUS at 08:14

## 2023-01-12 RX ADMIN — ONDANSETRON 4 MG: 2 INJECTION INTRAMUSCULAR; INTRAVENOUS at 09:44

## 2023-01-12 NOTE — TELEPHONE ENCOUNTER
[place] [address], [place] [address]  P [phone]  F [phone]    PATIENT:  Jennifer Linton   : 1951  REFERRING PHYSICIAN:Donato Hernandez MD      CHIEF COMPLAINT:   Chief Complaint   Patient presents with   • Follow-up     1 year f/u       HISTORY OF PRESENT ILLNESS:  Jennifer is a 71 year old female with HTN, HL, MISTY, anxiety. She presented to [facility] in 2021 with right MCA CVA. She was transferred to rehab facility. On 2021 episode of AF.       Last seen in our office on 21 by me. At that visit she was walking with a cane. Clinically doing well. No significant changes were made.     Recently saw her PCP 22 with no cardiovascular complaints. She has been having issues with her ankle(?)    Most recent lipids at goal    Pt is presenting for a cardiovascular follow-up.   Patient reports every second day episode of palpitations.  Patients prescription medication list has been reviewed and reconciled with the patient. Patient is taking all prescription medications as prescribed without side effects.      PAST MEDICAL HISTORY:    Past Medical History:   Diagnosis Date   • CVA (cerebral vascular accident) (CMS/HCC) 2021    RIGHT MCA   • Essential (primary) hypertension    • Gastroesophageal reflux disease    • High cholesterol    • IBS (irritable bowel syndrome)    • Insomnia    • Migraine    • Pneumonia 2019    was treated with inhalers and antibiotics; feeling better today    • PONV (postoperative nausea and vomiting)    • Renal insufficiency        REVIEW OF SYSTEMS:    Constitutional: Negative.    HENT: Negative.    Eyes: Negative.    Respiratory: Negative.    Cardiovascular:        SEE HPI   Endocrine: Negative.    Genitourinary: Negative.    Skin: Negative.    Allergic/Immunologic: Negative.    Neurological: Negative.    Hematological: Negative.    Psychiatric/Behavioral: Negative.      ALLERGIES:   Pt notified     ALLERGIES:   Allergen Reactions   • Amoxicillin-Pot Clavulanate VOMITING and NAUSEA     Abdominal Pain; Adverse Reaction.   • Adenosine Other (See Comments)   • Dairy Digestive Other (See Comments)   • Hydromorphone Other (See Comments)   • Nuts   (Food) Other (See Comments)   • Pollen Other (See Comments)   • Pork (Diagnostic) Other (See Comments)   • Reglan Other (See Comments)       MEDICATIONS:     Current Outpatient Medications   Medication Sig Dispense Refill   • tiZANidine (ZANAFLEX) 2 MG tablet TAKE 1 TABLET BY MOUTH  TWICE DAILY AS NEEDED FOR  MUSCLE SPASM(S) 40 tablet 0   • ondansetron (ZOFRAN) 8 MG tablet TAKE 1 TABLET BY MOUTH  EVERY 12 HOURS AS NEEDED  FOR NAUSEA 90 tablet 0   • clonazePAM (KlonoPIN) 0.5 MG tablet Take 1 tablet by mouth twice daily as needed for anxiety 40 tablet 0   • dicyclomine (BENTYL) 20 MG tablet TAKE 1 TABLET BY MOUTH 4  TIMES DAILY BEFORE MEALS  AND AT NIGHT 360 tablet 0   • Cetirizine HCl 10 MG Cap Take 10 mg by mouth nightly.     • Eliquis 5 MG Tab TAKE 1 TABLET BY MOUTH EVERY 12 HOURS 180 tablet 3   • furosemide (LASIX) 20 MG tablet Take 1 tablet by mouth every other day. 90 tablet 1   • citalopram (CeleXA) 40 MG tablet Take 2 tablets by mouth daily. 180 tablet 1   • famotidine (PEPCID) 20 MG tablet Take 1 tablet by mouth in the morning and 1 tablet in the evening. 180 tablet 1   • pantoprazole (PROTONIX) 40 MG tablet Take 1 tablet by mouth daily. 90 tablet 1   • atorvastatin (LIPITOR) 40 MG tablet Take 1 tablet by mouth daily. 90 tablet 1   • gabapentin (NEURONTIN) 400 MG capsule TAKE 1 CAPSULE BY MOUTH IN  THE MORNING , 1 CAPSULE IN  THE EVENING AND 2 CAPSULES  BEFORE BEDTIME 360 capsule 1   • metoPROLOL succinate (TOPROL-XL) 100 MG 24 hr tablet Take 1 tablet by mouth daily. 90 tablet 1   • sumatriptan (IMITREX) 100 MG tablet TAKE 1 TABLET BY MOUTH AT  ONSET OF MIGRAINE. MAY  REPEAT AFTER 2 HOURS IF  NEEDED AS DIRECTED 36 tablet 2   • zolpidem (AMBIEN) 5 MG tablet Take 1  tablet by mouth at bedtime as needed for Sleep. 90 tablet 1   • Cholecalciferol (vitamin D) 50 mcg (2,000 units) capsule Take 1 capsule by mouth daily. 30 capsule    • cyanocobalamin 1000 MCG tablet Take 1,000 mcg by mouth daily.       No current facility-administered medications for this visit.        SOCIAL HISTORY:    Social History     Tobacco Use   • Smoking status: Former     Packs/day: 0.50     Years: 4.00     Pack years: 2.00     Types: Cigarettes     Quit date:      Years since quittin.0   • Smokeless tobacco: Never   Vaping Use   • Vaping Use: never used   Substance Use Topics   • Alcohol use: No   • Drug use: No         FAMILY HISTORY:    Family History   Problem Relation Age of Onset   • Parkinsonism Mother    • Dementia/Alzheimers Father    • Hypertension Sister    • Cancer, Ovarian Sister        PHYSICAL EXAMINATION:  BP (!) 147/95 (BP Location: LUE - Left upper extremity)   Pulse 62   Temp 97.1 °F (36.2 °C) (Temporal)   Resp 16   Ht 5' 6\" (1.676 m)   Wt 101.1 kg (222 lb 12.4 oz)   BMI 35.96 kg/m²   BSA 2.09 m²   Constitutional: oriented to person, place, and time. appears well-developed and well-nourished.   HENT: Head: Normocephalic and atraumatic.   Mouth/Throat: Oropharynx is clear and moist.   Eyes: Pupils are equal, round, and reactive to light.   Neck: Normal range of motion. Neck supple.   Cardiovascular: Normal rate, regular rhythm, S1 normal, S2 normal Exam reveals no S3, no S4 and no friction rub. No murmur heard.  Pulmonary/Chest: Breath sounds normal. no wheezes.  no rales.   Abdominal: Soft. Bowel sounds are normal.  no distension. There is no tenderness.   Musculoskeletal: Normal range of motion.no pain or tenderness.   Neurological: He is alert and oriented to person, place, and time.   Skin: Skin is warm and dry. No rash noted.   Psychiatric: He has a normal mood and affect. His behavior is normal.   Extremities: no open wound on lower legs.     I personally reviewed  and interpreted recent laboratory values in the chart.     Hemoglobin A1C (%)   Date Value   08/10/2022 5.0       Cholesterol (mg/dL)   Date Value   08/10/2022 114     HDL (mg/dL)   Date Value   08/10/2022 65     Triglycerides (mg/dL)   Date Value   08/10/2022 59     LDL (mg/dL)   Date Value   08/10/2022 37     Sodium (mmol/L)   Date Value   09/14/2022 143     Potassium (mmol/L)   Date Value   09/14/2022 4.4     Chloride (mmol/L)   Date Value   09/14/2022 110     Glucose (mg/dL)   Date Value   09/14/2022 93     Calcium (mg/dL)   Date Value   09/14/2022 9.0     Carbon Dioxide (mmol/L)   Date Value   09/14/2022 29     BUN (mg/dL)   Date Value   09/14/2022 13     Creatinine (mg/dL)   Date Value   09/14/2022 1.08 (H)     No results found for: BNP       Echo ( 1/2021)  Normal LV size & function, no major valvular issues    ASSESSMENT:    Benign hypertension  controlled     Hyperlipidemia, goal below 70.   1/2021> started on statin will recheck labs; 54 from 8/2021; LDL 37 8/22  In the goal range continue therapy.    MR  Mild by echo 1/2021    Right MCA embolic CVA  - left sided weakness is much improved. Cont  PT  - now diganosed with afib after CVA. See below  - f/u as per neurology ( dr leigh)     Paroxysmal atrial fibrillation   Chronic anticoagulation  New onset PAF with RVR noted after recent embolic CVA in January 2021  No problems with anticoagulation.  Complains of palpitations every second day we will do extended work Holter.  If recurrent episode of AF were referred to EP consider for ablation.    Leg edema  Sedentary lifestyle we spoke about salt restrictions and recommended compression stockings.     Obstructive sleep apnea syndrome  Had positive sleep study.  .      Follow up Dr Vasquez for MISTY  Orders Placed This Encounter   • HOLTER MONITOR EXTENDED WEAR     Medications Discontinued During This Encounter   Medication Reason   • fluticasone (Flonase) 50 MCG/ACT nasal spray Discontinued by another  Health Care Provider     Return in about 1 year (around 1/12/2024).    Bernardo Simms MD  Cardiology & Interventional cardiology   Peripheral Arterial & Venous Disease   Director, [facility] Cardiac Cath Lab    [facility] [place]  [facility]  Plan of care discussed with the patient.  All questions were answered.  Patient verbalized understanding and agrees with the plan.    A letter has been sent to referring physician

## 2023-01-12 NOTE — ANESTHESIA PREPROCEDURE EVALUATION
Anesthesia Evaluation     Patient summary reviewed and Nursing notes reviewed   no history of anesthetic complications:  NPO Solid Status: > 8 hours  NPO Liquid Status: > 2 hours           Airway   Mallampati: III  TM distance: >3 FB  Neck ROM: full  No difficulty expected  Dental    (+) upper dentures and lower dentures    Pulmonary - normal exam    breath sounds clear to auscultation  (+) asthma,shortness of breath, sleep apnea,   Cardiovascular - normal exam  Exercise tolerance: good (4-7 METS)    Rhythm: regular  Rate: normal    (+) hypertension, past MI , CAD, dysrhythmias Atrial Fib, DVT, hyperlipidemia,       Neuro/Psych  (+) numbness, psychiatric history,    GI/Hepatic/Renal/Endo    (+)  GERD,  renal disease, diabetes mellitus type 2, thyroid problem hypothyroidism    Musculoskeletal     Abdominal    Substance History - negative use     OB/GYN negative ob/gyn ROS         Other   arthritis,      ROS/Med Hx Other: PAT Nursing Notes unavailable.           Latest Reference Range & Units Most Recent   Glucose 70 - 99 mg/dL 129 (H)  1/12/23 07:13   Sodium 136 - 145 mmol/L 143  1/9/23 09:01   Potassium 3.5 - 5.2 mmol/L 4.7  1/9/23 09:01   CO2 22.0 - 29.0 mmol/L 26.7  1/9/23 09:01   Chloride 98 - 107 mmol/L 107  1/9/23 09:01   Anion Gap 5.0 - 15.0 mmol/L 9.3  1/9/23 09:01   Creatinine 0.57 - 1.00 mg/dL 0.92  1/9/23 09:01   BUN 8 - 23 mg/dL 26 (H)  1/9/23 09:01   BUN/Creatinine Ratio 7.0 - 25.0  28.3 (H)  1/9/23 09:01   Calcium 8.6 - 10.5 mg/dL 9.8  1/9/23 09:01   eGFR >60.0 mL/min/1.73 68.4  1/9/23 09:01   Alkaline Phosphatase 39 - 117 U/L 90  1/9/23 09:01   Total Protein 6.0 - 8.5 g/dL 6.7  1/9/23 09:01   ALT (SGPT) 1 - 33 U/L 12  1/9/23 09:01   AST (SGOT) 1 - 32 U/L 19  1/9/23 09:01   Total Bilirubin 0.0 - 1.2 mg/dL 0.2  1/9/23 09:01   Albumin 3.5 - 5.2 g/dL 3.6  1/9/23 09:01   Globulin gm/dL 3.1  1/9/23 09:01   A/G Ratio g/dL 1.2  1/9/23 09:01   (H): Data is abnormally high    Interpretation Summary       •   Calculated left ventricular EF = 57%      BORDERLINE ECG -  Sinus tachycardia  Low voltage, precordial leads  Abnormal R-wave progression, early transition         Anesthesia Plan    ASA 3     general     (Total IV Anesthesia    Patient understands anesthesia not responsible for dental damage.  )  intravenous induction     Anesthetic plan, risks, benefits, and alternatives have been provided, discussed and informed consent has been obtained with: patient.    Plan discussed with CRNA.        CODE STATUS:

## 2023-01-12 NOTE — ANESTHESIA POSTPROCEDURE EVALUATION
Patient: Gifty Hoover    Procedure Summary     Date: 01/12/23 Room / Location: [facility] END[facility]COPY 4 / [facility]    Anesthesia Start: 0808 Anesthesia Stop: 0904    Procedure: ENDOSCOPIC RETROGRADE CHOLANGIOPANCREATOGRAPHY with stent removal, balloon dilation, stone removal Diagnosis:       Encounter for removal of biliary stent      (Encounter for removal of biliary stent [Z46.89])    Surgeons: Arden Garcia MD Provider: Renato Smart MD    Anesthesia Type: general ASA Status: 3          Anesthesia Type: general    Vitals  Vitals Value Taken Time   /55 01/12/23 0913   Temp 36.6 °C (97.8 °F) 01/12/23 0903   Pulse 92 01/12/23 0913   Resp 23 01/12/23 0913   SpO2 98 % 01/12/23 0913           Post Anesthesia Care and Evaluation    Patient location during evaluation: bedside  Patient participation: complete - patient participated  Level of consciousness: awake  Pain management: adequate    Airway patency: patent  Anesthetic complications: No anesthetic complications  PONV Status: none  Cardiovascular status: acceptable and stable  Respiratory status: acceptable  Hydration status: acceptable    Comments: An Anesthesiologist personally participated in the most demanding procedures (including induction and emergence if applicable) in the anesthesia plan, monitored the course of anesthesia administration at frequent intervals and remained physically present and available for immediate diagnosis and treatment of emergencies.

## 2023-01-12 NOTE — ADDENDUM NOTE
Addendum  created 01/12/23 0935 by Renato Smart MD    Order list changed, Pharmacy for encounter modified

## 2023-01-29 ENCOUNTER — TELEPHONE (OUTPATIENT)
Dept: GASTROENTEROLOGY | Facility: CLINIC | Age: 68
End: 2023-01-29
Payer: MEDICARE

## 2023-02-06 ENCOUNTER — TRANSCRIBE ORDERS (OUTPATIENT)
Dept: LAB | Facility: HOSPITAL | Age: 68
End: 2023-02-06
Payer: MEDICARE

## 2023-02-06 ENCOUNTER — LAB (OUTPATIENT)
Dept: LAB | Facility: HOSPITAL | Age: 68
End: 2023-02-06
Payer: MEDICARE

## 2023-02-06 DIAGNOSIS — E78.5 HYPERLIPIDEMIA, UNSPECIFIED HYPERLIPIDEMIA TYPE: ICD-10-CM

## 2023-02-06 DIAGNOSIS — I10 HYPERTENSION, UNSPECIFIED TYPE: ICD-10-CM

## 2023-02-06 DIAGNOSIS — N18.2 STAGE 2 CHRONIC KIDNEY DISEASE: ICD-10-CM

## 2023-02-06 DIAGNOSIS — E11.9 DIABETES MELLITUS WITHOUT COMPLICATION: ICD-10-CM

## 2023-02-06 DIAGNOSIS — N18.2 STAGE 2 CHRONIC KIDNEY DISEASE: Primary | ICD-10-CM

## 2023-02-06 LAB — HBA1C MFR BLD: 6.1 % (ref 4.8–5.6)

## 2023-02-06 PROCEDURE — 80061 LIPID PANEL: CPT

## 2023-02-06 PROCEDURE — 36415 COLL VENOUS BLD VENIPUNCTURE: CPT

## 2023-02-06 PROCEDURE — 83036 HEMOGLOBIN GLYCOSYLATED A1C: CPT

## 2023-02-06 PROCEDURE — 80053 COMPREHEN METABOLIC PANEL: CPT

## 2023-02-07 LAB
ALBUMIN SERPL-MCNC: 4.1 G/DL (ref 3.5–5.2)
ALBUMIN/GLOB SERPL: 1.4 G/DL
ALP SERPL-CCNC: 82 U/L (ref 39–117)
ALT SERPL W P-5'-P-CCNC: 13 U/L (ref 1–33)
ANION GAP SERPL CALCULATED.3IONS-SCNC: 6.4 MMOL/L (ref 5–15)
AST SERPL-CCNC: 18 U/L (ref 1–32)
BILIRUB SERPL-MCNC: 0.3 MG/DL (ref 0–1.2)
BUN SERPL-MCNC: 18 MG/DL (ref 8–23)
BUN/CREAT SERPL: 20.7 (ref 7–25)
CALCIUM SPEC-SCNC: 9.7 MG/DL (ref 8.6–10.5)
CHLORIDE SERPL-SCNC: 110 MMOL/L (ref 98–107)
CHOLEST SERPL-MCNC: 124 MG/DL (ref 0–200)
CO2 SERPL-SCNC: 28.6 MMOL/L (ref 22–29)
CREAT SERPL-MCNC: 0.87 MG/DL (ref 0.57–1)
EGFRCR SERPLBLD CKD-EPI 2021: 73.1 ML/MIN/1.73
GLOBULIN UR ELPH-MCNC: 2.9 GM/DL
GLUCOSE SERPL-MCNC: 102 MG/DL (ref 65–99)
HDLC SERPL-MCNC: 60 MG/DL (ref 40–60)
LDLC SERPL CALC-MCNC: 50 MG/DL (ref 0–100)
LDLC/HDLC SERPL: 0.85 {RATIO}
POTASSIUM SERPL-SCNC: 4.6 MMOL/L (ref 3.5–5.2)
PROT SERPL-MCNC: 7 G/DL (ref 6–8.5)
SODIUM SERPL-SCNC: 145 MMOL/L (ref 136–145)
TRIGL SERPL-MCNC: 64 MG/DL (ref 0–150)
VLDLC SERPL-MCNC: 14 MG/DL (ref 5–40)

## 2023-02-23 ENCOUNTER — OFFICE VISIT (OUTPATIENT)
Dept: PODIATRY | Facility: CLINIC | Age: 68
End: 2023-02-23
Payer: MEDICARE

## 2023-02-23 VITALS
OXYGEN SATURATION: 99 % | DIASTOLIC BLOOD PRESSURE: 72 MMHG | TEMPERATURE: 98.4 F | HEART RATE: 107 BPM | HEIGHT: 60 IN | SYSTOLIC BLOOD PRESSURE: 140 MMHG | WEIGHT: 213 LBS | BODY MASS INDEX: 41.82 KG/M2

## 2023-02-23 DIAGNOSIS — M79.671 FOOT PAIN, BILATERAL: Primary | ICD-10-CM

## 2023-02-23 DIAGNOSIS — L60.0 ONYCHOCRYPTOSIS: ICD-10-CM

## 2023-02-23 DIAGNOSIS — E11.9 NON-INSULIN DEPENDENT TYPE 2 DIABETES MELLITUS: ICD-10-CM

## 2023-02-23 DIAGNOSIS — B35.1 ONYCHOMYCOSIS: ICD-10-CM

## 2023-02-23 DIAGNOSIS — E11.8 DIABETIC FOOT: ICD-10-CM

## 2023-02-23 DIAGNOSIS — M79.672 FOOT PAIN, BILATERAL: Primary | ICD-10-CM

## 2023-02-23 PROCEDURE — 11721 DEBRIDE NAIL 6 OR MORE: CPT | Performed by: PODIATRIST

## 2023-02-23 PROCEDURE — 99203 OFFICE O/P NEW LOW 30 MIN: CPT | Performed by: PODIATRIST

## 2023-02-23 PROCEDURE — G8404 LOW EXTEMITY NEUR EXAM DOCUM: HCPCS | Performed by: PODIATRIST

## 2023-02-23 NOTE — PROGRESS NOTES
[facility] - PODIATRY    Today's Date: 02/23/23    Patient Name: Gifty Hoover  MRN: [id number]  CSN: [id number]  PCP: Pairs Ace APRN, Last PCP Visit:  1/9/2023  Referring Provider: Referring, Self    SUBJECTIVE     Chief Complaint   Patient presents with   • Left Foot - Establish Care, Annual Exam, Diabetes, Nail Problem   • Right Foot - Establish Care, Annual Exam, Diabetes, Nail Problem     HPI: Gifty Hoover, a 67 y.o.female, presents to clinic for painful toenail and a diabetic foot evaluation.    New, Established, New Problem:  New  Location:  Toenails  Duration:   Greater than five years  Onset:  Gradual  Nature:  sore with palpation.  Stable, worsening, improving:   worsening  Aggravating factors:  Pain with shoe gear and ambulation.  Previous Treatment: Unable to trim their own toenails.    Patient controlling diabetes via: Oral medication    Patient states their last blood glucose was: 88    Patient denies any fevers, chills, nausea, vomiting, shortness of breath, nor any other constitutional signs nor symptoms.    No other pedal complaints at this time.    Past Medical History:   Diagnosis Date   • Anemia 2022    NO CURRENT S/S   • Anxiety    • Arthritis    • Asthma 2020    Shortness of breath after walking long periods of time.   • Bladder problem     JOO   • CAD 09/14/2021    DEDE SEES PT   • Cholelithiasis 2022   • Chronic kidney disease 2022    NO DIALYSIS   • Colon polyp 2008    BENIGN   • Deep vein thrombosis (HCC) 2011    Green filter in place.   • Depression    • Diabetes mellitus (HCC)    • Elevated cholesterol    • Essential hypertension 09/14/2021   • GERD (gastroesophageal reflux disease)    • Heart attack 2009    NO STENTS/NO BYPASS   • History of pulmonary embolism     2011, TAKE PRADAXA   • Hypothyroid    • Ingrown toenail 2022   • Lumbar radiculopathy 09/2018    describes sensory S1 radiculopathy but would favor right L5   • Paroxysmal atrial fibrillation  2021    AGUAYO   • Seasonal allergies    • Sleep apnea     BIPAP?   • SOB (shortness of breath) on exertion    • Thyroid disorder      Past Surgical History:   Procedure Laterality Date   • BLADDER SURGERY     • BREAST BIOPSY      Upper right breast BENIGN   • BREAST SURGERY     • CARDIAC CATHETERIZATION      NO INTERVENTION   • CARDIAC SURGERY      R/T BLOOD CLOTS, NO CARDIAC INTERVENTION   •  SECTION     • CHOLECYSTECTOMY N/A 2022    Procedure: laparoscopic cholecystectomy, possible open;  Surgeon: Krystal Dacosta MD;  Location: [facility] [place] [facility] [place];  Service: General;  Laterality: N/A;   • COLONOSCOPY     • COLONOSCOPY N/A 2022    Procedure: COLONOSCOPY;  Surgeon: Ta Vasquez MD;  Location: [facility] ENDOSCOPY;  Service: Gastroenterology;  Laterality: N/A;  DIVERTICULOSIS   • ENDOSCOPY N/A 2022    Procedure: ESOPHAGOGASTRODUODENOSCOPY WITH BX;  Surgeon: Ta Vasquez MD;  Location: [facility] ENDOSCOPY;  Service: Gastroenterology;  Laterality: N/A;  SMALL HIATAL HERNIA   • ERCP N/A 2022    Procedure: ENDOSCOPIC RETROGRADE CHOLANGIOPANCREATOGRAPHY WITH SPHINCTEROTOMY, STONE REMOVAL, STENT PLACEMENT;  Surgeon: Arden Garcia MD;  Location: [facility] ENDOSCOPY;  Service: Gastroenterology;  Laterality: N/A;  BILE DUCT STONES   • ERCP N/A 2023    Procedure: ENDOSCOPIC RETROGRADE CHOLANGIOPANCREATOGRAPHY with stent removal, balloon dilation, stone removal;  Surgeon: Arden Garcia MD;  Location: [facility] ENDOSCOPY;  Service: Gastroenterology;  Laterality: N/A;  bile duct stones   • NICHOLAS FILTER INSERTION 2011   • HYSTERECTOMY       Family History   Problem Relation Age of Onset   • Stroke Mother    • Cancer Mother         Colon   • Colon cancer Mother 71        still living at 73   • Diabetes Mother    • Arthritis Mother    • Colon polyps Mother    • Hypertension Mother    • Kidney disease Mother    • Miscarriages /  Stillbirths Mother    • Stroke Father    • Arthritis Father         Padgets Disease   • Osteoporosis Father    • Inflammatory bowel disease Father    • Cancer Sister         Eye   • Thyroid disease Sister    • Diabetes Brother    • Stroke Brother    • Bleeding Disorder Daughter    • Diabetes Brother    • Hypertension Brother    • Malig Hyperthermia Neg Hx      Social History     Socioeconomic History   • Marital status:    Tobacco Use   • Smoking status: Never   • Smokeless tobacco: Never   Vaping Use   • Vaping Use: Never used   Substance and Sexual Activity   • Alcohol use: Never   • Drug use: Never   • Sexual activity: Not Currently     Partners: Male     Birth control/protection: Hysterectomy     No Known Allergies  Current Outpatient Medications   Medication Sig Dispense Refill   • albuterol sulfate HFA (ProAir HFA) 108 (90 Base) MCG/ACT inhaler Inhale 2 puffs Every 6 (Six) Hours As Needed for Wheezing. 18 g 0   • aspirin 81 MG EC tablet Take 81 mg by mouth Daily.     • Calcium Carb-Cholecalciferol (Calcium-Vitamin D) 500-400 MG-UNIT tablet Take 1 tablet by mouth 2 (Two) Times a Day. 180 tablet 1   • ezetimibe (ZETIA) 10 MG tablet Take 1 tablet by mouth Daily. 90 tablet 3   • famotidine (Pepcid) 40 MG tablet Take 1 tablet by mouth every night at bedtime. 90 tablet 1   • Ferrous Fumarate 324 (106 Fe) MG tablet Take 1 tablet by mouth Every Other Day. LAST DOSE 12/15/22     • levothyroxine (SYNTHROID, LEVOTHROID) 88 MCG tablet Take 1 tablet by mouth Daily. 90 tablet 1   • metFORMIN (GLUCOPHAGE) 500 MG tablet Take 500 mg by mouth 2 (Two) Times a Day With Meals. INST PER ANESTHESIA PROTOCOL     • omeprazole (priLOSEC) 40 MG capsule Take 1 capsule by mouth Daily. 90 capsule 1   • Pradaxa 150 MG capsu Take 1 capsule by mouth 2 (Two) Times a Day. 180 capsule 1   • rosuvastatin (CRESTOR) 40 MG tablet Take 1 tablet by mouth Every Night. 90 tablet 1   • telmisartan (MICARDIS) 80 MG tablet Take 1 tablet by mouth  Every Night. 90 tablet 1     No current facility-administered medications for this visit.     Review of Systems   Constitutional: Negative.    Skin:        Painful toenails.   All other systems reviewed and are negative.      OBJECTIVE     Vitals:    23 0856   BP: 140/72   Pulse: 107   Temp: 98.4 °F (36.9 °C)   SpO2: 99%       Body mass index is 41.6 kg/m².    Lab Results   Component Value Date    HGBA1C 6.10 (H) 2023       Lab Results   Component Value Date    GLUCOSE 102 (H) 2023    CALCIUM 9.7 2023     2023    K 4.6 2023    CO2 28.6 2023     (H) 2023    BUN 18 2023    CREATININE 0.87 2023    EGFRIFNONA 33 (L) 2021    BCR 20.7 2023    ANIONGAP 6.4 2023       Patient seen in no apparent distress.      PHYSICAL EXAM:     Foot/Ankle Exam:       General:   Diabetic Foot Exam Performed    Appearance: obesity and elderly    Orientation: AAOx3    Affect: appropriate    Gait: unimpaired    Shoe Gear:  Casual shoes    VASCULAR      Right Foot Vascularity   Normal vascular exam    Dorsalis pedis:  1+  Posterior tibial:  1+  Skin Temperature: warm    Edema Gradin+ and pitting  CFT:  < 3 seconds  Pedal Hair Growth:  Absent  Varicosities: moderate varicosities       Left Foot Vascularity   Normal vascular exam    Dorsalis pedis:  1+  Posterior tibial:  1+  Skin Temperature: warm    Edema Gradin+ and pitting  CFT:  < 3 seconds  Pedal Hair Growth:  Absent  Varicosities: moderate varicosities        NEUROLOGIC     Right Foot Neurologic   Normal sensation    Light touch sensation:  Normal  Vibratory sensation:  Normal  Hot/Cold sensation: normal    Protective Sensation using [place] Monofilament:  10     Left Foot Neurologic   Normal sensation    Light touch sensation:  Normal  Vibratory sensation:  Normal  Hot/cold sensation: normal    Protective Sensation using [place] Monofilament:  10     MUSCLE STRENGTH      Right Foot Muscle Strength   Foot dorsiflexion:  4  Foot plantar flexion:  4  Foot inversion:  4  Foot eversion:  4     Left Foot Muscle Strength   Foot dorsiflexion:  4  Foot plantar flexion:  4  Foot inversion:  4  Foot eversion:  4     RANGE OF MOTION      Right Foot Range of Motion   Foot and ankle ROM within normal limits       Left Foot Range of Motion   Foot and ankle ROM within normal limits       DERMATOLOGIC     Right Foot Dermatologic   Skin: skin intact    Nails: onychomycosis, abnormally thick, subungual debris, dystrophic nails and ingrown toenail    Nails comment:  Toenails 1, 2, 3, 4, and 5     Left Foot Dermatologic   Skin: skin intact    Nails: onychomycosis, abnormally thick, subungual debris, dystrophic nails and ingrown toenail    Nails comment:  Toenails 1, 3, 4, and 5      Diabetic Foot Exam Performed      ASSESSMENT/PLAN     Diagnoses and all orders for this visit:    1. Foot pain, bilateral (Primary)    2. Onychomycosis    3. Onychocryptosis    4. Non-insulin dependent type 2 diabetes mellitus (HCC)    5. Diabetic foot (HCC)        Comprehensive lower extremity examination and evaluation was performed.    Discussed findings and treatment plan including risks, benefits, and treatment options with patient in detail. Patient agreed with treatment plan.    Medications and allergies reviewed.  Reviewed available blood glucose and HgB A1C lab values along with other pertinent labs.  These were discussed with the patient as to their importance of diabetic maintenance.    Toenails 1, 2, 3, 4, 5 on Right and 1, 3, 4, 5 on Left were debrided with nail nippers then filed with a Dremel nail yves.  Patient tolerated procedure well without complications.    Diabetic foot exam performed and documented this date, compliant with CQM required standards. Detail of findings as noted in physical exam.  Lower extremity Neurologic exam for diabetic patient performed and documented this date, compliant with PQRS  required standards. Detail of findings as noted in physical exam.  Advised patient importance of good routine lower extremity hygiene. Advised patient importance of evaluating for intact skin and pain free nail borders.  Advised patient to use mirror to evaluate plantar/ soles of feet for better visualization. Advised patient monitor and phone office to be seen if any cracking to skin, open lesions, painful nail borders or if nails become elongated prior to next visit. Advised patient importance of daily cleansing of lower extremities, followed by good skin cream to maintain normal hydration of skin. Also advised patient importance of close daily monitoring of blood sugar. Advised to regulate diet and medications to maintain control of blood sugar in optimal range. Contact primary care provider if difficulties maintaining blood sugar levels.  Advised Patient of presence of Diabetes Mellitus condition.  Advised Patient risk of progression and worsening or improvement, then return of condition.  Will monitor condition for any change in future. Treat with most appropriate treatment pending status of condition.  Counseled and advised patient extensively on nature and ramifications of diabetes. Standard instructions given to patient for good diabetic foot care and maintenance. Advised importance of careful monitoring to avoid break down and complications secondary to diabetes. Advised patient importance of strict maintenance of blood sugar control. Advised patient of possible ominous results from neglect of condition, i.e.: amputation/ loss of digits, feet and legs, or even death.  Patient states understands counseling, will monitor closely, continue good hygiene and routine diabetic foot care. Patient will contact office should they have any questions or problems.      An After Visit Summary was printed and given to the patient at discharge, including (if requested) any available informative/educational handouts  regarding diagnosis, treatment, or medications. All questions were answered to patient/family satisfaction. Should symptoms fail to improve or worsen they agree to call or return to clinic or to go to the Emergency Department. Discussed the importance of following up with any needed screening tests/labs/specialist appointments and any requested follow-up recommended by me today. Importance of maintaining follow-up discussed and patient accepts that missed appointments can delay diagnosis and potentially lead to worsening of conditions.    Return in about 9 weeks (around 4/27/2023) for Toenail Care., or sooner if acute issues arise.    This document has been electronically signed by Isiah Cuevas DPM on February 23, 2023 09:22 EST

## 2023-03-30 RX ORDER — FAMOTIDINE 40 MG/1
TABLET, FILM COATED ORAL
Qty: 90 TABLET | Refills: 1 | Status: SHIPPED | OUTPATIENT
Start: 2023-03-30

## 2023-04-05 ENCOUNTER — LAB (OUTPATIENT)
Dept: ONCOLOGY | Facility: HOSPITAL | Age: 68
End: 2023-04-05
Payer: MEDICARE

## 2023-04-05 DIAGNOSIS — D64.9 LOW HEMOGLOBIN: ICD-10-CM

## 2023-04-05 LAB
BASOPHILS # BLD AUTO: 0.03 10*3/MM3 (ref 0–0.2)
BASOPHILS NFR BLD AUTO: 0.7 % (ref 0–1.5)
DEPRECATED RDW RBC AUTO: 53.4 FL (ref 37–54)
EOSINOPHIL # BLD AUTO: 0.13 10*3/MM3 (ref 0–0.4)
EOSINOPHIL NFR BLD AUTO: 3 % (ref 0.3–6.2)
ERYTHROCYTE [DISTWIDTH] IN BLOOD BY AUTOMATED COUNT: 16.3 % (ref 12.3–15.4)
FERRITIN SERPL-MCNC: 33.38 NG/ML (ref 13–150)
HCT VFR BLD AUTO: 34.6 % (ref 34–46.6)
HGB BLD-MCNC: 10.5 G/DL (ref 12–15.9)
IMM GRANULOCYTES # BLD AUTO: 0.01 10*3/MM3 (ref 0–0.05)
IMM GRANULOCYTES NFR BLD AUTO: 0.2 % (ref 0–0.5)
IRON 24H UR-MRATE: 38 MCG/DL (ref 37–145)
IRON SATN MFR SERPL: 8 % (ref 20–50)
LYMPHOCYTES # BLD AUTO: 0.79 10*3/MM3 (ref 0.7–3.1)
LYMPHOCYTES NFR BLD AUTO: 18 % (ref 19.6–45.3)
MCH RBC QN AUTO: 26.9 PG (ref 26.6–33)
MCHC RBC AUTO-ENTMCNC: 30.3 G/DL (ref 31.5–35.7)
MCV RBC AUTO: 88.5 FL (ref 79–97)
MONOCYTES # BLD AUTO: 0.42 10*3/MM3 (ref 0.1–0.9)
MONOCYTES NFR BLD AUTO: 9.5 % (ref 5–12)
NEUTROPHILS NFR BLD AUTO: 3.02 10*3/MM3 (ref 1.7–7)
NEUTROPHILS NFR BLD AUTO: 68.6 % (ref 42.7–76)
PLATELET # BLD AUTO: 211 10*3/MM3 (ref 140–450)
PMV BLD AUTO: 10.3 FL (ref 6–12)
RBC # BLD AUTO: 3.91 10*6/MM3 (ref 3.77–5.28)
TIBC SERPL-MCNC: 460 MCG/DL (ref 298–536)
TRANSFERRIN SERPL-MCNC: 309 MG/DL (ref 200–360)
WBC NRBC COR # BLD: 4.4 10*3/MM3 (ref 3.4–10.8)

## 2023-04-05 PROCEDURE — 85025 COMPLETE CBC W/AUTO DIFF WBC: CPT

## 2023-04-05 PROCEDURE — 84466 ASSAY OF TRANSFERRIN: CPT

## 2023-04-05 PROCEDURE — 82728 ASSAY OF FERRITIN: CPT

## 2023-04-05 PROCEDURE — 83540 ASSAY OF IRON: CPT

## 2023-04-05 PROCEDURE — 36415 COLL VENOUS BLD VENIPUNCTURE: CPT

## 2023-04-06 ENCOUNTER — RESEARCH ENCOUNTER (OUTPATIENT)
Dept: OTHER | Facility: OTHER | Age: 68
End: 2023-04-06
Payer: MEDICARE

## 2023-04-06 ENCOUNTER — OFFICE VISIT (OUTPATIENT)
Dept: ONCOLOGY | Facility: HOSPITAL | Age: 68
End: 2023-04-06
Payer: MEDICARE

## 2023-04-06 VITALS
DIASTOLIC BLOOD PRESSURE: 80 MMHG | SYSTOLIC BLOOD PRESSURE: 150 MMHG | BODY MASS INDEX: 42.8 KG/M2 | HEART RATE: 92 BPM | TEMPERATURE: 97.8 F | RESPIRATION RATE: 18 BRPM | WEIGHT: 219.14 LBS | OXYGEN SATURATION: 97 %

## 2023-04-06 DIAGNOSIS — D64.9 LOW HEMOGLOBIN: Primary | ICD-10-CM

## 2023-04-06 DIAGNOSIS — N18.30 STAGE 3 CHRONIC KIDNEY DISEASE, UNSPECIFIED WHETHER STAGE 3A OR 3B CKD: ICD-10-CM

## 2023-04-06 DIAGNOSIS — D50.8 OTHER IRON DEFICIENCY ANEMIA: ICD-10-CM

## 2023-04-06 DIAGNOSIS — K76.0 FATTY LIVER: ICD-10-CM

## 2023-04-06 DIAGNOSIS — K80.20 CALCULUS OF GALLBLADDER WITHOUT CHOLECYSTITIS WITHOUT OBSTRUCTION: ICD-10-CM

## 2023-04-06 PROBLEM — E11.9 TYPE 2 DIABETES MELLITUS WITHOUT COMPLICATION: Status: ACTIVE | Noted: 2022-10-12

## 2023-04-06 PROBLEM — N17.9 ACUTE RENAL FAILURE SYNDROME: Status: ACTIVE | Noted: 2022-11-17

## 2023-04-06 PROBLEM — N39.0 URINARY TRACT INFECTIOUS DISEASE: Status: ACTIVE | Noted: 2022-10-12

## 2023-04-06 PROBLEM — N18.2 STAGE 2 CHRONIC KIDNEY DISEASE: Status: ACTIVE | Noted: 2022-10-12

## 2023-04-06 PROCEDURE — 1126F AMNT PAIN NOTED NONE PRSNT: CPT | Performed by: INTERNAL MEDICINE

## 2023-04-06 PROCEDURE — G0463 HOSPITAL OUTPT CLINIC VISIT: HCPCS | Performed by: INTERNAL MEDICINE

## 2023-04-06 PROCEDURE — 3079F DIAST BP 80-89 MM HG: CPT | Performed by: INTERNAL MEDICINE

## 2023-04-06 PROCEDURE — 99214 OFFICE O/P EST MOD 30 MIN: CPT | Performed by: INTERNAL MEDICINE

## 2023-04-06 PROCEDURE — 3077F SYST BP >= 140 MM HG: CPT | Performed by: INTERNAL MEDICINE

## 2023-04-06 RX ORDER — MULTIVIT WITH MINERALS/LUTEIN
250 TABLET ORAL DAILY
Qty: 90 TABLET | Refills: 1 | Status: SHIPPED | OUTPATIENT
Start: 2023-04-06

## 2023-04-06 NOTE — PROGRESS NOTES
Chief Complaint  Low blood counts    Malka, Paris, APRN  Ace, Paris, APRN      Subjective          Gifty Hoover presents to [facility] for anemia    History of Present Illness   Ms. Gifty Hoover is a 66 yo F presenting for follow up for anemia She has a history of CAD, HTN, hepatic steatosis, GERD, obesity, HLD.  She is feeling much better recently. She is taking iron every other day. She denies any bleeding. No abdominal pain. No chest pain, trouble breathing, fevers, chills.    Hematology History     7/7/22 CBC with Hgb 9.9, WBC 4.28, plt 213, MCV 92.3. Positive occult blood. Also with transaminitis on labs from 6/30/22. Hemoglobin is in the 9.9 range. She has CKD as well with cr of 1.60 and GFR of 35. Iron studies with normal iron level, but iron sat was 15%, ferritin was elevated at 241. No prior labs to compare to on the ferritin. Previous colonoscopy with diverticulosis and grade I hemmorrhoids    8/3 CBC with improved hgb to 10.1, MCV 92.3, plt 241, WBC of 3.27. Abdominal ultrasound showed hepatic steatosis. SPEP without M-spike.     9/9/22 Iron studies: Ferritin down to 88.9, T sat of 12%, TIBC 429.     11/23-26/22: [facility] admission due to choledocholithiasis and biliary colic requiring ERCP, balloon extraction of 2 stones and placement of stent. Hgb sarabjit of 7.6 during this stay. Iron sat of 4%, iron low at 16    11/29/22: Hgb 9.9.    4/5/23: Hgb 10.5. Iron 38, ferritin 33, iron sat 8%, TIBC 460.    Oncology/Hematology History    No history exists.       Review of Systems   Constitutional: Positive for fatigue.   HENT: Negative.    Eyes: Negative.    Respiratory: Negative for shortness of breath.    Cardiovascular: Negative.    Gastrointestinal: Negative.    Endocrine: Negative.    Genitourinary: Negative for frequency.   Musculoskeletal: Negative.    Allergic/Immunologic: Negative.    Neurological: Negative.    Hematological: Negative.     Psychiatric/Behavioral: Negative.    All other systems reviewed and are negative.      Current Outpatient Medications on File Prior to Visit   Medication Sig Dispense Refill   • albuterol sulfate HFA (ProAir HFA) 108 (90 Base) MCG/ACT inhaler Inhale 2 puffs Every 6 (Six) Hours As Needed for Wheezing. 18 g 0   • aspirin 81 MG EC tablet Take 1 tablet by mouth Daily.     • Calcium Carb-Cholecalciferol (Calcium-Vitamin D) 500-400 MG-UNIT tablet Take 1 tablet by mouth 2 (Two) Times a Day. 180 tablet 1   • ezetimibe (ZETIA) 10 MG tablet Take 1 tablet by mouth Daily. 90 tablet 3   • famotidine (PEPCID) 40 MG tablet TAKE ONE TABLET BY MOUTH AT BEDTIME 90 tablet 1   • Ferrous Fumarate 324 (106 Fe) MG tablet Take 1 tablet by mouth Every Other Day. LAST DOSE 12/15/22     • levothyroxine (SYNTHROID, LEVOTHROID) 88 MCG tablet Take 1 tablet by mouth Daily. 90 tablet 1   • metFORMIN (GLUCOPHAGE) 500 MG tablet Take 1 tablet by mouth 2 (Two) Times a Day With Meals. INST PER ANESTHESIA PROTOCOL     • omeprazole (priLOSEC) 40 MG capsule Take 1 capsule by mouth Daily. 90 capsule 1   • Pradaxa 150 MG capsu Take 1 capsule by mouth 2 (Two) Times a Day. 180 capsule 1   • rosuvastatin (CRESTOR) 40 MG tablet Take 1 tablet by mouth Every Night. 90 tablet 1   • telmisartan (MICARDIS) 80 MG tablet Take 1 tablet by mouth Every Night. 90 tablet 1     No current facility-administered medications on file prior to visit.       No Known Allergies  Past Medical History:   Diagnosis Date   • Anemia 2022    NO CURRENT S/S   • Anxiety    • Arthritis    • Asthma 2020    Shortness of breath after walking long periods of time.   • Bladder problem     JOO   • CAD 09/14/2021    AGUAYO SEES PT   • Cholelithiasis 2022   • Chronic kidney disease 2022    NO DIALYSIS   • Colon polyp 2008    BENIGN   • Deep vein thrombosis 2011    Green filter in place.   • Depression    • Diabetes mellitus    • Elevated cholesterol    • Essential hypertension 09/14/2021   • GERD  (gastroesophageal reflux disease)    • Heart attack     NO STENTS/NO BYPASS   • History of pulmonary embolism     , TAKE PRADAXA   • Hypothyroid    • Ingrown toenail    • Lumbar radiculopathy 2018    describes sensory S1 radiculopathy but would favor right L5   • Paroxysmal atrial fibrillation 2021    AGUAYO   • Seasonal allergies    • Sleep apnea     BIPAP?   • SOB (shortness of breath) on exertion    • Thyroid disorder      Past Surgical History:   Procedure Laterality Date   • BLADDER SURGERY     • BREAST BIOPSY      Upper right breast BENIGN   • BREAST SURGERY     • CARDIAC CATHETERIZATION      NO INTERVENTION   • CARDIAC SURGERY      R/T BLOOD CLOTS, NO CARDIAC INTERVENTION   •  SECTION     • CHOLECYSTECTOMY N/A 2022    Procedure: laparoscopic cholecystectomy, possible open;  Surgeon: Krystal Dacosta MD;  Location: [facility] [place] [facility] [place];  Service: General;  Laterality: N/A;   • COLONOSCOPY     • COLONOSCOPY N/A 2022    Procedure: COLONOSCOPY;  Surgeon: Ta Vasquez MD;  Location: [facility] ENDOSCOPY;  Service: Gastroenterology;  Laterality: N/A;  DIVERTICULOSIS   • ENDOSCOPY N/A 2022    Procedure: ESOPHAGOGASTRODUODENOSCOPY WITH BX;  Surgeon: Ta Vasquez MD;  Location: [facility] ENDOSCOPY;  Service: Gastroenterology;  Laterality: N/A;  SMALL HIATAL HERNIA   • ERCP N/A 2022    Procedure: ENDOSCOPIC RETROGRADE CHOLANGIOPANCREATOGRAPHY WITH SPHINCTEROTOMY, STONE REMOVAL, STENT PLACEMENT;  Surgeon: Arden Garcia MD;  Location: [facility] ENDOSCOPY;  Service: Gastroenterology;  Laterality: N/A;  BILE DUCT STONES   • ERCP N/A 2023    Procedure: ENDOSCOPIC RETROGRADE CHOLANGIOPANCREATOGRAPHY with stent removal, balloon dilation, stone removal;  Surgeon: Arden Garcia MD;  Location: [facility] ENDOSCOPY;  Service: Gastroenterology;  Laterality: N/A;  bile duct stones   • NICHOLAS FILTER INSERTION JUGULAR     •  HYSTERECTOMY  1983     Social History     Socioeconomic History   • Marital status:    Tobacco Use   • Smoking status: Never   • Smokeless tobacco: Never   Vaping Use   • Vaping Use: Never used   Substance and Sexual Activity   • Alcohol use: Never   • Drug use: Never   • Sexual activity: Not Currently     Partners: Male     Birth control/protection: Hysterectomy     Family History   Problem Relation Age of Onset   • Stroke Mother    • Cancer Mother         Colon   • Colon cancer Mother 71        still living at 73   • Diabetes Mother    • Arthritis Mother    • Colon polyps Mother    • Hypertension Mother    • Kidney disease Mother    • Miscarriages / Stillbirths Mother    • Stroke Father    • Arthritis Father         Padgets Disease   • Osteoporosis Father    • Inflammatory bowel disease Father    • Cancer Sister         Eye   • Thyroid disease Sister    • Diabetes Brother    • Stroke Brother    • Bleeding Disorder Daughter    • Diabetes Brother    • Hypertension Brother    • Malig Hyperthermia Neg Hx      Immunization History   Administered Date(s) Administered   • COVID-19 (PFIZER) PURPLE CAP 04/03/2021, 04/26/2021   • Flu Vaccine Split Quad 11/06/2019   • FluLaval/Fluzone >6mos 11/06/2019   • FluMist 2-49yrs 12/07/2022   • Fluad Quad 65+ 10/01/2021   • Fluzone High-Dose 65+yrs 12/07/2022   • Influenza Quad Vaccine (Inpatient) 11/06/2019   • Influenza, Unspecified 10/01/2021   • Pneumococcal Conjugate 13-Valent (PCV13) 06/30/2022   • Pneumococcal Conjugate 20-Valent (PCV20) 06/30/2022   • Pneumococcal Polysaccharide (PPSV23) 10/13/2017       Objective   Physical Exam  Vitals and nursing note reviewed.   Constitutional:       Appearance: Normal appearance. She is obese.   HENT:      Head: Normocephalic.      Nose: Nose normal.      Mouth/Throat:      Mouth: Mucous membranes are moist.   Eyes:      Conjunctiva/sclera: Conjunctivae normal.   Pulmonary:      Effort: Pulmonary effort is normal.   Abdominal:       General: Bowel sounds are normal.      Palpations: Abdomen is soft.   Musculoskeletal:         General: Normal range of motion.      Cervical back: Normal range of motion and neck supple.   Skin:     General: Skin is warm and dry.   Neurological:      General: No focal deficit present.      Mental Status: She is alert and oriented to person, place, and time.   Psychiatric:         Mood and Affect: Mood normal.         Behavior: Behavior normal.         Thought Content: Thought content normal.         Vitals:    04/06/23 0936   BP: 150/80   Pulse: 92   Resp: 18   Temp: 97.8 °F (36.6 °C)   TempSrc: Temporal   SpO2: 97%   Weight: 99.4 kg (219 lb 2.2 oz)   PainSc: 0-No pain     ECOG score: 0         ECOG: (1) Restricted in Physically Strenuous Activity, Ambulatory & Able to Do Work of Light Nature  Fall Risk Assessment was completed, and patient is at low risk for falls.  PHQ-9 Total Score:         The patient is  experiencing fatigue. Fatigue score: 7    PT/OT Functional Screening: PT fx screen: No needs identified  Speech Functional Screening: Speech fx screen: No needs identified  Rehab to be ordered: Rehab to be ordered: No needs identified        Result Review :   The following data was reviewed by:Toño Carroll MD on 04/06/23:  Lab Results   Component Value Date    HGB 10.5 (L) 04/05/2023    HCT 34.6 04/05/2023    MCV 88.5 04/05/2023     04/05/2023    WBC 4.40 04/05/2023    NEUTROABS 3.02 04/05/2023    LYMPHSABS 0.79 04/05/2023    MONOSABS 0.42 04/05/2023    EOSABS 0.13 04/05/2023    BASOSABS 0.03 04/05/2023     Lab Results   Component Value Date    GLUCOSE 102 (H) 02/06/2023    BUN 18 02/06/2023    CREATININE 0.87 02/06/2023     02/06/2023    K 4.6 02/06/2023     (H) 02/06/2023    CO2 28.6 02/06/2023    CALCIUM 9.7 02/06/2023    PROTEINTOT 7.0 02/06/2023    ALBUMIN 4.1 02/06/2023    BILITOT 0.3 02/06/2023    ALKPHOS 82 02/06/2023    AST 18 02/06/2023    ALT 13 02/06/2023     Iron labs  reviewed and revealed low iron saturation but improving, iron up, ferritin 33.     11/26/22 D/c summary personally reviewed.     Abdominal ultrasound report reviewed and consistent with hepatic steatosis.      Assessment and Plan    Diagnoses and all orders for this visit:    1. Low hemoglobin (Primary)  -     Iron Profile; Future  -     Ferritin; Future  -     vitamin C (ASCORBIC ACID) 250 MG tablet; Take 1 tablet by mouth Daily.  Dispense: 90 tablet; Refill: 1    2. Calculus of gallbladder without cholecystitis without obstruction    3. Other iron deficiency anemia    4. Fatty liver    5. Stage 3 chronic kidney disease, unspecified whether stage 3a or 3b CKD    Patient with blood loss from hospital stay due to choledocholithiasis in November of 2022. She has recovered from this and her hgb is up to 10.5. Her iron sat remains low but it is improving. She is on iron supplementation orally every other day and is tolerating it well. Recommend to continue this. Will add ascorbic acid to help with absorption. Will see patient back in 2 months to re-evaluate as she could continue to improve. If not much improvement in labs in 2 months, will consider IV iron for additional support. Patient with EGD and C-scope 11/2022 with no source of bleeding, however she does have diverticulosis. Patient has EGD per GI in November. This will help evaluate for cause of slow bleeding in GI tract. Recommend continued diet and exercise due to fatty liver.       CKD  SPEP has been checked and was without M-spike. Patient is following with nephrology.       I spent 20 minutes caring for Gifty on this date of service. This time includes time spent by me in the following activities:preparing for the visit, reviewing tests, obtaining and/or reviewing a separately obtained history, performing a medically appropriate examination and/or evaluation , counseling and educating the patient/family/caregiver, ordering medications, tests, or procedures,  referring and communicating with other health care professionals , documenting information in the medical record and independently interpreting results and communicating that information with the patient/family/caregiver    Patient Follow Up: 2 months with labs  Patient was given instructions and counseling regarding her condition or for health maintenance advice. Please see specific information pulled into the AVS if appropriate.

## 2023-04-06 NOTE — RESEARCH
Informed consent worksheet for the protocol: Ascend 2    Consent version 3.0 with the approval dated 3/2/23    Subject ID [id number]      The patient was seen in the office today by Dr. Carroll and reviewed by Dr. Adler and identified as a candidate for the Ascend 2 .     The following people were present at the consent conference:   Patient: Gifty Hoover   : Kamilla Ramachandran LPN   Other: N/A      The following was discussed with the patient prior to signing the informed consent.    • The study purposes   • Procedures   • Duration of study participation  • New findings   • Risks   • Discomforts  • Any known side effects when applicable    • The alternative treatments   • Benefits   • Patient and insurance costs   • Payments if applicable     • Patient's accountability and responsibilities    • The voluntary nature of research participants     • Right to withdraw consent    • The withdrawal process    • Confidentiality   • Authorization to use and disclose information for research purposes - Including who can view information and the types of information shared.    The patient was informed of what to do in case of an illness or injury resulting from the study and who to contact for more trial information, or information on rights and welfare as a research volunteer. The patient was given the  contact Information and clinical trial contact information.     The patient was given opportunity for questions and opportunity to discuss this with family and friends. The  and or sub investigators were also available for any questions. The patient verbalizes understanding and is willing to sign the informed consent.     After all questions were satisfied the patient signed the informed consent and a copy of the signed main informed consent form & any sub-study informed consent forms were given to the patient.    No study-specific procedures were completed prior to patient  signing study informed consent form(s)    The  and or sub investigator is aware of the subject's participation status.

## 2023-05-26 PROBLEM — N39.0 URINARY TRACT INFECTIOUS DISEASE: Status: RESOLVED | Noted: 2022-10-12 | Resolved: 2023-05-26

## 2023-05-26 PROBLEM — N17.9 ACUTE RENAL FAILURE SYNDROME: Status: RESOLVED | Noted: 2022-11-17 | Resolved: 2023-05-26

## 2023-05-26 PROBLEM — N18.2 STAGE 2 CHRONIC KIDNEY DISEASE: Status: RESOLVED | Noted: 2022-10-12 | Resolved: 2023-05-26

## 2023-05-26 PROBLEM — R25.2 MUSCLE CRAMPS: Status: RESOLVED | Noted: 2021-10-08 | Resolved: 2023-05-26

## 2023-05-26 PROBLEM — F41.9 ANXIETY: Status: RESOLVED | Noted: 2021-10-08 | Resolved: 2023-05-26

## 2023-05-26 PROBLEM — D64.89 OTHER SPECIFIED ANEMIAS: Status: RESOLVED | Noted: 2022-11-23 | Resolved: 2023-05-26

## 2023-05-26 PROBLEM — R14.0 BLOATING: Status: RESOLVED | Noted: 2022-09-07 | Resolved: 2023-05-26

## 2023-05-31 ENCOUNTER — OFFICE VISIT (OUTPATIENT)
Dept: CARDIOLOGY | Facility: CLINIC | Age: 68
End: 2023-05-31
Payer: MEDICARE

## 2023-05-31 VITALS
DIASTOLIC BLOOD PRESSURE: 80 MMHG | BODY MASS INDEX: 43.19 KG/M2 | WEIGHT: 220 LBS | HEIGHT: 60 IN | SYSTOLIC BLOOD PRESSURE: 126 MMHG | HEART RATE: 83 BPM

## 2023-05-31 DIAGNOSIS — I25.10 CORONARY ARTERY DISEASE INVOLVING NATIVE CORONARY ARTERY OF NATIVE HEART WITHOUT ANGINA PECTORIS: Primary | ICD-10-CM

## 2023-05-31 DIAGNOSIS — I48.0 PAROXYSMAL ATRIAL FIBRILLATION: ICD-10-CM

## 2023-05-31 DIAGNOSIS — E78.5 HYPERLIPIDEMIA LDL GOAL <70: ICD-10-CM

## 2023-05-31 DIAGNOSIS — I10 ESSENTIAL HYPERTENSION: ICD-10-CM

## 2023-05-31 RX ORDER — FUROSEMIDE 20 MG/1
20 TABLET ORAL DAILY PRN
Qty: 30 TABLET | Refills: 3 | Status: SHIPPED | OUTPATIENT
Start: 2023-05-31

## 2023-05-31 RX ORDER — POTASSIUM CHLORIDE 750 MG/1
10 TABLET, FILM COATED, EXTENDED RELEASE ORAL DAILY PRN
Qty: 30 TABLET | Refills: 3 | Status: SHIPPED | OUTPATIENT
Start: 2023-05-31

## 2023-05-31 NOTE — PROGRESS NOTES
Chief Complaint  Follow-up, Coronary Artery Disease, Hypertension, and Hyperlipidemia    Subjective            History of Present Illness  Gifty Hoover is a 67-year-old white/ female patient who presents to the office today for follow-up.  She has CAD, paroxysmal atrial fibrillation, hypertension, and hyperlipidemia.  She admits to some occasional bilateral lower extremity edema which goes away with elevating her legs.  She reports compliance with her medications.  She denies any chest pain, shortness of breath, lightheadedness/dizziness, or palpitations.    PMH  Past Medical History:   Diagnosis Date    Anemia     NO CURRENT S/S    Anxiety     Arthritis     Asthma     Shortness of breath after walking long periods of time.    CAD 2021    AGUAYO SEES PT    Cholelithiasis     Chronic kidney disease     NO DIALYSIS    Colon polyp     BENIGN    Deep vein thrombosis     Green filter in place.    Depression     Diabetes mellitus     Elevated cholesterol     Essential hypertension 2021    GERD (gastroesophageal reflux disease)     Heart attack     NO STENTS/NO BYPASS    History of pulmonary embolism     , TAKE PRADAXA    Hypothyroid     Ingrown toenail     Lumbar radiculopathy 2018    describes sensory S1 radiculopathy but would favor right L5    Paroxysmal atrial fibrillation 2021    AGUAYO    Seasonal allergies     Sleep apnea     BIPAP?         ALLERGY  No Known Allergies       SURGICALHX  Past Surgical History:   Procedure Laterality Date    BLADDER SURGERY  2011    BREAST BIOPSY      Upper right breast BENIGN    BREAST SURGERY      CARDIAC CATHETERIZATION      NO INTERVENTION    CARDIAC SURGERY      R/T BLOOD CLOTS, NO CARDIAC INTERVENTION     SECTION  1982    CHOLECYSTECTOMY N/A 2022    Procedure: laparoscopic cholecystectomy, possible open;  Surgeon: Krystal Dacosta MD;  [place] [facility] [place] [facility] [place]  Service: General;   Laterality: N/A;    COLONOSCOPY  2018    COLONOSCOPY N/A 11/22/2022    Procedure: COLONOSCOPY;  Surgeon: Ta Vasquez MD;  Location: [facility] ENDOSCOPY;  Service: Gastroenterology;  Laterality: N/A;  DIVERTICULOSIS    ENDOSCOPY N/A 11/22/2022    Procedure: ESOPHAGOGASTRODUODENOSCOPY WITH BX;  Surgeon: Ta Vasquez MD;  Location: [facility] ENDOSCOPY;  Service: Gastroenterology;  Laterality: N/A;  SMALL HIATAL HERNIA    ERCP N/A 11/25/2022    Procedure: ENDOSCOPIC RETROGRADE CHOLANGIOPANCREATOGRAPHY WITH SPHINCTEROTOMY, STONE REMOVAL, STENT PLACEMENT;  Surgeon: Arden Garcia MD;  Location: [facility] ENDOSCOPY;  Service: Gastroenterology;  Laterality: N/A;  BILE DUCT STONES    ERCP N/A 1/12/2023    Procedure: ENDOSCOPIC RETROGRADE CHOLANGIOPANCREATOGRAPHY with stent removal, balloon dilation, stone removal;  Surgeon: Arden Garcia MD;  Location: [facility] ENDOSCOPY;  Service: Gastroenterology;  Laterality: N/A;  bile duct stones    NICHOLAS FILTER INSERTION JUGULAR  2011    HYSTERECTOMY  1983          SOC  Social History     Socioeconomic History    Marital status:    Tobacco Use    Smoking status: Never    Smokeless tobacco: Never   Vaping Use    Vaping Use: Never used   Substance and Sexual Activity    Alcohol use: Never    Drug use: Never    Sexual activity: Not Currently     Partners: Male     Birth control/protection: Hysterectomy         FAMHX  Family History   Problem Relation Age of Onset    Stroke Mother     Cancer Mother         Colon    Colon cancer Mother 71        still living at 73    Diabetes Mother     Arthritis Mother     Colon polyps Mother     Hypertension Mother     Kidney disease Mother     Miscarriages / Stillbirths Mother     Stroke Father     Arthritis Father         Padgets Disease    Osteoporosis Father     Inflammatory bowel disease Father     Cancer Sister         Eye    Thyroid disease Sister     Diabetes Brother     Stroke Brother     Bleeding Disorder  "Daughter     Diabetes Brother     Hypertension Brother     Malig Hyperthermia Neg Hx           MEDSIGONLY  Current Outpatient Medications on File Prior to Visit   Medication Sig    albuterol sulfate HFA (ProAir HFA) 108 (90 Base) MCG/ACT inhaler Inhale 2 puffs Every 6 (Six) Hours As Needed for Wheezing.    aspirin 81 MG EC tablet Take 1 tablet by mouth Daily.    Calcium Carb-Cholecalciferol (Calcium-Vitamin D) 500-400 MG-UNIT tablet Take 1 tablet by mouth 2 (Two) Times a Day.    ezetimibe (ZETIA) 10 MG tablet Take 1 tablet by mouth Daily.    famotidine (PEPCID) 40 MG tablet TAKE ONE TABLET BY MOUTH AT BEDTIME    Ferrous Fumarate 324 (106 Fe) MG tablet Take 1 tablet by mouth Every Other Day. LAST DOSE 12/15/22    levothyroxine (SYNTHROID, LEVOTHROID) 88 MCG tablet Take 1 tablet by mouth Daily.    metFORMIN (GLUCOPHAGE) 500 MG tablet Take 1 tablet by mouth 2 (Two) Times a Day With Meals. INST PER ANESTHESIA PROTOCOL    omeprazole (priLOSEC) 40 MG capsule Take 1 capsule by mouth Daily.    Pradaxa 150 MG capsu Take 1 capsule by mouth 2 (Two) Times a Day.    rosuvastatin (CRESTOR) 40 MG tablet Take 1 tablet by mouth Every Night.    telmisartan (MICARDIS) 80 MG tablet Take 1 tablet by mouth Every Night.    vitamin C (ASCORBIC ACID) 250 MG tablet Take 1 tablet by mouth Daily.     No current facility-administered medications on file prior to visit.         Objective   /80   Pulse 83   Ht 152.4 cm (60\")   Wt 99.8 kg (220 lb)   BMI 42.97 kg/m²       Physical Exam  Constitutional:       Appearance: She is obese.   HENT:      Head: Normocephalic.   Neck:      Vascular: No carotid bruit.   Cardiovascular:      Rate and Rhythm: Normal rate and regular rhythm.      Pulses: Normal pulses.      Heart sounds: Normal heart sounds. No murmur heard.  Pulmonary:      Effort: Pulmonary effort is normal.      Breath sounds: Normal breath sounds.   Musculoskeletal:      Cervical back: Neck supple.      Right lower leg: No edema. "      Left lower leg: No edema.   Skin:     General: Skin is dry.   Neurological:      Mental Status: She is alert and oriented to person, place, and time.   Psychiatric:         Behavior: Behavior normal.     Result Review :   The following data was reviewed by: TIEN Peace on 05/31/2023:  No results found for: PROBNP  CMP          2/6/2023    14:50   CMP   Glucose 102     BUN 18     Creatinine 0.87     EGFR 73.1     Sodium 145     Potassium 4.6     Chloride 110     Calcium 9.7     Total Protein 7.0     Albumin 4.1     Globulin 2.9     Total Bilirubin 0.3     Alkaline Phosphatase 82     AST (SGOT) 18     ALT (SGPT) 13     Albumin/Globulin Ratio 1.4     BUN/Creatinine Ratio 20.7     Anion Gap 6.4       CBC w/diff          4/5/2023    09:17   CBC w/Diff   WBC 4.40     RBC 3.91     Hemoglobin 10.5     Hematocrit 34.6     MCV 88.5     MCH 26.9     MCHC 30.3     RDW 16.3     Platelets 211     Neutrophil Rel % 68.6     Immature Granulocyte Rel % 0.2     Lymphocyte Rel % 18.0     Monocyte Rel % 9.5     Eosinophil Rel % 3.0     Basophil Rel % 0.7        Lab Results   Component Value Date    TSH 1.210 06/30/2022      No results found for: FREET4   No results found for: DDIMERQUANT  Magnesium   Date Value Ref Range Status   10/06/2022 2.0 1.6 - 2.4 mg/dL Final      No results found for: DIGOXIN   No results found for: TROPONINT        Lipid Panel          2/6/2023    14:50   Lipid Panel   Total Cholesterol 124     Triglycerides 64     HDL Cholesterol 60     VLDL Cholesterol 14     LDL Cholesterol  50     LDL/HDL Ratio 0.85         Results for orders placed in visit on 11/11/22    Adult Transthoracic Echo Complete W/ Cont if Necessary Per Protocol    Interpretation Summary    Calculated left ventricular EF = 57%    [id number] Score: 7        Assessment and Plan    Diagnoses and all orders for this visit:    1. CAD (Primary)  She denies any anginal symptoms, continue aspirin 81 mg daily.    2. Paroxysmal atrial  fibrillation  Symptomatically stable, continue Pradaxa 150 mg twice daily for CVA prevention.    3. Essential hypertension  Currently controlled and without adverse effects from medication, continue telmisartan 80 mg nightly.  She can take Lasix 20 mg daily as needed for bilateral lower extremity edema.  She should take potassium 10 mill equivalent daily whenever she takes Lasix.  Check BMP in 2 weeks to assess renal function.  Low-sodium diet discussed.  -     Basic Metabolic Panel; Future    4. Hyperlipidemia LDL goal <70  Last lipid panel was 2/6/2023 with LDL 50 which is within her goal range, continue rosuvastatin 40 mg nightly and Zetia 10 mg daily.  -     Lipid Panel; Future  -     Hepatic Function Panel; Future    Other orders  -     furosemide (LASIX) 20 MG tablet; Take 1 tablet by mouth Daily As Needed (for leg swelling).  Dispense: 30 tablet; Refill: 3  -     potassium chloride 10 MEQ CR tablet; Take 1 tablet by mouth Daily As Needed (take with Lasix).  Dispense: 30 tablet; Refill: 3            Follow Up   Return in about 6 months (around 11/30/2023) for Follow up with Dr Zavala.    Patient was given instructions and counseling regarding her condition or for health maintenance advice. Please see specific information pulled into the AVS if appropriate.     Gifty MANJINDER Hoover  reports that she has never smoked. She has never used smokeless tobacco.         Mine Castillo, APRN  05/31/23  09:31 EDT    Dictated Utilizing Dragon Dictation

## 2023-06-05 ENCOUNTER — LAB (OUTPATIENT)
Dept: ONCOLOGY | Facility: HOSPITAL | Age: 68
End: 2023-06-05
Payer: MEDICARE

## 2023-06-05 DIAGNOSIS — D64.9 LOW HEMOGLOBIN: ICD-10-CM

## 2023-06-05 LAB
FERRITIN SERPL-MCNC: 40.05 NG/ML (ref 13–150)
IRON 24H UR-MRATE: 54 MCG/DL (ref 37–145)
IRON SATN MFR SERPL: 11 % (ref 20–50)
TIBC SERPL-MCNC: 481 MCG/DL (ref 298–536)
TRANSFERRIN SERPL-MCNC: 323 MG/DL (ref 200–360)

## 2023-06-05 PROCEDURE — 83540 ASSAY OF IRON: CPT

## 2023-06-05 PROCEDURE — 36415 COLL VENOUS BLD VENIPUNCTURE: CPT

## 2023-06-05 PROCEDURE — 82728 ASSAY OF FERRITIN: CPT

## 2023-06-05 PROCEDURE — 84466 ASSAY OF TRANSFERRIN: CPT

## 2023-06-06 ENCOUNTER — LAB (OUTPATIENT)
Dept: ONCOLOGY | Facility: HOSPITAL | Age: 68
End: 2023-06-06
Payer: MEDICARE

## 2023-06-06 ENCOUNTER — OFFICE VISIT (OUTPATIENT)
Dept: ONCOLOGY | Facility: HOSPITAL | Age: 68
End: 2023-06-06
Payer: MEDICARE

## 2023-06-06 VITALS
TEMPERATURE: 97.7 F | DIASTOLIC BLOOD PRESSURE: 85 MMHG | OXYGEN SATURATION: 98 % | HEART RATE: 92 BPM | SYSTOLIC BLOOD PRESSURE: 148 MMHG | RESPIRATION RATE: 18 BRPM | WEIGHT: 219.8 LBS | BODY MASS INDEX: 42.93 KG/M2

## 2023-06-06 DIAGNOSIS — D64.9 LOW HEMOGLOBIN: Primary | ICD-10-CM

## 2023-06-06 DIAGNOSIS — D64.9 LOW HEMOGLOBIN: ICD-10-CM

## 2023-06-06 LAB
BASOPHILS # BLD AUTO: 0.03 10*3/MM3 (ref 0–0.2)
BASOPHILS NFR BLD AUTO: 0.7 % (ref 0–1.5)
DEPRECATED RDW RBC AUTO: 52.5 FL (ref 37–54)
EOSINOPHIL # BLD AUTO: 0.14 10*3/MM3 (ref 0–0.4)
EOSINOPHIL NFR BLD AUTO: 3.3 % (ref 0.3–6.2)
ERYTHROCYTE [DISTWIDTH] IN BLOOD BY AUTOMATED COUNT: 15.3 % (ref 12.3–15.4)
HCT VFR BLD AUTO: 37.2 % (ref 34–46.6)
HGB BLD-MCNC: 11.2 G/DL (ref 12–15.9)
IMM GRANULOCYTES # BLD AUTO: 0 10*3/MM3 (ref 0–0.05)
IMM GRANULOCYTES NFR BLD AUTO: 0 % (ref 0–0.5)
LYMPHOCYTES # BLD AUTO: 0.77 10*3/MM3 (ref 0.7–3.1)
LYMPHOCYTES NFR BLD AUTO: 18.4 % (ref 19.6–45.3)
MCH RBC QN AUTO: 27.9 PG (ref 26.6–33)
MCHC RBC AUTO-ENTMCNC: 30.1 G/DL (ref 31.5–35.7)
MCV RBC AUTO: 92.5 FL (ref 79–97)
MONOCYTES # BLD AUTO: 0.42 10*3/MM3 (ref 0.1–0.9)
MONOCYTES NFR BLD AUTO: 10 % (ref 5–12)
NEUTROPHILS NFR BLD AUTO: 2.82 10*3/MM3 (ref 1.7–7)
NEUTROPHILS NFR BLD AUTO: 67.6 % (ref 42.7–76)
PLATELET # BLD AUTO: 209 10*3/MM3 (ref 140–450)
PMV BLD AUTO: 10.9 FL (ref 6–12)
RBC # BLD AUTO: 4.02 10*6/MM3 (ref 3.77–5.28)
WBC NRBC COR # BLD: 4.18 10*3/MM3 (ref 3.4–10.8)

## 2023-06-06 PROCEDURE — 36415 COLL VENOUS BLD VENIPUNCTURE: CPT

## 2023-06-06 PROCEDURE — 85025 COMPLETE CBC W/AUTO DIFF WBC: CPT

## 2023-06-06 PROCEDURE — G0463 HOSPITAL OUTPT CLINIC VISIT: HCPCS | Performed by: INTERNAL MEDICINE

## 2023-06-06 NOTE — PROGRESS NOTES
Chief Complaint  Low blood counts    Malka, Paris, APRN  Malka, Paris, APRN      Subjective          Gifty Hoover presents to [facility] for anemia    History of Present Illness   Ms. Gifty Hoover is a 68 yo F presenting for follow up for anemia She has a history of CAD, HTN, hepatic steatosis, GERD, obesity, HLD. She is taking iron every other day. She is tolerating it well. She continues to feel better. She denies any bleeding. No abdominal pain. No chest pain, trouble breathing, fevers, chills.    Hematology History     7/7/22 CBC with Hgb 9.9, WBC 4.28, plt 213, MCV 92.3. Positive occult blood. Also with transaminitis on labs from 6/30/22. Hemoglobin is in the 9.9 range. She has CKD as well with cr of 1.60 and GFR of 35. Iron studies with normal iron level, but iron sat was 15%, ferritin was elevated at 241. No prior labs to compare to on the ferritin. Previous colonoscopy with diverticulosis and grade I hemmorrhoids    8/3 CBC with improved hgb to 10.1, MCV 92.3, plt 241, WBC of 3.27. Abdominal ultrasound showed hepatic steatosis. SPEP without M-spike.     9/9/22 Iron studies: Ferritin down to 88.9, T sat of 12%, TIBC 429.     11/23-26/22: [facility] admission due to choledocholithiasis and biliary colic requiring ERCP, balloon extraction of 2 stones and placement of stent. Hgb sarabjit of 7.6 during this stay. Iron sat of 4%, iron low at 16    11/29/22: Hgb 9.9.    4/5/23: Hgb 10.5. Iron 38, ferritin 33, iron sat 8%, TIBC 460.    6/5/23: Iron sat 11%, ferritin 40    Oncology/Hematology History    No history exists.       Review of Systems   Constitutional:  Positive for fatigue.   HENT: Negative.     Eyes: Negative.    Respiratory:  Negative for shortness of breath.    Cardiovascular: Negative.    Gastrointestinal: Negative.    Endocrine: Negative.    Genitourinary:  Negative for frequency.   Musculoskeletal: Negative.    Allergic/Immunologic: Negative.    Neurological:  Negative.    Hematological: Negative.    Psychiatric/Behavioral: Negative.     All other systems reviewed and are negative.    Current Outpatient Medications on File Prior to Visit   Medication Sig Dispense Refill    albuterol sulfate HFA (ProAir HFA) 108 (90 Base) MCG/ACT inhaler Inhale 2 puffs Every 6 (Six) Hours As Needed for Wheezing. 18 g 0    aspirin 81 MG EC tablet Take 1 tablet by mouth Daily.      Calcium Carb-Cholecalciferol (Calcium-Vitamin D) 500-400 MG-UNIT tablet Take 1 tablet by mouth 2 (Two) Times a Day. 180 tablet 1    ezetimibe (ZETIA) 10 MG tablet Take 1 tablet by mouth Daily. 90 tablet 3    famotidine (PEPCID) 40 MG tablet TAKE ONE TABLET BY MOUTH AT BEDTIME 90 tablet 1    Ferrous Fumarate 324 (106 Fe) MG tablet Take 1 tablet by mouth Every Other Day. LAST DOSE 12/15/22      furosemide (LASIX) 20 MG tablet Take 1 tablet by mouth Daily As Needed (for leg swelling). 30 tablet 3    levothyroxine (SYNTHROID, LEVOTHROID) 88 MCG tablet Take 1 tablet by mouth Daily. 90 tablet 1    metFORMIN (GLUCOPHAGE) 500 MG tablet Take 1 tablet by mouth 2 (Two) Times a Day With Meals. INST PER ANESTHESIA PROTOCOL      omeprazole (priLOSEC) 40 MG capsule Take 1 capsule by mouth Daily. 90 capsule 1    potassium chloride 10 MEQ CR tablet Take 1 tablet by mouth Daily As Needed (take with Lasix). 30 tablet 3    Pradaxa 150 MG capsu Take 1 capsule by mouth 2 (Two) Times a Day. 180 capsule 1    rosuvastatin (CRESTOR) 40 MG tablet Take 1 tablet by mouth Every Night. 90 tablet 1    telmisartan (MICARDIS) 80 MG tablet Take 1 tablet by mouth Every Night. 90 tablet 1    vitamin C (ASCORBIC ACID) 250 MG tablet Take 1 tablet by mouth Daily. 90 tablet 1     No current facility-administered medications on file prior to visit.       No Known Allergies  Past Medical History:   Diagnosis Date    Anemia 2022    NO CURRENT S/S    Anxiety     Arthritis     Asthma 2020    Shortness of breath after walking long periods of time.    CAD  2021    [facility] SEES PT    Cholelithiasis     Chronic kidney disease     NO DIALYSIS    Colon polyp     BENIGN    Deep vein thrombosis     Green filter in place.    Depression     Diabetes mellitus     Elevated cholesterol     Essential hypertension 2021    GERD (gastroesophageal reflux disease)     Heart attack     NO STENTS/NO BYPASS    History of pulmonary embolism     , TAKE PRADAXA    Hypothyroid     Ingrown toenail     Lumbar radiculopathy 2018    describes sensory S1 radiculopathy but would favor right L5    Paroxysmal atrial fibrillation 2021    [facility]    Seasonal allergies     Sleep apnea     BIPAP?     Past Surgical History:   Procedure Laterality Date    BLADDER SURGERY  2011    BREAST BIOPSY      Upper right breast BENIGN    BREAST SURGERY      CARDIAC CATHETERIZATION      NO INTERVENTION    CARDIAC SURGERY      R/T BLOOD CLOTS, NO CARDIAC INTERVENTION     SECTION      CHOLECYSTECTOMY N/A 2022    Procedure: laparoscopic cholecystectomy, possible open;  Surgeon: Krystal Dacosta MD;  Location: [facility];  Service: General;  Laterality: N/A;    COLONOSCOPY      COLONOSCOPY N/A 2022    Procedure: COLONOSCOPY;  Surgeon: Ta Vasquez MD;  Location: [facility] ENDOSCOPY;  Service: Gastroenterology;  Laterality: N/A;  DIVERTICULOSIS    ENDOSCOPY N/A 2022    Procedure: ESOPHAGOGASTRODUODENOSCOPY WITH BX;  Surgeon: Ta Vasquez MD;  Location: [facility] ENDOSCOPY;  Service: Gastroenterology;  Laterality: N/A;  SMALL HIATAL HERNIA    ERCP N/A 2022    Procedure: ENDOSCOPIC RETROGRADE CHOLANGIOPANCREATOGRAPHY WITH SPHINCTEROTOMY, STONE REMOVAL, STENT PLACEMENT;  Surgeon: Arden Garcia MD;  Location: [facility] ENDOSCOPY;  Service: Gastroenterology;  Laterality: N/A;  BILE DUCT STONES    ERCP N/A 2023    Procedure: ENDOSCOPIC RETROGRADE CHOLANGIOPANCREATOGRAPHY with stent removal, balloon dilation,  stone removal;  Surgeon: Arden Garcia MD;  Location: [facility] ENDOSCOPY;  Service: Gastroenterology;  Laterality: N/A;  bile duct stones    NICHOLAS FILTER INSERTION JUGULAR  2011    HYSTERECTOMY  1983     Social History     Socioeconomic History    Marital status:    Tobacco Use    Smoking status: Never    Smokeless tobacco: Never   Vaping Use    Vaping Use: Never used   Substance and Sexual Activity    Alcohol use: Never    Drug use: Never    Sexual activity: Not Currently     Partners: Male     Birth control/protection: Hysterectomy     Family History   Problem Relation Age of Onset    Stroke Mother     Cancer Mother         Colon    Colon cancer Mother 71        still living at 73    Diabetes Mother     Arthritis Mother     Colon polyps Mother     Hypertension Mother     Kidney disease Mother     Miscarriages / Stillbirths Mother     Stroke Father     Arthritis Father         Padgets Disease    Osteoporosis Father     Inflammatory bowel disease Father     Cancer Sister         Eye    Thyroid disease Sister     Diabetes Brother     Stroke Brother     Bleeding Disorder Daughter     Diabetes Brother     Hypertension Brother     Malig Hyperthermia Neg Hx      Immunization History   Administered Date(s) Administered    COVID-19 (PFIZER) Purple Cap Monovalent 04/03/2021, 04/26/2021    Flu Vaccine Split Quad 11/06/2019    FluLaval/Fluzone >6mos 11/06/2019    FluMist 2-49yrs 12/07/2022    Fluad Quad 65+ 10/01/2021    Fluzone High-Dose 65+yrs 12/07/2022    Influenza Quad Vaccine (Inpatient) 11/06/2019    Influenza, Unspecified 10/01/2021    Pneumococcal Conjugate 13-Valent (PCV13) 06/30/2022    Pneumococcal Conjugate 20-Valent (PCV20) 06/30/2022    Pneumococcal Polysaccharide (PPSV23) 10/13/2017       Objective   Physical Exam  Vitals and nursing note reviewed.   Constitutional:       Appearance: Normal appearance. She is obese.   HENT:      Head: Normocephalic.      Nose: Nose normal.      Mouth/Throat:       Mouth: Mucous membranes are moist.   Eyes:      Conjunctiva/sclera: Conjunctivae normal.   Pulmonary:      Effort: Pulmonary effort is normal.   Abdominal:      General: Bowel sounds are normal.      Palpations: Abdomen is soft.   Musculoskeletal:         General: Normal range of motion.      Cervical back: Normal range of motion and neck supple.   Skin:     General: Skin is warm and dry.   Neurological:      General: No focal deficit present.      Mental Status: She is alert and oriented to person, place, and time.   Psychiatric:         Mood and Affect: Mood normal.         Behavior: Behavior normal.         Thought Content: Thought content normal.       Vitals:    06/06/23 1103   BP: 148/85   Pulse: 92   Resp: 18   Temp: 97.7 °F (36.5 °C)   TempSrc: Temporal   SpO2: 98%   Weight: 99.7 kg (219 lb 12.8 oz)   PainSc: 0-No pain               ECOG: (1) Restricted in Physically Strenuous Activity, Ambulatory & Able to Do Work of Light Nature  Fall Risk Assessment was completed, and patient is at low risk for falls.  PHQ-9 Total Score:         The patient is  experiencing fatigue. Fatigue score: 7    PT/OT Functional Screening: PT fx screen: No needs identified  Speech Functional Screening: Speech fx screen: No needs identified  Rehab to be ordered: Rehab to be ordered: No needs identified        Result Review :   The following data was reviewed by:Toño Carroll MD on 06/06/23:  Lab Results   Component Value Date    HGB 10.5 (L) 04/05/2023    HCT 34.6 04/05/2023    MCV 88.5 04/05/2023     04/05/2023    WBC 4.40 04/05/2023    NEUTROABS 3.02 04/05/2023    LYMPHSABS 0.79 04/05/2023    MONOSABS 0.42 04/05/2023    EOSABS 0.13 04/05/2023    BASOSABS 0.03 04/05/2023     Lab Results   Component Value Date    GLUCOSE 102 (H) 02/06/2023    BUN 18 02/06/2023    CREATININE 0.87 02/06/2023     02/06/2023    K 4.6 02/06/2023     (H) 02/06/2023    CO2 28.6 02/06/2023    CALCIUM 9.7 02/06/2023     PROTEINTOT 7.0 02/06/2023    ALBUMIN 4.1 02/06/2023    BILITOT 0.3 02/06/2023    ALKPHOS 82 02/06/2023    AST 18 02/06/2023    ALT 13 02/06/2023     Iron labs reviewed and revealed low iron saturation but improving, ferritin increased to 40    11/26/22 D/c summary personally reviewed.     Abdominal ultrasound report reviewed and consistent with hepatic steatosis.      Assessment and Plan    Diagnoses and all orders for this visit:    1. Low hemoglobin (Primary)  -     CBC & Differential; Future  -     CBC & Differential; Future  -     Iron Profile; Future  -     Ferritin; Future    Patient with blood loss from hospital stay due to choledocholithiasis in November of 2022. Hgb is trending up.  Her iron sat remains low but it is improving. She is on iron supplementation orally every other day and is tolerating it well. Recommend to continue this. Vitamin C added to help with absorption. Will see patient back in 4 months to re-evaluate as she could continue to improve. Patient with EGD and C-scope 11/2022 with no source of bleeding, however she does have diverticulosis.  Recommend continued diet and exercise due to fatty liver.       CKD  SPEP has been checked and was without M-spike. Patient is following with nephrology.       I spent 20 minutes caring for Gifty on this date of service. This time includes time spent by me in the following activities:preparing for the visit, reviewing tests, obtaining and/or reviewing a separately obtained history, performing a medically appropriate examination and/or evaluation , counseling and educating the patient/family/caregiver, ordering medications, tests, or procedures, referring and communicating with other health care professionals , documenting information in the medical record and independently interpreting results and communicating that information with the patient/family/caregiver    Patient Follow Up: 4 months with labs  Patient was given instructions and counseling  regarding her condition or for health maintenance advice. Please see specific information pulled into the AVS if appropriate.

## 2023-06-12 ENCOUNTER — OFFICE VISIT (OUTPATIENT)
Dept: PODIATRY | Facility: CLINIC | Age: 68
End: 2023-06-12
Payer: MEDICARE

## 2023-06-12 VITALS
SYSTOLIC BLOOD PRESSURE: 130 MMHG | OXYGEN SATURATION: 93 % | HEIGHT: 60 IN | HEART RATE: 96 BPM | WEIGHT: 222 LBS | DIASTOLIC BLOOD PRESSURE: 80 MMHG | TEMPERATURE: 98.2 F | BODY MASS INDEX: 43.59 KG/M2

## 2023-06-12 DIAGNOSIS — E11.9 NON-INSULIN DEPENDENT TYPE 2 DIABETES MELLITUS: ICD-10-CM

## 2023-06-12 DIAGNOSIS — M79.672 FOOT PAIN, BILATERAL: Primary | ICD-10-CM

## 2023-06-12 DIAGNOSIS — L60.0 ONYCHOCRYPTOSIS: ICD-10-CM

## 2023-06-12 DIAGNOSIS — B35.1 ONYCHOMYCOSIS: ICD-10-CM

## 2023-06-12 DIAGNOSIS — E11.8 DIABETIC FOOT: ICD-10-CM

## 2023-06-12 DIAGNOSIS — M79.671 FOOT PAIN, BILATERAL: Primary | ICD-10-CM

## 2023-06-12 NOTE — PROGRESS NOTES
[facility] - PODIATRY    Today's Date: 06/12/23    Patient Name: Gifty Hoover  MRN: [id number]  CSN: [id number]  PCP: Paris Ace APRN, Last PCP Visit:  1/9/2023  Referring Provider: No ref. provider found    SUBJECTIVE     Chief Complaint   Patient presents with    Left Foot - Follow-up, Nail Problem    Right Foot - Follow-up, Nail Problem       HPI: Gifty Hoover, a 67 y.o.female, presents to clinic for painful toenail and a diabetic foot evaluation.    New, Established, New Problem:  est  Location:  Toenails  Duration:   Greater than five years  Onset:  Gradual  Nature:  sore with palpation.  Stable, worsening, improving:  stable  Aggravating factors:  Pain with shoe gear and ambulation.  Previous Treatment:  Debridement    Patient controlling diabetes via: Oral medication    Patient states their last blood glucose was: 81    Patient denies any fevers, chills, nausea, vomiting, shortness of breath, nor any other constitutional signs nor symptoms.    Medical changes:  none.    Past Medical History:   Diagnosis Date    Anemia 2022    NO CURRENT S/S    Anxiety     Arthritis     Asthma 2020    Shortness of breath after walking long periods of time.    CAD 09/14/2021    [facility]O SEES PT    Cholelithiasis 2022    Chronic kidney disease 2022    NO DIALYSIS    Colon polyp 2008    BENIGN    Deep vein thrombosis 2011    Green filter in place.    Depression     Diabetes mellitus     Elevated cholesterol     Essential hypertension 09/14/2021    GERD (gastroesophageal reflux disease)     Heart attack 2009    NO STENTS/NO BYPASS    History of pulmonary embolism     2011, TAKE PRADAXA    Hypothyroid     Ingrown toenail 2022    Lumbar radiculopathy 09/2018    describes sensory S1 radiculopathy but would favor right L5    Paroxysmal atrial fibrillation 09/14/2021    AGUAYO    Seasonal allergies     Sleep apnea     BIPAP?     Past Surgical History:   Procedure Laterality Date    BLADDER SURGERY  2011    BREAST  BIOPSY      Upper right breast BENIGN    BREAST SURGERY      CARDIAC CATHETERIZATION      NO INTERVENTION    CARDIAC SURGERY      R/T BLOOD CLOTS, NO CARDIAC INTERVENTION     SECTION      CHOLECYSTECTOMY N/A 2022    Procedure: laparoscopic cholecystectomy, possible open;  Surgeon: Krystal Dacosta MD;  Location: [facility];  Service: General;  Laterality: N/A;    COLONOSCOPY  2018    COLONOSCOPY N/A 2022    Procedure: COLONOSCOPY;  Surgeon: Ta Vasquez MD;  Location: [facility] ENDOSCOPY;  Service: Gastroenterology;  Laterality: N/A;  DIVERTICULOSIS    ENDOSCOPY N/A 2022    Procedure: ESOPHAGOGASTRODUODENOSCOPY WITH BX;  Surgeon: Ta Vasquez MD;  Location: [facility] ENDOSCOPY;  Service: Gastroenterology;  Laterality: N/A;  SMALL HIATAL HERNIA    ERCP N/A 2022    Procedure: ENDOSCOPIC RETROGRADE CHOLANGIOPANCREATOGRAPHY WITH SPHINCTEROTOMY, STONE REMOVAL, STENT PLACEMENT;  Surgeon: Arden Garcia MD;  Location: [facility] ENDOSCOPY;  Service: Gastroenterology;  Laterality: N/A;  BILE DUCT STONES    ERCP N/A 2023    Procedure: ENDOSCOPIC RETROGRADE CHOLANGIOPANCREATOGRAPHY with stent removal, balloon dilation, stone removal;  Surgeon: Arden Garcia MD;  Location: [facility] ENDOSCOPY;  Service: Gastroenterology;  Laterality: N/A;  bile duct stones    NICHOLAS FILTER INSERTION JUGULAR      HYSTERECTOMY       Family History   Problem Relation Age of Onset    Stroke Mother     Cancer Mother         Colon    Colon cancer Mother 71        still living at 73    Diabetes Mother     Arthritis Mother     Colon polyps Mother     Hypertension Mother     Kidney disease Mother     Miscarriages / Stillbirths Mother     Stroke Father     Arthritis Father         Padgets Disease    Osteoporosis Father     Inflammatory bowel disease Father     Cancer Sister         Eye    Thyroid disease Sister     Diabetes Brother     Stroke Brother     Bleeding  Disorder Daughter     Diabetes Brother     Hypertension Brother     Malsarthak Hyperthermia Neg Hx      Social History     Socioeconomic History    Marital status:    Tobacco Use    Smoking status: Never    Smokeless tobacco: Never   Vaping Use    Vaping Use: Never used   Substance and Sexual Activity    Alcohol use: Never    Drug use: Never    Sexual activity: Not Currently     Partners: Male     Birth control/protection: Surgical, Hysterectomy     No Known Allergies  Current Outpatient Medications   Medication Sig Dispense Refill    albuterol sulfate HFA (ProAir HFA) 108 (90 Base) MCG/ACT inhaler Inhale 2 puffs Every 6 (Six) Hours As Needed for Wheezing. 18 g 0    aspirin 81 MG EC tablet Take 1 tablet by mouth Daily.      Calcium Carb-Cholecalciferol (Calcium-Vitamin D) 500-400 MG-UNIT tablet Take 1 tablet by mouth 2 (Two) Times a Day. 180 tablet 1    ezetimibe (ZETIA) 10 MG tablet Take 1 tablet by mouth Daily. 90 tablet 3    famotidine (PEPCID) 40 MG tablet TAKE ONE TABLET BY MOUTH AT BEDTIME 90 tablet 1    Ferrous Fumarate 324 (106 Fe) MG tablet Take 1 tablet by mouth Every Other Day. LAST DOSE 12/15/22      furosemide (LASIX) 20 MG tablet Take 1 tablet by mouth Daily As Needed (for leg swelling). 30 tablet 3    levothyroxine (SYNTHROID, LEVOTHROID) 88 MCG tablet Take 1 tablet by mouth Daily. 90 tablet 1    metFORMIN (GLUCOPHAGE) 500 MG tablet Take 1 tablet by mouth 2 (Two) Times a Day With Meals. INST PER ANESTHESIA PROTOCOL      omeprazole (priLOSEC) 40 MG capsule Take 1 capsule by mouth Daily. 90 capsule 1    potassium chloride 10 MEQ CR tablet Take 1 tablet by mouth Daily As Needed (take with Lasix). 30 tablet 3    Pradaxa 150 MG capsu Take 1 capsule by mouth 2 (Two) Times a Day. 180 capsule 1    rosuvastatin (CRESTOR) 40 MG tablet Take 1 tablet by mouth Every Night. 90 tablet 1    telmisartan (MICARDIS) 80 MG tablet Take 1 tablet by mouth Every Night. 90 tablet 1    vitamin C (ASCORBIC ACID) 250 MG  tablet Take 1 tablet by mouth Daily. 90 tablet 1     No current facility-administered medications for this visit.     Review of Systems   Constitutional: Negative.    Skin:         Painful toenails.   All other systems reviewed and are negative.    OBJECTIVE     Vitals:    23 0839   BP: 130/80   Pulse: 96   Temp: 98.2 °F (36.8 °C)   SpO2: 93%       Body mass index is 43.36 kg/m².    Lab Results   Component Value Date    HGBA1C 6.10 (H) 2023       Lab Results   Component Value Date    GLUCOSE 102 (H) 2023    CALCIUM 9.7 2023     2023    K 4.6 2023    CO2 28.6 2023     (H) 2023    BUN 18 2023    CREATININE 0.87 2023    EGFRIFNONA 33 (L) 2021    BCR 20.7 2023    ANIONGAP 6.4 2023       Patient seen in no apparent distress.      PHYSICAL EXAM:     Foot/Ankle Exam    GENERAL  Appearance:  obese and elderly  Orientation:  AAOx3  Affect:  appropriate  Gait:  unimpaired  Assistance:  independent  Right shoe gear: casual shoe  Left shoe gear: casual shoe    VASCULAR     Right Foot Vascularity   Normal vascular exam    Dorsalis pedis:  1+  Posterior tibial:  1+  Skin temperature:  warm  Edema gradin+ and pitting  CFT:  < 3 seconds  Pedal hair growth:  Absent  Varicosities:  none and moderate varicosities     Left Foot Vascularity   Normal vascular exam    Dorsalis pedis:  1+  Posterior tibial:  1+  Skin temperature:  warm  Edema gradin+ and pitting  CFT:  < 3 seconds  Pedal hair growth:  Absent  Varicosities:  none and moderate varicosities     NEUROLOGIC     Right Foot Neurologic   Normal sensation    Light touch sensation: normal  Vibratory sensation: normal  Hot/Cold sensation: normal  Protective Sensation using [place] Monofilament:   Sites intact: 10  Sites tested: 10     Left Foot Neurologic   Normal sensation    Light touch sensation: normal  Vibratory sensation: normal  Hot/Cold sensation:  normal  Protective  Sensation using [place]-Max[place] Monofilament:   Sites intact: 10  Sites tested: 10    MUSCLE STRENGTH     Right Foot Muscle Strength   Foot dorsiflexion:  4  Foot plantar flexion:  4  Foot inversion:  4  Foot eversion:  4     Left Foot Muscle Strength   Foot dorsiflexion:  4  Foot plantar flexion:  4  Foot inversion:  4  Foot eversion:  4    RANGE OF MOTION     Right Foot Range of Motion   Foot and ankle ROM within normal limits       Left Foot Range of Motion   Foot and ankle ROM within normal limits      DERMATOLOGIC      Right Foot Dermatologic   Skin  Right foot skin is intact.   Nails  1.  Positive for elongated, onychomycosis, abnormal thickness, subungual debris and ingrown toenail.  2.  Positive for elongated, onychomycosis, abnormal thickness, subungual debris and ingrown toenail.  3.  Positive for elongated, onychomycosis, abnormal thickness, subungual debris and ingrown toenail.  4.  Positive for elongated, onychomycosis, abnormal thickness, subungual debris and ingrown toenail.  5.  Positive for elongated, onychomycosis, abnormal thickness, subungual debris and ingrown toenail.     Left Foot Dermatologic   Skin  Left foot skin is intact.   Nails  1.  Positive for elongated, onychomycosis, abnormal thickness, subungual debris and ingrown toenail.  3.  Positive for elongated, onychomycosis, abnormal thickness, subungual debris and ingrown toenail.  4.  Positive for elongated, onychomycosis, abnormally thick, subungual debris and ingrown toenail.  5.  Positive for elongated, onychomycosis, abnormally thick, subungual debris and ingrown toenail.    Diabetic Foot Exam Performed      ASSESSMENT/PLAN     Diagnoses and all orders for this visit:    1. Foot pain, bilateral (Primary)    2. Diabetic foot    3. Non-insulin dependent type 2 diabetes mellitus    4. Onychocryptosis    5. Onychomycosis        Comprehensive lower extremity examination and evaluation was performed.    Discussed findings and treatment  plan including risks, benefits, and treatment options with patient in detail. Patient agreed with treatment plan.    Medications and allergies reviewed.  Reviewed available blood glucose and HgB A1C lab values along with other pertinent labs.  These were discussed with the patient as to their importance of diabetic maintenance.    Toenails 1, 2, 3, 4, 5 on Right and 1, 3, 4, 5 on Left were debrided with nail nippers then filed with a Dremel nail yves.  Patient tolerated procedure well without complications.    Diabetic foot exam performed and documented this date, compliant with CQM required standards. Detail of findings as noted in physical exam.  Lower extremity Neurologic exam for diabetic patient performed and documented this date, compliant with PQRS required standards. Detail of findings as noted in physical exam.  Advised patient importance of good routine lower extremity hygiene. Advised patient importance of evaluating for intact skin and pain free nail borders.  Advised patient to use mirror to evaluate plantar/ soles of feet for better visualization. Advised patient monitor and phone office to be seen if any cracking to skin, open lesions, painful nail borders or if nails become elongated prior to next visit. Advised patient importance of daily cleansing of lower extremities, followed by good skin cream to maintain normal hydration of skin. Also advised patient importance of close daily monitoring of blood sugar. Advised to regulate diet and medications to maintain control of blood sugar in optimal range. Contact primary care provider if difficulties maintaining blood sugar levels.  Advised Patient of presence of Diabetes Mellitus condition.  Advised Patient risk of progression and worsening or improvement, then return of condition.  Will monitor condition for any change in future. Treat with most appropriate treatment pending status of condition.  Counseled and advised patient extensively on nature and  ramifications of diabetes. Standard instructions given to patient for good diabetic foot care and maintenance. Advised importance of careful monitoring to avoid break down and complications secondary to diabetes. Advised patient importance of strict maintenance of blood sugar control. Advised patient of possible ominous results from neglect of condition, i.e.: amputation/ loss of digits, feet and legs, or even death.  Patient states understands counseling, will monitor closely, continue good hygiene and routine diabetic foot care. Patient will contact office should they have any questions or problems.      An After Visit Summary was printed and given to the patient at discharge, including (if requested) any available informative/educational handouts regarding diagnosis, treatment, or medications. All questions were answered to patient/family satisfaction. Should symptoms fail to improve or worsen they agree to call or return to clinic or to go to the Emergency Department. Discussed the importance of following up with any needed screening tests/labs/specialist appointments and any requested follow-up recommended by me today. Importance of maintaining follow-up discussed and patient accepts that missed appointments can delay diagnosis and potentially lead to worsening of conditions.    Return in about 9 weeks (around 8/14/2023) for Toenail Care., or sooner if acute issues arise.    This document has been electronically signed by Isiah Cuevas DPM on June 12, 2023 08:51 EDT

## 2023-07-14 NOTE — PROGRESS NOTES
The ABCs of the Annual Wellness Visit  Subsequent Medicare Wellness Visit    Subjective    Gifty Hoover is a 67 y.o. female who presents for a Subsequent Medicare Wellness Visit.    The following portions of the patient's history were reviewed and   updated as appropriate: allergies, current medications, past family history, past medical history, past social history, past surgical history, and problem list.    Compared to one year ago, the patient feels her physical   health is better.    Compared to one year ago, the patient feels her mental   health is better.    Recent Hospitalizations:  She was not admitted to the hospital during the last year.       Current Medical Providers:  Patient Care Team:  Paris Ace APRN as PCP - General (Nurse Practitioner)  Titi Zavala MD as Consulting Physician (Cardiology)  Krystal Dacosta MD as Consulting Physician (General Surgery)    Outpatient Medications Prior to Visit   Medication Sig Dispense Refill    albuterol sulfate HFA (ProAir HFA) 108 (90 Base) MCG/ACT inhaler Inhale 2 puffs Every 6 (Six) Hours As Needed for Wheezing. 18 g 0    aspirin 81 MG EC tablet Take 1 tablet by mouth Daily.      Calcium Carb-Cholecalciferol (Calcium-Vitamin D) 500-400 MG-UNIT tablet Take 1 tablet by mouth 2 (Two) Times a Day. 180 tablet 1    ezetimibe (ZETIA) 10 MG tablet Take 1 tablet by mouth Daily. 90 tablet 3    famotidine (PEPCID) 40 MG tablet TAKE ONE TABLET BY MOUTH AT BEDTIME 90 tablet 1    Ferrous Fumarate 324 (106 Fe) MG tablet Take 1 tablet by mouth Every Other Day. LAST DOSE 12/15/22      furosemide (LASIX) 20 MG tablet Take 1 tablet by mouth Daily As Needed (for leg swelling). 30 tablet 3    potassium chloride 10 MEQ CR tablet Take 1 tablet by mouth Daily As Needed (take with Lasix). 30 tablet 3    vitamin C (ASCORBIC ACID) 250 MG tablet Take 1 tablet by mouth Daily. 90 tablet 1    levothyroxine (SYNTHROID, LEVOTHROID) 88 MCG tablet Take 1 tablet by mouth Daily. 90  tablet 1    metFORMIN (GLUCOPHAGE) 500 MG tablet Take 1 tablet by mouth 2 (Two) Times a Day With Meals. INST PER ANESTHESIA PROTOCOL      omeprazole (priLOSEC) 40 MG capsule Take 1 capsule by mouth Daily. 90 capsule 1    Pradaxa 150 MG capsu Take 1 capsule by mouth 2 (Two) Times a Day. 180 capsule 1    rosuvastatin (CRESTOR) 40 MG tablet Take 1 tablet by mouth Every Night. 90 tablet 1    telmisartan (MICARDIS) 80 MG tablet Take 1 tablet by mouth Every Night. 90 tablet 1     No facility-administered medications prior to visit.       No opioid medication identified on active medication list. I have reviewed chart for other potential  high risk medication/s and harmful drug interactions in the elderly.          Aspirin is on active medication list. Aspirin use is indicated based on review of current medical condition/s. Pros and cons of this therapy have been discussed today. Benefits of this medication outweigh potential harm.  Patient has been encouraged to continue taking this medication.  .      Patient Active Problem List   Diagnosis    CAD    Essential hypertension    Hyperlipidemia LDL goal <70    Paroxysmal atrial fibrillation    Arthritis    Closed fracture of ankle    Closed fracture of left distal fibula    Deep vein phlebitis and thrombophlebitis of the leg    Depression    Acquired hypothyroidism    Generalized anxiety disorder    Lumbar radiculopathy    Major depression, single episode    Osteoporosis    Seasonal allergic rhinitis    Vitamin D deficiency    Gastroesophageal reflux disease without esophagitis    Obesity    Occult blood positive stool    Iron deficiency anemia    History of colon polyps    Stage 3b chronic kidney disease    Calculus of gallbladder    Elevated LFTs    Obesity, Class III, BMI 40-49.9 (morbid obesity)    Encounter for removal of biliary stent    Type 2 diabetes mellitus without complication     Advance Care Planning   Advance Care Planning     Advance Directive is not on file.  " ACP discussion was held with the patient during this visit. Patient has an advance directive (not in EMR), copy requested.     Objective    Vitals:    23 1031 23 1039   BP: 143/71 135/74   Pulse: 91    Temp: 98 øF (36.7 øC)    TempSrc: Temporal    SpO2: 94%    Weight: 105 kg (230 lb 12.8 oz)      Estimated body mass index is 45.08 kg/mý as calculated from the following:    Height as of 23: 152.4 cm (60\").    Weight as of this encounter: 105 kg (230 lb 12.8 oz).          Does the patient have evidence of cognitive impairment? No          HEALTH RISK ASSESSMENT    Smoking Status:  Social History     Tobacco Use   Smoking Status Never   Smokeless Tobacco Never     Alcohol Consumption:  Social History     Substance and Sexual Activity   Alcohol Use Never     Fall Risk Screen:    NIHARIKA Fall Risk Assessment was completed, and patient is at LOW risk for falls.Assessment completed on:2023    Depression Screenin/16/2023    10:37 AM   PHQ-2/PHQ-9 Depression Screening   Little Interest or Pleasure in Doing Things 0-->not at all   Feeling Down, Depressed or Hopeless 0-->not at all   PHQ-9: Brief Depression Severity Measure Score 0       Health Habits and Functional and Cognitive Screenin/16/2023    10:35 AM   Functional & Cognitive Status   Do you have difficulty preparing food and eating? No   Do you have difficulty bathing yourself, getting dressed or grooming yourself? No   Do you have difficulty using the toilet? No   Do you have difficulty moving around from place to place? No   Do you have trouble with steps or getting out of a bed or a chair? No   Current Diet Unhealthy Diet   Dental Exam Up to date   Eye Exam Up to date   Exercise (times per week) 7 times per week   Current Exercises Include Walking   Do you need help using the phone?  No   Are you deaf or do you have serious difficulty hearing?  No   Do you need help to go to places out of walking distance? No   Do you need " help shopping? No   Do you need help preparing meals?  No   Do you need help with housework?  No   Do you need help with laundry? No   Do you need help taking your medications? No   Do you need help managing money? No   Do you ever drive or ride in a car without wearing a seat belt? No   Have you felt unusual stress, anger or loneliness in the last month? No   Who do you live with? Alone   If you need help, do you have trouble finding someone available to you? No   Have you been bothered in the last four weeks by sexual problems? No   Do you have difficulty concentrating, remembering or making decisions? No       Age-appropriate Screening Schedule:  Refer to the list below for future screening recommendations based on patient's age, sex and/or medical conditions. Orders for these recommended tests are listed in the plan section. The patient has been provided with a written plan.    Health Maintenance   Topic Date Due    TDAP/TD VACCINES (1 - Tdap) Never done    ZOSTER VACCINE (1 of 2) Never done    COVID-19 Vaccine (3 - Pfizer series) 06/21/2021    DIABETIC EYE EXAM  Never done    HEMOGLOBIN A1C  08/06/2023    INFLUENZA VACCINE  10/01/2023    LIPID PANEL  02/06/2024    MAMMOGRAM  05/04/2024    DXA SCAN  05/04/2024    DIABETIC FOOT EXAM  06/12/2024    URINE MICROALBUMIN  08/07/2024    ANNUAL WELLNESS VISIT  08/16/2024    COLORECTAL CANCER SCREENING  11/22/2027    Pneumococcal Vaccine 65+  Completed    HEPATITIS C SCREENING  Discontinued                  CMS Preventative Services Quick Reference  Risk Factors Identified During Encounter  Immunizations Discussed/Encouraged: Shingrix  The above risks/problems have been discussed with the patient.  Pertinent information has been shared with the patient in the After Visit Summary.  An After Visit Summary and PPPS were made available to the patient.    Follow Up:   Next Medicare Wellness visit to be scheduled in 1 year.       Additional E&M Note during same encounter  follows:  Patient has multiple medical problems which are significant and separately identifiable that require additional work above and beyond the Medicare Wellness Visit.      Chief Complaint  Medicare Wellness-subsequent, Dysuria, and Cyst (On face x6 mo getting bigger; another one coming up.)    Subjective        HPI  Gifty Hoover is also being seen today for     Pt c/o dysuria and itching. Worse over the last week. Ongoing for a while.     GERD - well controlled denies any flaring. Improved since having gallbladder removal.    HLD - denies any myalgias.     HTN - well controlled. Wnl at home.     DM - pt is taking metformin does have some trouble with dietary intake. Last eye exam 12/2022.    Non-healing lesion for 6 months on right inner cheek region. Pt reports it scabs at times. Not improved with otc abx ointment.    Review of Systems   Constitutional:  Negative for fever.   Respiratory:  Negative for chest tightness and shortness of breath.    Cardiovascular:  Negative for chest pain, palpitations and leg swelling.   Genitourinary:  Positive for dysuria.   Neurological:  Negative for dizziness.   Psychiatric/Behavioral:  Negative for sleep disturbance.      Objective   Vital Signs:  /74   Pulse 91   Temp 98 øF (36.7 øC) (Temporal)   Wt 105 kg (230 lb 12.8 oz)   SpO2 94%   BMI 45.08 kg/mý     Physical Exam  Vitals reviewed.   Constitutional:       Appearance: Normal appearance. She is well-developed.   HENT:      Head: Normocephalic and atraumatic.   Eyes:      Conjunctiva/sclera: Conjunctivae normal.      Pupils: Pupils are equal, round, and reactive to light.   Cardiovascular:      Rate and Rhythm: Normal rate and regular rhythm.      Heart sounds: Normal heart sounds. No murmur heard.  Pulmonary:      Effort: Pulmonary effort is normal.      Breath sounds: Normal breath sounds. No wheezing or rhonchi.   Abdominal:      General: Bowel sounds are normal. There is no distension.      Palpations:  Abdomen is soft.      Tenderness: There is no abdominal tenderness.   Skin:     General: Skin is warm and dry.      Comments: Right inner cheek region with excoriation with slight surrounding erythema approximately 6 mm.   Neurological:      Mental Status: She is alert and oriented to person, place, and time.   Psychiatric:         Mood and Affect: Mood and affect normal.         Behavior: Behavior normal.         Thought Content: Thought content normal.         Judgment: Judgment normal.        The following data was reviewed by: TIEN Perez on 08/16/2023:  Common labs          4/5/2023    09:17 6/6/2023    11:31 8/7/2023    10:26   Common Labs   Glucose   124    BUN   19    Creatinine   0.90    Sodium   143    Potassium   4.5    Chloride   106    Calcium   9.8    Albumin   4.2    Total Bilirubin   0.3    Alkaline Phosphatase   85    AST (SGOT)   24    ALT (SGPT)   19    WBC 4.40  4.18  3.73    Hemoglobin 10.5  11.2  11.5    Hematocrit 34.6  37.2  35.6    Platelets 211  209  205    Microalbumin, Urine   3.5      Data reviewed : GI studies ERCP and colonoscopy           Assessment and Plan   Diagnoses and all orders for this visit:    1. AWV,Sub (Primary)    2. Essential hypertension  -     telmisartan (MICARDIS) 80 MG tablet; Take 1 tablet by mouth Every Night.  Dispense: 90 tablet; Refill: 1    3. Hyperlipidemia LDL goal <70  -     rosuvastatin (CRESTOR) 40 MG tablet; Take 1 tablet by mouth Every Night.  Dispense: 90 tablet; Refill: 1    4. Type 2 diabetes mellitus without complication, unspecified whether long term insulin use  -     Hemoglobin A1c    5. Acquired hypothyroidism  -     TSH  -     levothyroxine (SYNTHROID, LEVOTHROID) 88 MCG tablet; Take 1 tablet by mouth Daily.  Dispense: 90 tablet; Refill: 1    6. Breast cancer screening by mammogram  -     Mammo Screening Digital Tomosynthesis Bilateral With CAD; Future    7. Dysuria  -     POCT urinalysis dipstick, automated  -     Urine Culture -  Urine, Urine, Clean Catch    8. Atrophic vaginitis  -     Estrogens Conjugated (Premarin) 0.625 MG/GM vaginal cream; Apply daily for 2 weeks then use 2 times weekly thereafter.  Dispense: 30 g; Refill: 1  -     fluconazole (Diflucan) 150 MG tablet; Take 1 tablet by mouth 1 (One) Time for 1 dose.  Dispense: 1 tablet; Refill: 0    9. Gastroesophageal reflux disease without esophagitis  -     omeprazole (priLOSEC) 40 MG capsule; Take 1 capsule by mouth Daily.  Dispense: 90 capsule; Refill: 1    10. Paroxysmal atrial fibrillation  -     Pradaxa 150 MG capsu; Take 1 capsule by mouth 2 (Two) Times a Day.  Dispense: 180 capsule; Refill: 1    11. Skin lesion of face  -     Ambulatory Referral to Dermatology    Other orders  -     metFORMIN (GLUCOPHAGE) 500 MG tablet; Take 1 tablet by mouth 2 (Two) Times a Day With Meals. INST PER ANESTHESIA PROTOCOL  Dispense: 90 tablet; Refill: 1      Continue blood sugar monitoring daily and record.  Bring your log to office visits.  Call the office for readings below 70 and above 250 or any complications.    Daily foot care.  Avoid walking barefoot.    Annual dilated eye exam.    Discussed with patient blood pressure monitoring, hemoglobin A1c levels need to be below 7, and LDL goals below 70.           Follow Up   Return in about 6 months (around 2/16/2024) for Next scheduled follow up.    Patient was given instructions and counseling regarding her condition or for health maintenance advice. Please see specific information pulled into the AVS if appropriate.     Updated annual wellness visit checklist.  Immunizations discussed.  Screening discussed and/or ordered.  Recommend yearly dental and eye exams. Also discussed monitoring of blood pressure and lipids.      TIEN Perez

## 2023-08-07 ENCOUNTER — LAB (OUTPATIENT)
Dept: LAB | Facility: HOSPITAL | Age: 68
End: 2023-08-07
Payer: MEDICARE

## 2023-08-07 ENCOUNTER — TRANSCRIBE ORDERS (OUTPATIENT)
Dept: LAB | Facility: HOSPITAL | Age: 68
End: 2023-08-07
Payer: MEDICARE

## 2023-08-07 DIAGNOSIS — I10 HYPERTENSION, ESSENTIAL: Primary | ICD-10-CM

## 2023-08-07 DIAGNOSIS — D64.9 ANEMIA, UNSPECIFIED TYPE: ICD-10-CM

## 2023-08-07 DIAGNOSIS — N18.30 STAGE 3 CHRONIC KIDNEY DISEASE, UNSPECIFIED WHETHER STAGE 3A OR 3B CKD: ICD-10-CM

## 2023-08-07 DIAGNOSIS — I10 HYPERTENSION, ESSENTIAL: ICD-10-CM

## 2023-08-07 LAB
ALBUMIN SERPL-MCNC: 4.2 G/DL (ref 3.5–5.2)
ALBUMIN UR-MCNC: 3.5 MG/DL
ALBUMIN/GLOB SERPL: 1.8 G/DL
ALP SERPL-CCNC: 85 U/L (ref 39–117)
ALT SERPL W P-5'-P-CCNC: 19 U/L (ref 1–33)
ANION GAP SERPL CALCULATED.3IONS-SCNC: 10 MMOL/L (ref 5–15)
AST SERPL-CCNC: 24 U/L (ref 1–32)
BASOPHILS # BLD AUTO: 0.03 10*3/MM3 (ref 0–0.2)
BASOPHILS NFR BLD AUTO: 0.8 % (ref 0–1.5)
BILIRUB SERPL-MCNC: 0.3 MG/DL (ref 0–1.2)
BUN SERPL-MCNC: 19 MG/DL (ref 8–23)
BUN/CREAT SERPL: 21.1 (ref 7–25)
CALCIUM SPEC-SCNC: 9.8 MG/DL (ref 8.6–10.5)
CHLORIDE SERPL-SCNC: 106 MMOL/L (ref 98–107)
CO2 SERPL-SCNC: 27 MMOL/L (ref 22–29)
CREAT SERPL-MCNC: 0.9 MG/DL (ref 0.57–1)
CREAT UR-MCNC: 100.3 MG/DL
CREAT UR-MCNC: 113.3 MG/DL
DEPRECATED RDW RBC AUTO: 44.5 FL (ref 37–54)
EGFRCR SERPLBLD CKD-EPI 2021: 70.2 ML/MIN/1.73
EOSINOPHIL # BLD AUTO: 0.15 10*3/MM3 (ref 0–0.4)
EOSINOPHIL NFR BLD AUTO: 4 % (ref 0.3–6.2)
ERYTHROCYTE [DISTWIDTH] IN BLOOD BY AUTOMATED COUNT: 13.4 % (ref 12.3–15.4)
GLOBULIN UR ELPH-MCNC: 2.4 GM/DL
GLUCOSE SERPL-MCNC: 124 MG/DL (ref 65–99)
HCT VFR BLD AUTO: 35.6 % (ref 34–46.6)
HGB BLD-MCNC: 11.5 G/DL (ref 12–15.9)
IMM GRANULOCYTES # BLD AUTO: 0.02 10*3/MM3 (ref 0–0.05)
IMM GRANULOCYTES NFR BLD AUTO: 0.5 % (ref 0–0.5)
IRON 24H UR-MRATE: 117 MCG/DL (ref 37–145)
IRON SATN MFR SERPL: 25 % (ref 20–50)
LYMPHOCYTES # BLD AUTO: 0.79 10*3/MM3 (ref 0.7–3.1)
LYMPHOCYTES NFR BLD AUTO: 21.2 % (ref 19.6–45.3)
MCH RBC QN AUTO: 29.4 PG (ref 26.6–33)
MCHC RBC AUTO-ENTMCNC: 32.3 G/DL (ref 31.5–35.7)
MCV RBC AUTO: 91 FL (ref 79–97)
MICROALBUMIN/CREAT UR: 34.9 MG/G
MONOCYTES # BLD AUTO: 0.44 10*3/MM3 (ref 0.1–0.9)
MONOCYTES NFR BLD AUTO: 11.8 % (ref 5–12)
NEUTROPHILS NFR BLD AUTO: 2.3 10*3/MM3 (ref 1.7–7)
NEUTROPHILS NFR BLD AUTO: 61.7 % (ref 42.7–76)
NRBC BLD AUTO-RTO: 0 /100 WBC (ref 0–0.2)
PLATELET # BLD AUTO: 205 10*3/MM3 (ref 140–450)
PMV BLD AUTO: 11.1 FL (ref 6–12)
POTASSIUM SERPL-SCNC: 4.5 MMOL/L (ref 3.5–5.2)
PROT ?TM UR-MCNC: 13.2 MG/DL
PROT SERPL-MCNC: 6.6 G/DL (ref 6–8.5)
PROT/CREAT UR: 0.12 MG/G{CREAT}
RBC # BLD AUTO: 3.91 10*6/MM3 (ref 3.77–5.28)
SODIUM SERPL-SCNC: 143 MMOL/L (ref 136–145)
TIBC SERPL-MCNC: 465 MCG/DL (ref 298–536)
TRANSFERRIN SERPL-MCNC: 312 MG/DL (ref 200–360)
WBC NRBC COR # BLD: 3.73 10*3/MM3 (ref 3.4–10.8)

## 2023-08-07 PROCEDURE — 80053 COMPREHEN METABOLIC PANEL: CPT

## 2023-08-07 PROCEDURE — 82570 ASSAY OF URINE CREATININE: CPT

## 2023-08-07 PROCEDURE — 83540 ASSAY OF IRON: CPT

## 2023-08-07 PROCEDURE — 82043 UR ALBUMIN QUANTITATIVE: CPT

## 2023-08-07 PROCEDURE — 36415 COLL VENOUS BLD VENIPUNCTURE: CPT

## 2023-08-07 PROCEDURE — 84466 ASSAY OF TRANSFERRIN: CPT

## 2023-08-07 PROCEDURE — 85025 COMPLETE CBC W/AUTO DIFF WBC: CPT

## 2023-08-07 PROCEDURE — 84156 ASSAY OF PROTEIN URINE: CPT

## 2023-08-16 ENCOUNTER — OFFICE VISIT (OUTPATIENT)
Dept: FAMILY MEDICINE CLINIC | Facility: CLINIC | Age: 68
End: 2023-08-16
Payer: MEDICARE

## 2023-08-16 VITALS
BODY MASS INDEX: 45.08 KG/M2 | TEMPERATURE: 98 F | SYSTOLIC BLOOD PRESSURE: 135 MMHG | HEART RATE: 91 BPM | DIASTOLIC BLOOD PRESSURE: 74 MMHG | WEIGHT: 230.8 LBS | OXYGEN SATURATION: 94 %

## 2023-08-16 DIAGNOSIS — Z00.00 MEDICARE ANNUAL WELLNESS VISIT, SUBSEQUENT: Primary | ICD-10-CM

## 2023-08-16 DIAGNOSIS — N95.2 ATROPHIC VAGINITIS: ICD-10-CM

## 2023-08-16 DIAGNOSIS — K21.9 GASTROESOPHAGEAL REFLUX DISEASE WITHOUT ESOPHAGITIS: ICD-10-CM

## 2023-08-16 DIAGNOSIS — E03.9 ACQUIRED HYPOTHYROIDISM: ICD-10-CM

## 2023-08-16 DIAGNOSIS — E11.9 TYPE 2 DIABETES MELLITUS WITHOUT COMPLICATION, UNSPECIFIED WHETHER LONG TERM INSULIN USE: ICD-10-CM

## 2023-08-16 DIAGNOSIS — I10 ESSENTIAL HYPERTENSION: ICD-10-CM

## 2023-08-16 DIAGNOSIS — R30.0 DYSURIA: ICD-10-CM

## 2023-08-16 DIAGNOSIS — E78.5 HYPERLIPIDEMIA LDL GOAL <70: ICD-10-CM

## 2023-08-16 DIAGNOSIS — D64.9 LOW HEMOGLOBIN: ICD-10-CM

## 2023-08-16 DIAGNOSIS — L98.9 SKIN LESION OF FACE: ICD-10-CM

## 2023-08-16 DIAGNOSIS — I48.0 PAROXYSMAL ATRIAL FIBRILLATION: ICD-10-CM

## 2023-08-16 DIAGNOSIS — Z12.31 BREAST CANCER SCREENING BY MAMMOGRAM: ICD-10-CM

## 2023-08-16 LAB
BASOPHILS # BLD AUTO: 0.03 10*3/MM3 (ref 0–0.2)
BASOPHILS NFR BLD AUTO: 0.7 % (ref 0–1.5)
BILIRUB BLD-MCNC: NEGATIVE MG/DL
CLARITY, POC: CLEAR
COLOR UR: YELLOW
DEPRECATED RDW RBC AUTO: 49.1 FL (ref 37–54)
EOSINOPHIL # BLD AUTO: 0.11 10*3/MM3 (ref 0–0.4)
EOSINOPHIL NFR BLD AUTO: 2.5 % (ref 0.3–6.2)
ERYTHROCYTE [DISTWIDTH] IN BLOOD BY AUTOMATED COUNT: 14.5 % (ref 12.3–15.4)
EXPIRATION DATE: ABNORMAL
FERRITIN SERPL-MCNC: 33.4 NG/ML (ref 13–150)
GLUCOSE UR STRIP-MCNC: NEGATIVE MG/DL
HBA1C MFR BLD: 6.4 % (ref 4.8–5.6)
HCT VFR BLD AUTO: 36.7 % (ref 34–46.6)
HGB BLD-MCNC: 11.5 G/DL (ref 12–15.9)
IMM GRANULOCYTES # BLD AUTO: 0.02 10*3/MM3 (ref 0–0.05)
IMM GRANULOCYTES NFR BLD AUTO: 0.5 % (ref 0–0.5)
IRON 24H UR-MRATE: 129 MCG/DL (ref 37–145)
IRON SATN MFR SERPL: 28 % (ref 20–50)
KETONES UR QL: NEGATIVE
LEUKOCYTE EST, POC: ABNORMAL
LYMPHOCYTES # BLD AUTO: 0.65 10*3/MM3 (ref 0.7–3.1)
LYMPHOCYTES NFR BLD AUTO: 14.8 % (ref 19.6–45.3)
Lab: ABNORMAL
MCH RBC QN AUTO: 29.2 PG (ref 26.6–33)
MCHC RBC AUTO-ENTMCNC: 31.3 G/DL (ref 31.5–35.7)
MCV RBC AUTO: 93.1 FL (ref 79–97)
MONOCYTES # BLD AUTO: 0.5 10*3/MM3 (ref 0.1–0.9)
MONOCYTES NFR BLD AUTO: 11.4 % (ref 5–12)
NEUTROPHILS NFR BLD AUTO: 3.08 10*3/MM3 (ref 1.7–7)
NEUTROPHILS NFR BLD AUTO: 70.1 % (ref 42.7–76)
NITRITE UR-MCNC: NEGATIVE MG/ML
NRBC BLD AUTO-RTO: 0 /100 WBC (ref 0–0.2)
PH UR: 5.5 [PH] (ref 5–8)
PLATELET # BLD AUTO: 217 10*3/MM3 (ref 140–450)
PMV BLD AUTO: 10.8 FL (ref 6–12)
PROT UR STRIP-MCNC: ABNORMAL MG/DL
RBC # BLD AUTO: 3.94 10*6/MM3 (ref 3.77–5.28)
RBC # UR STRIP: ABNORMAL /UL
SP GR UR: 1.03 (ref 1–1.03)
TIBC SERPL-MCNC: 462 MCG/DL (ref 298–536)
TRANSFERRIN SERPL-MCNC: 310 MG/DL (ref 200–360)
TSH SERPL DL<=0.05 MIU/L-ACNC: 1.54 UIU/ML (ref 0.27–4.2)
UROBILINOGEN UR QL: NORMAL
WBC NRBC COR # BLD: 4.39 10*3/MM3 (ref 3.4–10.8)

## 2023-08-16 PROCEDURE — 84466 ASSAY OF TRANSFERRIN: CPT | Performed by: INTERNAL MEDICINE

## 2023-08-16 PROCEDURE — 82728 ASSAY OF FERRITIN: CPT | Performed by: INTERNAL MEDICINE

## 2023-08-16 PROCEDURE — 83036 HEMOGLOBIN GLYCOSYLATED A1C: CPT | Performed by: NURSE PRACTITIONER

## 2023-08-16 PROCEDURE — 87088 URINE BACTERIA CULTURE: CPT | Performed by: NURSE PRACTITIONER

## 2023-08-16 PROCEDURE — 83540 ASSAY OF IRON: CPT | Performed by: INTERNAL MEDICINE

## 2023-08-16 PROCEDURE — 84443 ASSAY THYROID STIM HORMONE: CPT | Performed by: NURSE PRACTITIONER

## 2023-08-16 PROCEDURE — 85025 COMPLETE CBC W/AUTO DIFF WBC: CPT | Performed by: INTERNAL MEDICINE

## 2023-08-16 PROCEDURE — 87186 SC STD MICRODIL/AGAR DIL: CPT | Performed by: NURSE PRACTITIONER

## 2023-08-16 PROCEDURE — 87086 URINE CULTURE/COLONY COUNT: CPT | Performed by: NURSE PRACTITIONER

## 2023-08-16 RX ORDER — ATENOLOL 100 MG/1
150 TABLET ORAL 2 TIMES DAILY
Qty: 180 CAPSULE | Refills: 1 | Status: SHIPPED | OUTPATIENT
Start: 2023-08-16

## 2023-08-16 RX ORDER — FLUCONAZOLE 150 MG/1
150 TABLET ORAL ONCE
Qty: 1 TABLET | Refills: 0 | Status: SHIPPED | OUTPATIENT
Start: 2023-08-16 | End: 2023-08-16

## 2023-08-16 RX ORDER — LEVOTHYROXINE SODIUM 88 UG/1
88 TABLET ORAL DAILY
Qty: 90 TABLET | Refills: 1 | Status: SHIPPED | OUTPATIENT
Start: 2023-08-16

## 2023-08-16 RX ORDER — OMEPRAZOLE 40 MG/1
40 CAPSULE, DELAYED RELEASE ORAL DAILY
Qty: 90 CAPSULE | Refills: 1 | Status: SHIPPED | OUTPATIENT
Start: 2023-08-16

## 2023-08-16 RX ORDER — ROSUVASTATIN CALCIUM 40 MG/1
40 TABLET, COATED ORAL NIGHTLY
Qty: 90 TABLET | Refills: 1 | Status: SHIPPED | OUTPATIENT
Start: 2023-08-16

## 2023-08-16 RX ORDER — CONJUGATED ESTROGENS 0.62 MG/G
CREAM VAGINAL
Qty: 30 G | Refills: 1 | Status: SHIPPED | OUTPATIENT
Start: 2023-08-16

## 2023-08-16 RX ORDER — TELMISARTAN 80 MG/1
80 TABLET ORAL NIGHTLY
Qty: 90 TABLET | Refills: 1 | Status: SHIPPED | OUTPATIENT
Start: 2023-08-16

## 2023-08-16 NOTE — PATIENT INSTRUCTIONS
Preventive Care 65 Years and Older, Female  Preventive care refers to lifestyle choices and visits with your health care provider that can promote health and wellness. Preventive care visits are also called wellness exams.  What can I expect for my preventive care visit?  Counseling  Your health care provider may ask you questions about your:  Medical history, including:  Past medical problems.  Family medical history.  Pregnancy and menstrual history.  History of falls.  Current health, including:  Memory and ability to understand (cognition).  Emotional well-being.  Home life and relationship well-being.  Sexual activity and sexual health.  Lifestyle, including:  Alcohol, nicotine or tobacco, and drug use.  Access to firearms.  Diet, exercise, and sleep habits.  Work and work environment.  Sunscreen use.  Safety issues such as seatbelt and bike helmet use.  Physical exam  Your health care provider will check your:  Height and weight. These may be used to calculate your BMI (body mass index). BMI is a measurement that tells if you are at a healthy weight.  Waist circumference. This measures the distance around your waistline. This measurement also tells if you are at a healthy weight and may help predict your risk of certain diseases, such as type 2 diabetes and high blood pressure.  Heart rate and blood pressure.  Body temperature.  Skin for abnormal spots.  What immunizations do I need?    Vaccines are usually given at various ages, according to a schedule. Your health care provider will recommend vaccines for you based on your age, medical history, and lifestyle or other factors, such as travel or where you work.  What tests do I need?  Screening  Your health care provider may recommend screening tests for certain conditions. This may include:  Lipid and cholesterol levels.  Hepatitis C test.  Hepatitis B test.  HIV (human immunodeficiency virus) test.  STI (sexually transmitted infection) testing, if you are at  risk.  Lung cancer screening.  Colorectal cancer screening.  Diabetes screening. This is done by checking your blood sugar (glucose) after you have not eaten for a while (fasting).  Mammogram. Talk with your health care provider about how often you should have regular mammograms.  BRCA-related cancer screening. This may be done if you have a family history of breast, ovarian, tubal, or peritoneal cancers.  Bone density scan. This is done to screen for osteoporosis.  Talk with your health care provider about your test results, treatment options, and if necessary, the need for more tests.  Follow these instructions at home:  Eating and drinking    Eat a diet that includes fresh fruits and vegetables, whole grains, lean protein, and low-fat dairy products. Limit your intake of foods with high amounts of sugar, saturated fats, and salt.  Take vitamin and mineral supplements as recommended by your health care provider.  Do not drink alcohol if your health care provider tells you not to drink.  If you drink alcohol:  Limit how much you have to 0-1 drink a day.  Know how much alcohol is in your drink. In the [place] one drink equals one 12 oz bottle of beer (355 mL), one 5 oz glass of wine (148 mL), or one 1« oz glass of hard liquor (44 mL).  Lifestyle  Brush your teeth every morning and night with fluoride toothpaste. Floss one time each day.  Exercise for at least 30 minutes 5 or more days each week.  Do not use any products that contain nicotine or tobacco. These products include cigarettes, chewing tobacco, and vaping devices, such as e-cigarettes. If you need help quitting, ask your health care provider.  Do not use drugs.  If you are sexually active, practice safe sex. Use a condom or other form of protection in order to prevent STIs.  Take aspirin only as told by your health care provider. Make sure that you understand how much to take and what form to take. Work with your health care provider to find out whether it  is safe and beneficial for you to take aspirin daily.  Ask your health care provider if you need to take a cholesterol-lowering medicine (statin).  Find healthy ways to manage stress, such as:  Meditation, yoga, or listening to music.  Journaling.  Talking to a trusted person.  Spending time with friends and family.  Minimize exposure to UV radiation to reduce your risk of skin cancer.  Safety  Always wear your seat belt while driving or riding in a vehicle.  Do not drive:  If you have been drinking alcohol. Do not ride with someone who has been drinking.  When you are tired or distracted.  While texting.  If you have been using any mind-altering substances or drugs.  Wear a helmet and other protective equipment during sports activities.  If you have firearms in your house, make sure you follow all gun safety procedures.  What's next?  Visit your health care provider once a year for an annual wellness visit.  Ask your health care provider how often you should have your eyes and teeth checked.  Stay up to date on all vaccines.  This information is not intended to replace advice given to you by your health care provider. Make sure you discuss any questions you have with your health care provider.  Document Revised: 06/15/2022 Document Reviewed: 06/15/2022  [employer] Patient Education c 2023 [employer]

## 2023-08-17 RX ORDER — NITROFURANTOIN 25; 75 MG/1; MG/1
100 CAPSULE ORAL 2 TIMES DAILY
Qty: 14 CAPSULE | Refills: 0 | Status: SHIPPED | OUTPATIENT
Start: 2023-08-17

## 2023-08-18 LAB — BACTERIA SPEC AEROBE CULT: ABNORMAL

## 2023-09-05 ENCOUNTER — OFFICE VISIT (OUTPATIENT)
Dept: PODIATRY | Facility: CLINIC | Age: 68
End: 2023-09-05
Payer: MEDICARE

## 2023-09-05 VITALS
OXYGEN SATURATION: 98 % | SYSTOLIC BLOOD PRESSURE: 140 MMHG | TEMPERATURE: 97.7 F | BODY MASS INDEX: 45.11 KG/M2 | DIASTOLIC BLOOD PRESSURE: 81 MMHG | HEART RATE: 92 BPM | WEIGHT: 231 LBS

## 2023-09-05 DIAGNOSIS — E11.8 DIABETIC FOOT: ICD-10-CM

## 2023-09-05 DIAGNOSIS — E11.9 NON-INSULIN DEPENDENT TYPE 2 DIABETES MELLITUS: ICD-10-CM

## 2023-09-05 DIAGNOSIS — M79.671 FOOT PAIN, BILATERAL: Primary | ICD-10-CM

## 2023-09-05 DIAGNOSIS — M79.672 FOOT PAIN, BILATERAL: Primary | ICD-10-CM

## 2023-09-05 DIAGNOSIS — B35.1 ONYCHOMYCOSIS: ICD-10-CM

## 2023-09-05 DIAGNOSIS — L60.0 ONYCHOCRYPTOSIS: ICD-10-CM

## 2023-09-05 PROCEDURE — 11721 DEBRIDE NAIL 6 OR MORE: CPT | Performed by: PODIATRIST

## 2023-09-05 PROCEDURE — G8404 LOW EXTEMITY NEUR EXAM DOCUM: HCPCS | Performed by: PODIATRIST

## 2023-09-05 PROCEDURE — 1160F RVW MEDS BY RX/DR IN RCRD: CPT | Performed by: PODIATRIST

## 2023-09-05 PROCEDURE — 3079F DIAST BP 80-89 MM HG: CPT | Performed by: PODIATRIST

## 2023-09-05 PROCEDURE — 3077F SYST BP >= 140 MM HG: CPT | Performed by: PODIATRIST

## 2023-09-05 PROCEDURE — 1159F MED LIST DOCD IN RCRD: CPT | Performed by: PODIATRIST

## 2023-09-05 NOTE — PROGRESS NOTES
[facility] - PODIATRY    Today's Date: 09/05/23    Patient Name: Gifty Hoover  MRN: [id number]  CSN: [id number]  PCP: Paris Ace APRN, Last PCP Visit:  8/16/2023  Referring Provider: No ref. provider found    SUBJECTIVE     Chief Complaint   Patient presents with    Left Foot - Follow-up, Nail Problem    Right Foot - Follow-up, Nail Problem       HPI: Gifty Hoover, a 67 y.o.female, presents to clinic for painful toenail and a diabetic foot evaluation.    New, Established, New Problem:  est  Location:  Toenails  Duration:   Greater than five years  Onset:  Gradual  Nature:  sore with palpation.  Stable, worsening, improving:  stable  Aggravating factors:  Pain with shoe gear and ambulation.  Previous Treatment:  Debridement    Patient controlling diabetes via: Oral medication    Patient states their last blood glucose was: 91    Patient denies any fevers, chills, nausea, vomiting, shortness of breath, nor any other constitutional signs nor symptoms.    Medical changes:  no changes    Past Medical History:   Diagnosis Date    Anemia 2022    NO CURRENT S/S    Anxiety     Arthritis     Asthma 2020    Shortness of breath after walking long periods of time.    CAD 09/14/2021    AGUAYO SEES PT    Cholelithiasis 2022    Chronic kidney disease 2022    NO DIALYSIS    Colon polyp 2008    BENIGN    Deep vein thrombosis 2011    Green filter in place.    Depression     Diabetes mellitus     Elevated cholesterol     Essential hypertension 09/14/2021    GERD (gastroesophageal reflux disease)     Heart attack 2009    NO STENTS/NO BYPASS    History of pulmonary embolism     2011, TAKE PRADAXA    Hypothyroid     Ingrown toenail 2022    Lumbar radiculopathy 09/2018    describes sensory S1 radiculopathy but would favor right L5    Paroxysmal atrial fibrillation 09/14/2021    AGUAYO    Seasonal allergies     Sleep apnea     BIPAP?     Past Surgical History:   Procedure Laterality Date    BLADDER SURGERY  2011     BREAST BIOPSY      Upper right breast BENIGN    BREAST SURGERY      CARDIAC CATHETERIZATION      NO INTERVENTION    CARDIAC SURGERY      R/T BLOOD CLOTS, NO CARDIAC INTERVENTION     SECTION      CHOLECYSTECTOMY N/A 2022    Procedure: laparoscopic cholecystectomy, possible open;  Surgeon: Krystal Dacosta MD;  [place] [facility] [place] [facility] [place]  Service: General;  Laterality: N/A;    COLONOSCOPY  2018    COLONOSCOPY N/A 2022    Procedure: COLONOSCOPY;  Surgeon: Ta Vasquez MD;  Location: [facility] ENDOSCOPY;  Service: Gastroenterology;  Laterality: N/A;  DIVERTICULOSIS    ENDOSCOPY N/A 2022    Procedure: ESOPHAGOGASTRODUODENOSCOPY WITH BX;  Surgeon: Ta Vasquez MD;  Location: [facility] ENDOSCOPY;  Service: Gastroenterology;  Laterality: N/A;  SMALL HIATAL HERNIA    ERCP N/A 2022    Procedure: ENDOSCOPIC RETROGRADE CHOLANGIOPANCREATOGRAPHY WITH SPHINCTEROTOMY, STONE REMOVAL, STENT PLACEMENT;  Surgeon: Arden Garcia MD;  Location: [facility] ENDOSCOPY;  Service: Gastroenterology;  Laterality: N/A;  BILE DUCT STONES    ERCP N/A 2023    Procedure: ENDOSCOPIC RETROGRADE CHOLANGIOPANCREATOGRAPHY with stent removal, balloon dilation, stone removal;  Surgeon: Arden Garcia MD;  Location: [facility] ENDOSCOPY;  Service: Gastroenterology;  Laterality: N/A;  bile duct stones    NICHOLAS FILTER INSERTION JUGULAR      HYSTERECTOMY       Family History   Problem Relation Age of Onset    Stroke Mother     Cancer Mother         Colon    Colon cancer Mother 71        still living at 73    Diabetes Mother     Arthritis Mother     Colon polyps Mother     Hypertension Mother     Kidney disease Mother     Miscarriages / Stillbirths Mother     Stroke Father     Arthritis Father         Padgets Disease    Osteoporosis Father     Inflammatory bowel disease Father     Cancer Sister         Eye    Thyroid disease Sister     Diabetes Brother     Stroke Brother      Bleeding Disorder Daughter     Diabetes Brother     Hypertension Brother     Malsarthak Hyperthermia Neg Hx      Social History     Socioeconomic History    Marital status:    Tobacco Use    Smoking status: Never    Smokeless tobacco: Never   Vaping Use    Vaping Use: Never used   Substance and Sexual Activity    Alcohol use: Never    Drug use: Never    Sexual activity: Not Currently     Partners: Male     Birth control/protection: Surgical, Hysterectomy     No Known Allergies  Current Outpatient Medications   Medication Sig Dispense Refill    albuterol sulfate HFA (ProAir HFA) 108 (90 Base) MCG/ACT inhaler Inhale 2 puffs Every 6 (Six) Hours As Needed for Wheezing. 18 g 0    aspirin 81 MG EC tablet Take 1 tablet by mouth Daily.      Calcium Carb-Cholecalciferol (Calcium-Vitamin D) 500-400 MG-UNIT tablet Take 1 tablet by mouth 2 (Two) Times a Day. 180 tablet 1    Estrogens Conjugated (Premarin) 0.625 MG/GM vaginal cream Apply daily for 2 weeks then use 2 times weekly thereafter. 30 g 1    ezetimibe (ZETIA) 10 MG tablet Take 1 tablet by mouth Daily. 90 tablet 3    famotidine (PEPCID) 40 MG tablet TAKE ONE TABLET BY MOUTH AT BEDTIME 90 tablet 1    Ferrous Fumarate 324 (106 Fe) MG tablet Take 1 tablet by mouth Every Other Day. LAST DOSE 12/15/22      furosemide (LASIX) 20 MG tablet Take 1 tablet by mouth Daily As Needed (for leg swelling). 30 tablet 3    levothyroxine (SYNTHROID, LEVOTHROID) 88 MCG tablet Take 1 tablet by mouth Daily. 90 tablet 1    metFORMIN (GLUCOPHAGE) 500 MG tablet Take 1 tablet by mouth 2 (Two) Times a Day With Meals. INST PER ANESTHESIA PROTOCOL 90 tablet 1    omeprazole (priLOSEC) 40 MG capsule Take 1 capsule by mouth Daily. 90 capsule 1    potassium chloride 10 MEQ CR tablet Take 1 tablet by mouth Daily As Needed (take with Lasix). 30 tablet 3    Pradaxa 150 MG capsu Take 1 capsule by mouth 2 (Two) Times a Day. 180 capsule 1    rosuvastatin (CRESTOR) 40 MG tablet Take 1 tablet by mouth Every  Night. 90 tablet 1    telmisartan (MICARDIS) 80 MG tablet Take 1 tablet by mouth Every Night. 90 tablet 1    vitamin C (ASCORBIC ACID) 250 MG tablet Take 1 tablet by mouth Daily. 90 tablet 1     No current facility-administered medications for this visit.     Review of Systems   Constitutional: Negative.    Skin:         Painful toenails.   All other systems reviewed and are negative.    OBJECTIVE     Vitals:    23 0917   BP: 140/81   Pulse: 92   Temp: 97.7 °F (36.5 °C)   SpO2: 98%       Body mass index is 45.11 kg/m².    Lab Results   Component Value Date    HGBA1C 6.40 (H) 2023       Lab Results   Component Value Date    GLUCOSE 124 (H) 2023    CALCIUM 9.8 2023     2023    K 4.5 2023    CO2 27.0 2023     2023    BUN 19 2023    CREATININE 0.90 2023    EGFRIFNONA 33 (L) 2021    BCR 21.1 2023    ANIONGAP 10.0 2023       Patient seen in no apparent distress.      PHYSICAL EXAM:     Foot/Ankle Exam    GENERAL  Diabetic foot exam performed    Appearance:  obese and elderly  Orientation:  AAOx3  Affect:  appropriate  Gait:  unimpaired  Assistance:  independent  Right shoe gear: casual shoe  Left shoe gear: casual shoe    VASCULAR     Right Foot Vascularity   Dorsalis pedis:  1+  Posterior tibial:  1+  Skin temperature:  warm  Edema gradin+ and pitting  CFT:  < 3 seconds  Pedal hair growth:  Absent  Varicosities:  moderate varicosities     Left Foot Vascularity   Dorsalis pedis:  1+  Posterior tibial:  1+  Skin temperature:  warm  Edema gradin+ and pitting  CFT:  < 3 seconds  Pedal hair growth:  Absent  Varicosities:  moderate varicosities     NEUROLOGIC     Right Foot Neurologic   Normal sensation    Light touch sensation: normal  Vibratory sensation: normal  Hot/Cold sensation: normal  Protective Sensation using [place] Monofilament:   Sites intact: 10  Sites tested: 10     Left Foot Neurologic   Normal sensation     Light touch sensation: normal  Vibratory sensation: normal  Hot/Cold sensation:  normal  Protective Sensation using [place]-Maximo Monofilament:   Sites intact: 10  Sites tested: 10    MUSCLE STRENGTH     Right Foot Muscle Strength   Foot dorsiflexion:  4  Foot plantar flexion:  4  Foot inversion:  4  Foot eversion:  4     Left Foot Muscle Strength   Foot dorsiflexion:  4  Foot plantar flexion:  4  Foot inversion:  4  Foot eversion:  4    RANGE OF MOTION     Right Foot Range of Motion   Foot and ankle ROM within normal limits       Left Foot Range of Motion   Foot and ankle ROM within normal limits      DERMATOLOGIC      Right Foot Dermatologic   Skin  Right foot skin is intact.   Nails  1.  Positive for elongated, onychomycosis, abnormal thickness, subungual debris and ingrown toenail.  2.  Positive for elongated, onychomycosis, abnormal thickness, subungual debris and ingrown toenail.  3.  Positive for elongated, onychomycosis, abnormal thickness, subungual debris and ingrown toenail.  4.  Positive for elongated, onychomycosis, abnormal thickness, subungual debris and ingrown toenail.  5.  Positive for elongated, onychomycosis, abnormal thickness, subungual debris and ingrown toenail.     Left Foot Dermatologic   Skin  Left foot skin is intact.   Nails  1.  Positive for elongated, onychomycosis, abnormal thickness, subungual debris and ingrown toenail.  3.  Positive for elongated, onychomycosis, abnormal thickness, subungual debris and ingrown toenail.  4.  Positive for elongated, onychomycosis, abnormally thick, subungual debris and ingrown toenail.  5.  Positive for elongated, onychomycosis, abnormally thick, subungual debris and ingrown toenail.    Diabetic Foot Exam Performed and Monofilament Test Performed    ASSESSMENT/PLAN     Diagnoses and all orders for this visit:    1. Foot pain, bilateral (Primary)    2. Non-insulin dependent type 2 diabetes mellitus    3. Diabetic foot    4. Onychocryptosis    5.  Onychomycosis        Comprehensive lower extremity examination and evaluation was performed.    Discussed findings and treatment plan including risks, benefits, and treatment options with patient in detail. Patient agreed with treatment plan.    Medications and allergies reviewed.  Reviewed available blood glucose and HgB A1C lab values along with other pertinent labs.  These were discussed with the patient as to their importance of diabetic maintenance.    Toenails 1, 2, 3, 4, 5 on Right and 1, 3, 4, 5 on Left were debrided with nail nippers then filed with a Dremel nail yves.  Patient tolerated procedure well without complications.    Diabetic foot exam performed and documented this date, compliant with CQM required standards. Detail of findings as noted in physical exam.  Lower extremity Neurologic exam for diabetic patient performed and documented this date, compliant with PQRS required standards. Detail of findings as noted in physical exam.  Advised patient importance of good routine lower extremity hygiene. Advised patient importance of evaluating for intact skin and pain free nail borders.  Advised patient to use mirror to evaluate plantar/ soles of feet for better visualization. Advised patient monitor and phone office to be seen if any cracking to skin, open lesions, painful nail borders or if nails become elongated prior to next visit. Advised patient importance of daily cleansing of lower extremities, followed by good skin cream to maintain normal hydration of skin. Also advised patient importance of close daily monitoring of blood sugar. Advised to regulate diet and medications to maintain control of blood sugar in optimal range. Contact primary care provider if difficulties maintaining blood sugar levels.  Advised Patient of presence of Diabetes Mellitus condition.  Advised Patient risk of progression and worsening or improvement, then return of condition.  Will monitor condition for any change in  future. Treat with most appropriate treatment pending status of condition.  Counseled and advised patient extensively on nature and ramifications of diabetes. Standard instructions given to patient for good diabetic foot care and maintenance. Advised importance of careful monitoring to avoid break down and complications secondary to diabetes. Advised patient importance of strict maintenance of blood sugar control. Advised patient of possible ominous results from neglect of condition, i.e.: amputation/ loss of digits, feet and legs, or even death.  Patient states understands counseling, will monitor closely, continue good hygiene and routine diabetic foot care. Patient will contact office should they have any questions or problems.      An After Visit Summary was printed and given to the patient at discharge, including (if requested) any available informative/educational handouts regarding diagnosis, treatment, or medications. All questions were answered to patient/family satisfaction. Should symptoms fail to improve or worsen they agree to call or return to clinic or to go to the Emergency Department. Discussed the importance of following up with any needed screening tests/labs/specialist appointments and any requested follow-up recommended by me today. Importance of maintaining follow-up discussed and patient accepts that missed appointments can delay diagnosis and potentially lead to worsening of conditions.    Return in about 9 weeks (around 11/7/2023) for Toenail Care., or sooner if acute issues arise.    This document has been electronically signed by Isiah Cuevas DPM on September 5, 2023 09:27 EDT

## 2023-09-13 ENCOUNTER — TELEPHONE (OUTPATIENT)
Dept: CARDIOLOGY | Facility: CLINIC | Age: 68
End: 2023-09-13
Payer: MEDICARE

## 2023-09-13 RX ORDER — EZETIMIBE 10 MG/1
10 TABLET ORAL DAILY
Qty: 90 TABLET | Refills: 3 | Status: SHIPPED | OUTPATIENT
Start: 2023-09-13

## 2023-09-13 RX ORDER — FAMOTIDINE 40 MG/1
40 TABLET, FILM COATED ORAL
Qty: 90 TABLET | Refills: 1 | Status: SHIPPED | OUTPATIENT
Start: 2023-09-13

## 2023-09-13 NOTE — TELEPHONE ENCOUNTER
The [facility] received a fax that requires your attention. The document has been indexed to the patient’s chart for your review.      Reason for sending: EXTERNAL MEDICAL RECORD NOTIFICATION     Documents Description: MEDS-EZETIMIBE REFILL-9.13.23    [facility]    Date Indexed: 9.13.23

## 2023-09-13 NOTE — TELEPHONE ENCOUNTER
Famotidine 40MG tablet refill request.    Last office visit- 9/7/22.    No upcoming Appointments scheduled.     Please advise?

## 2023-10-03 DIAGNOSIS — D50.8 OTHER IRON DEFICIENCY ANEMIA: Primary | ICD-10-CM

## 2023-10-04 ENCOUNTER — LAB (OUTPATIENT)
Dept: ONCOLOGY | Facility: HOSPITAL | Age: 68
End: 2023-10-04
Payer: MEDICARE

## 2023-10-04 DIAGNOSIS — D50.8 OTHER IRON DEFICIENCY ANEMIA: ICD-10-CM

## 2023-10-04 LAB
ALBUMIN SERPL-MCNC: 4.1 G/DL (ref 3.5–5.2)
ALBUMIN/GLOB SERPL: 1.5 G/DL
ALP SERPL-CCNC: 83 U/L (ref 39–117)
ALT SERPL W P-5'-P-CCNC: 17 U/L (ref 1–33)
ANION GAP SERPL CALCULATED.3IONS-SCNC: 5.9 MMOL/L (ref 5–15)
AST SERPL-CCNC: 19 U/L (ref 1–32)
BASOPHILS # BLD AUTO: 0.03 10*3/MM3 (ref 0–0.2)
BASOPHILS NFR BLD AUTO: 0.7 % (ref 0–1.5)
BILIRUB SERPL-MCNC: 0.3 MG/DL (ref 0–1.2)
BUN SERPL-MCNC: 15 MG/DL (ref 8–23)
BUN/CREAT SERPL: 17.2 (ref 7–25)
CALCIUM SPEC-SCNC: 9.7 MG/DL (ref 8.6–10.5)
CHLORIDE SERPL-SCNC: 105 MMOL/L (ref 98–107)
CO2 SERPL-SCNC: 29.1 MMOL/L (ref 22–29)
CREAT SERPL-MCNC: 0.87 MG/DL (ref 0.57–1)
DEPRECATED RDW RBC AUTO: 48.4 FL (ref 37–54)
EGFRCR SERPLBLD CKD-EPI 2021: 73.1 ML/MIN/1.73
EOSINOPHIL # BLD AUTO: 0.13 10*3/MM3 (ref 0–0.4)
EOSINOPHIL NFR BLD AUTO: 2.9 % (ref 0.3–6.2)
ERYTHROCYTE [DISTWIDTH] IN BLOOD BY AUTOMATED COUNT: 13.8 % (ref 12.3–15.4)
FERRITIN SERPL-MCNC: 38.09 NG/ML (ref 13–150)
GLOBULIN UR ELPH-MCNC: 2.8 GM/DL
GLUCOSE SERPL-MCNC: 140 MG/DL (ref 65–99)
HCT VFR BLD AUTO: 38.8 % (ref 34–46.6)
HGB BLD-MCNC: 12 G/DL (ref 12–15.9)
IMM GRANULOCYTES # BLD AUTO: 0.01 10*3/MM3 (ref 0–0.05)
IMM GRANULOCYTES NFR BLD AUTO: 0.2 % (ref 0–0.5)
IRON 24H UR-MRATE: 55 MCG/DL (ref 37–145)
IRON SATN MFR SERPL: 11 % (ref 20–50)
LYMPHOCYTES # BLD AUTO: 0.73 10*3/MM3 (ref 0.7–3.1)
LYMPHOCYTES NFR BLD AUTO: 16.1 % (ref 19.6–45.3)
MCH RBC QN AUTO: 29.2 PG (ref 26.6–33)
MCHC RBC AUTO-ENTMCNC: 30.9 G/DL (ref 31.5–35.7)
MCV RBC AUTO: 94.4 FL (ref 79–97)
MONOCYTES # BLD AUTO: 0.48 10*3/MM3 (ref 0.1–0.9)
MONOCYTES NFR BLD AUTO: 10.6 % (ref 5–12)
NEUTROPHILS NFR BLD AUTO: 3.15 10*3/MM3 (ref 1.7–7)
NEUTROPHILS NFR BLD AUTO: 69.5 % (ref 42.7–76)
PLATELET # BLD AUTO: 215 10*3/MM3 (ref 140–450)
PMV BLD AUTO: 10.6 FL (ref 6–12)
POTASSIUM SERPL-SCNC: 4.3 MMOL/L (ref 3.5–5.2)
PROT SERPL-MCNC: 6.9 G/DL (ref 6–8.5)
RBC # BLD AUTO: 4.11 10*6/MM3 (ref 3.77–5.28)
SODIUM SERPL-SCNC: 140 MMOL/L (ref 136–145)
TIBC SERPL-MCNC: 498 MCG/DL (ref 298–536)
TRANSFERRIN SERPL-MCNC: 334 MG/DL (ref 200–360)
WBC NRBC COR # BLD: 4.53 10*3/MM3 (ref 3.4–10.8)

## 2023-10-04 PROCEDURE — 36415 COLL VENOUS BLD VENIPUNCTURE: CPT

## 2023-10-04 PROCEDURE — 83540 ASSAY OF IRON: CPT

## 2023-10-04 PROCEDURE — 85025 COMPLETE CBC W/AUTO DIFF WBC: CPT

## 2023-10-04 PROCEDURE — 80053 COMPREHEN METABOLIC PANEL: CPT

## 2023-10-04 PROCEDURE — 82728 ASSAY OF FERRITIN: CPT

## 2023-10-04 PROCEDURE — 84466 ASSAY OF TRANSFERRIN: CPT

## 2023-10-05 PROBLEM — M25.559 HIP PAIN: Status: ACTIVE | Noted: 2018-07-31

## 2023-10-05 PROBLEM — J20.9 ACUTE BRONCHITIS: Status: ACTIVE | Noted: 2017-06-29

## 2023-10-05 PROBLEM — M54.50 LOW BACK PAIN: Status: ACTIVE | Noted: 2018-08-16

## 2023-10-05 PROBLEM — J20.0 ACUTE BRONCHITIS DUE TO MYCOPLASMA PNEUMONIAE: Status: ACTIVE | Noted: 2017-06-29

## 2023-10-06 ENCOUNTER — OFFICE VISIT (OUTPATIENT)
Dept: ONCOLOGY | Facility: HOSPITAL | Age: 68
End: 2023-10-06
Payer: MEDICARE

## 2023-10-06 VITALS
OXYGEN SATURATION: 97 % | SYSTOLIC BLOOD PRESSURE: 150 MMHG | WEIGHT: 233.4 LBS | HEART RATE: 100 BPM | BODY MASS INDEX: 45.58 KG/M2 | RESPIRATION RATE: 18 BRPM | TEMPERATURE: 97.2 F | DIASTOLIC BLOOD PRESSURE: 80 MMHG

## 2023-10-06 DIAGNOSIS — D50.8 OTHER IRON DEFICIENCY ANEMIA: Primary | ICD-10-CM

## 2023-10-06 NOTE — PROGRESS NOTES
Chief Complaint  Low blood counts    Malka, Paris, APRN  Malka, Paris, APRN      Subjective          Gifty Hoover presents to [facility] for anemia    History of Present Illness   Ms. Gifty Hoover is a 68 yo F presenting for follow up for anemia She has a history of CAD, HTN, hepatic steatosis, GERD, obesity, HLD. She is taking iron every other day with vitamin C. She is tolerating it well. She continues to feel better. She denies any bleeding or dark stool. No abdominal pain. No chest pain, trouble breathing, fevers, chills. Feels about the same as 4 months ago.     Hematology History     7/7/22 CBC with Hgb 9.9, WBC 4.28, plt 213, MCV 92.3. Positive occult blood. Also with transaminitis on labs from 6/30/22. Hemoglobin is in the 9.9 range. She has CKD as well with cr of 1.60 and GFR of 35. Iron studies with normal iron level, but iron sat was 15%, ferritin was elevated at 241. No prior labs to compare to on the ferritin. Previous colonoscopy with diverticulosis and grade I hemmorrhoids    8/3 CBC with improved hgb to 10.1, MCV 92.3, plt 241, WBC of 3.27. Abdominal ultrasound showed hepatic steatosis. SPEP without M-spike.     9/9/22 Iron studies: Ferritin down to 88.9, T sat of 12%, TIBC 429.     11/23-26/22: [facility] admission due to choledocholithiasis and biliary colic requiring ERCP, balloon extraction of 2 stones and placement of stent. Hgb sarabjit of 7.6 during this stay. Iron sat of 4%, iron low at 16    11/29/22: Hgb 9.9.    4/5/23: Hgb 10.5. Iron 38, ferritin 33, iron sat 8%, TIBC 460.    6/5/23: Hgb 11.2, Iron sat 11%, ferritin 40.     10/4/23: Hgb 12.0 g/dL, iron 55, ferritin 38, iron sat 11%, TIBC 498        Oncology/Hematology History    No history exists.       Review of Systems   Constitutional:  Negative for appetite change, diaphoresis, fatigue, fever, unexpected weight gain and unexpected weight loss.   HENT: Negative.  Negative for hearing loss, sore  throat and voice change.    Eyes: Negative.  Negative for blurred vision, double vision, pain, redness and visual disturbance.   Respiratory:  Negative for cough, shortness of breath and wheezing.    Cardiovascular: Negative.  Negative for chest pain, palpitations and leg swelling.   Gastrointestinal: Negative.    Endocrine: Negative.  Negative for cold intolerance, heat intolerance, polydipsia and polyuria.   Genitourinary:  Negative for decreased urine volume, difficulty urinating, frequency and urinary incontinence.   Musculoskeletal: Negative.  Negative for arthralgias, back pain, joint swelling and myalgias.   Skin:  Negative for color change, rash, skin lesions and wound.   Allergic/Immunologic: Negative.    Neurological: Negative.  Negative for dizziness, seizures, numbness and headache.   Hematological: Negative.  Negative for adenopathy. Does not bruise/bleed easily.   Psychiatric/Behavioral: Negative.  Negative for depressed mood. The patient is not nervous/anxious.    All other systems reviewed and are negative.    Current Outpatient Medications on File Prior to Visit   Medication Sig Dispense Refill    albuterol sulfate HFA (ProAir HFA) 108 (90 Base) MCG/ACT inhaler Inhale 2 puffs Every 6 (Six) Hours As Needed for Wheezing. 18 g 0    aspirin 81 MG EC tablet Take 1 tablet by mouth Daily.      Calcium Carb-Cholecalciferol (Calcium-Vitamin D) 500-400 MG-UNIT tablet Take 1 tablet by mouth 2 (Two) Times a Day. 180 tablet 1    Estrogens Conjugated (Premarin) 0.625 MG/GM vaginal cream Apply daily for 2 weeks then use 2 times weekly thereafter. 30 g 1    ezetimibe (ZETIA) 10 MG tablet Take 1 tablet by mouth Daily. 90 tablet 3    famotidine (PEPCID) 40 MG tablet Take 1 tablet by mouth every night at bedtime. 90 tablet 1    Ferrous Fumarate 324 (106 Fe) MG tablet Take 1 tablet by mouth Every Other Day. LAST DOSE 12/15/22      furosemide (LASIX) 20 MG tablet Take 1 tablet by mouth Daily As Needed (for leg  swelling). 30 tablet 3    levothyroxine (SYNTHROID, LEVOTHROID) 88 MCG tablet Take 1 tablet by mouth Daily. 90 tablet 1    metFORMIN (GLUCOPHAGE) 500 MG tablet Take 1 tablet by mouth 2 (Two) Times a Day With Meals. INST PER ANESTHESIA PROTOCOL 90 tablet 1    omeprazole (priLOSEC) 40 MG capsule Take 1 capsule by mouth Daily. 90 capsule 1    potassium chloride 10 MEQ CR tablet Take 1 tablet by mouth Daily As Needed (take with Lasix). 30 tablet 3    Pradaxa 150 MG capsu Take 1 capsule by mouth 2 (Two) Times a Day. 180 capsule 1    rosuvastatin (CRESTOR) 40 MG tablet Take 1 tablet by mouth Every Night. 90 tablet 1    telmisartan (MICARDIS) 80 MG tablet Take 1 tablet by mouth Every Night. 90 tablet 1    vitamin C (ASCORBIC ACID) 250 MG tablet Take 1 tablet by mouth Daily. 90 tablet 1     No current facility-administered medications on file prior to visit.       No Known Allergies  Past Medical History:   Diagnosis Date    Anemia 2022    NO CURRENT S/S    Anxiety     Arthritis     Asthma 2020    Shortness of breath after walking long periods of time.    CAD 09/14/2021    [facility] SEES PT    Cholelithiasis 2022    Chronic kidney disease 2022    NO DIALYSIS    Colon polyp 2008    BENIGN    Deep vein thrombosis 2011    Green filter in place.    Depression     Diabetes mellitus     Elevated cholesterol     Essential hypertension 09/14/2021    GERD (gastroesophageal reflux disease)     Heart attack 2009    NO STENTS/NO BYPASS    History of pulmonary embolism     2011, TAKE PRADAXA    Hypothyroid     Ingrown toenail 2022    Lumbar radiculopathy 09/2018    describes sensory S1 radiculopathy but would favor right L5    Paroxysmal atrial fibrillation 09/14/2021    [facility]O    Seasonal allergies     Sleep apnea     BIPAP?     Past Surgical History:   Procedure Laterality Date    BLADDER SURGERY  2011    BREAST BIOPSY  2002    Upper right breast BENIGN    BREAST SURGERY      CARDIAC CATHETERIZATION  2011    NO INTERVENTION    CARDIAC  SURGERY      R/T BLOOD CLOTS, NO CARDIAC INTERVENTION     SECTION  1982    CHOLECYSTECTOMY N/A 2022    Procedure: laparoscopic cholecystectomy, possible open;  Surgeon: Krystal Dacosta MD;  [place] [facility] [place]  Service: General;  Laterality: N/A;    COLONOSCOPY  2018    COLONOSCOPY N/A 2022    Procedure: COLONOSCOPY;  Surgeon: Ta Vasquez MD;  Location: [facility] ENDOSCOPY;  Service: Gastroenterology;  Laterality: N/A;  DIVERTICULOSIS    ENDOSCOPY N/A 2022    Procedure: ESOPHAGOGASTRODUODENOSCOPY WITH BX;  Surgeon: Ta Vasquez MD;  Location: [facility] ENDOSCOPY;  Service: Gastroenterology;  Laterality: N/A;  SMALL HIATAL HERNIA    ERCP N/A 2022    Procedure: ENDOSCOPIC RETROGRADE CHOLANGIOPANCREATOGRAPHY WITH SPHINCTEROTOMY, STONE REMOVAL, STENT PLACEMENT;  Surgeon: Arden Garcia MD;  Location: [facility] ENDOSCOPY;  Service: Gastroenterology;  Laterality: N/A;  BILE DUCT STONES    ERCP N/A 2023    Procedure: ENDOSCOPIC RETROGRADE CHOLANGIOPANCREATOGRAPHY with stent removal, balloon dilation, stone removal;  Surgeon: Arden Garcia MD;  Location: [facility] ENDOSCOPY;  Service: Gastroenterology;  Laterality: N/A;  bile duct stones    NICHOLAS FILTER INSERTION   2011    HYSTERECTOMY       Social History     Socioeconomic History    Marital status:    Tobacco Use    Smoking status: Never    Smokeless tobacco: Never   Vaping Use    Vaping Use: Never used   Substance and Sexual Activity    Alcohol use: Never    Drug use: Never    Sexual activity: Not Currently     Partners: Male     Birth control/protection: Surgical, Hysterectomy     Family History   Problem Relation Age of Onset    Stroke Mother     Cancer Mother         Colon    Colon cancer Mother 71        still living at 73    Diabetes Mother     Arthritis Mother     Colon polyps Mother     Hypertension Mother     Kidney disease Mother     Miscarriages / Stillbirths Mother      Stroke Father     Arthritis Father         Padgets Disease    Osteoporosis Father     Inflammatory bowel disease Father     Cancer Sister         Eye    Thyroid disease Sister     Diabetes Brother     Stroke Brother     Bleeding Disorder Daughter     Diabetes Brother     Hypertension Brother     Malig Hyperthermia Neg Hx      Immunization History   Administered Date(s) Administered    COVID-19 (PFIZER) Purple Cap Monovalent 04/03/2021, 04/26/2021    Flu Vaccine Split Quad 11/06/2019    FluMist 2-49yrs 12/07/2022    Fluad Quad 65+ 10/01/2021    Fluzone (or Fluarix & Flulaval for VFC) >6mos 11/06/2019    Fluzone High-Dose 65+yrs 12/07/2022    Influenza Quad Vaccine (Inpatient) 11/06/2019    Influenza, Unspecified 10/01/2021    Pneumococcal Conjugate 13-Valent (PCV13) 06/30/2022    Pneumococcal Conjugate 20-Valent (PCV20) 06/30/2022    Pneumococcal Polysaccharide (PPSV23) 10/13/2017       Objective   Physical Exam  Well appearing patient in no acute distress on RA  Anicteric sclerae, no rash on exposed skin  Respirations non-labored  Awake, alert, and oriented x 4. Speech intact. No gross neurologic deficit  Appropriate mood and affect    Vitals:    10/06/23 0859   BP: 150/80   Pulse: 100   Resp: 18   Temp: 97.2 °F (36.2 °C)   TempSrc: Temporal   SpO2: 97%   Weight: 106 kg (233 lb 6.4 oz)   PainSc: 0-No pain               ECOG: (1) Restricted in Physically Strenuous Activity, Ambulatory & Able to Do Work of Light Nature  Fall Risk Assessment was completed, and patient is at low risk for falls.  PHQ-9 Total Score:         The patient is  experiencing fatigue. Fatigue score: 7    PT/OT Functional Screening: PT fx screen: No needs identified  Speech Functional Screening: Speech fx screen: No needs identified  Rehab to be ordered: Rehab to be ordered: No needs identified        Result Review :   The following data was reviewed by:Toño Carroll MD on 10/06/23:  Lab Results   Component Value Date    HGB 12.0  10/04/2023    HCT 38.8 10/04/2023    MCV 94.4 10/04/2023     10/04/2023    WBC 4.53 10/04/2023    NEUTROABS 3.15 10/04/2023    LYMPHSABS 0.73 10/04/2023    MONOSABS 0.48 10/04/2023    EOSABS 0.13 10/04/2023    BASOSABS 0.03 10/04/2023     Lab Results   Component Value Date    GLUCOSE 140 (H) 10/04/2023    BUN 15 10/04/2023    CREATININE 0.87 10/04/2023     10/04/2023    K 4.3 10/04/2023     10/04/2023    CO2 29.1 (H) 10/04/2023    CALCIUM 9.7 10/04/2023    PROTEINTOT 6.9 10/04/2023    ALBUMIN 4.1 10/04/2023    BILITOT 0.3 10/04/2023    ALKPHOS 83 10/04/2023    AST 19 10/04/2023    ALT 17 10/04/2023     Iron labs reviewed and revealed low iron saturation, ferritin stable at 38    11/26/22 D/c summary personally reviewed.     Abdominal ultrasound report reviewed and consistent with hepatic steatosis.      Assessment and Plan    Diagnoses and all orders for this visit:    1. Other iron deficiency anemia (Primary)  -     CBC & Differential; Future  -     Comprehensive Metabolic Panel; Future  -     Iron Profile; Future  -     Ferritin; Future    Patient with blood loss from hospital stay due to choledocholithiasis in November of 2022. Patient with EGD and C-scope 11/2022 with no source of bleeding, however she does have diverticulosis.  Hgb is normal as of 10/4/23. Ferritin in normal range and stable above 30. Iron saturation has dropped to 11%. She is on iron supplementation orally every other day and is tolerating it well. Vitamin C added to help with absorption. Recommend to continue this. Will see patient back in 4 months to re-evaluate as she could continue to improve.       I spent 20 minutes caring for Gifty on this date of service. This time includes time spent by me in the following activities:preparing for the visit, reviewing tests, obtaining and/or reviewing a separately obtained history, performing a medically appropriate examination and/or evaluation , counseling and educating the  patient/family/caregiver, ordering medications, tests, or procedures, referring and communicating with other health care professionals , documenting information in the medical record and independently interpreting results and communicating that information with the patient/family/caregiver    Patient Follow Up: 4 months with labs  Patient was given instructions and counseling regarding her condition or for health maintenance advice. Please see specific information pulled into the AVS if appropriate.

## 2023-12-18 ENCOUNTER — LAB (OUTPATIENT)
Dept: LAB | Facility: HOSPITAL | Age: 68
End: 2023-12-18
Payer: MEDICARE

## 2023-12-18 ENCOUNTER — TRANSCRIBE ORDERS (OUTPATIENT)
Dept: LAB | Facility: HOSPITAL | Age: 68
End: 2023-12-18
Payer: MEDICARE

## 2023-12-18 DIAGNOSIS — I10 ESSENTIAL HYPERTENSION: ICD-10-CM

## 2023-12-18 DIAGNOSIS — N18.2 CHRONIC KIDNEY DISEASE, STAGE II (MILD): Primary | ICD-10-CM

## 2023-12-18 DIAGNOSIS — I10 HYPERTENSION, UNSPECIFIED TYPE: ICD-10-CM

## 2023-12-18 DIAGNOSIS — N18.2 CHRONIC KIDNEY DISEASE, STAGE II (MILD): ICD-10-CM

## 2023-12-18 DIAGNOSIS — E78.5 HYPERLIPIDEMIA LDL GOAL <70: ICD-10-CM

## 2023-12-18 LAB
ALBUMIN SERPL-MCNC: 4 G/DL (ref 3.5–5.2)
ALBUMIN UR-MCNC: 6 MG/DL
ALBUMIN/GLOB SERPL: 1.4 G/DL
ALP SERPL-CCNC: 84 U/L (ref 39–117)
ALT SERPL W P-5'-P-CCNC: 15 U/L (ref 1–33)
ANION GAP SERPL CALCULATED.3IONS-SCNC: 11.3 MMOL/L (ref 5–15)
AST SERPL-CCNC: 20 U/L (ref 1–32)
BACTERIA UR QL AUTO: ABNORMAL /HPF
BASOPHILS # BLD AUTO: 0.04 10*3/MM3 (ref 0–0.2)
BASOPHILS NFR BLD AUTO: 0.9 % (ref 0–1.5)
BILIRUB CONJ SERPL-MCNC: <0.2 MG/DL (ref 0–0.3)
BILIRUB SERPL-MCNC: 0.4 MG/DL (ref 0–1.2)
BILIRUB UR QL STRIP: NEGATIVE
BUN SERPL-MCNC: 23 MG/DL (ref 8–23)
BUN/CREAT SERPL: 23.5 (ref 7–25)
CALCIUM SPEC-SCNC: 9.6 MG/DL (ref 8.6–10.5)
CHLORIDE SERPL-SCNC: 107 MMOL/L (ref 98–107)
CHOLEST SERPL-MCNC: 119 MG/DL (ref 0–200)
CLARITY UR: ABNORMAL
CO2 SERPL-SCNC: 23.7 MMOL/L (ref 22–29)
COLOR UR: YELLOW
CREAT SERPL-MCNC: 0.98 MG/DL (ref 0.57–1)
CREAT UR-MCNC: 118.6 MG/DL
CREAT UR-MCNC: 134.2 MG/DL
DEPRECATED RDW RBC AUTO: 43.8 FL (ref 37–54)
EGFRCR SERPLBLD CKD-EPI 2021: 63.4 ML/MIN/1.73
EOSINOPHIL # BLD AUTO: 0.16 10*3/MM3 (ref 0–0.4)
EOSINOPHIL NFR BLD AUTO: 3.5 % (ref 0.3–6.2)
ERYTHROCYTE [DISTWIDTH] IN BLOOD BY AUTOMATED COUNT: 13.1 % (ref 12.3–15.4)
GLOBULIN UR ELPH-MCNC: 2.8 GM/DL
GLUCOSE SERPL-MCNC: 115 MG/DL (ref 65–99)
GLUCOSE UR STRIP-MCNC: NEGATIVE MG/DL
HCT VFR BLD AUTO: 36.9 % (ref 34–46.6)
HDLC SERPL-MCNC: 62 MG/DL (ref 40–60)
HGB BLD-MCNC: 12 G/DL (ref 12–15.9)
HGB UR QL STRIP.AUTO: ABNORMAL
HYALINE CASTS UR QL AUTO: ABNORMAL /LPF
IMM GRANULOCYTES # BLD AUTO: 0.03 10*3/MM3 (ref 0–0.05)
IMM GRANULOCYTES NFR BLD AUTO: 0.7 % (ref 0–0.5)
KETONES UR QL STRIP: NEGATIVE
LDLC SERPL CALC-MCNC: 45 MG/DL (ref 0–100)
LDLC/HDLC SERPL: 0.75 {RATIO}
LEUKOCYTE ESTERASE UR QL STRIP.AUTO: ABNORMAL
LYMPHOCYTES # BLD AUTO: 0.88 10*3/MM3 (ref 0.7–3.1)
LYMPHOCYTES NFR BLD AUTO: 19.4 % (ref 19.6–45.3)
MCH RBC QN AUTO: 29.8 PG (ref 26.6–33)
MCHC RBC AUTO-ENTMCNC: 32.5 G/DL (ref 31.5–35.7)
MCV RBC AUTO: 91.6 FL (ref 79–97)
MICROALBUMIN/CREAT UR: 50.6 MG/G (ref 0–29)
MONOCYTES # BLD AUTO: 0.48 10*3/MM3 (ref 0.1–0.9)
MONOCYTES NFR BLD AUTO: 10.6 % (ref 5–12)
NEUTROPHILS NFR BLD AUTO: 2.94 10*3/MM3 (ref 1.7–7)
NEUTROPHILS NFR BLD AUTO: 64.9 % (ref 42.7–76)
NITRITE UR QL STRIP: POSITIVE
NRBC BLD AUTO-RTO: 0 /100 WBC (ref 0–0.2)
PH UR STRIP.AUTO: 6 [PH] (ref 5–8)
PLATELET # BLD AUTO: 200 10*3/MM3 (ref 140–450)
PMV BLD AUTO: 11.4 FL (ref 6–12)
POTASSIUM SERPL-SCNC: 4.5 MMOL/L (ref 3.5–5.2)
PROT ?TM UR-MCNC: 19.4 MG/DL
PROT SERPL-MCNC: 6.8 G/DL (ref 6–8.5)
PROT UR QL STRIP: ABNORMAL
PROT/CREAT UR: 0.14 MG/G{CREAT}
RBC # BLD AUTO: 4.03 10*6/MM3 (ref 3.77–5.28)
RBC # UR STRIP: ABNORMAL /HPF
REF LAB TEST METHOD: ABNORMAL
SODIUM SERPL-SCNC: 142 MMOL/L (ref 136–145)
SP GR UR STRIP: 1.02 (ref 1–1.03)
SQUAMOUS #/AREA URNS HPF: ABNORMAL /HPF
TRIGL SERPL-MCNC: 54 MG/DL (ref 0–150)
UROBILINOGEN UR QL STRIP: ABNORMAL
VLDLC SERPL-MCNC: 12 MG/DL (ref 5–40)
WBC # UR STRIP: ABNORMAL /HPF
WBC NRBC COR # BLD AUTO: 4.53 10*3/MM3 (ref 3.4–10.8)

## 2023-12-18 PROCEDURE — 85025 COMPLETE CBC W/AUTO DIFF WBC: CPT

## 2023-12-18 PROCEDURE — 80053 COMPREHEN METABOLIC PANEL: CPT

## 2023-12-18 PROCEDURE — 36415 COLL VENOUS BLD VENIPUNCTURE: CPT

## 2023-12-18 PROCEDURE — 80061 LIPID PANEL: CPT

## 2023-12-18 PROCEDURE — 82248 BILIRUBIN DIRECT: CPT

## 2023-12-18 PROCEDURE — 82043 UR ALBUMIN QUANTITATIVE: CPT

## 2023-12-18 PROCEDURE — 84156 ASSAY OF PROTEIN URINE: CPT

## 2023-12-18 PROCEDURE — 82570 ASSAY OF URINE CREATININE: CPT

## 2023-12-18 PROCEDURE — 81001 URINALYSIS AUTO W/SCOPE: CPT

## 2023-12-19 ENCOUNTER — TELEPHONE (OUTPATIENT)
Dept: CARDIOLOGY | Facility: CLINIC | Age: 68
End: 2023-12-19
Payer: MEDICARE

## 2023-12-19 NOTE — TELEPHONE ENCOUNTER
----- Message from TIEN Francis sent at 12/18/2023  5:43 PM EST -----  Lipid panel is good, renal function and electrolytes are good, liver enzymes are good, continue current meds

## 2024-01-08 DIAGNOSIS — N95.2 ATROPHIC VAGINITIS: ICD-10-CM

## 2024-01-08 RX ORDER — CONJUGATED ESTROGENS 0.62 MG/G
CREAM VAGINAL
Qty: 30 G | Refills: 1 | Status: SHIPPED | OUTPATIENT
Start: 2024-01-08

## 2024-01-12 ENCOUNTER — OFFICE VISIT (OUTPATIENT)
Dept: FAMILY MEDICINE CLINIC | Facility: CLINIC | Age: 69
End: 2024-01-12
Payer: MEDICARE

## 2024-01-12 VITALS
TEMPERATURE: 98.4 F | HEART RATE: 112 BPM | OXYGEN SATURATION: 94 % | HEIGHT: 60 IN | WEIGHT: 233 LBS | SYSTOLIC BLOOD PRESSURE: 113 MMHG | DIASTOLIC BLOOD PRESSURE: 63 MMHG | BODY MASS INDEX: 45.75 KG/M2

## 2024-01-12 DIAGNOSIS — J02.8 PHARYNGITIS DUE TO OTHER ORGANISM: ICD-10-CM

## 2024-01-12 DIAGNOSIS — J10.1 INFLUENZA A: Primary | ICD-10-CM

## 2024-01-12 LAB
EXPIRATION DATE: ABNORMAL
EXPIRATION DATE: NORMAL
FLUAV AG UPPER RESP QL IA.RAPID: DETECTED
FLUBV AG UPPER RESP QL IA.RAPID: NOT DETECTED
INTERNAL CONTROL: ABNORMAL
INTERNAL CONTROL: NORMAL
Lab: 8172
Lab: ABNORMAL
S PYO AG THROAT QL: NEGATIVE
SARS-COV-2 AG UPPER RESP QL IA.RAPID: NOT DETECTED

## 2024-01-12 PROCEDURE — 3074F SYST BP LT 130 MM HG: CPT | Performed by: NURSE PRACTITIONER

## 2024-01-12 PROCEDURE — 3078F DIAST BP <80 MM HG: CPT | Performed by: NURSE PRACTITIONER

## 2024-01-12 PROCEDURE — 1159F MED LIST DOCD IN RCRD: CPT | Performed by: NURSE PRACTITIONER

## 2024-01-12 PROCEDURE — 87428 SARSCOV & INF VIR A&B AG IA: CPT | Performed by: NURSE PRACTITIONER

## 2024-01-12 PROCEDURE — 1160F RVW MEDS BY RX/DR IN RCRD: CPT | Performed by: NURSE PRACTITIONER

## 2024-01-12 PROCEDURE — 99213 OFFICE O/P EST LOW 20 MIN: CPT | Performed by: NURSE PRACTITIONER

## 2024-01-12 PROCEDURE — 87880 STREP A ASSAY W/OPTIC: CPT | Performed by: NURSE PRACTITIONER

## 2024-01-12 RX ORDER — AZITHROMYCIN 250 MG/1
TABLET, FILM COATED ORAL
Qty: 6 TABLET | Refills: 0 | Status: CANCELLED | OUTPATIENT
Start: 2024-01-12

## 2024-01-12 RX ORDER — BENZONATATE 100 MG/1
100 CAPSULE ORAL 3 TIMES DAILY PRN
Qty: 30 CAPSULE | Refills: 0 | Status: SHIPPED | OUTPATIENT
Start: 2024-01-12

## 2024-01-12 NOTE — PATIENT INSTRUCTIONS
Supportive care with rest, plenty of fluids, tylenol/ibuprofen for pain and/or fever as needed per label instructions.  You may find a cool-mist humidifier helpful as well as throat lozenges, Chloraseptic spray and warm salt water gargles. Should you develop shortness of breath, chest pain or worsening of symptoms please go to the ER.

## 2024-01-12 NOTE — PROGRESS NOTES
Chief Complaint  Cough (2 weeks not getting any better), URI, and Sore Throat (Body aches )    Subjective      Gifty Hoover is a 68 year old female that presents to [facility] with c/o cough, sinus congestion, sore throat and body aches. She reports fever up to 102. Symptoms started about 2 weeks ago. She states it started out in the sinuses and progressed from there.   Cough is productive. She had been taking mucinex dm but is now taking dayquil/nyquil.   No known sick contacts or exposures.     History of Present Illness    Current Outpatient Medications   Medication Instructions    albuterol sulfate HFA (ProAir HFA) 108 (90 Base) MCG/ACT inhaler 2 puffs, Inhalation, Every 6 Hours PRN    aspirin 81 mg, Oral, Daily    benzonatate (TESSALON PERLES) 100 mg, Oral, 3 Times Daily PRN    Calcium Carb-Cholecalciferol (Calcium-Vitamin D) 500-400 MG-UNIT tablet 1 tablet, Oral, 2 Times Daily    ezetimibe (ZETIA) 10 mg, Oral, Daily    famotidine (PEPCID) 40 mg, Oral, Every Night at Bedtime    Ferrous Fumarate 324 (106 Fe) MG tablet 1 tablet, Oral, Every Other Day, LAST DOSE 12/15/22    furosemide (LASIX) 20 mg, Oral, Daily PRN    levothyroxine (SYNTHROID, LEVOTHROID) 88 mcg, Oral, Daily    metFORMIN (GLUCOPHAGE) 500 mg, Oral, 2 Times Daily With Meals, INST PER ANESTHESIA PROTOCOL    omeprazole (PRILOSEC) 40 mg, Oral, Daily    potassium chloride 10 MEQ CR tablet 10 mEq, Oral, Daily PRN    Pradaxa 150 mg, Oral, 2 Times Daily    Premarin 0.625 MG/GM vaginal cream Apply daily for 2 weeks then use 2 times weekly thereafter.    rosuvastatin (CRESTOR) 40 mg, Oral, Nightly    telmisartan (MICARDIS) 80 mg, Oral, Nightly    vitamin C (ASCORBIC ACID) 250 mg, Oral, Daily       The following portions of the patient's history were reviewed and updated as appropriate: allergies, current medications, past family history, past medical history, past social history, past surgical history, and problem  "list.    Objective   Vital Signs:   /63   Pulse 112   Temp 98.4 °F (36.9 °C)   Ht 152.4 cm (60\")   Wt 106 kg (233 lb)   SpO2 94%   BMI 45.50 kg/m²     Wt Readings from Last 3 Encounters:   01/12/24 106 kg (233 lb)   10/06/23 106 kg (233 lb 6.4 oz)   09/05/23 105 kg (231 lb)     BP Readings from Last 3 Encounters:   01/12/24 113/63   10/06/23 150/80   09/05/23 140/81     Physical Exam  Vitals reviewed.   Constitutional:       Appearance: Normal appearance. She is well-developed. She is obese. She is ill-appearing.   HENT:      Head: Normocephalic and atraumatic.      Mouth/Throat:      Mouth: Mucous membranes are moist.      Pharynx: Posterior oropharyngeal erythema present.   Eyes:      Conjunctiva/sclera: Conjunctivae normal.      Pupils: Pupils are equal, round, and reactive to light.   Cardiovascular:      Rate and Rhythm: Normal rate and regular rhythm.      Heart sounds: No murmur heard.     No friction rub. No gallop.   Pulmonary:      Effort: Pulmonary effort is normal.      Breath sounds: No wheezing. Rhonchi: bilateral.     Comments: Coarse breath sounds in lower lobes.  Skin:     General: Skin is warm and dry.   Neurological:      Mental Status: She is alert and oriented to person, place, and time.   Psychiatric:         Mood and Affect: Mood and affect normal.         Behavior: Behavior normal.          Result Review :  The following data was reviewed by: TIEN Cantu on 01/12/2024:            Lab Results (last 72 hours)       Procedure Component Value Units Date/Time    POCT rapid strep A [[id number]] Collected: 01/12/24 1359    Specimen: Swab Updated: 01/12/24 1400     Rapid Strep A Screen Negative     Internal Control Passed     Lot Number [id number]     Expiration Date 07/27/2025    POCT SARS-CoV-2 Antigen LUCIAN + Flu [[id number]]  (Abnormal) Collected: 01/12/24 1400    Specimen: Swab Updated: 01/12/24 1400     SARS Antigen Not Detected     Influenza A Antigen LUCIAN Detected     " Influenza B Antigen LUCIAN Not Detected     Internal Control Passed     Lot Number [id number]     Expiration Date 10/30/2024             No Images in the past 120 days found..    Lab Results   Component Value Date    SARSANTIGEN Not Detected 01/12/2024    COVID19 NOT DETECTED 07/27/2020    FLUAAG Detected (A) 01/12/2024    FLUBAG Not Detected 01/12/2024    RAPSCRN Negative 01/12/2024    INR 1.21 (H) 11/23/2022    BILIRUBINUR Negative 12/18/2023    POCGLU 129 (H) 01/12/2023       Procedures        Assessment and Plan   Diagnoses and all orders for this visit:    1. Influenza A (Primary)  -     POCT SARS-CoV-2 Antigen LUCIAN + Flu  -     benzonatate (Tessalon Perles) 100 MG capsule; Take 1 capsule by mouth 3 (Three) Times a Day As Needed for Cough.  Dispense: 30 capsule; Refill: 0    2. Pharyngitis due to other organism  -     POCT rapid strep A                  There are no discontinued medications.       Follow Up   No follow-ups on file.  Patient was given instructions and counseling regarding her condition or for health maintenance advice. Please see specific information pulled into the AVS if appropriate.     Supportive care with rest, plenty of fluids, tylenol/ibuprofen for pain and/or fever as needed per label instructions.  You may find a cool-mist humidifier helpful as well as throat lozenges, Chloraseptic spray and warm salt water gargles. Should you develop shortness of breath, chest pain or worsening of symptoms please go to the ER.    Poly Saldivar, TIEN  01/12/24  14:30 EST

## 2024-02-07 ENCOUNTER — LAB (OUTPATIENT)
Dept: ONCOLOGY | Facility: HOSPITAL | Age: 69
End: 2024-02-07
Payer: MEDICARE

## 2024-02-07 DIAGNOSIS — D50.8 OTHER IRON DEFICIENCY ANEMIA: ICD-10-CM

## 2024-02-07 LAB
ALBUMIN SERPL-MCNC: 4.3 G/DL (ref 3.5–5.2)
ALBUMIN/GLOB SERPL: 1.7 G/DL
ALP SERPL-CCNC: 76 U/L (ref 39–117)
ALT SERPL W P-5'-P-CCNC: 24 U/L (ref 1–33)
ANION GAP SERPL CALCULATED.3IONS-SCNC: 9.2 MMOL/L (ref 5–15)
AST SERPL-CCNC: 21 U/L (ref 1–32)
BASOPHILS # BLD AUTO: 0.03 10*3/MM3 (ref 0–0.2)
BASOPHILS NFR BLD AUTO: 0.7 % (ref 0–1.5)
BILIRUB SERPL-MCNC: 0.5 MG/DL (ref 0–1.2)
BUN SERPL-MCNC: 18 MG/DL (ref 8–23)
BUN/CREAT SERPL: 21.7 (ref 7–25)
CALCIUM SPEC-SCNC: 9.4 MG/DL (ref 8.6–10.5)
CHLORIDE SERPL-SCNC: 106 MMOL/L (ref 98–107)
CO2 SERPL-SCNC: 26.8 MMOL/L (ref 22–29)
CREAT SERPL-MCNC: 0.83 MG/DL (ref 0.57–1)
DEPRECATED RDW RBC AUTO: 50.9 FL (ref 37–54)
EGFRCR SERPLBLD CKD-EPI 2021: 76.9 ML/MIN/1.73
EOSINOPHIL # BLD AUTO: 0.15 10*3/MM3 (ref 0–0.4)
EOSINOPHIL NFR BLD AUTO: 3.3 % (ref 0.3–6.2)
ERYTHROCYTE [DISTWIDTH] IN BLOOD BY AUTOMATED COUNT: 14 % (ref 12.3–15.4)
FERRITIN SERPL-MCNC: 54.91 NG/ML (ref 13–150)
GLOBULIN UR ELPH-MCNC: 2.5 GM/DL
GLUCOSE SERPL-MCNC: 115 MG/DL (ref 65–99)
HCT VFR BLD AUTO: 39.6 % (ref 34–46.6)
HGB BLD-MCNC: 12.4 G/DL (ref 12–15.9)
IMM GRANULOCYTES # BLD AUTO: 0.01 10*3/MM3 (ref 0–0.05)
IMM GRANULOCYTES NFR BLD AUTO: 0.2 % (ref 0–0.5)
IRON 24H UR-MRATE: 49 MCG/DL (ref 37–145)
IRON SATN MFR SERPL: 11 % (ref 20–50)
LYMPHOCYTES # BLD AUTO: 0.8 10*3/MM3 (ref 0.7–3.1)
LYMPHOCYTES NFR BLD AUTO: 17.5 % (ref 19.6–45.3)
MCH RBC QN AUTO: 30.2 PG (ref 26.6–33)
MCHC RBC AUTO-ENTMCNC: 31.3 G/DL (ref 31.5–35.7)
MCV RBC AUTO: 96.4 FL (ref 79–97)
MONOCYTES # BLD AUTO: 0.46 10*3/MM3 (ref 0.1–0.9)
MONOCYTES NFR BLD AUTO: 10.1 % (ref 5–12)
NEUTROPHILS NFR BLD AUTO: 3.11 10*3/MM3 (ref 1.7–7)
NEUTROPHILS NFR BLD AUTO: 68.2 % (ref 42.7–76)
PLATELET # BLD AUTO: 210 10*3/MM3 (ref 140–450)
PMV BLD AUTO: 10.3 FL (ref 6–12)
POTASSIUM SERPL-SCNC: 4.2 MMOL/L (ref 3.5–5.2)
PROT SERPL-MCNC: 6.8 G/DL (ref 6–8.5)
RBC # BLD AUTO: 4.11 10*6/MM3 (ref 3.77–5.28)
SODIUM SERPL-SCNC: 142 MMOL/L (ref 136–145)
TIBC SERPL-MCNC: 456 MCG/DL (ref 298–536)
TRANSFERRIN SERPL-MCNC: 306 MG/DL (ref 200–360)
WBC NRBC COR # BLD AUTO: 4.56 10*3/MM3 (ref 3.4–10.8)

## 2024-02-07 PROCEDURE — 84466 ASSAY OF TRANSFERRIN: CPT

## 2024-02-07 PROCEDURE — 80053 COMPREHEN METABOLIC PANEL: CPT

## 2024-02-07 PROCEDURE — 36415 COLL VENOUS BLD VENIPUNCTURE: CPT

## 2024-02-07 PROCEDURE — 85025 COMPLETE CBC W/AUTO DIFF WBC: CPT

## 2024-02-07 PROCEDURE — 82728 ASSAY OF FERRITIN: CPT

## 2024-02-07 PROCEDURE — 83540 ASSAY OF IRON: CPT

## 2024-02-09 ENCOUNTER — OFFICE VISIT (OUTPATIENT)
Dept: ONCOLOGY | Facility: HOSPITAL | Age: 69
End: 2024-02-09
Payer: MEDICARE

## 2024-02-09 VITALS
BODY MASS INDEX: 45.31 KG/M2 | WEIGHT: 232 LBS | HEART RATE: 105 BPM | SYSTOLIC BLOOD PRESSURE: 153 MMHG | DIASTOLIC BLOOD PRESSURE: 99 MMHG | TEMPERATURE: 96.8 F | OXYGEN SATURATION: 96 % | RESPIRATION RATE: 18 BRPM

## 2024-02-09 DIAGNOSIS — I10 PRIMARY HYPERTENSION: ICD-10-CM

## 2024-02-09 DIAGNOSIS — D50.8 OTHER IRON DEFICIENCY ANEMIA: Primary | ICD-10-CM

## 2024-02-09 PROCEDURE — G0463 HOSPITAL OUTPT CLINIC VISIT: HCPCS | Performed by: INTERNAL MEDICINE

## 2024-02-09 NOTE — PROGRESS NOTES
Chief Complaint  Low blood counts    Malka, Paris, TIEN Ace, Paris, APRN      Subjective          Gifty Hoover presents to [facility] for anemia    History of Present Illness   Ms. Gifty Hoover is a 68 yo F presenting for follow up for anemia. She has a history of CAD, HTN, hepatic steatosis, GERD, obesity, HLD. She is taking iron every other day with vitamin C. She is tolerating it well. Energy level stable.  She denies any bleeding or dark stool. No abdominal pain. No chest pain, headache, vision changes, trouble breathing, fevers, chills. She does have lingering cough from a viral infection several weeks ago.     Hematology History     7/7/22 CBC with Hgb 9.9, WBC 4.28, plt 213, MCV 92.3. Positive occult blood. Also with transaminitis on labs from 6/30/22. Hemoglobin is in the 9.9 range. She has CKD as well with cr of 1.60 and GFR of 35. Iron studies with normal iron level, but iron sat was 15%, ferritin was elevated at 241. No prior labs to compare to on the ferritin. Previous colonoscopy with diverticulosis and grade I hemmorrhoids    8/3 CBC with improved hgb to 10.1, MCV 92.3, plt 241, WBC of 3.27. Abdominal ultrasound showed hepatic steatosis. SPEP without M-spike.     9/9/22 Iron studies: Ferritin down to 88.9, T sat of 12%, TIBC 429.     11/23-26/22: [facility] admission due to choledocholithiasis and biliary colic requiring ERCP, balloon extraction of 2 stones and placement of stent. Hgb sarabjit of 7.6 during this stay. Iron sat of 4%, iron low at 16    11/29/22: Hgb 9.9.    4/5/23: Hgb 10.5. Iron 38, ferritin 33, iron sat 8%, TIBC 460.    6/5/23: Hgb 11.2, Iron sat 11%, ferritin 40.     10/4/23: Hgb 12.0 g/dL, iron 55, ferritin 38, iron sat 11%, TIBC 498    2/7/24: Hgb 12.4 g/dL, MCV 96.4, Ferritin 54.9, iron sat 11%. Continue oral iron every other day with vitamin C.       Oncology/Hematology History    No history exists.       Review of Systems    Constitutional:  Negative for appetite change, diaphoresis, fatigue, fever, unexpected weight gain and unexpected weight loss.   HENT: Negative.  Negative for hearing loss, sore throat and voice change.    Eyes: Negative.  Negative for blurred vision, double vision, pain, redness and visual disturbance.   Respiratory:  Negative for cough, shortness of breath and wheezing.    Cardiovascular: Negative.  Negative for chest pain, palpitations and leg swelling.   Gastrointestinal: Negative.    Endocrine: Negative.  Negative for cold intolerance, heat intolerance, polydipsia and polyuria.   Genitourinary:  Negative for decreased urine volume, difficulty urinating, frequency and urinary incontinence.   Musculoskeletal: Negative.  Negative for arthralgias, back pain, joint swelling and myalgias.   Skin:  Negative for color change, rash, skin lesions and wound.   Allergic/Immunologic: Negative.    Neurological: Negative.  Negative for dizziness, seizures, numbness and headache.   Hematological: Negative.  Negative for adenopathy. Does not bruise/bleed easily.   Psychiatric/Behavioral: Negative.  Negative for depressed mood. The patient is not nervous/anxious.    All other systems reviewed and are negative.    Current Outpatient Medications on File Prior to Visit   Medication Sig Dispense Refill    albuterol sulfate HFA (ProAir HFA) 108 (90 Base) MCG/ACT inhaler Inhale 2 puffs Every 6 (Six) Hours As Needed for Wheezing. 18 g 0    aspirin 81 MG EC tablet Take 1 tablet by mouth Daily.      benzonatate (Tessalon Perles) 100 MG capsule Take 1 capsule by mouth 3 (Three) Times a Day As Needed for Cough. 30 capsule 0    Calcium Carb-Cholecalciferol (Calcium-Vitamin D) 500-400 MG-UNIT tablet Take 1 tablet by mouth 2 (Two) Times a Day. 180 tablet 1    ezetimibe (ZETIA) 10 MG tablet Take 1 tablet by mouth Daily. 90 tablet 3    famotidine (PEPCID) 40 MG tablet Take 1 tablet by mouth every night at bedtime. 90 tablet 1    Ferrous  Fumarate 324 (106 Fe) MG tablet Take 1 tablet by mouth Every Other Day. LAST DOSE 12/15/22      furosemide (LASIX) 20 MG tablet Take 1 tablet by mouth Daily As Needed (for leg swelling). 30 tablet 3    levothyroxine (SYNTHROID, LEVOTHROID) 88 MCG tablet Take 1 tablet by mouth Daily. 90 tablet 1    metFORMIN (GLUCOPHAGE) 500 MG tablet Take 1 tablet by mouth 2 (Two) Times a Day With Meals. INST PER ANESTHESIA PROTOCOL 90 tablet 1    omeprazole (priLOSEC) 40 MG capsule Take 1 capsule by mouth Daily. 90 capsule 1    potassium chloride 10 MEQ CR tablet Take 1 tablet by mouth Daily As Needed (take with Lasix). 30 tablet 3    Pradaxa 150 MG capsu Take 1 capsule by mouth 2 (Two) Times a Day. 180 capsule 1    Premarin 0.625 MG/GM vaginal cream Apply daily for 2 weeks then use 2 times weekly thereafter. 30 g 1    rosuvastatin (CRESTOR) 40 MG tablet Take 1 tablet by mouth Every Night. 90 tablet 1    telmisartan (MICARDIS) 80 MG tablet Take 1 tablet by mouth Every Night. 90 tablet 1    vitamin C (ASCORBIC ACID) 250 MG tablet Take 1 tablet by mouth Daily. 90 tablet 1     No current facility-administered medications on file prior to visit.       No Known Allergies  Past Medical History:   Diagnosis Date    Anemia 2022    NO CURRENT S/S    Anxiety     Arthritis     Asthma 2020    Shortness of breath after walking long periods of time.    CAD 09/14/2021    [facility] SEES PT    Cholelithiasis 2022    Chronic kidney disease 2022    NO DIALYSIS    Colon polyp 2008    BENIGN    Deep vein thrombosis 2011    Green filter in place.    Depression     Diabetes mellitus     Elevated cholesterol     Essential hypertension 09/14/2021    GERD (gastroesophageal reflux disease)     Heart attack 2009    NO STENTS/NO BYPASS    History of pulmonary embolism     2011, TAKE PRADAXA    Hypothyroid     Ingrown toenail 2022    Lumbar radiculopathy 09/2018    describes sensory S1 radiculopathy but would favor right L5    Paroxysmal atrial fibrillation  2021    [facility]O    Seasonal allergies     Sleep apnea     BIPAP?     Past Surgical History:   Procedure Laterality Date    BLADDER SURGERY  2011    BREAST BIOPSY      Upper right breast BENIGN    BREAST SURGERY      CARDIAC CATHETERIZATION      NO INTERVENTION    CARDIAC SURGERY      R/T BLOOD CLOTS, NO CARDIAC INTERVENTION     SECTION  1982    CHOLECYSTECTOMY N/A 2022    Procedure: laparoscopic cholecystectomy, possible open;  Surgeon: Krystal Dacosta MD;  Location: [facility] OR [facility] [place];  Service: General;  Laterality: N/A;    COLONOSCOPY      COLONOSCOPY N/A 2022    Procedure: COLONOSCOPY;  Surgeon: Ta Vasquez MD;  Location: [facility] ENDOSCOPY;  Service: Gastroenterology;  Laterality: N/A;  DIVERTICULOSIS    ENDOSCOPY N/A 2022    Procedure: ESOPHAGOGASTRODUODENOSCOPY WITH BX;  Surgeon: Ta Vasquez MD;  Location: [facility] ENDOSCOPY;  Service: Gastroenterology;  Laterality: N/A;  SMALL HIATAL HERNIA    ERCP N/A 2022    Procedure: ENDOSCOPIC RETROGRADE CHOLANGIOPANCREATOGRAPHY WITH SPHINCTEROTOMY, STONE REMOVAL, STENT PLACEMENT;  Surgeon: Arden Garcia MD;  Location: [facility] ENDOSCOPY;  Service: Gastroenterology;  Laterality: N/A;  BILE DUCT STONES    ERCP N/A 2023    Procedure: ENDOSCOPIC RETROGRADE CHOLANGIOPANCREATOGRAPHY with stent removal, balloon dilation, stone removal;  Surgeon: Arden Garcia MD;  Location: [facility] ENDOSCOPY;  Service: Gastroenterology;  Laterality: N/A;  bile duct stones    NICHOLAS FILTER INSERTION 2011    HYSTERECTOMY       Social History     Socioeconomic History    Marital status:    Tobacco Use    Smoking status: Never    Smokeless tobacco: Never   Vaping Use    Vaping Use: Never used   Substance and Sexual Activity    Alcohol use: Never    Drug use: Never    Sexual activity: Not Currently     Partners: Male     Birth control/protection: Surgical, Hysterectomy     Family History    Problem Relation Age of Onset    Stroke Mother     Cancer Mother         Colon    Colon cancer Mother 71        still living at 73    Diabetes Mother     Arthritis Mother     Colon polyps Mother     Hypertension Mother     Kidney disease Mother     Miscarriages / Stillbirths Mother     Stroke Father     Arthritis Father         Padgets Disease    Osteoporosis Father     Inflammatory bowel disease Father     Cancer Sister         Eye    Thyroid disease Sister     Diabetes Brother     Stroke Brother     Bleeding Disorder Daughter     Diabetes Brother     Hypertension Brother     Malig Hyperthermia Neg Hx      Immunization History   Administered Date(s) Administered    COVID-19 (PFIZER) Purple Cap Monovalent 04/03/2021, 04/26/2021    Flu Vaccine Split Quad 11/06/2019    FluMist 2-49yrs 12/07/2022    Fluad Quad 65+ 10/01/2021    Fluzone (or Fluarix & Flulaval for VFC) >6mos 11/06/2019    Fluzone High-Dose 65+yrs 12/07/2022    Influenza Quad Vaccine (Inpatient) 11/06/2019    Influenza, Unspecified 10/01/2021    Pneumococcal Conjugate 13-Valent (PCV13) 06/30/2022    Pneumococcal Conjugate 20-Valent (PCV20) 06/30/2022    Pneumococcal Polysaccharide (PPSV23) 10/13/2017       Objective   Physical Exam  Well appearing patient in no acute distress on RA  Anicteric sclerae, no rash on exposed skin  Respirations non-labored  Awake, alert, and oriented x 4. Speech intact. No gross neurologic deficit  Appropriate mood and affect    Vitals:    02/09/24 0840   BP: 153/99   Pulse: 105   Resp: 18   Temp: 96.8 °F (36 °C)   TempSrc: Temporal   SpO2: 96%   Weight: 105 kg (232 lb)   PainSc: 0-No pain                 ECOG: (1) Restricted in Physically Strenuous Activity, Ambulatory & Able to Do Work of Light Nature  Fall Risk Assessment was completed, and patient is at low risk for falls.  PHQ-9 Total Score:         The patient is  experiencing fatigue. Fatigue score: 7    PT/OT Functional Screening: PT fx screen: No needs  identified  Speech Functional Screening: Speech fx screen: No needs identified  Rehab to be ordered: Rehab to be ordered: No needs identified        Result Review :   The following data was reviewed by:Toño Carroll MD on 02/09/24:  Lab Results   Component Value Date    HGB 12.4 02/07/2024    HCT 39.6 02/07/2024    MCV 96.4 02/07/2024     02/07/2024    WBC 4.56 02/07/2024    NEUTROABS 3.11 02/07/2024    LYMPHSABS 0.80 02/07/2024    MONOSABS 0.46 02/07/2024    EOSABS 0.15 02/07/2024    BASOSABS 0.03 02/07/2024     Lab Results   Component Value Date    GLUCOSE 115 (H) 02/07/2024    BUN 18 02/07/2024    CREATININE 0.83 02/07/2024     02/07/2024    K 4.2 02/07/2024     02/07/2024    CO2 26.8 02/07/2024    CALCIUM 9.4 02/07/2024    PROTEINTOT 6.8 02/07/2024    ALBUMIN 4.3 02/07/2024    BILITOT 0.5 02/07/2024    ALKPHOS 76 02/07/2024    AST 21 02/07/2024    ALT 24 02/07/2024     Iron labs reviewed and revealed no anemai, stable iron sat, improved ferritin    PCP note personally reviewed         Assessment and Plan    Diagnoses and all orders for this visit:    1. Other iron deficiency anemia (Primary)  -     CBC & Differential; Future  -     Ferritin; Future  -     Iron Profile; Future    2. Primary hypertension      Patient with blood loss from hospital stay due to choledocholithiasis in November of 2022. Patient with EGD and C-scope 11/2022 with no source of bleeding, however she does have diverticulosis.  Hgb is normal as of 10/4/23 and 2/7/24. Ferritin in normal range and stable above 30. Iron saturation stable at 11%. She is on iron supplementation orally every other day and is tolerating it well. Vitamin C previously added to help with absorption. Recommend to continue current therapy. Will see patient back in 4 months to re-evaluate.     HTN  Asymptomatic. Better readings at home. Follow up outpatient.     I spent 20 minutes caring for Gifty on this date of service. This time includes time  spent by me in the following activities:preparing for the visit, reviewing tests, obtaining and/or reviewing a separately obtained history, performing a medically appropriate examination and/or evaluation , counseling and educating the patient/family/caregiver, ordering medications, tests, or procedures, referring and communicating with other health care professionals , documenting information in the medical record and independently interpreting results and communicating that information with the patient/family/caregiver    Patient Follow Up: 4 months with labs  Patient was given instructions and counseling regarding her condition or for health maintenance advice. Please see specific information pulled into the AVS if appropriate.

## 2024-02-12 ENCOUNTER — OFFICE VISIT (OUTPATIENT)
Dept: PODIATRY | Facility: CLINIC | Age: 69
End: 2024-02-12
Payer: MEDICARE

## 2024-02-12 VITALS
OXYGEN SATURATION: 93 % | TEMPERATURE: 98 F | BODY MASS INDEX: 47.12 KG/M2 | WEIGHT: 240 LBS | HEIGHT: 60 IN | DIASTOLIC BLOOD PRESSURE: 85 MMHG | SYSTOLIC BLOOD PRESSURE: 149 MMHG | HEART RATE: 91 BPM

## 2024-02-12 DIAGNOSIS — M79.672 FOOT PAIN, BILATERAL: Primary | ICD-10-CM

## 2024-02-12 DIAGNOSIS — E11.9 NON-INSULIN DEPENDENT TYPE 2 DIABETES MELLITUS: ICD-10-CM

## 2024-02-12 DIAGNOSIS — M79.671 FOOT PAIN, BILATERAL: Primary | ICD-10-CM

## 2024-02-12 DIAGNOSIS — E11.8 DIABETIC FOOT: ICD-10-CM

## 2024-02-12 DIAGNOSIS — L60.0 ONYCHOCRYPTOSIS: ICD-10-CM

## 2024-02-12 DIAGNOSIS — B35.1 ONYCHOMYCOSIS: ICD-10-CM

## 2024-02-12 PROCEDURE — 1159F MED LIST DOCD IN RCRD: CPT | Performed by: PODIATRIST

## 2024-02-12 PROCEDURE — 11721 DEBRIDE NAIL 6 OR MORE: CPT | Performed by: PODIATRIST

## 2024-02-12 PROCEDURE — 3079F DIAST BP 80-89 MM HG: CPT | Performed by: PODIATRIST

## 2024-02-12 PROCEDURE — 99213 OFFICE O/P EST LOW 20 MIN: CPT | Performed by: PODIATRIST

## 2024-02-12 PROCEDURE — 1160F RVW MEDS BY RX/DR IN RCRD: CPT | Performed by: PODIATRIST

## 2024-02-12 PROCEDURE — G8404 LOW EXTEMITY NEUR EXAM DOCUM: HCPCS | Performed by: PODIATRIST

## 2024-02-12 PROCEDURE — 3077F SYST BP >= 140 MM HG: CPT | Performed by: PODIATRIST

## 2024-03-04 ENCOUNTER — OFFICE VISIT (OUTPATIENT)
Dept: FAMILY MEDICINE CLINIC | Facility: CLINIC | Age: 69
End: 2024-03-04
Payer: MEDICARE

## 2024-03-04 VITALS
SYSTOLIC BLOOD PRESSURE: 140 MMHG | DIASTOLIC BLOOD PRESSURE: 77 MMHG | WEIGHT: 234 LBS | HEART RATE: 97 BPM | OXYGEN SATURATION: 94 % | HEIGHT: 60 IN | BODY MASS INDEX: 45.94 KG/M2 | TEMPERATURE: 98.8 F

## 2024-03-04 DIAGNOSIS — N76.0 ACUTE VAGINITIS: ICD-10-CM

## 2024-03-04 DIAGNOSIS — I48.0 PAROXYSMAL ATRIAL FIBRILLATION: ICD-10-CM

## 2024-03-04 DIAGNOSIS — E66.01 MORBIDLY OBESE: ICD-10-CM

## 2024-03-04 DIAGNOSIS — Z23 NEED FOR INFLUENZA VACCINATION: ICD-10-CM

## 2024-03-04 DIAGNOSIS — E11.9 TYPE 2 DIABETES MELLITUS WITHOUT COMPLICATION, UNSPECIFIED WHETHER LONG TERM INSULIN USE: Primary | ICD-10-CM

## 2024-03-04 DIAGNOSIS — I10 ESSENTIAL HYPERTENSION: ICD-10-CM

## 2024-03-04 DIAGNOSIS — E03.9 HYPOTHYROIDISM, UNSPECIFIED TYPE: ICD-10-CM

## 2024-03-04 DIAGNOSIS — K21.9 GASTROESOPHAGEAL REFLUX DISEASE WITHOUT ESOPHAGITIS: ICD-10-CM

## 2024-03-04 DIAGNOSIS — E03.9 ACQUIRED HYPOTHYROIDISM: ICD-10-CM

## 2024-03-04 DIAGNOSIS — E78.5 HYPERLIPIDEMIA LDL GOAL <70: ICD-10-CM

## 2024-03-04 DIAGNOSIS — R30.0 DYSURIA: ICD-10-CM

## 2024-03-04 LAB
ALBUMIN SERPL-MCNC: 4.1 G/DL (ref 3.5–5.2)
ALBUMIN/GLOB SERPL: 1.6 G/DL
ALP SERPL-CCNC: 76 U/L (ref 39–117)
ALT SERPL W P-5'-P-CCNC: 18 U/L (ref 1–33)
ANION GAP SERPL CALCULATED.3IONS-SCNC: 11 MMOL/L (ref 5–15)
AST SERPL-CCNC: 20 U/L (ref 1–32)
BILIRUB BLD-MCNC: NEGATIVE MG/DL
BILIRUB SERPL-MCNC: 0.4 MG/DL (ref 0–1.2)
BUN SERPL-MCNC: 22 MG/DL (ref 8–23)
BUN/CREAT SERPL: 21.6 (ref 7–25)
CALCIUM SPEC-SCNC: 9.7 MG/DL (ref 8.6–10.5)
CHLORIDE SERPL-SCNC: 107 MMOL/L (ref 98–107)
CHOLEST SERPL-MCNC: 124 MG/DL (ref 0–200)
CLARITY, POC: CLEAR
CO2 SERPL-SCNC: 25 MMOL/L (ref 22–29)
COLOR UR: YELLOW
CREAT SERPL-MCNC: 1.02 MG/DL (ref 0.57–1)
EGFRCR SERPLBLD CKD-EPI 2021: 60 ML/MIN/1.73
EXPIRATION DATE: ABNORMAL
GLOBULIN UR ELPH-MCNC: 2.6 GM/DL
GLUCOSE SERPL-MCNC: 138 MG/DL (ref 65–99)
GLUCOSE UR STRIP-MCNC: NEGATIVE MG/DL
HBA1C MFR BLD: 6.8 % (ref 4.8–5.6)
HDLC SERPL-MCNC: 64 MG/DL (ref 40–60)
KETONES UR QL: NEGATIVE
LDLC SERPL CALC-MCNC: 47 MG/DL (ref 0–100)
LDLC/HDLC SERPL: 0.75 {RATIO}
LEUKOCYTE EST, POC: ABNORMAL
Lab: ABNORMAL
NITRITE UR-MCNC: NEGATIVE MG/ML
PH UR: 5.5 [PH] (ref 5–8)
POTASSIUM SERPL-SCNC: 4.4 MMOL/L (ref 3.5–5.2)
PROT SERPL-MCNC: 6.7 G/DL (ref 6–8.5)
PROT UR STRIP-MCNC: ABNORMAL MG/DL
RBC # UR STRIP: ABNORMAL /UL
SODIUM SERPL-SCNC: 143 MMOL/L (ref 136–145)
SP GR UR: 1.03 (ref 1–1.03)
T4 FREE SERPL-MCNC: 1.46 NG/DL (ref 0.93–1.7)
TRIGL SERPL-MCNC: 60 MG/DL (ref 0–150)
TSH SERPL DL<=0.05 MIU/L-ACNC: 1.39 UIU/ML (ref 0.27–4.2)
UROBILINOGEN UR QL: NORMAL
VLDLC SERPL-MCNC: 13 MG/DL (ref 5–40)

## 2024-03-04 PROCEDURE — 84439 ASSAY OF FREE THYROXINE: CPT | Performed by: NURSE PRACTITIONER

## 2024-03-04 PROCEDURE — 80053 COMPREHEN METABOLIC PANEL: CPT | Performed by: NURSE PRACTITIONER

## 2024-03-04 PROCEDURE — 87088 URINE BACTERIA CULTURE: CPT | Performed by: NURSE PRACTITIONER

## 2024-03-04 PROCEDURE — 87186 SC STD MICRODIL/AGAR DIL: CPT | Performed by: NURSE PRACTITIONER

## 2024-03-04 PROCEDURE — 80061 LIPID PANEL: CPT | Performed by: NURSE PRACTITIONER

## 2024-03-04 PROCEDURE — 83036 HEMOGLOBIN GLYCOSYLATED A1C: CPT | Performed by: NURSE PRACTITIONER

## 2024-03-04 PROCEDURE — 87086 URINE CULTURE/COLONY COUNT: CPT | Performed by: NURSE PRACTITIONER

## 2024-03-04 PROCEDURE — 84443 ASSAY THYROID STIM HORMONE: CPT | Performed by: NURSE PRACTITIONER

## 2024-03-04 RX ORDER — FLUCONAZOLE 150 MG/1
150 TABLET ORAL
Qty: 3 TABLET | Refills: 0 | Status: SHIPPED | OUTPATIENT
Start: 2024-03-04

## 2024-03-04 RX ORDER — ATENOLOL 100 MG/1
150 TABLET ORAL 2 TIMES DAILY
Qty: 180 CAPSULE | Refills: 1 | Status: SHIPPED | OUTPATIENT
Start: 2024-03-04

## 2024-03-04 RX ORDER — OMEPRAZOLE 40 MG/1
40 CAPSULE, DELAYED RELEASE ORAL DAILY
Qty: 90 CAPSULE | Refills: 1 | Status: SHIPPED | OUTPATIENT
Start: 2024-03-04

## 2024-03-04 RX ORDER — LEVOTHYROXINE SODIUM 88 UG/1
88 TABLET ORAL DAILY
Qty: 90 TABLET | Refills: 1 | Status: SHIPPED | OUTPATIENT
Start: 2024-03-04

## 2024-03-04 RX ORDER — TELMISARTAN 80 MG/1
80 TABLET ORAL NIGHTLY
Qty: 90 TABLET | Refills: 1 | Status: SHIPPED | OUTPATIENT
Start: 2024-03-04

## 2024-03-04 RX ORDER — HYDROCHLOROTHIAZIDE 12.5 MG/1
12.5 TABLET ORAL EVERY MORNING
Qty: 90 TABLET | Refills: 1 | Status: SHIPPED | OUTPATIENT
Start: 2024-03-04

## 2024-03-04 RX ORDER — ROSUVASTATIN CALCIUM 40 MG/1
40 TABLET, COATED ORAL NIGHTLY
Qty: 90 TABLET | Refills: 1 | Status: SHIPPED | OUTPATIENT
Start: 2024-03-04

## 2024-03-04 NOTE — PROGRESS NOTES
Chief Complaint  Diabetes (Follow up), Dysuria, Flank Pain, and Urinary Frequency    Subjective          Gifty Hoover is a 68 y.o. female who presents to [facility]    History of Present Illness    HLD - LDL at goal of 45 12.18.23.    Hypothyroid -  last tsh was 1.5.  Patient reports feeling well energy level is normal for her.  Takes medicine in the morning on empty stomach.      HTN - mild elevation noted. Pt reports compliance with medications. Pt reports at home b/p is 137 systolic.    DM - last hgba1c was 6.4.Patient reports her sugar has been running good    GERD -denies any flaring with taking medication.    Recurrent UTI symptoms. Pt treated for UTI on 12.18.2023. pt c/o burning with urination. Admits to some itching. Pt wears pads d/t incontinence. Pt is using premarin cream and has noted some improvement.     PHQ-2 Total Score:     PHQ-9 Total Score:          Review of Systems   Constitutional:  Negative for chills, fatigue and fever.   Respiratory:  Negative for cough and shortness of breath.    Cardiovascular:  Negative for chest pain and palpitations.   Gastrointestinal:  Negative for constipation, diarrhea, nausea and vomiting.   Genitourinary:  Positive for dysuria. Negative for vaginal discharge.   Musculoskeletal:  Negative for back pain and neck pain.   Skin:  Negative for rash.   Neurological:  Negative for dizziness and headaches.          Medical History: has a past medical history of Anemia (2022), Anxiety, Arthritis, Asthma (2020), CAD (09/14/2021), Cholelithiasis (2022), Chronic kidney disease (2022), Colon polyp (2008), Deep vein thrombosis (2011), Depression, Diabetes mellitus, Elevated cholesterol, Essential hypertension (09/14/2021), GERD (gastroesophageal reflux disease), Heart attack (2009), History of pulmonary embolism, Hypothyroid, Ingrown toenail (2022), Lumbar radiculopathy (09/2018), Paroxysmal atrial fibrillation (09/14/2021), Seasonal  allergies, and Sleep apnea.     Surgical History: has a past surgical history that includes Bladder surgery ();  section (); Colonoscopy (); Sealy Filter Insertion Jugular (); Hysterectomy (); Cardiac catheterization (); Esophagogastroduodenoscopy (N/A, 2022); Colonoscopy (N/A, 2022); ERCP (N/A, 2022); Breast biopsy (); Cardiac surgery; Cholecystectomy (N/A, 2022); Breast surgery; and ERCP (N/A, 2023).     Family History: family history includes Arthritis in her father and mother; Bleeding Disorder in her daughter; Cancer in her mother and sister; Colon cancer (age of onset: 71) in her mother; Colon polyps in her mother; Diabetes in her brother, brother, and mother; Hypertension in her brother and mother; Inflammatory bowel disease in her father; Kidney disease in her mother; Miscarriages / Stillbirths in her mother; Osteoporosis in her father; Stroke in her brother, father, and mother; Thyroid disease in her sister.     Social History: reports that she has never smoked. She has never used smokeless tobacco. She reports that she does not drink alcohol and does not use drugs.    Allergies: Patient has no known allergies.      Health Maintenance Due   Topic Date Due    TDAP/TD VACCINES (1 - Tdap) Never done    ZOSTER VACCINE (1 of 2) Never done    RSV Vaccine - Adults (1 - 1-dose 60+ series) Never done    DIABETIC EYE EXAM  Never done    COVID-19 Vaccine (3 - 2023- season) 2023    HEMOGLOBIN A1C  2024            Current Outpatient Medications:     albuterol sulfate HFA (ProAir HFA) 108 (90 Base) MCG/ACT inhaler, Inhale 2 puffs Every 6 (Six) Hours As Needed for Wheezing., Disp: 18 g, Rfl: 0    Ascorbic Acid (VITAMIN C PO), Take  by mouth., Disp: , Rfl:     aspirin 81 MG EC tablet, Take 1 tablet by mouth Daily., Disp: , Rfl:     Calcium Carb-Cholecalciferol (CALCIUM/VITAMIN D PO), Take  by mouth., Disp: , Rfl:     ezetimibe (ZETIA) 10 MG  tablet, Take 1 tablet by mouth Daily., Disp: 90 tablet, Rfl: 3    famotidine (PEPCID) 40 MG tablet, Take 1 tablet by mouth every night at bedtime., Disp: 90 tablet, Rfl: 1    furosemide (LASIX) 20 MG tablet, Take 1 tablet by mouth Daily As Needed (for leg swelling)., Disp: 30 tablet, Rfl: 3    IRON, FERROUS SULFATE, PO, Take  by mouth., Disp: , Rfl:     levothyroxine (SYNTHROID, LEVOTHROID) 88 MCG tablet, Take 1 tablet by mouth Daily., Disp: 90 tablet, Rfl: 1    metFORMIN (GLUCOPHAGE) 500 MG tablet, Take 1 tablet by mouth 2 (Two) Times a Day With Meals. INST PER ANESTHESIA PROTOCOL, Disp: 90 tablet, Rfl: 1    omeprazole (priLOSEC) 40 MG capsule, Take 1 capsule by mouth Daily., Disp: 90 capsule, Rfl: 1    potassium chloride 10 MEQ CR tablet, Take 1 tablet by mouth Daily As Needed (take with Lasix)., Disp: 30 tablet, Rfl: 3    Pradaxa 150 MG capsu, Take 1 capsule by mouth 2 (Two) Times a Day., Disp: 180 capsule, Rfl: 1    Premarin 0.625 MG/GM vaginal cream, Apply daily for 2 weeks then use 2 times weekly thereafter., Disp: 30 g, Rfl: 1    rosuvastatin (CRESTOR) 40 MG tablet, Take 1 tablet by mouth Every Night., Disp: 90 tablet, Rfl: 1    telmisartan (MICARDIS) 80 MG tablet, Take 1 tablet by mouth Every Night., Disp: 90 tablet, Rfl: 1    fluconazole (Diflucan) 150 MG tablet, Take 1 tablet by mouth Every 72 (Seventy-Two) Hours As Needed (vaginal itching)., Disp: 3 tablet, Rfl: 0    hydroCHLOROthiazide 12.5 MG tablet, Take 1 tablet by mouth Every Morning., Disp: 90 tablet, Rfl: 1      Immunization History   Administered Date(s) Administered    COVID-19 (PFIZER) Purple Cap Monovalent 04/03/2021, 04/26/2021    Flu Vaccine Split Quad 11/06/2019    FluMist 2-49yrs 12/07/2022    Fluad Quad 65+ 10/01/2021    Fluzone (or Fluarix & Flulaval for VFC) >6mos 11/06/2019    Fluzone High-Dose 65+yrs 12/07/2022, 03/04/2024    Influenza Quad Vaccine (Inpatient) 11/06/2019    Influenza, Unspecified 10/01/2021    Pneumococcal Conjugate  "13-Valent (PCV13) 2022    Pneumococcal Conjugate 20-Valent (PCV20) 2022    Pneumococcal Polysaccharide (PPSV23) 10/13/2017         Objective       Vitals:    24 0742 24 0747   BP: 143/80 140/77   Pulse: 97    Temp: 98.8 °F (37.1 °C)    TempSrc: Temporal    SpO2: 94%    Weight: 106 kg (234 lb)    Height: 152.4 cm (60\")       Body mass index is 45.7 kg/m².   Wt Readings from Last 3 Encounters:   24 106 kg (234 lb)   24 109 kg (240 lb)   24 105 kg (232 lb)      BP Readings from Last 3 Encounters:   24 140/77   24 149/85   24 153/99                Physical Exam  Vitals reviewed.   Constitutional:       Appearance: Normal appearance. She is well-developed.   HENT:      Head: Normocephalic and atraumatic.   Eyes:      Conjunctiva/sclera: Conjunctivae normal.      Pupils: Pupils are equal, round, and reactive to light.   Neck:      Thyroid: No thyroid mass, thyromegaly or thyroid tenderness.   Cardiovascular:      Rate and Rhythm: Normal rate and regular rhythm.      Heart sounds: Normal heart sounds. No murmur heard.  Pulmonary:      Effort: Pulmonary effort is normal.      Breath sounds: Normal breath sounds. No wheezing or rhonchi.   Abdominal:      General: Bowel sounds are normal. There is no distension.      Palpations: Abdomen is soft.      Tenderness: There is no abdominal tenderness.   Musculoskeletal:      Right lower le+ Edema present.      Left lower le+ Edema present.   Skin:     General: Skin is warm and dry.   Neurological:      Mental Status: She is alert and oriented to person, place, and time.   Psychiatric:         Mood and Affect: Mood and affect normal.         Behavior: Behavior normal.         Thought Content: Thought content normal.         Judgment: Judgment normal.             Result Review :       Common labs          10/4/2023    09:04 2023    09:18 2023    09:23 2024    09:17   Common Labs   Glucose 140  115   " 115    BUN 15  23   18    Creatinine 0.87  0.98   0.83    Sodium 140  142   142    Potassium 4.3  4.5   4.2    Chloride 105  107   106    Calcium 9.7  9.6   9.4    Albumin 4.1  4.0   4.3    Total Bilirubin 0.3  0.4   0.5    Alkaline Phosphatase 83  84   76    AST (SGOT) 19  20   21    ALT (SGPT) 17  15   24    WBC 4.53  4.53   4.56    Hemoglobin 12.0  12.0   12.4    Hematocrit 38.8  36.9   39.6    Platelets 215  200   210    Total Cholesterol  119      Triglycerides  54      HDL Cholesterol  62      LDL Cholesterol   45      Microalbumin, Urine   6.0                        Assessment and Plan        Diagnoses and all orders for this visit:    1. Type 2 diabetes mellitus without complication, unspecified whether long term insulin use (Primary)  -     Comprehensive Metabolic Panel  -     Lipid Panel  -     Hemoglobin A1c    2. Essential hypertension  -     telmisartan (MICARDIS) 80 MG tablet; Take 1 tablet by mouth Every Night.  Dispense: 90 tablet; Refill: 1  -     hydroCHLOROthiazide 12.5 MG tablet; Take 1 tablet by mouth Every Morning.  Dispense: 90 tablet; Refill: 1    3. Dysuria  Comments:  Will send urine for culture currently treating for vaginitis and discussed atrophic vaginitis  Orders:  -     POCT urinalysis dipstick, automated  -     Urine Culture - Urine, Urine, Clean Catch; Future  -     Urine Culture - Urine, Urine, Clean Catch    4. Need for influenza vaccination  -     Fluzone High-Dose 65+yrs    5. Acquired hypothyroidism  -     levothyroxine (SYNTHROID, LEVOTHROID) 88 MCG tablet; Take 1 tablet by mouth Daily.  Dispense: 90 tablet; Refill: 1    6. Gastroesophageal reflux disease without esophagitis  -     omeprazole (priLOSEC) 40 MG capsule; Take 1 capsule by mouth Daily.  Dispense: 90 capsule; Refill: 1    7. Paroxysmal atrial fibrillation  -     Pradaxa 150 MG capsu; Take 1 capsule by mouth 2 (Two) Times a Day.  Dispense: 180 capsule; Refill: 1    8. Hyperlipidemia LDL goal <70  -     rosuvastatin  (CRESTOR) 40 MG tablet; Take 1 tablet by mouth Every Night.  Dispense: 90 tablet; Refill: 1    9. Hypothyroidism, unspecified type  -     TSH+Free T4    10. Acute vaginitis  -     fluconazole (Diflucan) 150 MG tablet; Take 1 tablet by mouth Every 72 (Seventy-Two) Hours As Needed (vaginal itching).  Dispense: 3 tablet; Refill: 0    11. Morbidly obese    Continue blood sugar monitoring daily and record.  Bring your log to office visits.  Call the office for readings below 70 and above 250 or any complications.    Daily foot care.  Avoid walking barefoot.    Annual dilated eye exam.    Discussed with patient blood pressure monitoring, hemoglobin A1c levels need to be below 7, and LDL goals below 70.    Patient advised to monitor blood pressure at home and journal readings.  Patient informed that a blood pressure reading at home of more than 130/80 is considered hypertension.  For readings greater than 140/90 or higher patient is advised to follow-up in the office with readings for management.  Patient advised to limit sodium intake.      Follow Up     Return in about 6 months (around 9/4/2024) for Next scheduled follow up.    Patient was given instructions and counseling regarding her condition or for health maintenance advice. Please see specific information pulled into the AVS if appropriate.     TIEN Perez    Answers submitted by the patient for this visit:  Other (Submitted on 3/1/2024)  Please describe your symptoms.: Mainly for medicine prescriptions refills and test results. Issues with frequent utis.  Have you had these symptoms before?: Yes  How long have you been having these symptoms?: 1-4 days  Please list any medications you are currently taking for this condition.: Tylenol and Pyridum 200mg for two days. Just finished it on Wednesday.  Primary Reason for Visit (Submitted on 3/1/2024)  What is the primary reason for your visit?: Other

## 2024-03-04 NOTE — PATIENT INSTRUCTIONS
Diabetes Mellitus and Nutrition, Adult  When you have diabetes, or diabetes mellitus, it is very important to have healthy eating habits because your blood sugar (glucose) levels are greatly affected by what you eat and drink. Eating healthy foods in the right amounts, at about the same times every day, can help you:  Manage your blood glucose.  Lower your risk of heart disease.  Improve your blood pressure.  Reach or maintain a healthy weight.  What can affect my meal plan?  Every person with diabetes is different, and each person has different needs for a meal plan. Your health care provider may recommend that you work with a dietitian to make a meal plan that is best for you. Your meal plan may vary depending on factors such as:  The calories you need.  The medicines you take.  Your weight.  Your blood glucose, blood pressure, and cholesterol levels.  Your activity level.  Other health conditions you have, such as heart or kidney disease.  How do carbohydrates affect me?  Carbohydrates, also called carbs, affect your blood glucose level more than any other type of food. Eating carbs raises the amount of glucose in your blood.  It is important to know how many carbs you can safely have in each meal. This is different for every person. Your dietitian can help you calculate how many carbs you should have at each meal and for each snack.  How does alcohol affect me?  Alcohol can cause a decrease in blood glucose (hypoglycemia), especially if you use insulin or take certain diabetes medicines by mouth. Hypoglycemia can be a life-threatening condition. Symptoms of hypoglycemia, such as sleepiness, dizziness, and confusion, are similar to symptoms of having too much alcohol.  Do not drink alcohol if:  Your health care provider tells you not to drink.  You are pregnant, may be pregnant, or are planning to become pregnant.  If you drink alcohol:  Limit how much you have to:  0-1 drink a day for women.  0-2 drinks a day  "for men.  Know how much alcohol is in your drink. In the [place] one drink equals one 12 oz bottle of beer (355 mL), one 5 oz glass of wine (148 mL), or one 1½ oz glass of hard liquor (44 mL).  Keep yourself hydrated with water, diet soda, or unsweetened iced tea. Keep in mind that regular soda, juice, and other mixers may contain a lot of sugar and must be counted as carbs.  What are tips for following this plan?    Reading food labels  Start by checking the serving size on the Nutrition Facts label of packaged foods and drinks. The number of calories and the amount of carbs, fats, and other nutrients listed on the label are based on one serving of the item. Many items contain more than one serving per package.  Check the total grams (g) of carbs in one serving.  Check the number of grams of saturated fats and trans fats in one serving. Choose foods that have a low amount or none of these fats.  Check the number of milligrams (mg) of salt (sodium) in one serving. Most people should limit total sodium intake to less than 2,300 mg per day.  Always check the nutrition information of foods labeled as \"low-fat\" or \"nonfat.\" These foods may be higher in added sugar or refined carbs and should be avoided.  Talk to your dietitian to identify your daily goals for nutrients listed on the label.  Shopping  Avoid buying canned, pre-made, or processed foods. These foods tend to be high in fat, sodium, and added sugar.  Shop around the outside edge of the grocery store. This is where you will most often find fresh fruits and vegetables, bulk grains, fresh meats, and fresh dairy products.  Cooking  Use low-heat cooking methods, such as baking, instead of high-heat cooking methods, such as deep frying.  Cook using healthy oils, such as olive, canola, or sunflower oil.  Avoid cooking with butter, cream, or high-fat meats.  Meal planning  Eat meals and snacks regularly, preferably at the same times every day. Avoid going long periods " of time without eating.  Eat foods that are high in fiber, such as fresh fruits, vegetables, beans, and whole grains.  Eat 4-6 oz (112-168 g) of lean protein each day, such as lean meat, chicken, fish, eggs, or tofu. One ounce (oz) (28 g) of lean protein is equal to:  1 oz (28 g) of meat, chicken, or fish.  1 egg.  ¼ cup (62 g) of tofu.  Eat some foods each day that contain healthy fats, such as avocado, nuts, seeds, and fish.  What foods should I eat?  Fruits  Berries. Apples. Oranges. Peaches. Apricots. Plums. Grapes. Mangoes. Papayas. Pomegranates. Kiwi. Cherries.  Vegetables  Leafy greens, including lettuce, spinach, kale, chard, anita greens, mustard greens, and cabbage. Beets. Cauliflower. Broccoli. Carrots. Green beans. Tomatoes. Peppers. Onions. Cucumbers. [place] sprouts.  Grains  Whole grains, such as whole-wheat or whole-grain bread, crackers, tortillas, cereal, and pasta. Unsweetened oatmeal. Quinoa. Brown or wild rice.  Meats and other proteins  Seafood. Poultry without skin. Lean cuts of poultry and beef. Tofu. Nuts. Seeds.  Dairy  Low-fat or fat-free dairy products such as milk, yogurt, and cheese.  The items listed above may not be a complete list of foods and beverages you can eat and drink. Contact a dietitian for more information.  What foods should I avoid?  Fruits  Fruits canned with syrup.  Vegetables  Canned vegetables. Frozen vegetables with butter or cream sauce.  Grains  Refined white flour and flour products such as bread, pasta, snack foods, and cereals. Avoid all processed foods.  Meats and other proteins  Fatty cuts of meat. Poultry with skin. Breaded or fried meats. Processed meat. Avoid saturated fats.  Dairy  Full-fat yogurt, cheese, or milk.  Beverages  Sweetened drinks, such as soda or iced tea.  The items listed above may not be a complete list of foods and beverages you should avoid. Contact a dietitian for more information.  Questions to ask a health care provider  Do I need  to meet with a certified diabetes care and ?  Do I need to meet with a dietitian?  What number can I call if I have questions?  When are the best times to check my blood glucose?  Where to find more information:  American Diabetes Association: diabetes.org  Academy of Nutrition and Dietetics: eatright.org  National [place] of Diabetes and Digestive and Kidney Diseases: niddk.nih.gov  Association of Diabetes Care & Education Specialists: diabeteseducator.org  Summary  It is important to have healthy eating habits because your blood sugar (glucose) levels are greatly affected by what you eat and drink. It is important to use alcohol carefully.  A healthy meal plan will help you manage your blood glucose and lower your risk of heart disease.  Your health care provider may recommend that you work with a dietitian to make a meal plan that is best for you.  This information is not intended to replace advice given to you by your health care provider. Make sure you discuss any questions you have with your health care provider.  Document Revised: 07/21/2021 Document Reviewed: 07/21/2021  Elsevier Patient Education © 2023 Elsevier Inc.

## 2024-03-05 DIAGNOSIS — R30.0 DYSURIA: Primary | ICD-10-CM

## 2024-03-05 RX ORDER — NITROFURANTOIN 25; 75 MG/1; MG/1
100 CAPSULE ORAL 2 TIMES DAILY
Qty: 14 CAPSULE | Refills: 0 | Status: SHIPPED | OUTPATIENT
Start: 2024-03-05 | End: 2024-03-12

## 2024-03-06 LAB — BACTERIA SPEC AEROBE CULT: ABNORMAL

## 2024-03-08 RX ORDER — FAMOTIDINE 40 MG/1
40 TABLET, FILM COATED ORAL
Qty: 90 TABLET | Refills: 1 | Status: SHIPPED | OUTPATIENT
Start: 2024-03-08

## 2024-03-18 ENCOUNTER — CLINICAL SUPPORT (OUTPATIENT)
Dept: FAMILY MEDICINE CLINIC | Facility: CLINIC | Age: 69
End: 2024-03-18
Payer: MEDICARE

## 2024-03-18 DIAGNOSIS — R30.0 DYSURIA: Primary | ICD-10-CM

## 2024-03-18 DIAGNOSIS — R30.0 DYSURIA: ICD-10-CM

## 2024-03-18 LAB
BACTERIA UR QL AUTO: ABNORMAL /HPF
BILIRUB UR QL STRIP: NEGATIVE
CLARITY UR: ABNORMAL
COLOR UR: YELLOW
GLUCOSE UR STRIP-MCNC: NEGATIVE MG/DL
HGB UR QL STRIP.AUTO: ABNORMAL
HYALINE CASTS UR QL AUTO: ABNORMAL /LPF
KETONES UR QL STRIP: NEGATIVE
LEUKOCYTE ESTERASE UR QL STRIP.AUTO: ABNORMAL
NITRITE UR QL STRIP: POSITIVE
PH UR STRIP.AUTO: 5.5 [PH] (ref 5–8)
PROT UR QL STRIP: NEGATIVE
RBC # UR STRIP: ABNORMAL /HPF
REF LAB TEST METHOD: ABNORMAL
SP GR UR STRIP: 1.02 (ref 1–1.03)
SQUAMOUS #/AREA URNS HPF: ABNORMAL /HPF
UROBILINOGEN UR QL STRIP: ABNORMAL
WBC # UR STRIP: ABNORMAL /HPF

## 2024-03-18 PROCEDURE — 81001 URINALYSIS AUTO W/SCOPE: CPT | Performed by: NURSE PRACTITIONER

## 2024-03-18 PROCEDURE — 87086 URINE CULTURE/COLONY COUNT: CPT | Performed by: NURSE PRACTITIONER

## 2024-03-18 PROCEDURE — 87186 SC STD MICRODIL/AGAR DIL: CPT | Performed by: NURSE PRACTITIONER

## 2024-03-18 PROCEDURE — 87077 CULTURE AEROBIC IDENTIFY: CPT | Performed by: NURSE PRACTITIONER

## 2024-03-19 RX ORDER — SULFAMETHOXAZOLE AND TRIMETHOPRIM 800; 160 MG/1; MG/1
1 TABLET ORAL 2 TIMES DAILY
Qty: 14 TABLET | Refills: 0 | Status: SHIPPED | OUTPATIENT
Start: 2024-03-19

## 2024-03-20 LAB — BACTERIA SPEC AEROBE CULT: ABNORMAL

## 2024-03-21 ENCOUNTER — HOSPITAL ENCOUNTER (OUTPATIENT)
Dept: MAMMOGRAPHY | Facility: HOSPITAL | Age: 69
Discharge: HOME | End: 2024-03-21
Admitting: NURSE PRACTITIONER
Payer: MEDICARE

## 2024-03-21 DIAGNOSIS — Z12.31 BREAST CANCER SCREENING BY MAMMOGRAM: ICD-10-CM

## 2024-03-21 PROCEDURE — 77067 SCR MAMMO BI INCL CAD: CPT

## 2024-03-21 PROCEDURE — 77063 BREAST TOMOSYNTHESIS BI: CPT

## 2024-04-19 ENCOUNTER — LAB (OUTPATIENT)
Dept: LAB | Facility: HOSPITAL | Age: 69
End: 2024-04-19
Payer: MEDICARE

## 2024-04-19 ENCOUNTER — TRANSCRIBE ORDERS (OUTPATIENT)
Dept: ADMINISTRATIVE | Facility: HOSPITAL | Age: 69
End: 2024-04-19
Payer: MEDICARE

## 2024-04-19 DIAGNOSIS — N17.9 ACUTE RENAL FAILURE, UNSPECIFIED ACUTE RENAL FAILURE TYPE: Primary | ICD-10-CM

## 2024-04-19 DIAGNOSIS — E11.9 DIABETES MELLITUS WITHOUT COMPLICATION: ICD-10-CM

## 2024-04-19 DIAGNOSIS — E55.9 VITAMIN D DEFICIENCY: ICD-10-CM

## 2024-04-19 DIAGNOSIS — N39.0 URINARY TRACT INFECTION WITHOUT HEMATURIA, SITE UNSPECIFIED: ICD-10-CM

## 2024-04-19 DIAGNOSIS — N17.9 ACUTE RENAL FAILURE, UNSPECIFIED ACUTE RENAL FAILURE TYPE: ICD-10-CM

## 2024-04-19 DIAGNOSIS — I10 ESSENTIAL HYPERTENSION, MALIGNANT: ICD-10-CM

## 2024-04-19 LAB
25(OH)D3 SERPL-MCNC: 20.7 NG/ML (ref 30–100)
ALBUMIN SERPL-MCNC: 4.2 G/DL (ref 3.5–5.2)
ALBUMIN/GLOB SERPL: 1.6 G/DL
ALP SERPL-CCNC: 69 U/L (ref 39–117)
ALT SERPL W P-5'-P-CCNC: 24 U/L (ref 1–33)
ANION GAP SERPL CALCULATED.3IONS-SCNC: 9 MMOL/L (ref 5–15)
AST SERPL-CCNC: 26 U/L (ref 1–32)
BACTERIA UR QL AUTO: ABNORMAL /HPF
BASOPHILS # BLD AUTO: 0.03 10*3/MM3 (ref 0–0.2)
BASOPHILS NFR BLD AUTO: 0.7 % (ref 0–1.5)
BILIRUB SERPL-MCNC: 0.6 MG/DL (ref 0–1.2)
BILIRUB UR QL STRIP: NEGATIVE
BUN SERPL-MCNC: 15 MG/DL (ref 8–23)
BUN/CREAT SERPL: 14.4 (ref 7–25)
CALCIUM SPEC-SCNC: 9.5 MG/DL (ref 8.6–10.5)
CHLORIDE SERPL-SCNC: 104 MMOL/L (ref 98–107)
CLARITY UR: ABNORMAL
CO2 SERPL-SCNC: 28 MMOL/L (ref 22–29)
COLOR UR: YELLOW
CREAT SERPL-MCNC: 1.04 MG/DL (ref 0.57–1)
CREAT UR-MCNC: 189.2 MG/DL
DEPRECATED RDW RBC AUTO: 45 FL (ref 37–54)
EGFRCR SERPLBLD CKD-EPI 2021: 58.7 ML/MIN/1.73
EOSINOPHIL # BLD AUTO: 0.29 10*3/MM3 (ref 0–0.4)
EOSINOPHIL NFR BLD AUTO: 6.5 % (ref 0.3–6.2)
ERYTHROCYTE [DISTWIDTH] IN BLOOD BY AUTOMATED COUNT: 13.2 % (ref 12.3–15.4)
GLOBULIN UR ELPH-MCNC: 2.7 GM/DL
GLUCOSE SERPL-MCNC: 137 MG/DL (ref 65–99)
GLUCOSE UR STRIP-MCNC: NEGATIVE MG/DL
HBA1C MFR BLD: 7.1 % (ref 4.8–5.6)
HCT VFR BLD AUTO: 37.5 % (ref 34–46.6)
HGB BLD-MCNC: 12.1 G/DL (ref 12–15.9)
HGB UR QL STRIP.AUTO: NEGATIVE
HYALINE CASTS UR QL AUTO: ABNORMAL /LPF
IMM GRANULOCYTES # BLD AUTO: 0.01 10*3/MM3 (ref 0–0.05)
IMM GRANULOCYTES NFR BLD AUTO: 0.2 % (ref 0–0.5)
KETONES UR QL STRIP: NEGATIVE
LEUKOCYTE ESTERASE UR QL STRIP.AUTO: ABNORMAL
LYMPHOCYTES # BLD AUTO: 0.78 10*3/MM3 (ref 0.7–3.1)
LYMPHOCYTES NFR BLD AUTO: 17.4 % (ref 19.6–45.3)
MCH RBC QN AUTO: 30 PG (ref 26.6–33)
MCHC RBC AUTO-ENTMCNC: 32.3 G/DL (ref 31.5–35.7)
MCV RBC AUTO: 93.1 FL (ref 79–97)
MONOCYTES # BLD AUTO: 0.44 10*3/MM3 (ref 0.1–0.9)
MONOCYTES NFR BLD AUTO: 9.8 % (ref 5–12)
NEUTROPHILS NFR BLD AUTO: 2.94 10*3/MM3 (ref 1.7–7)
NEUTROPHILS NFR BLD AUTO: 65.4 % (ref 42.7–76)
NITRITE UR QL STRIP: POSITIVE
NRBC BLD AUTO-RTO: 0 /100 WBC (ref 0–0.2)
PH UR STRIP.AUTO: 6 [PH] (ref 5–8)
PLATELET # BLD AUTO: 183 10*3/MM3 (ref 140–450)
PMV BLD AUTO: 11.1 FL (ref 6–12)
POTASSIUM SERPL-SCNC: 4.2 MMOL/L (ref 3.5–5.2)
PROT ?TM UR-MCNC: 22.7 MG/DL
PROT SERPL-MCNC: 6.9 G/DL (ref 6–8.5)
PROT UR QL STRIP: ABNORMAL
PROT/CREAT UR: 0.12 MG/G{CREAT}
RBC # BLD AUTO: 4.03 10*6/MM3 (ref 3.77–5.28)
RBC # UR STRIP: ABNORMAL /HPF
REF LAB TEST METHOD: ABNORMAL
SODIUM SERPL-SCNC: 141 MMOL/L (ref 136–145)
SP GR UR STRIP: 1.02 (ref 1–1.03)
SQUAMOUS #/AREA URNS HPF: ABNORMAL /HPF
UROBILINOGEN UR QL STRIP: ABNORMAL
WBC # UR STRIP: ABNORMAL /HPF
WBC NRBC COR # BLD AUTO: 4.49 10*3/MM3 (ref 3.4–10.8)

## 2024-04-19 PROCEDURE — 87088 URINE BACTERIA CULTURE: CPT | Performed by: NURSE PRACTITIONER

## 2024-04-19 PROCEDURE — 80053 COMPREHEN METABOLIC PANEL: CPT

## 2024-04-19 PROCEDURE — 85025 COMPLETE CBC W/AUTO DIFF WBC: CPT

## 2024-04-19 PROCEDURE — 87086 URINE CULTURE/COLONY COUNT: CPT | Performed by: NURSE PRACTITIONER

## 2024-04-19 PROCEDURE — 82306 VITAMIN D 25 HYDROXY: CPT

## 2024-04-19 PROCEDURE — 36415 COLL VENOUS BLD VENIPUNCTURE: CPT

## 2024-04-19 PROCEDURE — 84156 ASSAY OF PROTEIN URINE: CPT

## 2024-04-19 PROCEDURE — 87186 SC STD MICRODIL/AGAR DIL: CPT | Performed by: NURSE PRACTITIONER

## 2024-04-19 PROCEDURE — 83036 HEMOGLOBIN GLYCOSYLATED A1C: CPT

## 2024-04-19 PROCEDURE — 82570 ASSAY OF URINE CREATININE: CPT

## 2024-04-19 PROCEDURE — 81001 URINALYSIS AUTO W/SCOPE: CPT

## 2024-04-21 LAB — BACTERIA SPEC AEROBE CULT: ABNORMAL

## 2024-04-23 ENCOUNTER — LAB (OUTPATIENT)
Dept: LAB | Facility: HOSPITAL | Age: 69
End: 2024-04-23
Payer: MEDICARE

## 2024-04-23 ENCOUNTER — TRANSCRIBE ORDERS (OUTPATIENT)
Dept: LAB | Facility: HOSPITAL | Age: 69
End: 2024-04-23
Payer: MEDICARE

## 2024-04-23 DIAGNOSIS — N39.0 URINARY TRACT INFECTION WITHOUT HEMATURIA, SITE UNSPECIFIED: ICD-10-CM

## 2024-04-23 DIAGNOSIS — N39.0 URINARY TRACT INFECTION WITHOUT HEMATURIA, SITE UNSPECIFIED: Primary | ICD-10-CM

## 2024-04-23 LAB
BACTERIA UR QL AUTO: ABNORMAL /HPF
BILIRUB UR QL STRIP: NEGATIVE
CLARITY UR: ABNORMAL
COLOR UR: YELLOW
GLUCOSE UR STRIP-MCNC: NEGATIVE MG/DL
HGB UR QL STRIP.AUTO: ABNORMAL
HYALINE CASTS UR QL AUTO: ABNORMAL /LPF
KETONES UR QL STRIP: ABNORMAL
LEUKOCYTE ESTERASE UR QL STRIP.AUTO: ABNORMAL
NITRITE UR QL STRIP: POSITIVE
PH UR STRIP.AUTO: 6 [PH] (ref 5–8)
PROT UR QL STRIP: ABNORMAL
RBC # UR STRIP: ABNORMAL /HPF
REF LAB TEST METHOD: ABNORMAL
SP GR UR STRIP: 1.02 (ref 1–1.03)
SQUAMOUS #/AREA URNS HPF: ABNORMAL /HPF
UROBILINOGEN UR QL STRIP: ABNORMAL
WBC # UR STRIP: ABNORMAL /HPF

## 2024-04-23 PROCEDURE — 81001 URINALYSIS AUTO W/SCOPE: CPT

## 2024-05-03 ENCOUNTER — OFFICE VISIT (OUTPATIENT)
Dept: CARDIOLOGY | Facility: CLINIC | Age: 69
End: 2024-05-03
Payer: MEDICARE

## 2024-05-03 VITALS
WEIGHT: 234 LBS | DIASTOLIC BLOOD PRESSURE: 74 MMHG | HEART RATE: 98 BPM | SYSTOLIC BLOOD PRESSURE: 132 MMHG | HEIGHT: 60 IN | BODY MASS INDEX: 45.94 KG/M2

## 2024-05-03 DIAGNOSIS — I10 ESSENTIAL HYPERTENSION: ICD-10-CM

## 2024-05-03 DIAGNOSIS — I48.0 PAROXYSMAL ATRIAL FIBRILLATION: ICD-10-CM

## 2024-05-03 DIAGNOSIS — E78.5 HYPERLIPIDEMIA LDL GOAL <70: ICD-10-CM

## 2024-05-03 DIAGNOSIS — I25.10 CORONARY ARTERY DISEASE INVOLVING NATIVE CORONARY ARTERY OF NATIVE HEART WITHOUT ANGINA PECTORIS: Primary | ICD-10-CM

## 2024-05-03 PROBLEM — J20.9 ACUTE BRONCHITIS: Status: RESOLVED | Noted: 2017-06-29 | Resolved: 2024-05-03

## 2024-05-03 PROBLEM — M25.559 HIP PAIN: Status: RESOLVED | Noted: 2018-07-31 | Resolved: 2024-05-03

## 2024-05-03 PROBLEM — M54.16 LUMBAR RADICULOPATHY: Status: RESOLVED | Noted: 2018-09-20 | Resolved: 2024-05-03

## 2024-05-03 PROBLEM — J20.0 ACUTE BRONCHITIS DUE TO MYCOPLASMA PNEUMONIAE: Status: RESOLVED | Noted: 2017-06-29 | Resolved: 2024-05-03

## 2024-05-03 PROCEDURE — 1160F RVW MEDS BY RX/DR IN RCRD: CPT | Performed by: NURSE PRACTITIONER

## 2024-05-03 PROCEDURE — 99214 OFFICE O/P EST MOD 30 MIN: CPT | Performed by: NURSE PRACTITIONER

## 2024-05-03 PROCEDURE — 3078F DIAST BP <80 MM HG: CPT | Performed by: NURSE PRACTITIONER

## 2024-05-03 PROCEDURE — 3075F SYST BP GE 130 - 139MM HG: CPT | Performed by: NURSE PRACTITIONER

## 2024-05-03 PROCEDURE — 1159F MED LIST DOCD IN RCRD: CPT | Performed by: NURSE PRACTITIONER

## 2024-06-03 ENCOUNTER — TELEPHONE (OUTPATIENT)
Dept: ONCOLOGY | Facility: HOSPITAL | Age: 69
End: 2024-06-03

## 2024-06-03 NOTE — TELEPHONE ENCOUNTER
Caller: Gifty Hoover    Relationship to patient: Self    Best call back number: [phone]    Chief complaint: HAS ANOTHER APPT    Type of visit: F/U 2     Requested date: CALL TO R/S     If rescheduling, when is the original appointment: 6/11/2024    Additional notes:

## 2024-06-11 ENCOUNTER — OFFICE VISIT (OUTPATIENT)
Dept: PODIATRY | Facility: CLINIC | Age: 69
End: 2024-06-11
Payer: MEDICARE

## 2024-06-11 VITALS
BODY MASS INDEX: 46.92 KG/M2 | HEIGHT: 60 IN | OXYGEN SATURATION: 93 % | DIASTOLIC BLOOD PRESSURE: 86 MMHG | WEIGHT: 239 LBS | TEMPERATURE: 97.4 F | HEART RATE: 95 BPM | SYSTOLIC BLOOD PRESSURE: 141 MMHG

## 2024-06-11 DIAGNOSIS — L60.0 ONYCHOCRYPTOSIS: ICD-10-CM

## 2024-06-11 DIAGNOSIS — E11.8 DIABETIC FOOT: ICD-10-CM

## 2024-06-11 DIAGNOSIS — M79.671 FOOT PAIN, BILATERAL: ICD-10-CM

## 2024-06-11 DIAGNOSIS — E11.9 NON-INSULIN DEPENDENT TYPE 2 DIABETES MELLITUS: ICD-10-CM

## 2024-06-11 DIAGNOSIS — M79.672 FOOT PAIN, BILATERAL: ICD-10-CM

## 2024-06-11 DIAGNOSIS — B35.1 ONYCHOMYCOSIS: Primary | ICD-10-CM

## 2024-06-11 PROCEDURE — 1159F MED LIST DOCD IN RCRD: CPT | Performed by: PODIATRIST

## 2024-06-11 PROCEDURE — 3079F DIAST BP 80-89 MM HG: CPT | Performed by: PODIATRIST

## 2024-06-11 PROCEDURE — 11721 DEBRIDE NAIL 6 OR MORE: CPT | Performed by: PODIATRIST

## 2024-06-11 PROCEDURE — 3077F SYST BP >= 140 MM HG: CPT | Performed by: PODIATRIST

## 2024-06-11 PROCEDURE — 1160F RVW MEDS BY RX/DR IN RCRD: CPT | Performed by: PODIATRIST

## 2024-06-11 NOTE — PROGRESS NOTES
[facility] - PODIATRY    Today's Date: 06/11/24    Patient Name: Gifty Hoover  MRN: [id number]  CSN: [id number]  PCP: Paris Ace APRN, Last PCP Visit: 4  Referring Provider: No ref. provider found    SUBJECTIVE     Chief Complaint   Patient presents with    Left Foot - Follow-up, Nail Problem    Right Foot - Follow-up, Nail Problem       HPI: Gifty Hoover, a 68 y.o.female, presents to clinic for painful toenail and a diabetic foot evaluation.    New, Established, New Problem:  est  Location:  Toenails  Duration:   Greater than five years  Onset:  Gradual  Nature:  sore with palpation.  Stable, worsening, improving:  stable  Aggravating factors:  Pain with shoe gear and ambulation.  Previous Treatment:  Debridement    Patient controlling diabetes via: Oral medication    Patient states their last blood glucose was: 110    Patient denies any fevers, chills, nausea, vomiting, shortness of breath, nor any other constitutional signs nor symptoms.    Medical changes:  none    Past Medical History:   Diagnosis Date    Anemia 2022    NO CURRENT S/S    Anxiety     Arthritis     Asthma 2020    Shortness of breath after walking long periods of time.    CAD 09/14/2021    AGUAYO SEES PT    Cholelithiasis 2022    Chronic kidney disease 2022    NO DIALYSIS    Colon polyp 2008    BENIGN    Deep vein thrombosis 2011    Green filter in place.    Depression     Diabetes mellitus     Elevated cholesterol     Essential hypertension 09/14/2021    GERD (gastroesophageal reflux disease)     Heart attack 2009    NO STENTS/NO BYPASS    History of pulmonary embolism     2011, TAKE PRADAXA    Hypothyroid     Ingrown toenail 2022    Lumbar radiculopathy 09/2018    describes sensory S1 radiculopathy but would favor right L5    Paroxysmal atrial fibrillation 09/14/2021    AGUAYO    Seasonal allergies     Sleep apnea     BIPAP?     Past Surgical History:   Procedure Laterality Date    BLADDER SURGERY  2011    BREAST BIOPSY       Upper right breast BENIGN    BREAST SURGERY      CARDIAC CATHETERIZATION      NO INTERVENTION    CARDIAC SURGERY      R/T BLOOD CLOTS, NO CARDIAC INTERVENTION     SECTION      CHOLECYSTECTOMY N/A 2022    Procedure: laparoscopic cholecystectomy, possible open;  Surgeon: Krystal Dacosta MD;  Location: [facility];  Service: General;  Laterality: N/A;    COLONOSCOPY  2018    COLONOSCOPY N/A 2022    Procedure: COLONOSCOPY;  Surgeon: Ta Vasquez MD;  Location: [facility] ENDOSCOPY;  Service: Gastroenterology;  Laterality: N/A;  DIVERTICULOSIS    ENDOSCOPY N/A 2022    Procedure: ESOPHAGOGASTRODUODENOSCOPY WITH BX;  Surgeon: Ta Vasquez MD;  Location: [facility] ENDOSCOPY;  Service: Gastroenterology;  Laterality: N/A;  SMALL HIATAL HERNIA    ERCP N/A 2022    Procedure: ENDOSCOPIC RETROGRADE CHOLANGIOPANCREATOGRAPHY WITH SPHINCTEROTOMY, STONE REMOVAL, STENT PLACEMENT;  Surgeon: Arden Garcia MD;  Location: [facility] ENDOSCOPY;  Service: Gastroenterology;  Laterality: N/A;  BILE DUCT STONES    ERCP N/A 2023    Procedure: ENDOSCOPIC RETROGRADE CHOLANGIOPANCREATOGRAPHY with stent removal, balloon dilation, stone removal;  Surgeon: Arden Garcia MD;  Location: [facility] ENDOSCOPY;  Service: Gastroenterology;  Laterality: N/A;  bile duct stones    NICHOLAS FILTER INSERTION JUGULAR      HYSTERECTOMY       Family History   Problem Relation Age of Onset    Stroke Mother     Cancer Mother         Colon    Colon cancer Mother 71        still living at 73    Diabetes Mother     Arthritis Mother     Colon polyps Mother     Hypertension Mother     Kidney disease Mother     Miscarriages / Stillbirths Mother     Stroke Father     Arthritis Father         Padgets Disease    Osteoporosis Father     Inflammatory bowel disease Father     Cancer Sister         Eye    Thyroid disease Sister     Diabetes Brother     Stroke Brother     Bleeding Disorder  Daughter     Diabetes Brother     Hypertension Brother     Malsarthak Hyperthermia Neg Hx      Social History     Socioeconomic History    Marital status:    Tobacco Use    Smoking status: Never    Smokeless tobacco: Never   Vaping Use    Vaping status: Never Used   Substance and Sexual Activity    Alcohol use: Never    Drug use: Never    Sexual activity: Not Currently     Partners: Male     Birth control/protection: Surgical, Hysterectomy     No Known Allergies  Current Outpatient Medications   Medication Sig Dispense Refill    albuterol sulfate HFA (ProAir HFA) 108 (90 Base) MCG/ACT inhaler Inhale 2 puffs Every 6 (Six) Hours As Needed for Wheezing. 18 g 0    Ascorbic Acid (VITAMIN C PO) Take  by mouth.      aspirin 81 MG EC tablet Take 1 tablet by mouth Daily.      Calcium Carb-Cholecalciferol (CALCIUM/VITAMIN D PO) Take  by mouth.      ezetimibe (ZETIA) 10 MG tablet Take 1 tablet by mouth Daily. 90 tablet 3    famotidine (PEPCID) 40 MG tablet TAKE ONE TABLET BY MOUTH EVERY NIGHT AT BEDTIME 90 tablet 1    fluconazole (Diflucan) 150 MG tablet Take 1 tablet by mouth Every 72 (Seventy-Two) Hours As Needed (vaginal itching). 3 tablet 0    furosemide (LASIX) 20 MG tablet Take 1 tablet by mouth Daily As Needed (for leg swelling). 30 tablet 3    hydroCHLOROthiazide 12.5 MG tablet Take 1 tablet by mouth Every Morning. 90 tablet 1    IRON, FERROUS SULFATE, PO Take  by mouth.      levothyroxine (SYNTHROID, LEVOTHROID) 88 MCG tablet Take 1 tablet by mouth Daily. 90 tablet 1    metFORMIN (GLUCOPHAGE) 500 MG tablet TAKE ONE TABLET BY MOUTH TWICE DAILY WITH MEALS 180 tablet 1    omeprazole (priLOSEC) 40 MG capsule Take 1 capsule by mouth Daily. 90 capsule 1    potassium chloride 10 MEQ CR tablet Take 1 tablet by mouth Daily As Needed (take with Lasix). 30 tablet 3    Pradaxa 150 MG capsu Take 1 capsule by mouth 2 (Two) Times a Day. 180 capsule 1    Premarin 0.625 MG/GM vaginal cream Apply daily for 2 weeks then use 2 times  weekly thereafter. 30 g 1    rosuvastatin (CRESTOR) 40 MG tablet Take 1 tablet by mouth Every Night. 90 tablet 1    sulfamethoxazole-trimethoprim (Bactrim DS) 800-160 MG per tablet Take 1 tablet by mouth 2 (Two) Times a Day. 14 tablet 0    telmisartan (MICARDIS) 80 MG tablet Take 1 tablet by mouth Every Night. 90 tablet 1     No current facility-administered medications for this visit.     Review of Systems   Constitutional: Negative.    Skin:         Painful toenails.   All other systems reviewed and are negative.      OBJECTIVE     Vitals:    24 0918   BP: 141/86   Pulse: 95   Temp: 97.4 °F (36.3 °C)   SpO2: 93%       Body mass index is 46.68 kg/m².    Lab Results   Component Value Date    HGBA1C 7.10 (H) 2024       Lab Results   Component Value Date    GLUCOSE 137 (H) 2024    CALCIUM 9.5 2024     2024    K 4.2 2024    CO2 28.0 2024     2024    BUN 15 2024    CREATININE 1.04 (H) 2024    EGFRIFNONA 33 (L) 2021    BCR 14.4 2024    ANIONGAP 9.0 2024       Patient seen in no apparent distress.      PHYSICAL EXAM:     Foot/Ankle Exam    GENERAL  Appearance:  obese and elderly  Orientation:  AAOx3  Affect:  appropriate  Gait:  unimpaired  Assistance:  independent  Right shoe gear: casual shoe  Left shoe gear: casual shoe    VASCULAR     Right Foot Vascularity   Dorsalis pedis:  1+  Posterior tibial:  1+  Skin temperature:  warm  Edema gradin+ and pitting  CFT:  < 3 seconds  Pedal hair growth:  Absent  Varicosities:  moderate varicosities     Left Foot Vascularity   Dorsalis pedis:  1+  Posterior tibial:  1+  Skin temperature:  warm  Edema gradin+ and pitting  CFT:  < 3 seconds  Pedal hair growth:  Absent  Varicosities:  moderate varicosities     NEUROLOGIC     Right Foot Neurologic   Normal sensation    Light touch sensation: normal  Vibratory sensation: normal  Hot/Cold sensation: normal  Protective Sensation using  Terrell-Maximo Monofilament:   Sites intact: 10  Sites tested: 10     Left Foot Neurologic   Normal sensation    Light touch sensation: normal  Vibratory sensation: normal  Hot/Cold sensation:  normal  Protective Sensation using Terrell-Maximo Monofilament:   Sites intact: 10  Sites tested: 10    MUSCLE STRENGTH     Right Foot Muscle Strength   Foot dorsiflexion:  4  Foot plantar flexion:  4  Foot inversion:  4  Foot eversion:  4     Left Foot Muscle Strength   Foot dorsiflexion:  4  Foot plantar flexion:  4  Foot inversion:  4  Foot eversion:  4    RANGE OF MOTION     Right Foot Range of Motion   Foot and ankle ROM within normal limits       Left Foot Range of Motion   Foot and ankle ROM within normal limits      DERMATOLOGIC      Right Foot Dermatologic   Skin  Right foot skin is intact.   Nails  1.  Positive for elongated, onychomycosis, abnormal thickness, subungual debris and ingrown toenail.  2.  Positive for elongated, onychomycosis, abnormal thickness, subungual debris and ingrown toenail.  3.  Positive for elongated, onychomycosis, abnormal thickness, subungual debris and ingrown toenail.  4.  Positive for elongated, onychomycosis, abnormal thickness, subungual debris and ingrown toenail.  5.  Positive for elongated, onychomycosis, abnormal thickness, subungual debris and ingrown toenail.     Left Foot Dermatologic   Skin  Left foot skin is intact.   Nails  1.  Positive for elongated, onychomycosis, abnormal thickness, subungual debris and ingrown toenail.  3.  Positive for elongated, onychomycosis, abnormal thickness, subungual debris and ingrown toenail.  4.  Positive for elongated, onychomycosis, abnormally thick, subungual debris and ingrown toenail.  5.  Positive for elongated, onychomycosis, abnormally thick, subungual debris and ingrown toenail.    I have reexamined the patient the results are consistent with the previously documented exam.        ASSESSMENT/PLAN     Diagnoses and all orders for  this visit:    1. Onychomycosis (Primary)    2. Foot pain, bilateral    3. Non-insulin dependent type 2 diabetes mellitus    4. Diabetic foot    5. Onychocryptosis    Comprehensive lower extremity examination and evaluation was performed.    Discussed findings and treatment plan including risks, benefits, and treatment options with patient in detail. Patient agreed with treatment plan.    Medications and allergies reviewed.  Reviewed available blood glucose and HgB A1C lab values along with other pertinent labs.  These were discussed with the patient as to their importance of diabetic maintenance.    Toenails 1, 2, 3, 4, 5 on Right and 1, 3, 4, 5 on Left were debrided with nail nippers then filed with a Dremel nail yves.  Patient tolerated procedure well without complications.    An After Visit Summary was printed and given to the patient at discharge, including (if requested) any available informative/educational handouts regarding diagnosis, treatment, or medications. All questions were answered to patient/family satisfaction. Should symptoms fail to improve or worsen they agree to call or return to clinic or to go to the Emergency Department. Discussed the importance of following up with any needed screening tests/labs/specialist appointments and any requested follow-up recommended by me today. Importance of maintaining follow-up discussed and patient accepts that missed appointments can delay diagnosis and potentially lead to worsening of conditions.    Return in about 9 weeks (around 8/13/2024) for Toenail Care., or sooner if acute issues arise.    I have reviewed the assessment and plan and verified the accuracy of it. No changes to assessment and plan since the information was documented. Isiah Cuevas DPM 06/11/24     I have dictated this note utilizing Dragon Dictation.  Please note that portions of this note were completed with a voice recognition program.  Part of this note may be an electronic  transcription/translation of spoken language to printed text using the Dragon Dictation System.          This document has been electronically signed by Isiah Cuevas DPM on June 11, 2024 09:54 EDT

## 2024-06-13 ENCOUNTER — TELEPHONE (OUTPATIENT)
Dept: FAMILY MEDICINE CLINIC | Facility: CLINIC | Age: 69
End: 2024-06-13
Payer: MEDICARE

## 2024-06-13 NOTE — TELEPHONE ENCOUNTER
Caller: Gifty Hoover    Relationship: Self    Best call back number: [phone]     What medication are you requesting: SOMETHING FOR UTI    What are your current symptoms: BURNING, ITCHING AND PAINFUL URINATION. PAIN IN LOWER BACK.    How long have you been experiencing symptoms: PAST FOUR DAYS OR SO    Have you had these symptoms before:    [x] Yes  [] No    Have you been treated for these symptoms before:   [x] Yes  [] No    If a prescription is needed, what is your preferred pharmacy and phone number: [facility] [place] [address] - [phone] [facility] [phone] FX     Additional notes: PATIENT HAS TRIED OVER THE COUNTER MEDICATION BUT IT IS NOT WORKING. PATIENT STATES SHE CAN LEAVE A SAMPLE IF NECESSARY. PLEASE ADVISE.

## 2024-07-15 ENCOUNTER — LAB (OUTPATIENT)
Dept: ONCOLOGY | Facility: HOSPITAL | Age: 69
End: 2024-07-15
Payer: MEDICARE

## 2024-07-15 DIAGNOSIS — D50.8 OTHER IRON DEFICIENCY ANEMIA: ICD-10-CM

## 2024-07-15 LAB
BASOPHILS # BLD AUTO: 0.03 10*3/MM3 (ref 0–0.2)
BASOPHILS NFR BLD AUTO: 0.6 % (ref 0–1.5)
DEPRECATED RDW RBC AUTO: 49.2 FL (ref 37–54)
EOSINOPHIL # BLD AUTO: 0.12 10*3/MM3 (ref 0–0.4)
EOSINOPHIL NFR BLD AUTO: 2.4 % (ref 0.3–6.2)
ERYTHROCYTE [DISTWIDTH] IN BLOOD BY AUTOMATED COUNT: 13.9 % (ref 12.3–15.4)
FERRITIN SERPL-MCNC: 61.06 NG/ML (ref 13–150)
HCT VFR BLD AUTO: 37.5 % (ref 34–46.6)
HGB BLD-MCNC: 11.6 G/DL (ref 12–15.9)
IMM GRANULOCYTES # BLD AUTO: 0.02 10*3/MM3 (ref 0–0.05)
IMM GRANULOCYTES NFR BLD AUTO: 0.4 % (ref 0–0.5)
IRON 24H UR-MRATE: 51 MCG/DL (ref 37–145)
IRON SATN MFR SERPL: 11 % (ref 20–50)
LYMPHOCYTES # BLD AUTO: 0.81 10*3/MM3 (ref 0.7–3.1)
LYMPHOCYTES NFR BLD AUTO: 16.1 % (ref 19.6–45.3)
MCH RBC QN AUTO: 29.9 PG (ref 26.6–33)
MCHC RBC AUTO-ENTMCNC: 30.9 G/DL (ref 31.5–35.7)
MCV RBC AUTO: 96.6 FL (ref 79–97)
MONOCYTES # BLD AUTO: 0.39 10*3/MM3 (ref 0.1–0.9)
MONOCYTES NFR BLD AUTO: 7.7 % (ref 5–12)
NEUTROPHILS NFR BLD AUTO: 3.67 10*3/MM3 (ref 1.7–7)
NEUTROPHILS NFR BLD AUTO: 72.8 % (ref 42.7–76)
NRBC BLD AUTO-RTO: 0 /100 WBC (ref 0–0.2)
PLATELET # BLD AUTO: 234 10*3/MM3 (ref 140–450)
PMV BLD AUTO: 10.9 FL (ref 6–12)
RBC # BLD AUTO: 3.88 10*6/MM3 (ref 3.77–5.28)
TIBC SERPL-MCNC: 466 MCG/DL (ref 298–536)
TRANSFERRIN SERPL-MCNC: 313 MG/DL (ref 200–360)
WBC NRBC COR # BLD AUTO: 5.04 10*3/MM3 (ref 3.4–10.8)

## 2024-07-15 PROCEDURE — 84466 ASSAY OF TRANSFERRIN: CPT

## 2024-07-15 PROCEDURE — 36415 COLL VENOUS BLD VENIPUNCTURE: CPT

## 2024-07-15 PROCEDURE — 83540 ASSAY OF IRON: CPT

## 2024-07-15 PROCEDURE — 85025 COMPLETE CBC W/AUTO DIFF WBC: CPT

## 2024-07-15 PROCEDURE — 82728 ASSAY OF FERRITIN: CPT

## 2024-07-16 ENCOUNTER — OFFICE VISIT (OUTPATIENT)
Dept: ONCOLOGY | Facility: HOSPITAL | Age: 69
End: 2024-07-16
Payer: MEDICARE

## 2024-07-16 VITALS
RESPIRATION RATE: 18 BRPM | SYSTOLIC BLOOD PRESSURE: 125 MMHG | TEMPERATURE: 97.7 F | WEIGHT: 252.2 LBS | HEART RATE: 98 BPM | HEIGHT: 60 IN | BODY MASS INDEX: 49.52 KG/M2 | OXYGEN SATURATION: 98 % | DIASTOLIC BLOOD PRESSURE: 65 MMHG

## 2024-07-16 DIAGNOSIS — D50.8 OTHER IRON DEFICIENCY ANEMIA: Primary | ICD-10-CM

## 2024-07-16 DIAGNOSIS — I10 PRIMARY HYPERTENSION: ICD-10-CM

## 2024-07-16 PROBLEM — K90.9 MALABSORPTION OF IRON: Status: ACTIVE | Noted: 2024-07-16

## 2024-07-16 PROCEDURE — 3078F DIAST BP <80 MM HG: CPT | Performed by: INTERNAL MEDICINE

## 2024-07-16 PROCEDURE — 99214 OFFICE O/P EST MOD 30 MIN: CPT | Performed by: INTERNAL MEDICINE

## 2024-07-16 PROCEDURE — 3074F SYST BP LT 130 MM HG: CPT | Performed by: INTERNAL MEDICINE

## 2024-07-16 PROCEDURE — G0463 HOSPITAL OUTPT CLINIC VISIT: HCPCS | Performed by: INTERNAL MEDICINE

## 2024-07-16 PROCEDURE — 1126F AMNT PAIN NOTED NONE PRSNT: CPT | Performed by: INTERNAL MEDICINE

## 2024-07-16 RX ORDER — FAMOTIDINE 10 MG/ML
20 INJECTION, SOLUTION INTRAVENOUS ONCE
Status: CANCELLED | OUTPATIENT
Start: 2024-07-22

## 2024-07-16 RX ORDER — SODIUM CHLORIDE 9 MG/ML
20 INJECTION, SOLUTION INTRAVENOUS ONCE
Status: CANCELLED | OUTPATIENT
Start: 2024-07-22

## 2024-07-16 RX ORDER — SODIUM CHLORIDE 9 MG/ML
20 INJECTION, SOLUTION INTRAVENOUS ONCE
OUTPATIENT
Start: 2024-07-29

## 2024-07-22 ENCOUNTER — HOSPITAL ENCOUNTER (OUTPATIENT)
Dept: ONCOLOGY | Facility: HOSPITAL | Age: 69
Discharge: HOME | End: 2024-07-22
Admitting: INTERNAL MEDICINE
Payer: MEDICARE

## 2024-07-22 VITALS
TEMPERATURE: 98.2 F | DIASTOLIC BLOOD PRESSURE: 68 MMHG | SYSTOLIC BLOOD PRESSURE: 129 MMHG | RESPIRATION RATE: 18 BRPM | OXYGEN SATURATION: 96 % | HEART RATE: 83 BPM

## 2024-07-22 DIAGNOSIS — K90.9 MALABSORPTION OF IRON: Primary | ICD-10-CM

## 2024-07-22 DIAGNOSIS — D50.8 OTHER IRON DEFICIENCY ANEMIA: ICD-10-CM

## 2024-07-22 PROCEDURE — 25010000002 FERUMOXYTOL 510 MG/17ML SOLUTION: Performed by: INTERNAL MEDICINE

## 2024-07-22 PROCEDURE — 25810000003 SODIUM CHLORIDE 0.9 % SOLUTION: Performed by: INTERNAL MEDICINE

## 2024-07-22 PROCEDURE — 96375 TX/PRO/DX INJ NEW DRUG ADDON: CPT

## 2024-07-22 PROCEDURE — 96374 THER/PROPH/DIAG INJ IV PUSH: CPT

## 2024-07-22 RX ORDER — SODIUM CHLORIDE 9 MG/ML
20 INJECTION, SOLUTION INTRAVENOUS ONCE
Status: COMPLETED | OUTPATIENT
Start: 2024-07-22 | End: 2024-07-22

## 2024-07-22 RX ORDER — FAMOTIDINE 10 MG/ML
20 INJECTION, SOLUTION INTRAVENOUS ONCE
Status: COMPLETED | OUTPATIENT
Start: 2024-07-22 | End: 2024-07-22

## 2024-07-22 RX ADMIN — FERUMOXYTOL 510 MG: 510 INJECTION INTRAVENOUS at 09:06

## 2024-07-22 RX ADMIN — SODIUM CHLORIDE 20 ML/HR: 9 INJECTION, SOLUTION INTRAVENOUS at 08:50

## 2024-07-22 RX ADMIN — FAMOTIDINE 20 MG: 10 INJECTION INTRAVENOUS at 08:52

## 2024-07-29 ENCOUNTER — HOSPITAL ENCOUNTER (OUTPATIENT)
Dept: ONCOLOGY | Facility: HOSPITAL | Age: 69
Discharge: HOME | End: 2024-07-29
Admitting: INTERNAL MEDICINE
Payer: MEDICARE

## 2024-07-29 VITALS
RESPIRATION RATE: 16 BRPM | HEART RATE: 80 BPM | SYSTOLIC BLOOD PRESSURE: 127 MMHG | TEMPERATURE: 97.9 F | OXYGEN SATURATION: 94 % | DIASTOLIC BLOOD PRESSURE: 67 MMHG

## 2024-07-29 DIAGNOSIS — D50.8 OTHER IRON DEFICIENCY ANEMIA: ICD-10-CM

## 2024-07-29 DIAGNOSIS — K90.9 MALABSORPTION OF IRON: Primary | ICD-10-CM

## 2024-07-29 PROCEDURE — 25810000003 SODIUM CHLORIDE 0.9 % SOLUTION: Performed by: INTERNAL MEDICINE

## 2024-07-29 PROCEDURE — 96375 TX/PRO/DX INJ NEW DRUG ADDON: CPT

## 2024-07-29 PROCEDURE — 96374 THER/PROPH/DIAG INJ IV PUSH: CPT

## 2024-07-29 PROCEDURE — 25010000002 FERUMOXYTOL 510 MG/17ML SOLUTION: Performed by: INTERNAL MEDICINE

## 2024-07-29 PROCEDURE — 96365 THER/PROPH/DIAG IV INF INIT: CPT

## 2024-07-29 RX ORDER — FAMOTIDINE 10 MG/ML
20 INJECTION, SOLUTION INTRAVENOUS ONCE
Status: CANCELLED | OUTPATIENT
Start: 2024-07-29

## 2024-07-29 RX ORDER — SODIUM CHLORIDE 9 MG/ML
20 INJECTION, SOLUTION INTRAVENOUS ONCE
Status: COMPLETED | OUTPATIENT
Start: 2024-07-29 | End: 2024-07-29

## 2024-07-29 RX ORDER — FAMOTIDINE 10 MG/ML
20 INJECTION, SOLUTION INTRAVENOUS ONCE
Status: COMPLETED | OUTPATIENT
Start: 2024-07-29 | End: 2024-07-29

## 2024-07-29 RX ADMIN — FERUMOXYTOL 510 MG: 510 INJECTION INTRAVENOUS at 14:24

## 2024-07-29 RX ADMIN — FAMOTIDINE 20 MG: 10 INJECTION INTRAVENOUS at 14:19

## 2024-07-29 RX ADMIN — SODIUM CHLORIDE 20 ML/HR: 9 INJECTION, SOLUTION INTRAVENOUS at 14:17

## 2024-08-16 ENCOUNTER — LAB (OUTPATIENT)
Dept: LAB | Facility: HOSPITAL | Age: 69
End: 2024-08-16
Payer: MEDICARE

## 2024-08-16 ENCOUNTER — TRANSCRIBE ORDERS (OUTPATIENT)
Dept: LAB | Facility: HOSPITAL | Age: 69
End: 2024-08-16
Payer: MEDICARE

## 2024-08-16 DIAGNOSIS — N18.30 STAGE 3 CHRONIC KIDNEY DISEASE, UNSPECIFIED WHETHER STAGE 3A OR 3B CKD: ICD-10-CM

## 2024-08-16 DIAGNOSIS — I10 HYPERTENSION, ESSENTIAL: ICD-10-CM

## 2024-08-16 DIAGNOSIS — E11.9 DIABETES MELLITUS WITHOUT COMPLICATION: ICD-10-CM

## 2024-08-16 DIAGNOSIS — N18.30 STAGE 3 CHRONIC KIDNEY DISEASE, UNSPECIFIED WHETHER STAGE 3A OR 3B CKD: Primary | ICD-10-CM

## 2024-08-16 LAB
ALBUMIN SERPL-MCNC: 4 G/DL (ref 3.5–5.2)
ALBUMIN/GLOB SERPL: 1.4 G/DL
ALP SERPL-CCNC: 63 U/L (ref 39–117)
ALT SERPL W P-5'-P-CCNC: 29 U/L (ref 1–33)
ANION GAP SERPL CALCULATED.3IONS-SCNC: 9.6 MMOL/L (ref 5–15)
AST SERPL-CCNC: 24 U/L (ref 1–32)
BILIRUB SERPL-MCNC: 0.6 MG/DL (ref 0–1.2)
BUN SERPL-MCNC: 19 MG/DL (ref 8–23)
BUN/CREAT SERPL: 18.8 (ref 7–25)
CALCIUM SPEC-SCNC: 9.6 MG/DL (ref 8.6–10.5)
CHLORIDE SERPL-SCNC: 103 MMOL/L (ref 98–107)
CO2 SERPL-SCNC: 27.4 MMOL/L (ref 22–29)
CREAT SERPL-MCNC: 1.01 MG/DL (ref 0.57–1)
CREAT UR-MCNC: 238.5 MG/DL
EGFRCR SERPLBLD CKD-EPI 2021: 60.8 ML/MIN/1.73
GLOBULIN UR ELPH-MCNC: 2.8 GM/DL
GLUCOSE SERPL-MCNC: 159 MG/DL (ref 65–99)
HBA1C MFR BLD: 6.6 % (ref 4.8–5.6)
POTASSIUM SERPL-SCNC: 3.8 MMOL/L (ref 3.5–5.2)
PROT ?TM UR-MCNC: 39.9 MG/DL
PROT SERPL-MCNC: 6.8 G/DL (ref 6–8.5)
PROT/CREAT UR: 0.17 MG/G{CREAT}
SODIUM SERPL-SCNC: 140 MMOL/L (ref 136–145)

## 2024-08-16 PROCEDURE — 84156 ASSAY OF PROTEIN URINE: CPT

## 2024-08-16 PROCEDURE — 36415 COLL VENOUS BLD VENIPUNCTURE: CPT

## 2024-08-16 PROCEDURE — 83036 HEMOGLOBIN GLYCOSYLATED A1C: CPT

## 2024-08-16 PROCEDURE — 82570 ASSAY OF URINE CREATININE: CPT

## 2024-08-16 PROCEDURE — 80053 COMPREHEN METABOLIC PANEL: CPT

## 2024-08-20 NOTE — PROGRESS NOTES
Subjective   The ABCs of the Annual Wellness Visit  Medicare Wellness Visit      Gifty Hoover is a 68 y.o. patient who presents for a Medicare Wellness Visit.    The following portions of the patient's history were reviewed and   updated as appropriate: allergies, current medications, past family history, past medical history, past social history, past surgical history, and problem list.    Compared to one year ago, the patient's physical   health is the same.  Compared to one year ago, the patient's mental   health is the same.    Recent Hospitalizations:  She was not admitted to the hospital during the last year.     Current Medical Providers:  Patient Care Team:  Paris Ace APRN as PCP - General (Nurse Practitioner)  Titi Zavala MD as Consulting Physician (Cardiology)  Krystal Dacosta MD as Consulting Physician (General Surgery)    Outpatient Medications Prior to Visit   Medication Sig Dispense Refill    Ascorbic Acid (VITAMIN C PO) Take  by mouth.      aspirin 81 MG EC tablet Take 1 tablet by mouth Daily.      Calcium Carb-Cholecalciferol (CALCIUM/VITAMIN D PO) Take  by mouth.      ezetimibe (ZETIA) 10 MG tablet Take 1 tablet by mouth Daily. 90 tablet 3    famotidine (PEPCID) 40 MG tablet TAKE ONE TABLET BY MOUTH EVERY NIGHT AT BEDTIME 90 tablet 1    IRON, FERROUS SULFATE, PO Take  by mouth.      omeprazole (priLOSEC) 40 MG capsule Take 1 capsule by mouth Daily. 90 capsule 1    potassium chloride 10 MEQ CR tablet Take 1 tablet by mouth Daily As Needed (take with Lasix). 30 tablet 3    albuterol sulfate HFA (ProAir HFA) 108 (90 Base) MCG/ACT inhaler Inhale 2 puffs Every 6 (Six) Hours As Needed for Wheezing. 18 g 0    hydroCHLOROthiazide 12.5 MG tablet Take 1 tablet by mouth Every Morning. 90 tablet 1    levothyroxine (SYNTHROID, LEVOTHROID) 88 MCG tablet Take 1 tablet by mouth Daily. 90 tablet 1    metFORMIN (GLUCOPHAGE) 500 MG tablet TAKE ONE TABLET BY MOUTH TWICE DAILY WITH MEALS 180 tablet 1     Pradaxa 150 MG capsu Take 1 capsule by mouth 2 (Two) Times a Day. 180 capsule 1    rosuvastatin (CRESTOR) 40 MG tablet Take 1 tablet by mouth Every Night. 90 tablet 1    telmisartan (MICARDIS) 80 MG tablet Take 1 tablet by mouth Every Night. 90 tablet 1    furosemide (LASIX) 20 MG tablet Take 1 tablet by mouth Daily As Needed (for leg swelling). (Patient not taking: Reported on 9/4/2024) 30 tablet 3    fluconazole (Diflucan) 150 MG tablet Take 1 tablet by mouth Every 72 (Seventy-Two) Hours As Needed (vaginal itching). 3 tablet 0    Premarin 0.625 MG/GM vaginal cream Apply daily for 2 weeks then use 2 times weekly thereafter. (Patient not taking: Reported on 9/4/2024) 30 g 1    sulfamethoxazole-trimethoprim (Bactrim DS) 800-160 MG per tablet Take 1 tablet by mouth 2 (Two) Times a Day. 14 tablet 0     No facility-administered medications prior to visit.     No opioid medication identified on active medication list. I have reviewed chart for other potential  high risk medication/s and harmful drug interactions in the elderly.      Aspirin is on active medication list. Aspirin use is indicated based on review of current medical condition/s. Pros and cons of this therapy have been discussed today. Benefits of this medication outweigh potential harm.  Patient has been encouraged to continue taking this medication.  .      Patient Active Problem List   Diagnosis    CAD    Essential hypertension    Hyperlipidemia LDL goal <70    Paroxysmal atrial fibrillation    Arthritis    Closed fracture of ankle    Closed fracture of left distal fibula    Deep vein phlebitis and thrombophlebitis of the leg    Depression    Acquired hypothyroidism    Generalized anxiety disorder    Major depression, single episode    Osteoporosis    Seasonal allergic rhinitis    Vitamin D deficiency    Gastroesophageal reflux disease without esophagitis    Obesity    Occult blood positive stool    Iron deficiency anemia    History of colon polyps     "Stage 3b chronic kidney disease    Calculus of gallbladder    Elevated LFTs    Obesity, Class III, BMI 40-49.9 (morbid obesity)    Encounter for removal of biliary stent    Type 2 diabetes mellitus without complication    Disorder of bone and articular cartilage    History of thromboembolism of vein    Low back pain    Malabsorption of iron     Advance Care Planning  no advance directive on file. Information given in [facility].     Objective   Vitals:    24 0655   BP: 104/60   Pulse: 102   Temp: 98.7 °F (37.1 °C)   TempSrc: Temporal   SpO2: 95%   Weight: 106 kg (233 lb)   Height: 152.4 cm (60\")       Estimated body mass index is 45.5 kg/m² as calculated from the following:    Height as of this encounter: 152.4 cm (60\").    Weight as of this encounter: 106 kg (233 lb).    Class 3 Severe Obesity (BMI >=40). Obesity-related health conditions include the following: hypertension and diabetes mellitus. Obesity is improving with treatment. BMI is is above average; BMI management plan is completed. We discussed portion control and increasing exercise.       Does the patient have evidence of cognitive impairment? No  Lab Results   Component Value Date    HGBA1C 6.60 (H) 2024                                                                                                Health  Risk Assessment    Smoking Status:  Social History     Tobacco Use   Smoking Status Never   Smokeless Tobacco Never     Alcohol Consumption:  Social History     Substance and Sexual Activity   Alcohol Use Never       Fall Risk Screen  STEADI Fall Risk Assessment was completed, and patient is at LOW risk for falls.Assessment completed on:2024    Depression Screenin/4/2024     6:59 AM   PHQ-2/PHQ-9 Depression Screening   Little Interest or Pleasure in Doing Things 0-->not at all   Feeling Down, Depressed or Hopeless 0-->not at all   PHQ-9: Brief Depression Severity Measure Score 0     Health Habits and Functional and Cognitive " Screenin/28/2024     8:08 AM   Functional & Cognitive Status   Do you have difficulty preparing food and eating? No   Do you have difficulty bathing yourself, getting dressed or grooming yourself? No   Do you have difficulty using the toilet? No   Do you have difficulty moving around from place to place? No   Do you have trouble with steps or getting out of a bed or a chair? No   Current Diet Other   Dental Exam Other   Eye Exam Up to date   Exercise (times per week) 2 times per week   Current Exercises Include Walking   Do you need help using the phone?  No   Are you deaf or do you have serious difficulty hearing?  No   Do you need help to go to places out of walking distance? No   Do you need help shopping? No   Do you need help preparing meals?  No   Do you need help with housework?  No   Do you need help with laundry? No   Do you need help taking your medications? No   Do you need help managing money? No   Do you ever drive or ride in a car without wearing a seat belt? No   Have you felt unusual stress, anger or loneliness in the last month? No   Who do you live with? Alone   If you need help, do you have trouble finding someone available to you? No   Have you been bothered in the last four weeks by sexual problems? No   Do you have difficulty concentrating, remembering or making decisions? No           Age-appropriate Screening Schedule:  Refer to the list below for future screening recommendations based on patient's age, sex and/or medical conditions. Orders for these recommended tests are listed in the plan section. The patient has been provided with a written plan.    Health Maintenance List  Health Maintenance   Topic Date Due    DIABETIC EYE EXAM  Never done    TDAP/TD VACCINES (1 - Tdap) Never done    ZOSTER VACCINE (1 of 2) Never done    DXA SCAN  2024    COVID-19 Vaccine (3 -  season) 2024    INFLUENZA VACCINE  2024    DIABETIC FOOT EXAM  2025    HEMOGLOBIN A1C   02/16/2025    LIPID PANEL  03/04/2025    URINE MICROALBUMIN  08/16/2025    ANNUAL WELLNESS VISIT  09/04/2025    BMI FOLLOWUP  09/04/2025    MAMMOGRAM  03/21/2026    COLORECTAL CANCER SCREENING  11/22/2027    Pneumococcal Vaccine 65+  Completed    HEPATITIS C SCREENING  Discontinued                                                                                                                                                CMS Preventative Services Quick Reference  Risk Factors Identified During Encounter  Immunizations Discussed/Encouraged: Influenza    The above risks/problems have been discussed with the patient.  Pertinent information has been shared with the patient in the After Visit Summary.  An After Visit Summary and PPPS were made available to the patient.    Follow Up:   Next Medicare Wellness visit to be scheduled in 1 year.          Additional E&M Note during same encounter follows:  Patient has multiple medical problems which are significant and separately identifiable that require additional work above and beyond the Medicare Wellness Visit.      Chief Complaint  Diabetes (Follow up) and Medicare Wellness-subsequent    Gifty Hoover is a 68 y.o. female who presents to [facility]   History of Present Illness  The patient presents for evaluation of multiple medical concerns.    She continues to experience urinary tract infections, characterized by severe internal vaginal dryness and occasional intense pain that necessitates sitting down. These symptoms are not constant, with periods of a few weeks without any discomfort. She experiences pain during urination, which is often reduced to a dribble despite the discomfort. She has sought help from other physicians, including a nephrologist who is monitoring her condition. She was previously using Premarin vaginal cream twice a week but discontinued it two months ago due to increased dryness and pain during application. The  "cream did not alleviate her symptoms. It has been some time since her last Pap smear.    Her blood sugar levels have been slightly elevated recently. Her A1c level was 7.0 but has since decreased to 6.6. Her metformin dosage was increased from two to three tablets daily, which she tolerates well without any diarrhea. She reports no chest pain or shortness of breath. Her bowel and bladder functions are normal.    She visited the emergency room on 08/22/2024 due to severe left leg pain and was diagnosed with thrombophlebitis. She was advised to consult a vascular surgeon, apply heat pads, increase physical activity, and elevate her leg intermittently. An ultrasound did not reveal any abnormalities, but an x-ray showed inflammation behind her knee. Her leg pain has since resolved.    She takes baby aspirin daily. She does not have an advanced care plan in place. She reports that her physical and mental health have remained stable over the past year. She has not required hospital admission in the last year.    She takes thyroid medication every morning on an empty stomach. Her energy levels are normal. She received two iron infusions in 07/2024, which significantly improved her condition. However, her iron levels continue to decrease. She takes an iron supplement every other day as prescribed by her hematologist.    Objective   Vital Signs:   Vitals:    09/04/24 0655   BP: 104/60   Pulse: 102   Temp: 98.7 °F (37.1 °C)   TempSrc: Temporal   SpO2: 95%   Weight: 106 kg (233 lb)   Height: 152.4 cm (60\")     Body mass index is 45.5 kg/m².    Wt Readings from Last 3 Encounters:   09/04/24 106 kg (233 lb)   08/22/24 106 kg (233 lb 14.5 oz)   07/16/24 114 kg (252 lb 3.2 oz)     BP Readings from Last 3 Encounters:   09/04/24 104/60   08/22/24 121/74   07/29/24 127/67       Physical Exam  Vitals reviewed.   Constitutional:       Appearance: Normal appearance. She is well-developed.   HENT:      Head: Normocephalic and atraumatic. "   Eyes:      Conjunctiva/sclera: Conjunctivae normal.      Pupils: Pupils are equal, round, and reactive to light.   Cardiovascular:      Rate and Rhythm: Normal rate and regular rhythm.      Heart sounds: Normal heart sounds. No murmur heard.  Pulmonary:      Effort: Pulmonary effort is normal.      Breath sounds: Normal breath sounds. No wheezing or rhonchi.   Abdominal:      General: Bowel sounds are normal. There is no distension.      Palpations: Abdomen is soft.      Tenderness: There is no abdominal tenderness.   Skin:     General: Skin is warm and dry.   Neurological:      Mental Status: She is alert and oriented to person, place, and time.   Psychiatric:         Mood and Affect: Mood and affect normal.         Behavior: Behavior normal.         Thought Content: Thought content normal.         Judgment: Judgment normal.       Physical Exam  Vital Signs  Blood pressure reading is 104/60.    The following data was reviewed by TIEN Perez on 09/04/2024  Common Labs   Common labs          7/15/2024    14:11 8/16/2024    11:26 8/22/2024    06:32   Common Labs   Glucose  159  170    BUN  19  21    Creatinine  1.01  1.15    Sodium  140  140    Potassium  3.8  4.0    Chloride  103  102    Calcium  9.6  9.6    Albumin  4.0  4.0    Total Bilirubin  0.6  0.5    Alkaline Phosphatase  63  83    AST (SGOT)  24  25    ALT (SGPT)  29  29    WBC 5.04   5.70    Hemoglobin 11.6   12.8    Hematocrit 37.5   40.6    Platelets 234   196    Hemoglobin A1C  6.60       Results  Laboratory Studies  Hemoglobin A1c is 6.6.  Mammo and dexa ordered.     Assessment & Plan   Diagnoses and all orders for this visit:    1. Medicare annual wellness visit, subsequent (Primary)    2. Essential hypertension  -     hydroCHLOROthiazide 12.5 MG tablet; Take 1 tablet by mouth Every Morning.  Dispense: 90 tablet; Refill: 1  -     telmisartan (MICARDIS) 80 MG tablet; Take 1 tablet by mouth Every Night.  Dispense: 90 tablet; Refill: 1    3.  Hyperlipidemia LDL goal <70  -     Lipid Panel; Future  -     rosuvastatin (CRESTOR) 40 MG tablet; Take 1 tablet by mouth Every Night.  Dispense: 90 tablet; Refill: 1    4. Acquired hypothyroidism  -     levothyroxine (SYNTHROID, LEVOTHROID) 88 MCG tablet; Take 1 tablet by mouth Daily.  Dispense: 90 tablet; Refill: 1    5. SOB (shortness of breath) on exertion  -     albuterol sulfate HFA (ProAir HFA) 108 (90 Base) MCG/ACT inhaler; Inhale 2 puffs Every 6 (Six) Hours As Needed for Wheezing.  Dispense: 18 g; Refill: 0    6. Paroxysmal atrial fibrillation  -     Pradaxa 150 MG capsu; Take 1 capsule by mouth 2 (Two) Times a Day.  Dispense: 180 capsule; Refill: 1    7. Atrophic vaginitis  -     Estrogens Conjugated (Premarin) 0.625 MG/GM vaginal cream; Apply daily for 2 weeks then use 2 times weekly thereafter.  Dispense: 30 g; Refill: 1    8. Vulvodynia  -     Ambulatory Referral to Gynecology    9. Type 2 diabetes mellitus without complication, without long-term current use of insulin  -     metFORMIN (GLUCOPHAGE) 500 MG tablet; Take 1 tablet by mouth 3 times a day.  Dispense: 270 tablet; Refill: 1    10. Postmenopausal  -     DEXA Bone Density Axial; Future    11. Encounter for screening mammogram for malignant neoplasm of breast  -     Mammo Screening Digital Tomosynthesis Bilateral With CAD; Future    12. Class 3 severe obesity due to excess calories with serious comorbidity and body mass index (BMI) of 45.0 to 49.9 in adult    The patient is asked to make an attempt to improve diet and exercise patterns to aid in medical management of these diagnoses.  Assessment & Plan  1. Atrophic vaginitis.  Symptoms suggest atrophic vaginitis, likely due to menopausal changes. However, the severity of her pain necessitates further evaluation by a gynecologist. A prescription for Premarin vaginal cream has been refilled. She is advised to use it daily for two weeks, then reduce to twice weekly. A referral to Dr. Turpin, a female  gynecologist, will be made.    2. Diabetes Mellitus.  Her diabetes is well-managed, with an A1c level of 6.6 as of 08/16/2024. She is advised to maintain adequate hydration and continue her current regimen of metformin 500 mg three times daily.    3. Thrombophlebitis.  She experienced pain in her left leg, diagnosed as thrombophlebitis on 08/22/2024. The pain has since resolved. She is advised to monitor her blood pressure, which is slightly low today at 104/60 mmHg, and report any issues.     4. Iron Deficiency Anemia.  She received two iron infusions in July, which helped significantly. She continues to take an iron pill every other day. Further monitoring of her iron levels is managed per hematology.    5. Health Maintenance.  Her last lipid panel in 03/2024 was within normal limits. A cholesterol check will be scheduled for 03/2025. An influenza vaccine is due at the end of this month. A bone density test will be scheduled. Her mammogram is due on 03/21/2025.    Follow-up  The patient will follow up in 6 months.              FOLLOW UP  Return in about 6 months (around 3/4/2025) for Next scheduled follow up.    Patient was given instructions and counseling regarding her condition or for health maintenance advice. Please see specific information pulled into the AVS if appropriate.     Patient or patient representative verbalized consent for the use of Ambient Listening during the visit with  TIEN Perez for chart documentation. 9/4/2024  07:21 EDT    TIEN Perez  09/04/24  08:22 EDT

## 2024-08-22 ENCOUNTER — APPOINTMENT (OUTPATIENT)
Dept: GENERAL RADIOLOGY | Facility: HOSPITAL | Age: 69
End: 2024-08-22
Payer: MEDICARE

## 2024-08-22 ENCOUNTER — HOSPITAL ENCOUNTER (EMERGENCY)
Facility: HOSPITAL | Age: 69
Discharge: HOME | End: 2024-08-22
Attending: EMERGENCY MEDICINE
Payer: MEDICARE

## 2024-08-22 ENCOUNTER — APPOINTMENT (OUTPATIENT)
Facility: HOSPITAL | Age: 69
End: 2024-08-22
Payer: MEDICARE

## 2024-08-22 VITALS
SYSTOLIC BLOOD PRESSURE: 121 MMHG | HEART RATE: 86 BPM | TEMPERATURE: 98 F | RESPIRATION RATE: 20 BRPM | HEIGHT: 60 IN | BODY MASS INDEX: 45.92 KG/M2 | OXYGEN SATURATION: 96 % | DIASTOLIC BLOOD PRESSURE: 74 MMHG | WEIGHT: 233.91 LBS

## 2024-08-22 DIAGNOSIS — I80.9 THROMBOPHLEBITIS: Primary | ICD-10-CM

## 2024-08-22 LAB
ALBUMIN SERPL-MCNC: 4 G/DL (ref 3.5–5.2)
ALBUMIN/GLOB SERPL: 1.2 G/DL
ALP SERPL-CCNC: 83 U/L (ref 39–117)
ALT SERPL W P-5'-P-CCNC: 29 U/L (ref 1–33)
ANION GAP SERPL CALCULATED.3IONS-SCNC: 13.4 MMOL/L (ref 5–15)
APTT PPP: 64.4 SECONDS (ref 78–95.9)
AST SERPL-CCNC: 25 U/L (ref 1–32)
BASOPHILS # BLD AUTO: 0.05 10*3/MM3 (ref 0–0.2)
BASOPHILS NFR BLD AUTO: 0.9 % (ref 0–1.5)
BH CV LOW VAS LEFT GREATER SAPH AK VESSEL: 1
BH CV LOW VAS LEFT VARICOSITY BK VESSEL: 1
BH CV LOWER VASCULAR LEFT COMMON FEMORAL AUGMENT: NORMAL
BH CV LOWER VASCULAR LEFT COMMON FEMORAL COMPETENT: NORMAL
BH CV LOWER VASCULAR LEFT COMMON FEMORAL COMPRESS: NORMAL
BH CV LOWER VASCULAR LEFT COMMON FEMORAL PHASIC: NORMAL
BH CV LOWER VASCULAR LEFT COMMON FEMORAL SPONT: NORMAL
BH CV LOWER VASCULAR LEFT DISTAL FEMORAL COMPRESS: NORMAL
BH CV LOWER VASCULAR LEFT GASTRONEMIUS COMPRESS: NORMAL
BH CV LOWER VASCULAR LEFT GREATER SAPH AK COMPRESS: NORMAL
BH CV LOWER VASCULAR LEFT GREATER SAPH AK THROMBUS: NORMAL
BH CV LOWER VASCULAR LEFT GREATER SAPH BK COMPRESS: NORMAL
BH CV LOWER VASCULAR LEFT LESSER SAPH COMPRESS: NORMAL
BH CV LOWER VASCULAR LEFT MID FEMORAL AUGMENT: NORMAL
BH CV LOWER VASCULAR LEFT MID FEMORAL COMPETENT: NORMAL
BH CV LOWER VASCULAR LEFT MID FEMORAL COMPRESS: NORMAL
BH CV LOWER VASCULAR LEFT MID FEMORAL PHASIC: NORMAL
BH CV LOWER VASCULAR LEFT MID FEMORAL SPONT: NORMAL
BH CV LOWER VASCULAR LEFT PERONEAL COMPRESS: NORMAL
BH CV LOWER VASCULAR LEFT POPLITEAL AUGMENT: NORMAL
BH CV LOWER VASCULAR LEFT POPLITEAL COMPETENT: NORMAL
BH CV LOWER VASCULAR LEFT POPLITEAL COMPRESS: NORMAL
BH CV LOWER VASCULAR LEFT POPLITEAL PHASIC: NORMAL
BH CV LOWER VASCULAR LEFT POPLITEAL SPONT: NORMAL
BH CV LOWER VASCULAR LEFT POSTERIOR TIBIAL COMPRESS: NORMAL
BH CV LOWER VASCULAR LEFT PROXIMAL FEMORAL COMPRESS: NORMAL
BH CV LOWER VASCULAR LEFT SAPHENOFEMORAL JUNCTION COMPRESS: NORMAL
BH CV LOWER VASCULAR LEFT VARICOSITY BK COMPRESS: NORMAL
BH CV LOWER VASCULAR LEFT VARICOSITY BK THROMBUS: NORMAL
BH CV LOWER VASCULAR RIGHT COMMON FEMORAL AUGMENT: NORMAL
BH CV LOWER VASCULAR RIGHT COMMON FEMORAL COMPETENT: NORMAL
BH CV LOWER VASCULAR RIGHT COMMON FEMORAL COMPRESS: NORMAL
BH CV LOWER VASCULAR RIGHT COMMON FEMORAL PHASIC: NORMAL
BH CV LOWER VASCULAR RIGHT COMMON FEMORAL SPONT: NORMAL
BH CV VAS PRELIMINARY FINDINGS SCRIPTING: 1
BILIRUB SERPL-MCNC: 0.5 MG/DL (ref 0–1.2)
BUN SERPL-MCNC: 21 MG/DL (ref 8–23)
BUN/CREAT SERPL: 18.3 (ref 7–25)
CALCIUM SPEC-SCNC: 9.6 MG/DL (ref 8.6–10.5)
CHLORIDE SERPL-SCNC: 102 MMOL/L (ref 98–107)
CO2 SERPL-SCNC: 24.6 MMOL/L (ref 22–29)
CREAT SERPL-MCNC: 1.15 MG/DL (ref 0.57–1)
DEPRECATED RDW RBC AUTO: 52.2 FL (ref 37–54)
EGFRCR SERPLBLD CKD-EPI 2021: 52 ML/MIN/1.73
EOSINOPHIL # BLD AUTO: 0.12 10*3/MM3 (ref 0–0.4)
EOSINOPHIL NFR BLD AUTO: 2.1 % (ref 0.3–6.2)
ERYTHROCYTE [DISTWIDTH] IN BLOOD BY AUTOMATED COUNT: 14.5 % (ref 12.3–15.4)
GLOBULIN UR ELPH-MCNC: 3.3 GM/DL
GLUCOSE SERPL-MCNC: 170 MG/DL (ref 65–99)
HCT VFR BLD AUTO: 40.6 % (ref 34–46.6)
HGB BLD-MCNC: 12.8 G/DL (ref 12–15.9)
IMM GRANULOCYTES # BLD AUTO: 0.02 10*3/MM3 (ref 0–0.05)
IMM GRANULOCYTES NFR BLD AUTO: 0.4 % (ref 0–0.5)
INR PPP: 1.59 (ref 0.86–1.15)
LYMPHOCYTES # BLD AUTO: 0.82 10*3/MM3 (ref 0.7–3.1)
LYMPHOCYTES NFR BLD AUTO: 14.4 % (ref 19.6–45.3)
MCH RBC QN AUTO: 30.8 PG (ref 26.6–33)
MCHC RBC AUTO-ENTMCNC: 31.5 G/DL (ref 31.5–35.7)
MCV RBC AUTO: 97.6 FL (ref 79–97)
MONOCYTES # BLD AUTO: 0.39 10*3/MM3 (ref 0.1–0.9)
MONOCYTES NFR BLD AUTO: 6.8 % (ref 5–12)
NEUTROPHILS NFR BLD AUTO: 4.3 10*3/MM3 (ref 1.7–7)
NEUTROPHILS NFR BLD AUTO: 75.4 % (ref 42.7–76)
NRBC BLD AUTO-RTO: 0 /100 WBC (ref 0–0.2)
PLATELET # BLD AUTO: 196 10*3/MM3 (ref 140–450)
PMV BLD AUTO: 10.6 FL (ref 6–12)
POTASSIUM SERPL-SCNC: 4 MMOL/L (ref 3.5–5.2)
PROT SERPL-MCNC: 7.3 G/DL (ref 6–8.5)
PROTHROMBIN TIME: 19.2 SECONDS (ref 11.8–14.9)
RBC # BLD AUTO: 4.16 10*6/MM3 (ref 3.77–5.28)
SODIUM SERPL-SCNC: 140 MMOL/L (ref 136–145)
WBC NRBC COR # BLD AUTO: 5.7 10*3/MM3 (ref 3.4–10.8)

## 2024-08-22 PROCEDURE — 80053 COMPREHEN METABOLIC PANEL: CPT | Performed by: NURSE PRACTITIONER

## 2024-08-22 PROCEDURE — 93971 EXTREMITY STUDY: CPT

## 2024-08-22 PROCEDURE — 99284 EMERGENCY DEPT VISIT MOD MDM: CPT

## 2024-08-22 PROCEDURE — 73562 X-RAY EXAM OF KNEE 3: CPT

## 2024-08-22 PROCEDURE — 93971 EXTREMITY STUDY: CPT | Performed by: SURGERY

## 2024-08-22 PROCEDURE — 85730 THROMBOPLASTIN TIME PARTIAL: CPT | Performed by: NURSE PRACTITIONER

## 2024-08-22 PROCEDURE — 36415 COLL VENOUS BLD VENIPUNCTURE: CPT

## 2024-08-22 PROCEDURE — 85610 PROTHROMBIN TIME: CPT | Performed by: NURSE PRACTITIONER

## 2024-08-22 PROCEDURE — 85025 COMPLETE CBC W/AUTO DIFF WBC: CPT | Performed by: NURSE PRACTITIONER

## 2024-08-22 NOTE — ED PROVIDER NOTES
Time: 8:12 AM EDT  Date of encounter:  8/22/2024  Independent Historian/Clinical History and Information was obtained by:   Patient    History is limited by: N/A    Chief Complaint: Leg pain      History of Present Illness:  Patient is a 68 y.o. year old female who presents to the emergency department for evaluation of left upper leg pain since yesterday.  Patient reports small amount of swelling, states pain radiates up to the hip, has tenderness to the knee and distal upper leg.  Patient denies recent trauma or injury, states she was walking around town more than usual 2 days ago.  Patient also reports mild to moderate pain in the lower back, states she has a history of chronic lumbar pain.  Patient denies numbness or tingling, denies fevers.  Patient reports history of chronic thrombophlebitis in the left lower leg, states she does not have pain in this area.      Patient Care Team  Primary Care Provider: Paris Ace APRN    Past Medical History:     No Known Allergies  Past Medical History:   Diagnosis Date    Anemia 2022    NO CURRENT S/S    Anxiety     Arthritis     Asthma 2020    Shortness of breath after walking long periods of time.    CAD 09/14/2021    AGUAYO SEES PT    Cholelithiasis 2022    Chronic kidney disease 2022    NO DIALYSIS    Colon polyp 2008    BENIGN    Deep vein thrombosis 2011    Green filter in place.    Depression     Diabetes mellitus     Elevated cholesterol     Essential hypertension 09/14/2021    GERD (gastroesophageal reflux disease)     Heart attack 2009    NO STENTS/NO BYPASS    History of pulmonary embolism     2011, TAKE PRADAXA    Hypothyroid     Ingrown toenail 2022    Lumbar radiculopathy 09/2018    describes sensory S1 radiculopathy but would favor right L5    Paroxysmal atrial fibrillation 09/14/2021    AGUAYO    Seasonal allergies     Sleep apnea     BIPAP?     Past Surgical History:   Procedure Laterality Date    BLADDER SURGERY  2011    BREAST BIOPSY  2002    Upper right  breast BENIGN    BREAST SURGERY      CARDIAC CATHETERIZATION      NO INTERVENTION    CARDIAC SURGERY      R/T BLOOD CLOTS, NO CARDIAC INTERVENTION     SECTION  1982    CHOLECYSTECTOMY N/A 2022    Procedure: laparoscopic cholecystectomy, possible open;  Surgeon: Krystal Dacosta MD;  Location: [facility] [place] [facility] [place];  Service: General;  Laterality: N/A;    COLONOSCOPY      COLONOSCOPY N/A 2022    Procedure: COLONOSCOPY;  Surgeon: Ta Vasquez MD;  Location: [facility] ENDOSCOPY;  Service: Gastroenterology;  Laterality: N/A;  DIVERTICULOSIS    ENDOSCOPY N/A 2022    Procedure: ESOPHAGOGASTRODUODENOSCOPY WITH BX;  Surgeon: Ta Vasquez MD;  Location: [facility] ENDOSCOPY;  Service: Gastroenterology;  Laterality: N/A;  SMALL HIATAL HERNIA    ERCP N/A 2022    Procedure: ENDOSCOPIC RETROGRADE CHOLANGIOPANCREATOGRAPHY WITH SPHINCTEROTOMY, STONE REMOVAL, STENT PLACEMENT;  Surgeon: Arden Garcia MD;  Location: [facility] ENDOSCOPY;  Service: Gastroenterology;  Laterality: N/A;  BILE DUCT STONES    ERCP N/A 2023    Procedure: ENDOSCOPIC RETROGRADE CHOLANGIOPANCREATOGRAPHY with stent removal, balloon dilation, stone removal;  Surgeon: Arden Garcia MD;  Location: [facility] ENDOSCOPY;  Service: Gastroenterology;  Laterality: N/A;  bile duct stones    NICHOLAS FILTER INSERTION JUGULAR      HYSTERECTOMY       Family History   Problem Relation Age of Onset    Stroke Mother     Cancer Mother         Colon    Colon cancer Mother 71        still living at 73    Diabetes Mother     Arthritis Mother     Colon polyps Mother     Hypertension Mother     Kidney disease Mother     Miscarriages / Stillbirths Mother     Stroke Father     Arthritis Father         Padgets Disease    Osteoporosis Father     Inflammatory bowel disease Father     Cancer Sister         Eye    Thyroid disease Sister     Diabetes Brother     Stroke Brother     Bleeding Disorder Daughter     Diabetes  Brother     Hypertension Brother     Malig Hyperthermia Neg Hx        Home Medications:  Prior to Admission medications    Medication Sig Start Date End Date Taking? Authorizing Provider   albuterol sulfate HFA (ProAir HFA) 108 (90 Base) MCG/ACT inhaler Inhale 2 puffs Every 6 (Six) Hours As Needed for Wheezing. 1/9/23   Paris Ace APRN   Ascorbic Acid (VITAMIN C PO) Take  by mouth.    ProviderFidencio MD   aspirin 81 MG EC tablet Take 1 tablet by mouth Daily.    Fidencio Irene MD   Calcium Carb-Cholecalciferol (CALCIUM/VITAMIN D PO) Take  by mouth.    ProviderFidencio MD   ezetimibe (ZETIA) 10 MG tablet Take 1 tablet by mouth Daily. 9/13/23   Mine Castillo APRN   famotidine (PEPCID) 40 MG tablet TAKE ONE TABLET BY MOUTH EVERY NIGHT AT BEDTIME 3/8/24   Zoraida Jackson APRN   fluconazole (Diflucan) 150 MG tablet Take 1 tablet by mouth Every 72 (Seventy-Two) Hours As Needed (vaginal itching). 3/4/24   Paris Ace APRN   furosemide (LASIX) 20 MG tablet Take 1 tablet by mouth Daily As Needed (for leg swelling). 5/31/23   Mine Castillo APRN   hydroCHLOROthiazide 12.5 MG tablet Take 1 tablet by mouth Every Morning. 3/4/24   Paris Ace APRN   IRON, FERROUS SULFATE, PO Take  by mouth.    Fidencio Irene MD   levothyroxine (SYNTHROID, LEVOTHROID) 88 MCG tablet Take 1 tablet by mouth Daily. 3/4/24   Paris Ace APRN   metFORMIN (GLUCOPHAGE) 500 MG tablet TAKE ONE TABLET BY MOUTH TWICE DAILY WITH MEALS 3/8/24   Paris Ace APRN   omeprazole (priLOSEC) 40 MG capsule Take 1 capsule by mouth Daily. 3/4/24   Paris Ace APRN   potassium chloride 10 MEQ CR tablet Take 1 tablet by mouth Daily As Needed (take with Lasix). 5/31/23   Mine Castillo APRN   Pradaxa 150 MG capsu Take 1 capsule by mouth 2 (Two) Times a Day. 3/4/24   Ace, Paris, APRN   Premarin 0.625 MG/GM vaginal cream Apply daily for 2 weeks then use 2 times weekly thereafter. 1/8/24    "Paris Ace APRN   rosuvastatin (CRESTOR) 40 MG tablet Take 1 tablet by mouth Every Night. 3/4/24   Paris Ace APRN   sulfamethoxazole-trimethoprim (Bactrim DS) 800-160 MG per tablet Take 1 tablet by mouth 2 (Two) Times a Day. 3/19/24   Paris Ace APRN   telmisartan (MICARDIS) 80 MG tablet Take 1 tablet by mouth Every Night. 3/4/24   Paris Ace APRN        Social History:   Social History     Tobacco Use    Smoking status: Never    Smokeless tobacco: Never   Vaping Use    Vaping status: Never Used   Substance Use Topics    Alcohol use: Never    Drug use: Never         Review of Systems:  Review of Systems   Constitutional:  Negative for fever.   HENT:  Negative for sore throat.    Eyes: Negative.    Respiratory:  Negative for cough and shortness of breath.    Cardiovascular:  Positive for leg swelling. Negative for chest pain.   Gastrointestinal:  Negative for abdominal pain, diarrhea and vomiting.   Genitourinary:  Negative for dysuria.   Musculoskeletal:  Positive for arthralgias and myalgias. Negative for neck pain.   Skin:  Negative for rash.   Allergic/Immunologic: Negative.    Neurological:  Negative for weakness, numbness and headaches.   Hematological: Negative.    Psychiatric/Behavioral: Negative.     All other systems reviewed and are negative.       Physical Exam:  /87 (BP Location: Left arm, Patient Position: Sitting)   Pulse 111   Temp 97.7 °F (36.5 °C) (Oral)   Resp 20   Ht 152.4 cm (60\")   Wt 106 kg (233 lb 14.5 oz)   SpO2 95%   BMI 45.68 kg/m²     Physical Exam  Vitals and nursing note reviewed.   Constitutional:       Appearance: Normal appearance. She is not ill-appearing or toxic-appearing.   HENT:      Head: Normocephalic.      Nose: Nose normal.   Eyes:      Extraocular Movements: Extraocular movements intact.      Conjunctiva/sclera: Conjunctivae normal.      Pupils: Pupils are equal, round, and reactive to light.   Cardiovascular:      Rate and Rhythm: " Normal rate.      Pulses: Normal pulses.   Pulmonary:      Effort: Pulmonary effort is normal.   Abdominal:      General: Abdomen is flat. There is no distension.      Palpations: Abdomen is soft.   Musculoskeletal:      Cervical back: Normal range of motion and neck supple.      Left upper leg: Swelling and tenderness present. No deformity.      Left knee: Swelling present. No deformity, erythema or crepitus. Decreased range of motion. Tenderness present over the medial joint line and lateral joint line.   Skin:     General: Skin is warm and dry.      Capillary Refill: Capillary refill takes less than 2 seconds.   Neurological:      General: No focal deficit present.      Mental Status: She is alert and oriented to person, place, and time.   Psychiatric:         Mood and Affect: Mood normal.              Procedures:  Procedures      Medical Decision Making:      Comorbidities that affect care:    Asthma, Atrial Fibrillation, Chronic Kidney Disease, Coronary Artery Disease, Diabetes, Hypertension, Thyroid Disease    External Notes reviewed:    Previous Clinic Note: Nephrology office visit from 8/20/2024      The following orders were placed and all results were independently analyzed by me:  Orders Placed This Encounter   Procedures    XR Knee 3 View Left    Comprehensive Metabolic Panel    aPTT    Protime-INR    CBC Auto Differential    Apply ace wrap    CBC & Differential       Medications Given in the Emergency Department:  Medications - No data to display     ED Course:    ED Course as of 08/22/24 1046   Thu Aug 22, 2024   0812 Per ultrasound tech, preliminary results of the duplex venous ultrasound of the left lower extremity is negative for DVT, however there is chronic superficial thrombophlebitis. [RM]      ED Course User Index  [RM] Becca Han APRN       Labs:    Lab Results (last 24 hours)       Procedure Component Value Units Date/Time    CBC & Differential [[id number]]  (Abnormal) Collected:  08/22/24 0632    Specimen: Blood Updated: 08/22/24 0638    Narrative:      The following orders were created for panel order CBC & Differential.  Procedure                               Abnormality         Status                     ---------                               -----------         ------                     CBC Auto Differential[[id number]]        Abnormal            Final result                 Please view results for these tests on the individual orders.    Comprehensive Metabolic Panel [[id number]]  (Abnormal) Collected: 08/22/24 0632    Specimen: Blood Updated: 08/22/24 0659     Glucose 170 mg/dL      BUN 21 mg/dL      Creatinine 1.15 mg/dL      Sodium 140 mmol/L      Potassium 4.0 mmol/L      Chloride 102 mmol/L      CO2 24.6 mmol/L      Calcium 9.6 mg/dL      Total Protein 7.3 g/dL      Albumin 4.0 g/dL      ALT (SGPT) 29 U/L      AST (SGOT) 25 U/L      Alkaline Phosphatase 83 U/L      Total Bilirubin 0.5 mg/dL      Globulin 3.3 gm/dL      A/G Ratio 1.2 g/dL      BUN/Creatinine Ratio 18.3     Anion Gap 13.4 mmol/L      eGFR 52.0 mL/min/1.73     Narrative:      GFR Normal >60  Chronic Kidney Disease <60  Kidney Failure <15      aPTT [[id number]]  (Abnormal) Collected: 08/22/24 0632    Specimen: Blood Updated: 08/22/24 0651     PTT 64.4 seconds     Protime-INR [[id number]]  (Abnormal) Collected: 08/22/24 0632    Specimen: Blood Updated: 08/22/24 0651     Protime 19.2 Seconds      INR 1.59    Narrative:      Suggested Therapeutic Ranges For Oral Anticoagulant Therapy:  Level of Therapy                      INR Target Range  Standard Dose                            2.0-3.0  High Dose                                2.5-3.5  Patients not receiving anticoagulant  Therapy Normal Range                     0.86-1.15    CBC Auto Differential [[id number]]  (Abnormal) Collected: 08/22/24 0632    Specimen: Blood Updated: 08/22/24 0638     WBC 5.70 10*3/mm3      RBC 4.16 10*6/mm3      Hemoglobin 12.8 g/dL       Hematocrit 40.6 %      MCV 97.6 fL      MCH 30.8 pg      MCHC 31.5 g/dL      RDW 14.5 %      RDW-SD 52.2 fl      MPV 10.6 fL      Platelets 196 10*3/mm3      Neutrophil % 75.4 %      Lymphocyte % 14.4 %      Monocyte % 6.8 %      Eosinophil % 2.1 %      Basophil % 0.9 %      Immature Grans % 0.4 %      Neutrophils, Absolute 4.30 10*3/mm3      Lymphocytes, Absolute 0.82 10*3/mm3      Monocytes, Absolute 0.39 10*3/mm3      Eosinophils, Absolute 0.12 10*3/mm3      Basophils, Absolute 0.05 10*3/mm3      Immature Grans, Absolute 0.02 10*3/mm3      nRBC 0.0 /100 WBC              Imaging:    XR Knee 3 View Left    Result Date: 8/22/2024  XR KNEE 3 VW LEFT Date of Exam: 8/22/2024 9:54 AM EDT Indication: knee pain/swelling Comparison: No comparison. Findings: No acute osseous abnormality noted. Joint spaces preserved. Calcification extending along the posterior medial aspect of the soft tissues may be within a vascular structure. Consider the possibility of chronic thrombus. Question varicose vein formation.     Impression: 1. No acute osseous abnormality. 2. Calcification which may be within the venous structures suggesting possible chronic thrombosis. 3. Possible varicose vein formation Electronically Signed: Ramon Young MD  8/22/2024 10:01 AM EDT  Workstation ID: [id number]       Differential Diagnosis and Discussion:    Extremity Pain: Differential diagnosis includes but is not limited to soft tissue sprain, tendonitis, tendon injury, dislocation, fracture, deep vein thrombosis, arterial insufficiency, osteoarthritis, bursitis, and ligamentous damage.    All labs were reviewed and interpreted by me.  All X-rays impressions were independently interpreted by me.  Ultrasound impression was interpreted by me.     MDM     Amount and/or Complexity of Data Reviewed  Clinical lab tests: reviewed                 Patient Care Considerations:    ANTIBIOTICS: I considered prescribing antibiotics as an outpatient however no  bacterial focus of infection was found.      Consultants/Shared Management Plan:    None    Social Determinants of Health:    Patient is independent, reliable, and has access to care.       Disposition and Care Coordination:    Discharged: The patient is suitable and stable for discharge with no need for consideration of admission.    I have explained the patient´s condition, diagnoses and treatment plan based on the information available to me at this time. I have answered questions and addressed any concerns. The patient has a good  understanding of the patient´s diagnosis, condition, and treatment plan as can be expected at this point. The vital signs have been stable. The patient´s condition is stable and appropriate for discharge from the emergency department.      The patient will pursue further outpatient evaluation with the primary care physician or other designated or consulting physician as outlined in the discharge instructions. They are agreeable to this plan of care and follow-up instructions have been explained in detail. The patient has received these instructions in written format and has expressed an understanding of the discharge instructions. The patient is aware that any significant change in condition or worsening of symptoms should prompt an immediate return to this or the closest emergency department or call to 911.  I have explained discharge medications and the need for follow up with the patient/caretakers. This was also printed in the discharge instructions. Patient was discharged with the following medications and follow up:      Medication List      No changes were made to your prescriptions during this visit.      Paris Ace, APRN  [address] [place] [address]  [phone]    Call in 2 days         Final diagnoses:   Thrombophlebitis        ED Disposition       ED Disposition   Discharge    Condition   Stable    Comment   --               This medical  record created using voice recognition software.             Becca Han, TIEN  08/22/24 7823

## 2024-08-22 NOTE — DISCHARGE INSTRUCTIONS
Apply a warm compress or heating pad to affected areas. This may need to be repeated several times a day.  Move the affected limb. For example, you may need to start doing walking exercises right away. You will also be encouraged to continue your usual daily activities.  Raise (elevating) the affected arm or leg above the level of your heart.  Medicines, such as:  Anti-inflammatory medicines, such as ibuprofen.  Blood thinners (anticoagulants) or anti-platelet drugs such as aspirin.  Wearing compression stockings to help prevent blood clots and reduce swelling in your legs.  Follow-up with your primary care provider.  Return to emergency department for signs of infection including fevers, increased redness or swelling.

## 2024-09-04 ENCOUNTER — OFFICE VISIT (OUTPATIENT)
Dept: FAMILY MEDICINE CLINIC | Facility: CLINIC | Age: 69
End: 2024-09-04
Payer: MEDICARE

## 2024-09-04 VITALS
OXYGEN SATURATION: 95 % | DIASTOLIC BLOOD PRESSURE: 60 MMHG | SYSTOLIC BLOOD PRESSURE: 104 MMHG | BODY MASS INDEX: 45.75 KG/M2 | WEIGHT: 233 LBS | HEART RATE: 102 BPM | TEMPERATURE: 98.7 F | HEIGHT: 60 IN

## 2024-09-04 DIAGNOSIS — I48.0 PAROXYSMAL ATRIAL FIBRILLATION: ICD-10-CM

## 2024-09-04 DIAGNOSIS — E03.9 ACQUIRED HYPOTHYROIDISM: ICD-10-CM

## 2024-09-04 DIAGNOSIS — E11.9 TYPE 2 DIABETES MELLITUS WITHOUT COMPLICATION, WITHOUT LONG-TERM CURRENT USE OF INSULIN: ICD-10-CM

## 2024-09-04 DIAGNOSIS — E66.01 CLASS 3 SEVERE OBESITY DUE TO EXCESS CALORIES WITH SERIOUS COMORBIDITY AND BODY MASS INDEX (BMI) OF 45.0 TO 49.9 IN ADULT: ICD-10-CM

## 2024-09-04 DIAGNOSIS — I10 ESSENTIAL HYPERTENSION: ICD-10-CM

## 2024-09-04 DIAGNOSIS — Z00.00 MEDICARE ANNUAL WELLNESS VISIT, SUBSEQUENT: Primary | ICD-10-CM

## 2024-09-04 DIAGNOSIS — N94.819 VULVODYNIA: ICD-10-CM

## 2024-09-04 DIAGNOSIS — E78.5 HYPERLIPIDEMIA LDL GOAL <70: ICD-10-CM

## 2024-09-04 DIAGNOSIS — Z78.0 POSTMENOPAUSAL: ICD-10-CM

## 2024-09-04 DIAGNOSIS — N95.2 ATROPHIC VAGINITIS: ICD-10-CM

## 2024-09-04 DIAGNOSIS — R06.02 SOB (SHORTNESS OF BREATH) ON EXERTION: ICD-10-CM

## 2024-09-04 DIAGNOSIS — Z12.31 ENCOUNTER FOR SCREENING MAMMOGRAM FOR MALIGNANT NEOPLASM OF BREAST: ICD-10-CM

## 2024-09-04 RX ORDER — ALBUTEROL SULFATE 90 UG/1
2 AEROSOL, METERED RESPIRATORY (INHALATION) EVERY 6 HOURS PRN
Qty: 18 G | Refills: 0 | Status: SHIPPED | OUTPATIENT
Start: 2024-09-04

## 2024-09-04 RX ORDER — HYDROCHLOROTHIAZIDE 12.5 MG/1
12.5 TABLET ORAL EVERY MORNING
Qty: 90 TABLET | Refills: 1 | Status: SHIPPED | OUTPATIENT
Start: 2024-09-04

## 2024-09-04 RX ORDER — ROSUVASTATIN CALCIUM 40 MG/1
40 TABLET, COATED ORAL NIGHTLY
Qty: 90 TABLET | Refills: 1 | Status: SHIPPED | OUTPATIENT
Start: 2024-09-04

## 2024-09-04 RX ORDER — ATENOLOL 100 MG/1
150 TABLET ORAL 2 TIMES DAILY
Qty: 180 CAPSULE | Refills: 1 | Status: SHIPPED | OUTPATIENT
Start: 2024-09-04

## 2024-09-04 RX ORDER — LEVOTHYROXINE SODIUM 88 UG/1
88 TABLET ORAL DAILY
Qty: 90 TABLET | Refills: 1 | Status: SHIPPED | OUTPATIENT
Start: 2024-09-04

## 2024-09-04 RX ORDER — TELMISARTAN 80 MG/1
80 TABLET ORAL NIGHTLY
Qty: 90 TABLET | Refills: 1 | Status: SHIPPED | OUTPATIENT
Start: 2024-09-04

## 2024-09-04 RX ORDER — CONJUGATED ESTROGENS 0.62 MG/G
CREAM VAGINAL
Qty: 30 G | Refills: 1 | Status: SHIPPED | OUTPATIENT
Start: 2024-09-04

## 2024-09-04 NOTE — PATIENT INSTRUCTIONS
Diabetes Mellitus and Nutrition, Adult  When you have diabetes, or diabetes mellitus, it is very important to have healthy eating habits because your blood sugar (glucose) levels are greatly affected by what you eat and drink. Eating healthy foods in the right amounts, at about the same times every day, can help you:  Manage your blood glucose.  Lower your risk of heart disease.  Improve your blood pressure.  Reach or maintain a healthy weight.  What can affect my meal plan?  Every person with diabetes is different, and each person has different needs for a meal plan. Your health care provider may recommend that you work with a dietitian to make a meal plan that is best for you. Your meal plan may vary depending on factors such as:  The calories you need.  The medicines you take.  Your weight.  Your blood glucose, blood pressure, and cholesterol levels.  Your activity level.  Other health conditions you have, such as heart or kidney disease.  How do carbohydrates affect me?  Carbohydrates, also called carbs, affect your blood glucose level more than any other type of food. Eating carbs raises the amount of glucose in your blood.  It is important to know how many carbs you can safely have in each meal. This is different for every person. Your dietitian can help you calculate how many carbs you should have at each meal and for each snack.  How does alcohol affect me?  Alcohol can cause a decrease in blood glucose (hypoglycemia), especially if you use insulin or take certain diabetes medicines by mouth. Hypoglycemia can be a life-threatening condition. Symptoms of hypoglycemia, such as sleepiness, dizziness, and confusion, are similar to symptoms of having too much alcohol.  Do not drink alcohol if:  Your health care provider tells you not to drink.  You are pregnant, may be pregnant, or are planning to become pregnant.  If you drink alcohol:  Limit how much you have to:  0-1 drink a day for women.  0-2 drinks a day  "for men.  Know how much alcohol is in your drink. In the [place] one drink equals one 12 oz bottle of beer (355 mL), one 5 oz glass of wine (148 mL), or one 1½ oz glass of hard liquor (44 mL).  Keep yourself hydrated with water, diet soda, or unsweetened iced tea. Keep in mind that regular soda, juice, and other mixers may contain a lot of sugar and must be counted as carbs.  What are tips for following this plan?    Reading food labels  Start by checking the serving size on the Nutrition Facts label of packaged foods and drinks. The number of calories and the amount of carbs, fats, and other nutrients listed on the label are based on one serving of the item. Many items contain more than one serving per package.  Check the total grams (g) of carbs in one serving.  Check the number of grams of saturated fats and trans fats in one serving. Choose foods that have a low amount or none of these fats.  Check the number of milligrams (mg) of salt (sodium) in one serving. Most people should limit total sodium intake to less than 2,300 mg per day.  Always check the nutrition information of foods labeled as \"low-fat\" or \"nonfat.\" These foods may be higher in added sugar or refined carbs and should be avoided.  Talk to your dietitian to identify your daily goals for nutrients listed on the label.  Shopping  Avoid buying canned, pre-made, or processed foods. These foods tend to be high in fat, sodium, and added sugar.  Shop around the outside edge of the grocery store. This is where you will most often find fresh fruits and vegetables, bulk grains, fresh meats, and fresh dairy products.  Cooking  Use low-heat cooking methods, such as baking, instead of high-heat cooking methods, such as deep frying.  Cook using healthy oils, such as olive, canola, or sunflower oil.  Avoid cooking with butter, cream, or high-fat meats.  Meal planning  Eat meals and snacks regularly, preferably at the same times every day. Avoid going long periods " of time without eating.  Eat foods that are high in fiber, such as fresh fruits, vegetables, beans, and whole grains.  Eat 4-6 oz (112-168 g) of lean protein each day, such as lean meat, chicken, fish, eggs, or tofu. One ounce (oz) (28 g) of lean protein is equal to:  1 oz (28 g) of meat, chicken, or fish.  1 egg.  ¼ cup (62 g) of tofu.  Eat some foods each day that contain healthy fats, such as avocado, nuts, seeds, and fish.  What foods should I eat?  Fruits  Berries. Apples. Oranges. Peaches. Apricots. Plums. Grapes. Mangoes. Papayas. Pomegranates. Kiwi. Cherries.  Vegetables  Leafy greens, including lettuce, spinach, kale, chard, anita greens, mustard greens, and cabbage. Beets. Cauliflower. Broccoli. Carrots. Green beans. Tomatoes. Peppers. Onions. Cucumbers. [place] sprouts.  Grains  Whole grains, such as whole-wheat or whole-grain bread, crackers, tortillas, cereal, and pasta. Unsweetened oatmeal. Quinoa. Brown or wild rice.  Meats and other proteins  Seafood. Poultry without skin. Lean cuts of poultry and beef. Tofu. Nuts. Seeds.  Dairy  Low-fat or fat-free dairy products such as milk, yogurt, and cheese.  The items listed above may not be a complete list of foods and beverages you can eat and drink. Contact a dietitian for more information.  What foods should I avoid?  Fruits  Fruits canned with syrup.  Vegetables  Canned vegetables. Frozen vegetables with butter or cream sauce.  Grains  Refined white flour and flour products such as bread, pasta, snack foods, and cereals. Avoid all processed foods.  Meats and other proteins  Fatty cuts of meat. Poultry with skin. Breaded or fried meats. Processed meat. Avoid saturated fats.  Dairy  Full-fat yogurt, cheese, or milk.  Beverages  Sweetened drinks, such as soda or iced tea.  The items listed above may not be a complete list of foods and beverages you should avoid. Contact a dietitian for more information.  Questions to ask a health care provider  Do I need  to meet with a certified diabetes care and ?  Do I need to meet with a dietitian?  What number can I call if I have questions?  When are the best times to check my blood glucose?  Where to find more information:  American Diabetes Association: diabetes.org  Academy of Nutrition and Dietetics: eatright.org  National Wiseman of Diabetes and Digestive and Kidney Diseases: niddk.nih.gov  Association of Diabetes Care & Education Specialists: diabeteseducator.org  Summary  It is important to have healthy eating habits because your blood sugar (glucose) levels are greatly affected by what you eat and drink. It is important to use alcohol carefully.  A healthy meal plan will help you manage your blood glucose and lower your risk of heart disease.  Your health care provider may recommend that you work with a dietitian to make a meal plan that is best for you.  This information is not intended to replace advice given to you by your health care provider. Make sure you discuss any questions you have with your health care provider.  Document Revised: 07/21/2021 Document Reviewed: 07/21/2021  [employer] Patient Education © 2024 [employer]  Advance Directive    Advance directives are legal documents that allow you to make decisions about your health care and medical treatment in case you become unable to communicate for yourself. Advance directives let your wishes be known to family, friends, and health care providers.  Discussing and writing advance directives should happen over time rather than all at once. Advance directives can be changed and updated at any time. There are different types of advance directives, such as:  Medical power of .  Living will.  Do not resuscitate (DNR) order or do not attempt resuscitation (DNAR) order.  Health care proxy and medical power of   A health care proxy is also called a health care agent. This person is appointed to make medical decisions for you when  you are unable to make decisions for yourself. Generally, people ask a trusted friend or family member to act as their proxy and represent their preferences. Make sure you have an agreement with your trusted person to act as your proxy. A proxy may have to make a medical decision on your behalf if your wishes are not known.  A medical power of , also called a durable power of  for health care, is a legal document that names your health care proxy. Depending on the laws in your state, the document may need to be:  Signed.  Notarized.  Dated.  Copied.  Witnessed.  Incorporated into your medical record.  You may also want to appoint a trusted person to manage your money in the event you are unable to do so. This is called a durable power of  for finances. It is a separate legal document from the durable power of  for health care. You may choose your health care proxy or someone different to act as your agent in money matters.  If you do not appoint a proxy, or there is a concern that the proxy is not acting in your best interest, a court may appoint a guardian to act on your behalf.  Living will  A living will is a set of instructions that state your wishes about medical care when you cannot express them yourself. Health care providers should keep a copy of your living will in your medical record. You may want to give a copy to family members or friends. To alert caregivers in case of an emergency, you can place a card in your wallet to let them know that you have a living will and where they can find it. A living will is used if you become:  Terminally ill.  Disabled.  Unable to communicate or make decisions.  The following decisions should be included in your living will:  To use or not to use life support equipment, such as dialysis machines and breathing machines (ventilators).  Whether you want a DNR or DNAR order. This tells health care providers not to use cardiopulmonary  resuscitation (CPR) if breathing or heartbeat stops.  To use or not to use tube feeding.  To be given or not to be given food and fluids.  Whether you want comfort (palliative) care when the goal becomes comfort rather than a cure.  Whether you want to donate your organs and tissues.  A living will does not give instructions for distributing your money and property if you should pass away.  DNR or DNAR  A DNR or DNAR order is a request not to have CPR in the event that your heart stops beating or you stop breathing. If a DNR or DNAR order has not been made and shared, a health care provider will try to help any patient whose heart has stopped or who has stopped breathing. If you plan to have surgery, talk with your health care provider about how your DNR or DNAR order will be followed if problems occur.  What if I do not have an advance directive?  Some states assign family decision makers to act on your behalf if you do not have an advance directive. Each state has its own laws about advance directives. You may want to check with your health care provider, , or state representative about the laws in your state.  Summary  Advance directives are legal documents that allow you to make decisions about your health care and medical treatment in case you become unable to communicate for yourself.  The process of discussing and writing advance directives should happen over time. You can change and update advance directives at any time.  Advance directives may include a medical power of , a living will, and a DNR or DNAR order.  This information is not intended to replace advice given to you by your health care provider. Make sure you discuss any questions you have with your health care provider.  Document Revised: 09/21/2021 Document Reviewed: 09/21/2021  Elsevier Patient Education © 2024 [employer] Inc.

## 2024-09-06 ENCOUNTER — OFFICE VISIT (OUTPATIENT)
Dept: PODIATRY | Facility: CLINIC | Age: 69
End: 2024-09-06
Payer: MEDICARE

## 2024-09-06 VITALS
DIASTOLIC BLOOD PRESSURE: 76 MMHG | SYSTOLIC BLOOD PRESSURE: 116 MMHG | OXYGEN SATURATION: 91 % | WEIGHT: 233 LBS | TEMPERATURE: 98.2 F | BODY MASS INDEX: 45.75 KG/M2 | HEART RATE: 105 BPM | HEIGHT: 60 IN

## 2024-09-06 DIAGNOSIS — L60.0 ONYCHOCRYPTOSIS: ICD-10-CM

## 2024-09-06 DIAGNOSIS — M79.671 FOOT PAIN, BILATERAL: ICD-10-CM

## 2024-09-06 DIAGNOSIS — E11.8 DIABETIC FOOT: ICD-10-CM

## 2024-09-06 DIAGNOSIS — E11.9 NON-INSULIN DEPENDENT TYPE 2 DIABETES MELLITUS: ICD-10-CM

## 2024-09-06 DIAGNOSIS — M79.672 FOOT PAIN, BILATERAL: ICD-10-CM

## 2024-09-06 DIAGNOSIS — B35.1 ONYCHOMYCOSIS: Primary | ICD-10-CM

## 2024-09-06 NOTE — PROGRESS NOTES
[facility] - PODIATRY    Today's Date: 09/06/24    Patient Name: Gifty Hoover  MRN: [id number]  CSN: [id number]  PCP: Paris Ace APRN, Last PCP Visit: 9/5/2024  Referring Provider: No ref. provider found    SUBJECTIVE     Chief Complaint   Patient presents with    Left Foot - Follow-up, Nail Problem    Right Foot - Follow-up, Nail Problem       HPI: Gifty Hoover, a 68 y.o.female, presents to clinic for painful toenail and a diabetic foot evaluation.    New, Established, New Problem:  est  Location:  Toenails  Duration:   Greater than five years  Onset:  Gradual  Nature:  sore with palpation.  Stable, worsening, improving:  stable  Aggravating factors:  Pain with shoe gear and ambulation.  Previous Treatment:  Debridement    Patient controlling diabetes via: Oral medication    Patient states their last blood glucose was: 97    Patient denies any fevers, chills, nausea, vomiting, shortness of breath, nor any other constitutional signs nor symptoms.    Medical changes:  no changes    I have reviewed/confirmed previously documented HPI with no changes.     Past Medical History:   Diagnosis Date    Anemia 2022    NO CURRENT S/S    Anxiety     Arthritis     Asthma 2020    Shortness of breath after walking long periods of time.    CAD 09/14/2021    [facility] SEES PT    Cholelithiasis 2022    Chronic kidney disease 2022    NO DIALYSIS    Colon polyp 2008    BENIGN    Deep vein thrombosis 2011    Green filter in place.    Depression     Diabetes mellitus     Elevated cholesterol     Essential hypertension 09/14/2021    GERD (gastroesophageal reflux disease)     Heart attack 2009    NO STENTS/NO BYPASS    History of pulmonary embolism     2011, TAKE PRADAXA    Hypothyroid     Ingrown toenail 2022    Lumbar radiculopathy 09/2018    describes sensory S1 radiculopathy but would favor right L5    Paroxysmal atrial fibrillation 09/14/2021    [facility]    Seasonal allergies     Sleep apnea     BIPAP?     Past  Surgical History:   Procedure Laterality Date    BLADDER SURGERY  2011    BREAST BIOPSY  2002    Upper right breast BENIGN    BREAST SURGERY      CARDIAC CATHETERIZATION      NO INTERVENTION    CARDIAC SURGERY      R/T BLOOD CLOTS, NO CARDIAC INTERVENTION     SECTION      CHOLECYSTECTOMY N/A 2022    Procedure: laparoscopic cholecystectomy, possible open;  Surgeon: Krystal Dacosta MD;  Location: [facility] OR [facility] [place];  Service: General;  Laterality: N/A;    COLONOSCOPY      COLONOSCOPY N/A 2022    Procedure: COLONOSCOPY;  Surgeon: Ta Vasquez MD;  Location: [facility] ENDOSCOPY;  Service: Gastroenterology;  Laterality: N/A;  DIVERTICULOSIS    ENDOSCOPY N/A 2022    Procedure: ESOPHAGOGASTRODUODENOSCOPY WITH BX;  Surgeon: Ta Vasquez MD;  Location: [facility] ENDOSCOPY;  Service: Gastroenterology;  Laterality: N/A;  SMALL HIATAL HERNIA    ERCP N/A 2022    Procedure: ENDOSCOPIC RETROGRADE CHOLANGIOPANCREATOGRAPHY WITH SPHINCTEROTOMY, STONE REMOVAL, STENT PLACEMENT;  Surgeon: Arden Garcia MD;  Location: [facility] ENDOSCOPY;  Service: Gastroenterology;  Laterality: N/A;  BILE DUCT STONES    ERCP N/A 2023    Procedure: ENDOSCOPIC RETROGRADE CHOLANGIOPANCREATOGRAPHY with stent removal, balloon dilation, stone removal;  Surgeon: Arden Garcia MD;  Location: [facility] ENDOSCOPY;  Service: Gastroenterology;  Laterality: N/A;  bile duct stones    NICHOLAS FILTER INSERTION JUGULAR      HYSTERECTOMY       Family History   Problem Relation Age of Onset    Stroke Mother     Cancer Mother         Colon    Colon cancer Mother 71        still living at 73    Diabetes Mother     Arthritis Mother     Colon polyps Mother     Hypertension Mother     Kidney disease Mother     Miscarriages / Stillbirths Mother     Stroke Father     Arthritis Father         Padgets Disease    Osteoporosis Father     Inflammatory bowel disease Father     Cancer Sister         Eye     Thyroid disease Sister     Diabetes Brother     Stroke Brother     Bleeding Disorder Daughter     Diabetes Brother     Hypertension Brother     Malig Hyperthermia Neg Hx      Social History     Socioeconomic History    Marital status: Single   Tobacco Use    Smoking status: Never    Smokeless tobacco: Never   Vaping Use    Vaping status: Never Used   Substance and Sexual Activity    Alcohol use: Never    Drug use: Never    Sexual activity: Not Currently     Partners: Male     Birth control/protection: Hysterectomy, Surgical     No Known Allergies  Current Outpatient Medications   Medication Sig Dispense Refill    albuterol sulfate HFA (ProAir HFA) 108 (90 Base) MCG/ACT inhaler Inhale 2 puffs Every 6 (Six) Hours As Needed for Wheezing. 18 g 0    Ascorbic Acid (VITAMIN C PO) Take  by mouth.      aspirin 81 MG EC tablet Take 1 tablet by mouth Daily.      Calcium Carb-Cholecalciferol (CALCIUM/VITAMIN D PO) Take  by mouth.      Estrogens Conjugated (Premarin) 0.625 MG/GM vaginal cream Apply daily for 2 weeks then use 2 times weekly thereafter. 30 g 1    ezetimibe (ZETIA) 10 MG tablet Take 1 tablet by mouth Daily. 90 tablet 3    famotidine (PEPCID) 40 MG tablet TAKE ONE TABLET BY MOUTH EVERY NIGHT AT BEDTIME 90 tablet 1    hydroCHLOROthiazide 12.5 MG tablet Take 1 tablet by mouth Every Morning. 90 tablet 1    IRON, FERROUS SULFATE, PO Take  by mouth.      levothyroxine (SYNTHROID, LEVOTHROID) 88 MCG tablet Take 1 tablet by mouth Daily. 90 tablet 1    metFORMIN (GLUCOPHAGE) 500 MG tablet Take 1 tablet by mouth 3 times a day. 270 tablet 1    omeprazole (priLOSEC) 40 MG capsule Take 1 capsule by mouth Daily. 90 capsule 1    potassium chloride 10 MEQ CR tablet Take 1 tablet by mouth Daily As Needed (take with Lasix). 30 tablet 3    Pradaxa 150 MG capsu Take 1 capsule by mouth 2 (Two) Times a Day. 180 capsule 1    rosuvastatin (CRESTOR) 40 MG tablet Take 1 tablet by mouth Every Night. 90 tablet 1    telmisartan (MICARDIS)  80 MG tablet Take 1 tablet by mouth Every Night. 90 tablet 1    furosemide (LASIX) 20 MG tablet Take 1 tablet by mouth Daily As Needed (for leg swelling). (Patient not taking: Reported on 2024) 30 tablet 3     No current facility-administered medications for this visit.     Review of Systems   Constitutional: Negative.    Skin:         Painful toenails.   All other systems reviewed and are negative.      OBJECTIVE     Vitals:    24 1009   BP: 116/76   Pulse: 105   Temp: 98.2 °F (36.8 °C)   SpO2: 91%       Body mass index is 45.5 kg/m².    Lab Results   Component Value Date    HGBA1C 6.60 (H) 2024       Lab Results   Component Value Date    GLUCOSE 170 (H) 2024    CALCIUM 9.6 2024     2024    K 4.0 2024    CO2 24.6 2024     2024    BUN 21 2024    CREATININE 1.15 (H) 2024    EGFRIFNONA 33 (L) 2021    BCR 18.3 2024    ANIONGAP 13.4 2024       Patient seen in no apparent distress.      PHYSICAL EXAM:     Foot/Ankle Exam    GENERAL  Appearance:  obese and elderly  Orientation:  AAOx3  Affect:  appropriate  Gait:  unimpaired  Assistance:  independent  Right shoe gear: casual shoe  Left shoe gear: casual shoe    VASCULAR     Right Foot Vascularity   Dorsalis pedis:  1+  Posterior tibial:  1+  Skin temperature:  warm  Edema gradin+ and pitting  CFT:  < 3 seconds  Pedal hair growth:  Absent  Varicosities:  moderate varicosities     Left Foot Vascularity   Dorsalis pedis:  1+  Posterior tibial:  1+  Skin temperature:  warm  Edema gradin+ and pitting  CFT:  < 3 seconds  Pedal hair growth:  Absent  Varicosities:  moderate varicosities     NEUROLOGIC     Right Foot Neurologic   Normal sensation    Light touch sensation: normal  Vibratory sensation: normal  Hot/Cold sensation: normal  Protective Sensation using [place]-Maximo Monofilament:   Sites intact: 10  Sites tested: 10     Left Foot Neurologic   Normal sensation     Light touch sensation: normal  Vibratory sensation: normal  Hot/Cold sensation:  normal  Protective Sensation using [place]-Maximo Monofilament:   Sites intact: 10  Sites tested: 10    MUSCLE STRENGTH     Right Foot Muscle Strength   Foot dorsiflexion:  4  Foot plantar flexion:  4  Foot inversion:  4  Foot eversion:  4     Left Foot Muscle Strength   Foot dorsiflexion:  4  Foot plantar flexion:  4  Foot inversion:  4  Foot eversion:  4    RANGE OF MOTION     Right Foot Range of Motion   Foot and ankle ROM within normal limits       Left Foot Range of Motion   Foot and ankle ROM within normal limits      DERMATOLOGIC      Right Foot Dermatologic   Skin  Right foot skin is intact.   Nails  1.  Positive for elongated, onychomycosis, abnormal thickness, subungual debris and ingrown toenail.  2.  Positive for elongated, onychomycosis, abnormal thickness, subungual debris and ingrown toenail.  3.  Positive for elongated, onychomycosis, abnormal thickness, subungual debris and ingrown toenail.  4.  Positive for elongated, onychomycosis, abnormal thickness, subungual debris and ingrown toenail.  5.  Positive for elongated, onychomycosis, abnormal thickness, subungual debris and ingrown toenail.     Left Foot Dermatologic   Skin  Left foot skin is intact.   Nails  1.  Positive for elongated, onychomycosis, abnormal thickness, subungual debris and ingrown toenail.  3.  Positive for elongated, onychomycosis, abnormal thickness, subungual debris and ingrown toenail.  4.  Positive for elongated, onychomycosis, abnormally thick, subungual debris and ingrown toenail.  5.  Positive for elongated, onychomycosis, abnormally thick, subungual debris and ingrown toenail.    I have reexamined the patient the results are consistent with the previously documented exam.    ASSESSMENT/PLAN     Diagnoses and all orders for this visit:    1. Onychomycosis (Primary)    2. Foot pain, bilateral    3. Non-insulin dependent type 2 diabetes  mellitus    4. Diabetic foot    5. Onychocryptosis    Comprehensive lower extremity examination and evaluation was performed.    Discussed findings and treatment plan including risks, benefits, and treatment options with patient in detail. Patient agreed with treatment plan.    Medications and allergies reviewed.  Reviewed available blood glucose and HgB A1C lab values along with other pertinent labs.  These were discussed with the patient as to their importance of diabetic maintenance.    Toenails 1, 2, 3, 4, 5 on Right and 1, 3, 4, 5 on Left were debrided with nail nippers then filed with a Dremel nail yves.  Patient tolerated procedure well without complications.    An After Visit Summary was printed and given to the patient at discharge, including (if requested) any available informative/educational handouts regarding diagnosis, treatment, or medications. All questions were answered to patient/family satisfaction. Should symptoms fail to improve or worsen they agree to call or return to clinic or to go to the Emergency Department. Discussed the importance of following up with any needed screening tests/labs/specialist appointments and any requested follow-up recommended by me today. Importance of maintaining follow-up discussed and patient accepts that missed appointments can delay diagnosis and potentially lead to worsening of conditions.    Return in about 9 weeks (around 11/8/2024) for Toenail Care., or sooner if acute issues arise.    I have reviewed the assessment and plan and verified the accuracy of it. No changes to assessment and plan since the information was documented. Isiah Cuevas DPM 09/06/24     I have dictated this note utilizing Dragon Dictation.  Please note that portions of this note were completed with a voice recognition program.  Part of this note may be an electronic transcription/translation of spoken language to printed text using the Dragon Dictation System.      This document  has been electronically signed by Isiah Cuevas DPM on September 6, 2024 10:19 EDT

## 2024-09-19 ENCOUNTER — LAB REQUISITION (OUTPATIENT)
Dept: LAB | Facility: HOSPITAL | Age: 69
End: 2024-09-19
Payer: MEDICARE

## 2024-09-19 DIAGNOSIS — N89.8 OTHER SPECIFIED NONINFLAMMATORY DISORDERS OF VAGINA: ICD-10-CM

## 2024-09-19 LAB
CANDIDA SPECIES: NEGATIVE
GARDNERELLA VAGINALIS: POSITIVE
T VAGINALIS DNA VAG QL PROBE+SIG AMP: NEGATIVE

## 2024-09-19 PROCEDURE — 87480 CANDIDA DNA DIR PROBE: CPT | Performed by: OBSTETRICS & GYNECOLOGY

## 2024-09-19 PROCEDURE — 87510 GARDNER VAG DNA DIR PROBE: CPT | Performed by: OBSTETRICS & GYNECOLOGY

## 2024-09-19 PROCEDURE — 87660 TRICHOMONAS VAGIN DIR PROBE: CPT | Performed by: OBSTETRICS & GYNECOLOGY

## 2024-09-20 RX ORDER — FAMOTIDINE 40 MG/1
40 TABLET, FILM COATED ORAL
Qty: 90 TABLET | Refills: 1 | OUTPATIENT
Start: 2024-09-20

## 2024-09-20 RX ORDER — EZETIMIBE 10 MG/1
10 TABLET ORAL DAILY
Qty: 90 TABLET | Refills: 3 | Status: SHIPPED | OUTPATIENT
Start: 2024-09-20

## 2024-10-10 ENCOUNTER — HOSPITAL ENCOUNTER (OUTPATIENT)
Dept: BONE DENSITY | Facility: HOSPITAL | Age: 69
Discharge: HOME | End: 2024-10-10
Admitting: NURSE PRACTITIONER
Payer: MEDICARE

## 2024-10-10 DIAGNOSIS — Z78.0 POSTMENOPAUSAL: ICD-10-CM

## 2024-10-10 PROCEDURE — 77080 DXA BONE DENSITY AXIAL: CPT

## 2024-10-16 ENCOUNTER — LAB (OUTPATIENT)
Dept: ONCOLOGY | Facility: HOSPITAL | Age: 69
End: 2024-10-16
Payer: MEDICARE

## 2024-10-16 DIAGNOSIS — D50.8 OTHER IRON DEFICIENCY ANEMIA: ICD-10-CM

## 2024-10-16 LAB
BASOPHILS # BLD AUTO: 0.02 10*3/MM3 (ref 0–0.2)
BASOPHILS NFR BLD AUTO: 0.4 % (ref 0–1.5)
DEPRECATED RDW RBC AUTO: 49.8 FL (ref 37–54)
EOSINOPHIL # BLD AUTO: 0.14 10*3/MM3 (ref 0–0.4)
EOSINOPHIL NFR BLD AUTO: 2.9 % (ref 0.3–6.2)
ERYTHROCYTE [DISTWIDTH] IN BLOOD BY AUTOMATED COUNT: 13.6 % (ref 12.3–15.4)
FERRITIN SERPL-MCNC: 509.2 NG/ML (ref 13–150)
HCT VFR BLD AUTO: 38.8 % (ref 34–46.6)
HGB BLD-MCNC: 12.5 G/DL (ref 12–15.9)
IMM GRANULOCYTES # BLD AUTO: 0.01 10*3/MM3 (ref 0–0.05)
IMM GRANULOCYTES NFR BLD AUTO: 0.2 % (ref 0–0.5)
IRON 24H UR-MRATE: 77 MCG/DL (ref 37–145)
IRON SATN MFR SERPL: 24 % (ref 20–50)
LYMPHOCYTES # BLD AUTO: 0.75 10*3/MM3 (ref 0.7–3.1)
LYMPHOCYTES NFR BLD AUTO: 15.7 % (ref 19.6–45.3)
MCH RBC QN AUTO: 31.6 PG (ref 26.6–33)
MCHC RBC AUTO-ENTMCNC: 32.2 G/DL (ref 31.5–35.7)
MCV RBC AUTO: 98.2 FL (ref 79–97)
MONOCYTES # BLD AUTO: 0.45 10*3/MM3 (ref 0.1–0.9)
MONOCYTES NFR BLD AUTO: 9.4 % (ref 5–12)
NEUTROPHILS NFR BLD AUTO: 3.4 10*3/MM3 (ref 1.7–7)
NEUTROPHILS NFR BLD AUTO: 71.4 % (ref 42.7–76)
PLATELET # BLD AUTO: 187 10*3/MM3 (ref 140–450)
PMV BLD AUTO: 10.7 FL (ref 6–12)
RBC # BLD AUTO: 3.95 10*6/MM3 (ref 3.77–5.28)
TIBC SERPL-MCNC: 314 MCG/DL (ref 298–536)
TRANSFERRIN SERPL-MCNC: 211 MG/DL (ref 200–360)
WBC NRBC COR # BLD AUTO: 4.77 10*3/MM3 (ref 3.4–10.8)

## 2024-10-16 PROCEDURE — 83540 ASSAY OF IRON: CPT

## 2024-10-16 PROCEDURE — 85025 COMPLETE CBC W/AUTO DIFF WBC: CPT

## 2024-10-16 PROCEDURE — 82728 ASSAY OF FERRITIN: CPT

## 2024-10-16 PROCEDURE — 84466 ASSAY OF TRANSFERRIN: CPT

## 2024-10-16 PROCEDURE — 36415 COLL VENOUS BLD VENIPUNCTURE: CPT

## 2024-10-17 ENCOUNTER — OFFICE VISIT (OUTPATIENT)
Dept: ONCOLOGY | Facility: HOSPITAL | Age: 69
End: 2024-10-17
Payer: MEDICARE

## 2024-10-17 VITALS
HEART RATE: 96 BPM | BODY MASS INDEX: 46.22 KG/M2 | HEIGHT: 60 IN | TEMPERATURE: 97.8 F | SYSTOLIC BLOOD PRESSURE: 126 MMHG | OXYGEN SATURATION: 97 % | RESPIRATION RATE: 20 BRPM | DIASTOLIC BLOOD PRESSURE: 71 MMHG | WEIGHT: 235.4 LBS

## 2024-10-17 DIAGNOSIS — D50.8 OTHER IRON DEFICIENCY ANEMIA: Primary | ICD-10-CM

## 2024-10-17 DIAGNOSIS — I10 PRIMARY HYPERTENSION: ICD-10-CM

## 2024-10-17 PROCEDURE — G0463 HOSPITAL OUTPT CLINIC VISIT: HCPCS | Performed by: INTERNAL MEDICINE

## 2024-10-17 NOTE — PROGRESS NOTES
Chief Complaint  Low blood counts    Malka, Paris, TIEN Ace, Paris, APRN      Subjective          Gifty Hoover presents to [facility] for anemia    History of Present Illness   Ms. Gifty Hoover is a 66 yo F presenting for follow up for anemia. She has a history of CAD, HTN, hepatic steatosis, GERD, obesity, HLD.  Patient received IV iron back in July.  Patient reports feeling better after this.  She reports some of the energy has decreased but is still better than prior to the infusions.  She did had some leg pain back in August but this is resolved.  She denies any bleeding.  Denies any melena.  Denies any acute complaints.  Iron levels better.  Anemia resolved.  Results discussed with patient.      Hematology History     7/7/22 CBC with Hgb 9.9, WBC 4.28, plt 213, MCV 92.3. Positive occult blood. Also with transaminitis on labs from 6/30/22. Hemoglobin is in the 9.9 range. She has CKD as well with cr of 1.60 and GFR of 35. Iron studies with normal iron level, but iron sat was 15%, ferritin was elevated at 241. No prior labs to compare to on the ferritin. Previous colonoscopy with diverticulosis and grade I hemmorrhoids    8/3 CBC with improved hgb to 10.1, MCV 92.3, plt 241, WBC of 3.27. Abdominal ultrasound showed hepatic steatosis. SPEP without M-spike.     9/9/22 Iron studies: Ferritin down to 88.9, T sat of 12%, TIBC 429.     11/23-26/22: [facility] admission due to choledocholithiasis and biliary colic requiring ERCP, balloon extraction of 2 stones and placement of stent. Hgb sarabjit of 7.6 during this stay. Iron sat of 4%, iron low at 16    11/29/22: Hgb 9.9.    4/5/23: Hgb 10.5. Iron 38, ferritin 33, iron sat 8%, TIBC 460.    6/5/23: Hgb 11.2, Iron sat 11%, ferritin 40.     10/4/23: Hgb 12.0 g/dL, iron 55, ferritin 38, iron sat 11%, TIBC 498    2/7/24: Hgb 12.4 g/dL, MCV 96.4, Ferritin 54.9, iron sat 11%. Continue oral iron every other day with vitamin C.      7/15/24: Hgb 11.6, ferritin 61, iron sat 11%    7/29/24: IV Injectafer x 2 completed.     10/16/24: Hgb 12.5, MCV 98, plt 187, WBC 4.77, Ferritin 509, iron sat 24%      Oncology/Hematology History    No history exists.       Review of Systems   Constitutional:  Negative for appetite change, diaphoresis, fatigue, fever, unexpected weight gain and unexpected weight loss.   HENT: Negative.  Negative for hearing loss, sore throat and voice change.    Eyes: Negative.  Negative for blurred vision, double vision, pain, redness and visual disturbance.   Respiratory:  Negative for cough, shortness of breath and wheezing.    Cardiovascular: Negative.  Negative for chest pain, palpitations and leg swelling.   Gastrointestinal: Negative.    Endocrine: Negative.  Negative for cold intolerance, heat intolerance, polydipsia and polyuria.   Genitourinary:  Negative for decreased urine volume, difficulty urinating, frequency and urinary incontinence.   Musculoskeletal: Negative.  Negative for arthralgias, back pain, joint swelling and myalgias.   Skin:  Negative for color change, rash, skin lesions and wound.   Allergic/Immunologic: Negative.    Neurological: Negative.  Negative for dizziness, seizures, numbness and headache.   Hematological: Negative.  Negative for adenopathy. Does not bruise/bleed easily.   Psychiatric/Behavioral: Negative.  Negative for depressed mood. The patient is not nervous/anxious.    All other systems reviewed and are negative.      Current Outpatient Medications on File Prior to Visit   Medication Sig Dispense Refill    albuterol sulfate HFA (ProAir HFA) 108 (90 Base) MCG/ACT inhaler Inhale 2 puffs Every 6 (Six) Hours As Needed for Wheezing. 18 g 0    Ascorbic Acid (VITAMIN C PO) Take  by mouth.      aspirin 81 MG EC tablet Take 1 tablet by mouth Daily.      Calcium Carb-Cholecalciferol (CALCIUM/VITAMIN D PO) Take  by mouth.      Estrogens Conjugated (Premarin) 0.625 MG/GM vaginal cream Apply daily  for 2 weeks then use 2 times weekly thereafter. 30 g 1    ezetimibe (ZETIA) 10 MG tablet TAKE ONE TABLET BY MOUTH EVERY DAY 90 tablet 3    famotidine (PEPCID) 40 MG tablet TAKE ONE TABLET BY MOUTH EVERY NIGHT AT BEDTIME 90 tablet 1    furosemide (LASIX) 20 MG tablet Take 1 tablet by mouth Daily As Needed (for leg swelling). 30 tablet 3    hydroCHLOROthiazide 12.5 MG tablet Take 1 tablet by mouth Every Morning. 90 tablet 1    IRON, FERROUS SULFATE, PO Take  by mouth.      levothyroxine (SYNTHROID, LEVOTHROID) 88 MCG tablet Take 1 tablet by mouth Daily. 90 tablet 1    metFORMIN (GLUCOPHAGE) 500 MG tablet Take 1 tablet by mouth 3 times a day. 270 tablet 1    omeprazole (priLOSEC) 40 MG capsule Take 1 capsule by mouth Daily. 90 capsule 1    potassium chloride 10 MEQ CR tablet Take 1 tablet by mouth Daily As Needed (take with Lasix). 30 tablet 3    Pradaxa 150 MG capsu Take 1 capsule by mouth 2 (Two) Times a Day. 180 capsule 1    rosuvastatin (CRESTOR) 40 MG tablet Take 1 tablet by mouth Every Night. 90 tablet 1    telmisartan (MICARDIS) 80 MG tablet Take 1 tablet by mouth Every Night. 90 tablet 1     No current facility-administered medications on file prior to visit.       No Known Allergies  Past Medical History:   Diagnosis Date    Anemia 2022    NO CURRENT S/S    Anxiety     Arthritis     Asthma 2020    Shortness of breath after walking long periods of time.    CAD 09/14/2021    AGUAYO SEES PT    Cholelithiasis 2022    Chronic kidney disease 2022    NO DIALYSIS    Colon polyp 2008    BENIGN    Deep vein thrombosis 2011    Green filter in place.    Depression     Diabetes mellitus     Elevated cholesterol     Essential hypertension 09/14/2021    GERD (gastroesophageal reflux disease)     Heart attack 2009    NO STENTS/NO BYPASS    History of pulmonary embolism     2011, TAKE PRADAXA    Hypothyroid     Ingrown toenail 2022    Lumbar radiculopathy 09/2018    describes sensory S1 radiculopathy but would favor right L5     Paroxysmal atrial fibrillation 2021    AGUAYO    Seasonal allergies     Sleep apnea     BIPAP?     Past Surgical History:   Procedure Laterality Date    BLADDER SURGERY  2011    BREAST BIOPSY      Upper right breast BENIGN    BREAST SURGERY      CARDIAC CATHETERIZATION      NO INTERVENTION    CARDIAC SURGERY      R/T BLOOD CLOTS, NO CARDIAC INTERVENTION     SECTION  1982    CHOLECYSTECTOMY N/A 2022    Procedure: laparoscopic cholecystectomy, possible open;  Surgeon: Krystal Dacosta MD;  [place] [facility] [place] [facility] [place]  Service: General;  Laterality: N/A;    COLONOSCOPY      COLONOSCOPY N/A 2022    Procedure: COLONOSCOPY;  Surgeon: Ta Vasquez MD;  Location: [facility] ENDOSCOPY;  Service: Gastroenterology;  Laterality: N/A;  DIVERTICULOSIS    ENDOSCOPY N/A 2022    Procedure: ESOPHAGOGASTRODUODENOSCOPY WITH BX;  Surgeon: Ta Vasquez MD;  Location: [facility] ENDOSCOPY;  Service: Gastroenterology;  Laterality: N/A;  SMALL HIATAL HERNIA    ERCP N/A 2022    Procedure: ENDOSCOPIC RETROGRADE CHOLANGIOPANCREATOGRAPHY WITH SPHINCTEROTOMY, STONE REMOVAL, STENT PLACEMENT;  Surgeon: Arden Garcia MD;  Location: [facility] ENDOSCOPY;  Service: Gastroenterology;  Laterality: N/A;  BILE DUCT STONES    ERCP N/A 2023    Procedure: ENDOSCOPIC RETROGRADE CHOLANGIOPANCREATOGRAPHY with stent removal, balloon dilation, stone removal;  Surgeon: Arden Garcia MD;  Location: [facility] ENDOSCOPY;  Service: Gastroenterology;  Laterality: N/A;  bile duct stones    NICHOLAS FILTER INSERTION 2011    HYSTERECTOMY       Social History     Socioeconomic History    Marital status: Single   Tobacco Use    Smoking status: Never    Smokeless tobacco: Never   Vaping Use    Vaping status: Never Used   Substance and Sexual Activity    Alcohol use: Never    Drug use: Never    Sexual activity: Not Currently     Partners: Male     Birth control/protection: Hysterectomy,  "Surgical     Family History   Problem Relation Age of Onset    Stroke Mother     Cancer Mother         Colon    Colon cancer Mother 71        still living at 73    Diabetes Mother     Arthritis Mother     Colon polyps Mother     Hypertension Mother     Kidney disease Mother     Miscarriages / Stillbirths Mother     Stroke Father     Arthritis Father         Padgets Disease    Osteoporosis Father     Inflammatory bowel disease Father     Cancer Sister         Eye    Thyroid disease Sister     Diabetes Brother     Stroke Brother     Bleeding Disorder Daughter     Diabetes Brother     Hypertension Brother     Malig Hyperthermia Neg Hx      Immunization History   Administered Date(s) Administered    COVID-19 (PFIZER) Purple Cap Monovalent 04/03/2021, 04/26/2021    Flu Vaccine Split Quad 11/06/2019    FluMist 2-49yrs 12/07/2022    Fluad Quad 65+ 10/01/2021    Fluzone  >6mos 11/06/2019    Fluzone (or Fluarix & Flulaval for VFC) >6mos 11/06/2019    Fluzone High-Dose 65+yrs 12/07/2022, 03/04/2024    Influenza, Unspecified 10/01/2021    Pneumococcal Conjugate 13-Valent (PCV13) 06/30/2022    Pneumococcal Conjugate 20-Valent (PCV20) 06/30/2022    Pneumococcal Polysaccharide (PPSV23) 10/13/2017       Objective   Physical Exam  Well appearing patient in no acute distress on RA  Anicteric sclerae, no rash on exposed skin  Respirations non-labored  Awake, alert, and oriented x 4. Speech intact. No gross neurologic deficit  Appropriate mood and affect    Vitals:    10/17/24 1505   BP: 126/71   Pulse: 96   Resp: 20   Temp: 97.8 °F (36.6 °C)   TempSrc: Temporal   SpO2: 97%   Weight: 107 kg (235 lb 6.4 oz)   Height: 152.4 cm (60\")   PainSc: 0-No pain       ECOG score: 0         ECOG: (1) Restricted in Physically Strenuous Activity, Ambulatory & Able to Do Work of Light Nature  Fall Risk Assessment was completed, and patient is at low risk for falls.  PHQ-9 Total Score:         The patient is  experiencing fatigue. Fatigue score: " 7    PT/OT Functional Screening: PT fx screen: No needs identified  Speech Functional Screening: Speech fx screen: No needs identified  Rehab to be ordered: Rehab to be ordered: No needs identified        Result Review :   The following data was reviewed by:Toño Carroll MD on 10/17/24:  Lab Results   Component Value Date    HGB 12.5 10/16/2024    HCT 38.8 10/16/2024    MCV 98.2 (H) 10/16/2024     10/16/2024    WBC 4.77 10/16/2024    NEUTROABS 3.40 10/16/2024    LYMPHSABS 0.75 10/16/2024    MONOSABS 0.45 10/16/2024    EOSABS 0.14 10/16/2024    BASOSABS 0.02 10/16/2024     Lab Results   Component Value Date    GLUCOSE 170 (H) 08/22/2024    BUN 21 08/22/2024    CREATININE 1.15 (H) 08/22/2024     08/22/2024    K 4.0 08/22/2024     08/22/2024    CO2 24.6 08/22/2024    CALCIUM 9.6 08/22/2024    PROTEINTOT 7.3 08/22/2024    ALBUMIN 4.0 08/22/2024    BILITOT 0.5 08/22/2024    ALKPHOS 83 08/22/2024    AST 25 08/22/2024    ALT 29 08/22/2024     Iron labs reviewed and revealed slight anemia, stable iron sat, improved ferritin    ED note personally reviewed    PCP note 9/4/24 personally reviewed         Assessment and Plan    Diagnoses and all orders for this visit:    1. Other iron deficiency anemia (Primary)  -     CBC & Differential; Future  -     Ferritin; Future  -     Iron Profile; Future    2. Primary hypertension        Patient with blood loss from hospital stay due to choledocholithiasis in November of 2022. Patient with EGD and C-scope 11/2022 with no source of bleeding, however she does have diverticulosis.  Hgb dropped to 11.6 as of 7/15/24. Ferritin only slightly better and iron sat stable, but iron sat low at 11%.  As labs only minimally better but anemia worse and patient symptomatic, IV iron provided with IV injectafer, last dose 7/29/24. Iron levels responded. Anemia resolved. Hold oral iron. Repeat labs 3 months.      HTN  Controlled in office. Follow up outpatient.     I spent 20  minutes caring for Gifty on this date of service. This time includes time spent by me in the following activities:preparing for the visit, reviewing tests, obtaining and/or reviewing a separately obtained history, performing a medically appropriate examination and/or evaluation , counseling and educating the patient/family/caregiver, ordering medications, tests, or procedures, referring and communicating with other health care professionals , documenting information in the medical record and independently interpreting results and communicating that information with the patient/family/caregiver    Patient Follow Up: 3 months  Patient was given instructions and counseling regarding her condition or for health maintenance advice. Please see specific information pulled into the AVS if appropriate.

## 2024-11-05 ENCOUNTER — OFFICE VISIT (OUTPATIENT)
Dept: FAMILY MEDICINE CLINIC | Facility: CLINIC | Age: 69
End: 2024-11-05
Payer: MEDICARE

## 2024-11-05 ENCOUNTER — HOSPITAL ENCOUNTER (OUTPATIENT)
Dept: GENERAL RADIOLOGY | Facility: HOSPITAL | Age: 69
Discharge: HOME | End: 2024-11-05
Admitting: STUDENT IN AN ORGANIZED HEALTH CARE EDUCATION/TRAINING PROGRAM
Payer: MEDICARE

## 2024-11-05 VITALS
SYSTOLIC BLOOD PRESSURE: 112 MMHG | DIASTOLIC BLOOD PRESSURE: 47 MMHG | WEIGHT: 235 LBS | HEIGHT: 60 IN | TEMPERATURE: 98.6 F | BODY MASS INDEX: 46.13 KG/M2

## 2024-11-05 DIAGNOSIS — R06.02 SOB (SHORTNESS OF BREATH): ICD-10-CM

## 2024-11-05 DIAGNOSIS — J02.9 SORE THROAT: ICD-10-CM

## 2024-11-05 DIAGNOSIS — R05.9 COUGH, UNSPECIFIED TYPE: ICD-10-CM

## 2024-11-05 DIAGNOSIS — R05.9 COUGH, UNSPECIFIED TYPE: Primary | ICD-10-CM

## 2024-11-05 DIAGNOSIS — J32.1 FRONTAL SINUSITIS, UNSPECIFIED CHRONICITY: ICD-10-CM

## 2024-11-05 LAB
EXPIRATION DATE: NORMAL
EXPIRATION DATE: NORMAL
FLUAV AG UPPER RESP QL IA.RAPID: NOT DETECTED
FLUBV AG UPPER RESP QL IA.RAPID: NOT DETECTED
INTERNAL CONTROL: NORMAL
INTERNAL CONTROL: NORMAL
Lab: NORMAL
Lab: NORMAL
S PYO AG THROAT QL: NEGATIVE
SARS-COV-2 AG UPPER RESP QL IA.RAPID: NOT DETECTED

## 2024-11-05 PROCEDURE — 99214 OFFICE O/P EST MOD 30 MIN: CPT | Performed by: STUDENT IN AN ORGANIZED HEALTH CARE EDUCATION/TRAINING PROGRAM

## 2024-11-05 PROCEDURE — 1126F AMNT PAIN NOTED NONE PRSNT: CPT | Performed by: STUDENT IN AN ORGANIZED HEALTH CARE EDUCATION/TRAINING PROGRAM

## 2024-11-05 PROCEDURE — 3044F HG A1C LEVEL LT 7.0%: CPT | Performed by: STUDENT IN AN ORGANIZED HEALTH CARE EDUCATION/TRAINING PROGRAM

## 2024-11-05 PROCEDURE — 1159F MED LIST DOCD IN RCRD: CPT | Performed by: STUDENT IN AN ORGANIZED HEALTH CARE EDUCATION/TRAINING PROGRAM

## 2024-11-05 PROCEDURE — 3078F DIAST BP <80 MM HG: CPT | Performed by: STUDENT IN AN ORGANIZED HEALTH CARE EDUCATION/TRAINING PROGRAM

## 2024-11-05 PROCEDURE — 3074F SYST BP LT 130 MM HG: CPT | Performed by: STUDENT IN AN ORGANIZED HEALTH CARE EDUCATION/TRAINING PROGRAM

## 2024-11-05 PROCEDURE — 71046 X-RAY EXAM CHEST 2 VIEWS: CPT

## 2024-11-05 PROCEDURE — 87880 STREP A ASSAY W/OPTIC: CPT | Performed by: STUDENT IN AN ORGANIZED HEALTH CARE EDUCATION/TRAINING PROGRAM

## 2024-11-05 PROCEDURE — G2211 COMPLEX E/M VISIT ADD ON: HCPCS | Performed by: STUDENT IN AN ORGANIZED HEALTH CARE EDUCATION/TRAINING PROGRAM

## 2024-11-05 PROCEDURE — 1160F RVW MEDS BY RX/DR IN RCRD: CPT | Performed by: STUDENT IN AN ORGANIZED HEALTH CARE EDUCATION/TRAINING PROGRAM

## 2024-11-05 PROCEDURE — 87428 SARSCOV & INF VIR A&B AG IA: CPT | Performed by: STUDENT IN AN ORGANIZED HEALTH CARE EDUCATION/TRAINING PROGRAM

## 2024-11-05 NOTE — PROGRESS NOTES
"Chief Complaint  Fever, Cough, Shortness of Breath, Nasal Congestion, Hoarse, and Sore Throat    Subjective      Gifty Hoover is a 68 y.o. female who presents to [facility]     History of Present Illness  The patient presents for evaluation of a runny nose. She is accompanied by an adult female.    Her symptoms began last Thursday and have progressively worsened. She reports headaches and nosebleeds, which are unusual for her. She had two nosebleeds yesterday, with one lasting for about 15 minutes this morning. She is currently on aspirin and Pradaxa .she also reports a sore throat and a cough producing greenish mucus. She has been taking Coricidin for her symptoms.    She is experiencing difficulty breathing but has no history of asthma or COPD. She had a fever of 99 degrees this morning. She reports shortness of breath, particularly when coughing. She is not experiencing any chest pain. She has no ear pain or discharge.    She has a history of a heart attack in 2009 and open-heart surgery in 2011 due to a blood clot in her lungs. She reports no current heart issues.    She takes metformin for her diabetes.       Patient Care Team:  Paris Ace APRN as PCP - General (Nurse Practitioner)  Titi Zavala MD as Consulting Physician (Cardiology)  Krystal Dacosta MD as Consulting Physician (General Surgery)    Review of Systems   HENT:  Positive for congestion, nosebleeds, sinus pressure, sore throat and voice change.    Respiratory:  Positive for cough and shortness of breath. Negative for wheezing.    Cardiovascular:  Negative for chest pain and palpitations.   Neurological:  Negative for dizziness and light-headedness.         Objective   Vital Signs:   Vitals:    11/05/24 1400 11/05/24 1401   BP: 112/47    Temp: 99.4 °F (37.4 °C) 98.6 °F (37 °C)   TempSrc: Temporal Oral   Weight: 107 kg (235 lb)    Height: 152.4 cm (60\")      Body mass index is 45.9 kg/m².    Wt Readings " from Last 3 Encounters:   11/05/24 107 kg (235 lb)   10/17/24 107 kg (235 lb 6.4 oz)   09/06/24 106 kg (233 lb)     BP Readings from Last 3 Encounters:   11/05/24 112/47   10/17/24 126/71   09/06/24 116/76       Health Maintenance   Topic Date Due    DIABETIC EYE EXAM  Never done    TDAP/TD VACCINES (1 - Tdap) Never done    ZOSTER VACCINE (1 of 2) Never done    INFLUENZA VACCINE  08/01/2024    COVID-19 Vaccine (3 - 2024-25 season) 09/01/2024    DIABETIC FOOT EXAM  02/12/2025    HEMOGLOBIN A1C  02/16/2025    LIPID PANEL  03/04/2025    URINE MICROALBUMIN  08/16/2025    ANNUAL WELLNESS VISIT  09/04/2025    BMI FOLLOWUP  09/04/2025    MAMMOGRAM  03/21/2026    DXA SCAN  10/10/2026    COLORECTAL CANCER SCREENING  11/22/2027    Pneumococcal Vaccine 65+  Completed    HEPATITIS C SCREENING  Discontinued       Physical Exam  HENT:      Head: Normocephalic and atraumatic.        Comments: Pain and tenderness over frontal sinus     Right Ear: Tympanic membrane normal.      Left Ear: Tympanic membrane normal.      Mouth/Throat:      Mouth: Mucous membranes are moist.      Pharynx: Posterior oropharyngeal erythema present. No oropharyngeal exudate.   Eyes:      Pupils: Pupils are equal, round, and reactive to light.   Cardiovascular:      Rate and Rhythm: Normal rate and regular rhythm.      Pulses: Normal pulses.      Heart sounds: Normal heart sounds.   Pulmonary:      Effort: Pulmonary effort is normal. No respiratory distress.      Breath sounds: Normal breath sounds. No wheezing.   Psychiatric:         Mood and Affect: Mood normal.         Behavior: Behavior normal.         Physical Exam  Ears appear normal with a small amount of fluid present. Throat exhibits significant redness.  Lungs sound normal.  Heart sounds normal.       Result Review   The following data was reviewed by: Leslye Silva MD on 11/05/2024:  [x]  Tests & Results  []  Hospitalization/Emergency Department/Urgent Care  []  Internal/External Consultant  Notes      Results         ASSESSMENT/PLAN  Diagnoses and all orders for this visit:    1. Cough, unspecified type (Primary)  -     POCT SARS-CoV-2 Antigen LUCAIN + Flu  -     POCT rapid strep A  -     XR Chest PA & Lateral; Future    2. Sore throat  -     POCT SARS-CoV-2 Antigen LUCIAN + Flu  -     POCT rapid strep A    3. SOB (shortness of breath)  -     XR Chest PA & Lateral; Future    4. Frontal sinusitis, unspecified chronicity  -     amoxicillin-clavulanate (AUGMENTIN) 875-125 MG per tablet; Take 1 tablet by mouth 2 (Two) Times a Day for 10 days.  Dispense: 20 tablet; Refill: 0      Assessment & Plan  1. Viral upper respiratory infection.  Symptoms suggest a viral upper respiratory infection. A chest x-ray will be ordered to rule out pneumonia. If the x-ray is negative, Augmentin will be prescribed to treat the suspected sinusitis. If the x-ray reveals pneumonia, antibiotics will be prescribed accordingly.     2. Nosebleeds.  The patient has experienced nosebleeds, likely exacerbated by the use of aspirin and Pradaxa. She is advised to pinch her nose and tilt her head back during a nosebleed and avoid blowing her nose forcefully. If nosebleeds persist, advised her to go to ER    3. Diabetes Mellitus.  She is currently taking metformin for diabetes management. No changes to her medication regimen were discussed during this visit.       Gifty DUNBAR Sneha  reports that she has never smoked. She has never used smokeless tobacco.        FOLLOW UP  Return if symptoms worsen or fail to improve.  Patient was given instructions and counseling regarding her condition or for health maintenance advice. Please see specific information pulled into the AVS if appropriate.       Leslye Silva MD  11/05/24  14:47 EST    Patient or patient representative verbalized consent for the use of Ambient Listening during the visit with  Leslye Silva MD for chart documentation. 11/5/2024  14:29 EST

## 2024-11-22 ENCOUNTER — TRANSCRIBE ORDERS (OUTPATIENT)
Dept: ADMINISTRATIVE | Facility: HOSPITAL | Age: 69
End: 2024-11-22
Payer: MEDICARE

## 2024-11-22 ENCOUNTER — LAB (OUTPATIENT)
Dept: LAB | Facility: HOSPITAL | Age: 69
End: 2024-11-22
Payer: MEDICARE

## 2024-11-22 DIAGNOSIS — E11.9 DIABETES MELLITUS WITHOUT COMPLICATION: Primary | ICD-10-CM

## 2024-11-22 DIAGNOSIS — E11.9 DIABETES MELLITUS WITHOUT COMPLICATION: ICD-10-CM

## 2024-11-22 DIAGNOSIS — N17.9 ACUTE RENAL FAILURE, UNSPECIFIED ACUTE RENAL FAILURE TYPE: ICD-10-CM

## 2024-11-22 DIAGNOSIS — I10 ESSENTIAL HYPERTENSION, MALIGNANT: ICD-10-CM

## 2024-11-22 LAB
ALBUMIN SERPL-MCNC: 4 G/DL (ref 3.5–5.2)
ALBUMIN/GLOB SERPL: 1.4 G/DL
ALP SERPL-CCNC: 66 U/L (ref 39–117)
ALT SERPL W P-5'-P-CCNC: 28 U/L (ref 1–33)
ANION GAP SERPL CALCULATED.3IONS-SCNC: 12 MMOL/L (ref 5–15)
AST SERPL-CCNC: 24 U/L (ref 1–32)
BASOPHILS # BLD AUTO: 0.03 10*3/MM3 (ref 0–0.2)
BASOPHILS NFR BLD AUTO: 0.6 % (ref 0–1.5)
BILIRUB SERPL-MCNC: 0.5 MG/DL (ref 0–1.2)
BUN SERPL-MCNC: 15 MG/DL (ref 8–23)
BUN/CREAT SERPL: 16.1 (ref 7–25)
CALCIUM SPEC-SCNC: 9.9 MG/DL (ref 8.6–10.5)
CHLORIDE SERPL-SCNC: 106 MMOL/L (ref 98–107)
CO2 SERPL-SCNC: 26 MMOL/L (ref 22–29)
CREAT SERPL-MCNC: 0.93 MG/DL (ref 0.57–1)
DEPRECATED RDW RBC AUTO: 42.4 FL (ref 37–54)
EGFRCR SERPLBLD CKD-EPI 2021: 67.1 ML/MIN/1.73
EOSINOPHIL # BLD AUTO: 0.16 10*3/MM3 (ref 0–0.4)
EOSINOPHIL NFR BLD AUTO: 3.2 % (ref 0.3–6.2)
ERYTHROCYTE [DISTWIDTH] IN BLOOD BY AUTOMATED COUNT: 12.2 % (ref 12.3–15.4)
GLOBULIN UR ELPH-MCNC: 2.8 GM/DL
GLUCOSE SERPL-MCNC: 125 MG/DL (ref 65–99)
HBA1C MFR BLD: 6.9 % (ref 4.8–5.6)
HCT VFR BLD AUTO: 40.5 % (ref 34–46.6)
HGB BLD-MCNC: 13.1 G/DL (ref 12–15.9)
IMM GRANULOCYTES # BLD AUTO: 0.03 10*3/MM3 (ref 0–0.05)
IMM GRANULOCYTES NFR BLD AUTO: 0.6 % (ref 0–0.5)
LYMPHOCYTES # BLD AUTO: 1.01 10*3/MM3 (ref 0.7–3.1)
LYMPHOCYTES NFR BLD AUTO: 20 % (ref 19.6–45.3)
MCH RBC QN AUTO: 30.9 PG (ref 26.6–33)
MCHC RBC AUTO-ENTMCNC: 32.3 G/DL (ref 31.5–35.7)
MCV RBC AUTO: 95.5 FL (ref 79–97)
MONOCYTES # BLD AUTO: 0.5 10*3/MM3 (ref 0.1–0.9)
MONOCYTES NFR BLD AUTO: 9.9 % (ref 5–12)
NEUTROPHILS NFR BLD AUTO: 3.32 10*3/MM3 (ref 1.7–7)
NEUTROPHILS NFR BLD AUTO: 65.7 % (ref 42.7–76)
NRBC BLD AUTO-RTO: 0 /100 WBC (ref 0–0.2)
PLATELET # BLD AUTO: 248 10*3/MM3 (ref 140–450)
PMV BLD AUTO: 10.8 FL (ref 6–12)
POTASSIUM SERPL-SCNC: 4.7 MMOL/L (ref 3.5–5.2)
PROT SERPL-MCNC: 6.8 G/DL (ref 6–8.5)
RBC # BLD AUTO: 4.24 10*6/MM3 (ref 3.77–5.28)
SODIUM SERPL-SCNC: 144 MMOL/L (ref 136–145)
WBC NRBC COR # BLD AUTO: 5.05 10*3/MM3 (ref 3.4–10.8)

## 2024-11-22 PROCEDURE — 80053 COMPREHEN METABOLIC PANEL: CPT

## 2024-11-22 PROCEDURE — 36415 COLL VENOUS BLD VENIPUNCTURE: CPT

## 2024-11-22 PROCEDURE — 83036 HEMOGLOBIN GLYCOSYLATED A1C: CPT

## 2024-11-22 PROCEDURE — 85025 COMPLETE CBC W/AUTO DIFF WBC: CPT

## 2024-12-16 DIAGNOSIS — K21.9 GASTROESOPHAGEAL REFLUX DISEASE WITHOUT ESOPHAGITIS: ICD-10-CM

## 2024-12-16 RX ORDER — OMEPRAZOLE 40 MG/1
40 CAPSULE, DELAYED RELEASE ORAL DAILY
Qty: 90 CAPSULE | Refills: 1 | Status: SHIPPED | OUTPATIENT
Start: 2024-12-16

## 2025-01-20 ENCOUNTER — TELEPHONE (OUTPATIENT)
Dept: ONCOLOGY | Facility: HOSPITAL | Age: 70
End: 2025-01-20

## 2025-01-28 ENCOUNTER — LAB (OUTPATIENT)
Dept: ONCOLOGY | Facility: HOSPITAL | Age: 70
End: 2025-01-28
Payer: MEDICARE

## 2025-01-28 DIAGNOSIS — D50.8 OTHER IRON DEFICIENCY ANEMIA: ICD-10-CM

## 2025-01-28 LAB
BASOPHILS # BLD AUTO: 0.03 10*3/MM3 (ref 0–0.2)
BASOPHILS NFR BLD AUTO: 0.6 % (ref 0–1.5)
DEPRECATED RDW RBC AUTO: 47.2 FL (ref 37–54)
EOSINOPHIL # BLD AUTO: 0.14 10*3/MM3 (ref 0–0.4)
EOSINOPHIL NFR BLD AUTO: 2.8 % (ref 0.3–6.2)
ERYTHROCYTE [DISTWIDTH] IN BLOOD BY AUTOMATED COUNT: 13.2 % (ref 12.3–15.4)
FERRITIN SERPL-MCNC: 480.6 NG/ML (ref 13–150)
HCT VFR BLD AUTO: 41.7 % (ref 34–46.6)
HGB BLD-MCNC: 13 G/DL (ref 12–15.9)
IMM GRANULOCYTES # BLD AUTO: 0.02 10*3/MM3 (ref 0–0.05)
IMM GRANULOCYTES NFR BLD AUTO: 0.4 % (ref 0–0.5)
IRON 24H UR-MRATE: 85 MCG/DL (ref 37–145)
IRON SATN MFR SERPL: 24 % (ref 20–50)
LYMPHOCYTES # BLD AUTO: 1.1 10*3/MM3 (ref 0.7–3.1)
LYMPHOCYTES NFR BLD AUTO: 21.7 % (ref 19.6–45.3)
MCH RBC QN AUTO: 30.4 PG (ref 26.6–33)
MCHC RBC AUTO-ENTMCNC: 31.2 G/DL (ref 31.5–35.7)
MCV RBC AUTO: 97.4 FL (ref 79–97)
MONOCYTES # BLD AUTO: 0.46 10*3/MM3 (ref 0.1–0.9)
MONOCYTES NFR BLD AUTO: 9.1 % (ref 5–12)
NEUTROPHILS NFR BLD AUTO: 3.33 10*3/MM3 (ref 1.7–7)
NEUTROPHILS NFR BLD AUTO: 65.4 % (ref 42.7–76)
NRBC BLD AUTO-RTO: 0 /100 WBC (ref 0–0.2)
PLATELET # BLD AUTO: 210 10*3/MM3 (ref 140–450)
PMV BLD AUTO: 10.9 FL (ref 6–12)
RBC # BLD AUTO: 4.28 10*6/MM3 (ref 3.77–5.28)
TIBC SERPL-MCNC: 352 MCG/DL (ref 298–536)
TRANSFERRIN SERPL-MCNC: 236 MG/DL (ref 200–360)
WBC NRBC COR # BLD AUTO: 5.08 10*3/MM3 (ref 3.4–10.8)

## 2025-01-28 PROCEDURE — 82728 ASSAY OF FERRITIN: CPT

## 2025-01-28 PROCEDURE — 85025 COMPLETE CBC W/AUTO DIFF WBC: CPT

## 2025-01-28 PROCEDURE — 36415 COLL VENOUS BLD VENIPUNCTURE: CPT

## 2025-01-28 PROCEDURE — 83540 ASSAY OF IRON: CPT

## 2025-01-28 PROCEDURE — 84466 ASSAY OF TRANSFERRIN: CPT

## 2025-01-30 ENCOUNTER — OFFICE VISIT (OUTPATIENT)
Dept: ONCOLOGY | Facility: HOSPITAL | Age: 70
End: 2025-01-30
Payer: MEDICARE

## 2025-01-30 VITALS
RESPIRATION RATE: 18 BRPM | WEIGHT: 235.2 LBS | BODY MASS INDEX: 46.17 KG/M2 | SYSTOLIC BLOOD PRESSURE: 132 MMHG | HEART RATE: 122 BPM | TEMPERATURE: 97.2 F | HEIGHT: 60 IN | DIASTOLIC BLOOD PRESSURE: 73 MMHG | OXYGEN SATURATION: 93 %

## 2025-01-30 DIAGNOSIS — R79.89 ELEVATED FERRITIN: ICD-10-CM

## 2025-01-30 DIAGNOSIS — D50.8 OTHER IRON DEFICIENCY ANEMIA: Primary | ICD-10-CM

## 2025-01-30 PROCEDURE — G0463 HOSPITAL OUTPT CLINIC VISIT: HCPCS | Performed by: NURSE PRACTITIONER

## 2025-01-30 NOTE — PROGRESS NOTES
Chief Complaint/Reason for Referral:  Iron deficiency anemai      Paris Ace, Paris Bush, TIEN    Records Obtained:  Records of the patients history including those obtained from  [facility] and patient information were reviewed and summarized in detail.    Subjective    History of Present Illness  Ms. Gifty Hoover is a 66 yo F presenting for follow up for anemia. She has a history of CAD, HTN, hepatic steatosis, GERD, obesity, HLD.  Patient received IV iron back in July.  Patient reports feeling better after this.  She reports some of the energy has decreased but is still better than prior to the infusions.  She did had some leg pain back in August but this is resolved.  She denies any bleeding.  Denies any melena.  Denies any acute complaints.  Iron levels better.  Anemia resolved.  Results discussed with patient.    Interval history: 01/30/2025: The patient presents for evaluation of iron deficiency anemia. Last seen in October of 2024 by Dr. Carroll.     She reports a history of fluctuating iron levels, which she attributes to her gallbladder surgery. She has not observed any signs of bleeding. She underwent an iron infusion in 07/2024. Feels well overall at this time and reports minimal fatigue. Labs reviewed with patient. Iron panel, ferritin all normal at this time and ferritin is trending downward. CBC is normal with hemoglobin of 13.0. She is not taking any oral iron at this time.   .  Hematology History      7/7/22 CBC with Hgb 9.9, WBC 4.28, plt 213, MCV 92.3. Positive occult blood. Also with transaminitis on labs from 6/30/22. Hemoglobin is in the 9.9 range. She has CKD as well with cr of 1.60 and GFR of 35. Iron studies with normal iron level, but iron sat was 15%, ferritin was elevated at 241. No prior labs to compare to on the ferritin. Previous colonoscopy with diverticulosis and grade I hemmorrhoids     8/3 CBC with improved hgb to 10.1, MCV 92.3, plt 241, WBC of 3.27. Abdominal ultrasound  showed hepatic steatosis. SPEP without M-spike.      9/9/22 Iron studies: Ferritin down to 88.9, T sat of 12%, TIBC 429.      11/23-26/22: [facility] admission due to choledocholithiasis and biliary colic requiring ERCP, balloon extraction of 2 stones and placement of stent. Hgb sarabjit of 7.6 during this stay. Iron sat of 4%, iron low at 16     11/29/22: Hgb 9.9.     4/5/23: Hgb 10.5. Iron 38, ferritin 33, iron sat 8%, TIBC 460.     6/5/23: Hgb 11.2, Iron sat 11%, ferritin 40.      10/4/23: Hgb 12.0 g/dL, iron 55, ferritin 38, iron sat 11%, TIBC 498     2/7/24: Hgb 12.4 g/dL, MCV 96.4, Ferritin 54.9, iron sat 11%. Continue oral iron every other day with vitamin C.      7/15/24: Hgb 11.6, ferritin 61, iron sat 11%     7/29/24: IV Injectafer x 2 completed.      10/16/24: Hgb 12.5, MCV 98, plt 187, WBC 4.77, Ferritin 509, iron sat 24%    1/28/25: hgb 13.0, MCV 97, plt 210, WBC 5.08, ferritin 480, iron sat 24%.       History of Present Illness      Oncology/Hematology History    No history exists.       Review of Systems   Constitutional:  Positive for fatigue.   HENT: Negative.     Eyes: Negative.    Respiratory: Negative.     Cardiovascular: Negative.    Gastrointestinal: Negative.    Endocrine: Negative.    Genitourinary: Negative.    Musculoskeletal: Negative.    Skin: Negative.    Allergic/Immunologic: Negative.    Neurological: Negative.    Hematological: Negative.    Psychiatric/Behavioral: Negative.         Current Outpatient Medications on File Prior to Visit   Medication Sig Dispense Refill    albuterol sulfate HFA (ProAir HFA) 108 (90 Base) MCG/ACT inhaler Inhale 2 puffs Every 6 (Six) Hours As Needed for Wheezing. 18 g 0    Ascorbic Acid (VITAMIN C PO) Take  by mouth.      aspirin 81 MG EC tablet Take 1 tablet by mouth Daily.      Calcium Carb-Cholecalciferol (CALCIUM/VITAMIN D PO) Take  by mouth.      Estrogens Conjugated (Premarin) 0.625 MG/GM vaginal cream Apply daily for 2 weeks then use 2 times weekly  thereafter. 30 g 1    ezetimibe (ZETIA) 10 MG tablet TAKE ONE TABLET BY MOUTH EVERY DAY 90 tablet 3    famotidine (PEPCID) 40 MG tablet TAKE ONE TABLET BY MOUTH EVERY NIGHT AT BEDTIME 90 tablet 1    hydroCHLOROthiazide 12.5 MG tablet Take 1 tablet by mouth Every Morning. 90 tablet 1    levothyroxine (SYNTHROID, LEVOTHROID) 88 MCG tablet Take 1 tablet by mouth Daily. 90 tablet 1    metFORMIN (GLUCOPHAGE) 500 MG tablet Take 1 tablet by mouth 3 times a day. 270 tablet 1    omeprazole (priLOSEC) 40 MG capsule TAKE ONE CAPSULE BY MOUTH EVERY DAY 90 capsule 1    Pradaxa 150 MG capsu Take 1 capsule by mouth 2 (Two) Times a Day. 180 capsule 1    rosuvastatin (CRESTOR) 40 MG tablet Take 1 tablet by mouth Every Night. 90 tablet 1    telmisartan (MICARDIS) 80 MG tablet Take 1 tablet by mouth Every Night. 90 tablet 1    furosemide (LASIX) 20 MG tablet Take 1 tablet by mouth Daily As Needed (for leg swelling). (Patient not taking: Reported on 1/30/2025) 30 tablet 3    IRON, FERROUS SULFATE, PO Take  by mouth.      potassium chloride 10 MEQ CR tablet Take 1 tablet by mouth Daily As Needed (take with Lasix). (Patient not taking: Reported on 1/30/2025) 30 tablet 3     No current facility-administered medications on file prior to visit.       No Known Allergies  Past Medical History:   Diagnosis Date    Anemia 2022    NO CURRENT S/S    Anxiety     Arthritis     Asthma 2020    Shortness of breath after walking long periods of time.    CAD 09/14/2021    [facility] SEES PT    Cholelithiasis 2022    Chronic kidney disease 2022    NO DIALYSIS    Colon polyp 2008    BENIGN    Deep vein thrombosis 2011    Green filter in place.    Depression     Diabetes mellitus     Elevated cholesterol     Essential hypertension 09/14/2021    GERD (gastroesophageal reflux disease)     Heart attack 2009    NO STENTS/NO BYPASS    History of pulmonary embolism     2011, TAKE PRADAXA    Hypothyroid     Ingrown toenail 2022    Lumbar radiculopathy 09/2018     describes sensory S1 radiculopathy but would favor right L5    Paroxysmal atrial fibrillation 2021    [facility]    Seasonal allergies     Sleep apnea     BIPAP?     Past Surgical History:   Procedure Laterality Date    BLADDER SURGERY  2011    BREAST BIOPSY  2002    Upper right breast BENIGN    BREAST SURGERY      CARDIAC CATHETERIZATION      NO INTERVENTION    CARDIAC SURGERY      R/T BLOOD CLOTS, NO CARDIAC INTERVENTION     SECTION  1982    CHOLECYSTECTOMY N/A 2022    Procedure: laparoscopic cholecystectomy, possible open;  Surgeon: Krystal Dacosta MD;  Location: [facility] [place] [facility];  Service: General;  Laterality: N/A;    COLONOSCOPY      COLONOSCOPY N/A 2022    Procedure: COLONOSCOPY;  Surgeon: Ta Vasquez MD;  Location: [facility] ENDOSCOPY;  Service: Gastroenterology;  Laterality: N/A;  DIVERTICULOSIS    ENDOSCOPY N/A 2022    Procedure: ESOPHAGOGASTRODUODENOSCOPY WITH BX;  Surgeon: Ta Vasquez MD;  Location: [facility] ENDOSCOPY;  Service: Gastroenterology;  Laterality: N/A;  SMALL HIATAL HERNIA    ERCP N/A 2022    Procedure: ENDOSCOPIC RETROGRADE CHOLANGIOPANCREATOGRAPHY WITH SPHINCTEROTOMY, STONE REMOVAL, STENT PLACEMENT;  Surgeon: Arden Garcia MD;  Location: [facility] ENDOSCOPY;  Service: Gastroenterology;  Laterality: N/A;  BILE DUCT STONES    ERCP N/A 2023    Procedure: ENDOSCOPIC RETROGRADE CHOLANGIOPANCREATOGRAPHY with stent removal, balloon dilation, stone removal;  Surgeon: Arden Garcia MD;  Location: [facility] ENDOSCOPY;  Service: Gastroenterology;  Laterality: N/A;  bile duct stones    NICHOLAS FILTER INSERTION 2011    HYSTERECTOMY       Social History     Socioeconomic History    Marital status: Single   Tobacco Use    Smoking status: Never    Smokeless tobacco: Never   Vaping Use    Vaping status: Never Used   Substance and Sexual Activity    Alcohol use: Never    Drug use: Never    Sexual activity: Not Currently      Partners: Male     Birth control/protection: Hysterectomy, Surgical     Family History   Problem Relation Age of Onset    Stroke Mother     Cancer Mother         Colon    Colon cancer Mother 71        still living at 73    Diabetes Mother     Arthritis Mother     Colon polyps Mother     Hypertension Mother     Kidney disease Mother     Miscarriages / Stillbirths Mother     Stroke Father     Arthritis Father         Padgets Disease    Osteoporosis Father     Inflammatory bowel disease Father     Cancer Sister         Eye    Thyroid disease Sister     Diabetes Brother     Stroke Brother     Bleeding Disorder Daughter     Diabetes Brother     Hypertension Brother     Malig Hyperthermia Neg Hx      Immunization History   Administered Date(s) Administered    COVID-19 (PFIZER) Purple Cap Monovalent 04/03/2021, 04/26/2021    Flu Vaccine Split Quad 11/06/2019    FluMist 2-49yrs 12/07/2022    Fluad Quad 65+ 10/01/2021    Fluzone  >6mos 11/06/2019    Fluzone (or Fluarix & Flulaval for VFC) >6mos 11/06/2019    Fluzone High-Dose 65+yrs 12/07/2022, 03/04/2024    Influenza, Unspecified 10/01/2021    Pneumococcal Conjugate 13-Valent (PCV13) 06/30/2022    Pneumococcal Conjugate 20-Valent (PCV20) 06/30/2022    Pneumococcal Polysaccharide (PPSV23) 10/13/2017       Tobacco Use: Low Risk  (1/30/2025)    Patient History     Smoking Tobacco Use: Never     Smokeless Tobacco Use: Never     Passive Exposure: Not on file       Objective     Physical Exam  Vitals and nursing note reviewed.   Constitutional:       Appearance: Normal appearance.   HENT:      Mouth/Throat:      Mouth: Mucous membranes are moist.   Eyes:      Extraocular Movements: Extraocular movements intact.   Pulmonary:      Effort: Pulmonary effort is normal. No respiratory distress.   Abdominal:      Palpations: Abdomen is soft.   Musculoskeletal:         General: Normal range of motion.   Skin:     General: Skin is warm and dry.      Capillary Refill: Capillary refill  "takes less than 2 seconds.   Neurological:      General: No focal deficit present.      Mental Status: She is alert and oriented to person, place, and time.   Psychiatric:         Mood and Affect: Mood normal.         Behavior: Behavior normal.         Thought Content: Thought content normal.         Judgment: Judgment normal.         Vitals:    01/30/25 0918 01/30/25 0919   BP: 132/73    Pulse: (!) 131 (!) 122   Resp: 18    Temp: 97.2 °F (36.2 °C)    TempSrc: Temporal    SpO2: 93%    Weight: 107 kg (235 lb 3.2 oz)    Height: 152.4 cm (60\")    PainSc: 0-No pain        Wt Readings from Last 3 Encounters:   01/30/25 107 kg (235 lb 3.2 oz)   11/05/24 107 kg (235 lb)   10/17/24 107 kg (235 lb 6.4 oz)       ECOG score: 0         ECOG: (0) Fully Active - Able to Carry On All Pre-disease Performance Without Restriction  Fall Risk Assessment was completed, and patient is at low risk for falls.  PHQ-9 Total Score:         The patient is  experiencing fatigue. Fatigue score: 3    PT/OT Functional Screening: PT fx screen : No needs identified  Speech Functional Screening: Speech fx screen : No needs identified  Rehab to be ordered: Rehab to be ordered : No needs identified        Result Review :  The following data was reviewed by: TIEN Ruiz on 01/30/2025:  Lab Results   Component Value Date    HGB 13.0 01/28/2025    HCT 41.7 01/28/2025    MCV 97.4 (H) 01/28/2025     01/28/2025    WBC 5.08 01/28/2025    NEUTROABS 3.33 01/28/2025    LYMPHSABS 1.10 01/28/2025    MONOSABS 0.46 01/28/2025    EOSABS 0.14 01/28/2025    BASOSABS 0.03 01/28/2025     Lab Results   Component Value Date    GLUCOSE 125 (H) 11/22/2024    BUN 15 11/22/2024    CREATININE 0.93 11/22/2024     11/22/2024    K 4.7 11/22/2024     11/22/2024    CO2 26.0 11/22/2024    CALCIUM 9.9 11/22/2024    PROTEINTOT 6.8 11/22/2024    ALBUMIN 4.0 11/22/2024    BILITOT 0.5 11/22/2024    ALKPHOS 66 11/22/2024    AST 24 11/22/2024    ALT 28 " 11/22/2024     Lab Results   Component Value Date    Ferritin 480.60 (H) 01/28/2025    Folate 12.60 07/12/2022     Lab Results   Component Value Date    IRON 85 01/28/2025    LABIRON 24 01/28/2025    TRANSFERRIN 236 01/28/2025    TIBC 352 01/28/2025     Lab Results   Component Value Date    FERRITIN 480.60 (H) 01/28/2025    ITGRSAJF16 395 07/12/2022    FOLATE 12.60 07/12/2022     Lab Results   Component Value Date    AFP <2 09/09/2022       XR Chest PA & Lateral    Result Date: 11/5/2024  Impression: 1.No definite acute pulmonary process. Electronically Signed: Sachin Mishra MD  11/5/2024 3:07 PM EST  Workstation ID: [id number]    DEXA Bone Density Axial    Result Date: 10/11/2024  Impression: Osteopenia -Based upon the FRAX score, patient does not meet criteria for initiation of pharmacological treatment recommendations for prevention of osteoporosis per the National Osteoporosis Foundation (NOF) guidelines. However, individualized treatment decisions should be made accounting for additional clinical factors. Report dictated by: Aletha Finley  I have personally reviewed this case and agree with the findings above: Electronically Signed: Raffy Soliman MD  10/11/2024 4:58 PM EDT  Workstation ID: [id number]      Results  Laboratory Studies  Iron level increased from 77 in October to 85. Ferritin is slightly elevated. Iron saturation remains at 24. Hemoglobin is 13, up from 11.6 in July. B12 level was 395 in 2022.           Assessment and Plan:  Diagnoses and all orders for this visit:    1. Other iron deficiency anemia (Primary)  -     Iron Profile; Future  -     Ferritin; Future  -     CBC & Differential; Future  -     Vitamin B12; Future  -     Folate; Future    2. Elevated ferritin      Assessment & Plan  1. Iron deficiency anemia.  Her iron levels have shown improvement, increasing from 77 in October 2024 to 85 currently. The ferritin levels are slightly elevated, likely due to the recent intravenous iron  administration in July of 2024 with 2 dose of Injectafer. The iron saturation remains stable at 24, and the hemoglobin levels have improved from 11.6 in July 2024 to 13. Her weight remains unchanged since November 2024. A follow-up appointment will be scheduled in 6 months, during which folate and B12 levels will be assessed. Reviewed prior colonoscopy and EGD done in 2022. A colonoscopy is recommended to be performed in 5 years. If there are any changes in her condition, an earlier appointment can be arranged.    2. Elevated ferritin is likely secondary to prior IV iron but is trending downward. Will continue to monitor.     Follow-up  The patient will follow up in 6 months or sooner if needed with MD or NP.     PROCEDURE  The patient underwent gallbladder surgery in the past.        I spent 20 minutes caring for Gifty on this date of service. This time includes time spent by me in the following activities:preparing for the visit, reviewing tests, counseling and educating the patient/family/caregiver, ordering medications, tests, or procedures, referring and communicating with other health care professionals , documenting information in the medical record, and independently interpreting results and communicating that information with the patient/family/caregiver    Patient Follow Up: 6 months.   Patient was given instructions and counseling regarding her condition or for health maintenance advice. Please see specific information pulled into the AVS if appropriate.     TIEN Ruiz    1/30/2025    .tob    Patient or patient representative verbalized consent for the use of Ambient Listening during the visit with  TIEN Ruiz for chart documentation. 1/30/2025  10:00 EST

## 2025-02-06 ENCOUNTER — OFFICE VISIT (OUTPATIENT)
Dept: CARDIOLOGY | Facility: CLINIC | Age: 70
End: 2025-02-06
Payer: MEDICARE

## 2025-02-06 VITALS
DIASTOLIC BLOOD PRESSURE: 82 MMHG | BODY MASS INDEX: 46.2 KG/M2 | SYSTOLIC BLOOD PRESSURE: 140 MMHG | HEART RATE: 96 BPM | WEIGHT: 235.3 LBS | HEIGHT: 60 IN

## 2025-02-06 DIAGNOSIS — E78.5 HYPERLIPIDEMIA LDL GOAL <70: ICD-10-CM

## 2025-02-06 DIAGNOSIS — I48.0 PAROXYSMAL ATRIAL FIBRILLATION: ICD-10-CM

## 2025-02-06 DIAGNOSIS — I10 ESSENTIAL HYPERTENSION: ICD-10-CM

## 2025-02-06 DIAGNOSIS — I25.10 CORONARY ARTERY DISEASE INVOLVING NATIVE CORONARY ARTERY OF NATIVE HEART WITHOUT ANGINA PECTORIS: Primary | ICD-10-CM

## 2025-02-06 PROBLEM — M54.50 LOW BACK PAIN: Status: RESOLVED | Noted: 2018-08-16 | Resolved: 2025-02-06

## 2025-02-06 PROCEDURE — 3079F DIAST BP 80-89 MM HG: CPT | Performed by: NURSE PRACTITIONER

## 2025-02-06 PROCEDURE — 99214 OFFICE O/P EST MOD 30 MIN: CPT | Performed by: NURSE PRACTITIONER

## 2025-02-06 PROCEDURE — 3077F SYST BP >= 140 MM HG: CPT | Performed by: NURSE PRACTITIONER

## 2025-02-06 RX ORDER — METOPROLOL SUCCINATE 25 MG/1
25 TABLET, EXTENDED RELEASE ORAL NIGHTLY
Qty: 30 TABLET | Refills: 0 | Status: SHIPPED | OUTPATIENT
Start: 2025-02-06

## 2025-02-06 NOTE — PROGRESS NOTES
Chief Complaint  Follow-up, Coronary Artery Disease, Hyperlipidemia, Hypertension, and Atrial Fibrillation    Subjective            History of Present Illness  Gifty Hoover is a 69-year-old female patient who presents to the office today for follow-up.  She has CAD, atrial fibrillation, hypertension, and hyperlipidemia.  She is compliant with medication.   In the past month, she has been having more palpitations 2-3 times/week which she attributes to increased amount of stress and now requiring iron infusion. Palpitations are typically brief in nature and resolves on its own.     PMH  Past Medical History:   Diagnosis Date    Anemia     NO CURRENT S/S    Anxiety     Arthritis     Asthma     Shortness of breath after walking long periods of time.    CAD 2021    AGUAYO SEES PT    Cholelithiasis     Chronic kidney disease     NO DIALYSIS    Colon polyp     BENIGN    Deep vein thrombosis     Green filter in place.    Depression     Diabetes mellitus     Elevated cholesterol     Essential hypertension 2021    GERD (gastroesophageal reflux disease)     Heart attack     NO STENTS/NO BYPASS    History of pulmonary embolism     , TAKE PRADAXA    Hypothyroid     Ingrown toenail     Lumbar radiculopathy 2018    describes sensory S1 radiculopathy but would favor right L5    Paroxysmal atrial fibrillation 2021    AGUAYO    Pulmonary embolism     Seasonal allergies     Sleep apnea     BIPAP?         ALLERGY  No Known Allergies       SURGICALHX  Past Surgical History:   Procedure Laterality Date    BLADDER SURGERY      BREAST BIOPSY      Upper right breast BENIGN    BREAST SURGERY      CARDIAC CATHETERIZATION      NO INTERVENTION    CARDIAC SURGERY      R/T BLOOD CLOTS, NO CARDIAC INTERVENTION     SECTION      CHOLECYSTECTOMY N/A 2022    Procedure: laparoscopic cholecystectomy, possible open;  Surgeon: Krystal Dacosta MD;  Location:   [facility];  Service: General;  Laterality: N/A;    COLONOSCOPY  2018    COLONOSCOPY N/A 11/22/2022    Procedure: COLONOSCOPY;  Surgeon: Ta Vasquez MD;  Location: [facility] ENDOSCOPY;  Service: Gastroenterology;  Laterality: N/A;  DIVERTICULOSIS    ENDOSCOPY N/A 11/22/2022    Procedure: ESOPHAGOGASTRODUODENOSCOPY WITH BX;  Surgeon: Ta Vasquez MD;  Location: [facility] ENDOSCOPY;  Service: Gastroenterology;  Laterality: N/A;  SMALL HIATAL HERNIA    ERCP N/A 11/25/2022    Procedure: ENDOSCOPIC RETROGRADE CHOLANGIOPANCREATOGRAPHY WITH SPHINCTEROTOMY, STONE REMOVAL, STENT PLACEMENT;  Surgeon: Arden Garcia MD;  Location: [facility] ENDOSCOPY;  Service: Gastroenterology;  Laterality: N/A;  BILE DUCT STONES    ERCP N/A 1/12/2023    Procedure: ENDOSCOPIC RETROGRADE CHOLANGIOPANCREATOGRAPHY with stent removal, balloon dilation, stone removal;  Surgeon: Arden Garcia MD;  Location: [facility] ENDOSCOPY;  Service: Gastroenterology;  Laterality: N/A;  bile duct stones    NICHOLAS FILTER INSERTION JUGULAR  2011    HYSTERECTOMY  1983          SOC  Social History     Socioeconomic History    Marital status: Single   Tobacco Use    Smoking status: Never    Smokeless tobacco: Never   Vaping Use    Vaping status: Never Used   Substance and Sexual Activity    Alcohol use: Never    Drug use: Never    Sexual activity: Not Currently     Partners: Male     Birth control/protection: Hysterectomy, Surgical         FAMHX  Family History   Problem Relation Age of Onset    Stroke Mother     Cancer Mother         Colon    Colon cancer Mother 71        still living at 73    Diabetes Mother     Arthritis Mother     Colon polyps Mother     Hypertension Mother     Kidney disease Mother     Miscarriages / Stillbirths Mother     Anemia Mother     Stroke Father     Arthritis Father         Padgets Disease    Osteoporosis Father     Inflammatory bowel disease Father     Cancer Sister         Eye    Thyroid disease Sister      "Diabetes Brother     Stroke Brother     Bleeding Disorder Daughter     Diabetes Brother     Hypertension Brother     Malig Hyperthermia Neg Hx           MEDSIGONLY  Current Outpatient Medications on File Prior to Visit   Medication Sig    albuterol sulfate HFA (ProAir HFA) 108 (90 Base) MCG/ACT inhaler Inhale 2 puffs Every 6 (Six) Hours As Needed for Wheezing.    Ascorbic Acid (VITAMIN C PO) Take  by mouth.    aspirin 81 MG EC tablet Take 1 tablet by mouth Daily.    Calcium Carb-Cholecalciferol (CALCIUM/VITAMIN D PO) Take  by mouth.    Estrogens Conjugated (Premarin) 0.625 MG/GM vaginal cream Apply daily for 2 weeks then use 2 times weekly thereafter.    ezetimibe (ZETIA) 10 MG tablet TAKE ONE TABLET BY MOUTH EVERY DAY    famotidine (PEPCID) 40 MG tablet TAKE ONE TABLET BY MOUTH EVERY NIGHT AT BEDTIME    hydroCHLOROthiazide 12.5 MG tablet Take 1 tablet by mouth Every Morning.    levothyroxine (SYNTHROID, LEVOTHROID) 88 MCG tablet Take 1 tablet by mouth Daily.    metFORMIN (GLUCOPHAGE) 500 MG tablet Take 1 tablet by mouth 3 times a day.    omeprazole (priLOSEC) 40 MG capsule TAKE ONE CAPSULE BY MOUTH EVERY DAY    Pradaxa 150 MG capsu Take 1 capsule by mouth 2 (Two) Times a Day.    rosuvastatin (CRESTOR) 40 MG tablet Take 1 tablet by mouth Every Night.    telmisartan (MICARDIS) 80 MG tablet Take 1 tablet by mouth Every Night.     No current facility-administered medications on file prior to visit.         Objective   /82   Pulse 96   Ht 152.4 cm (60\")   Wt 107 kg (235 lb 4.8 oz)   BMI 45.95 kg/m²       Physical Exam  Constitutional:       Appearance: She is obese.   HENT:      Head: Normocephalic.   Neck:      Vascular: No carotid bruit.   Cardiovascular:      Rate and Rhythm: Normal rate and regular rhythm.      Pulses: Normal pulses.      Heart sounds: Normal heart sounds. No murmur heard.  Pulmonary:      Effort: Pulmonary effort is normal.      Breath sounds: Normal breath sounds.   Musculoskeletal:      " "Cervical back: Neck supple.      Right lower leg: No edema.      Left lower leg: No edema.   Skin:     General: Skin is dry.   Neurological:      Mental Status: She is alert and oriented to person, place, and time.   Psychiatric:         Behavior: Behavior normal.         Result Review :   The following data was reviewed by: TIEN Peace on 02/06/2025:  No results found for: \"PROBNP\"  CMP          8/22/2024    06:32 11/22/2024    11:27   CMP   Glucose 170  125    BUN 21  15    Creatinine 1.15  0.93    EGFR 52.0  67.1    Sodium 140  144    Potassium 4.0  4.7    Chloride 102  106    Calcium 9.6  9.9    Total Protein 7.3  6.8    Albumin 4.0  4.0    Globulin 3.3  2.8    Total Bilirubin 0.5  0.5    Alkaline Phosphatase 83  66    AST (SGOT) 25  24    ALT (SGPT) 29  28    Albumin/Globulin Ratio 1.2  1.4    BUN/Creatinine Ratio 18.3  16.1    Anion Gap 13.4  12.0      CBC w/diff          11/22/2024    11:27 1/28/2025    09:33   CBC w/Diff   WBC 5.05  5.08    RBC 4.24  4.28    Hemoglobin 13.1  13.0    Hematocrit 40.5  41.7    MCV 95.5  97.4    MCH 30.9  30.4    MCHC 32.3  31.2    RDW 12.2  13.2    Platelets 248  210    Neutrophil Rel % 65.7  65.4    Immature Granulocyte Rel % 0.6  0.4    Lymphocyte Rel % 20.0  21.7    Monocyte Rel % 9.9  9.1    Eosinophil Rel % 3.2  2.8    Basophil Rel % 0.6  0.6       Lab Results   Component Value Date    TSH 1.390 03/04/2024      Lab Results   Component Value Date    FREET4 1.46 03/04/2024      No results found for: \"DDIMERQUANT\"  Magnesium   Date Value Ref Range Status   10/06/2022 2.0 1.6 - 2.4 mg/dL Final      No results found for: \"DIGOXIN\"   No results found for: \"TROPONINT\"        Lipid Panel          3/4/2024    08:19   Lipid Panel   Total Cholesterol 124    Triglycerides 60    HDL Cholesterol 64    VLDL Cholesterol 13    LDL Cholesterol  47    LDL/HDL Ratio 0.75        Results for orders placed in visit on 11/11/22    Adult Transthoracic Echo Complete W/ Cont if Necessary " Per Protocol    Interpretation Summary    Calculated left ventricular EF = 57%      [id number] Score: 5        Assessment and Plan    Diagnoses and all orders for this visit:    1. CAD (Primary)  She denies any angina, continue aspirin 81 mg daily.     2. Paroxysmal atrial fibrillation  Experiencing more palpitations, heart rate mildly elevated today, start metoprolol 25 mg nighty for the next 30 days to see if this helps improve symptoms.    3. Essential hypertension  Currently controlled without adverse effect from medication, continue telmisartan 80 mg nightly and hydrochlorothiazide 12.5 mg daily.    4. Hyperlipidemia LDL goal <70  Last lipid panel was 3/4/2024 with LDL 47 which is within goal range, continue rosuvastatin 40 mg daily and ezetimibe 10 mg daily.    Other orders  -     metoprolol succinate XL (TOPROL-XL) 25 MG 24 hr tablet; Take 1 tablet by mouth Every Night.  Dispense: 30 tablet; Refill: 0        Follow Up   Return in about 6 months (around 8/6/2025) for Follow up with Dr Zavala.    Patient was given instructions and counseling regarding her condition or for health maintenance advice. Please see specific information pulled into the AVS if appropriate.     Gifty Hoover  reports that she has never smoked. She has never used smokeless tobacco.          Mine Castillo, TIEN  02/06/25  13:08 EST    Dictated Utilizing Dragon Dictation

## 2025-02-20 NOTE — PROGRESS NOTES
Chief Complaint  Diabetes (Follow up), Earache (Both; itch down inside, since Friday), Cough (Since Friday), and Nasal Congestion (Since Friday)    Subjective          Gifty Hoover is a 69 y.o. female who presents to [facility]    History of Present Illness  History of Present Illness  The patient presents for evaluation of cough, suspected urinary tract infection, and atrophic vaginitis.    She reports experiencing an itchy sensation deep within her ears, which she attributes to a recent illness that began on Friday morning. She describes symptoms of nasal congestion and difficulty breathing, accompanied by a cough and postnasal drainage. She has not experienced any fever or body aches. Her cough is severe enough to disrupt her sleep. She recalls a similar episode in November 2024, during which she was tested for COVID-19 and other illnesses, all of which returned negative results. She has not yet received her influenza vaccine.    Additionally, she suspects a possible yeast infection or urinary tract infection due to the presence of dysuria. She has a history of atrophic vaginitis and was previously treated with a gel for bacterial vaginosis. She was advised to consult a gynecologist for further evaluation and potential biopsy but has been unable to secure an appointment.    She reports no issues with her blood glucose levels. She is not experiencing any muscle cramps associated with her Crestor medication. She adheres to a regimen of taking her thyroid medication on an empty stomach each morning.    MEDICATIONS  Current: Crestor, thyroid medicine, Premarin cream    IMMUNIZATIONS  She has not yet received her influenza vaccine.      The PHQ has not been completed during this encounter.               Health Maintenance Due   Topic Date Due    DIABETIC EYE EXAM  Never done    TDAP/TD VACCINES (1 - Tdap) Never done    ZOSTER VACCINE (1 of 2) Never done    COVID-19 Vaccine (3 -  - season) 2024    URINE MICROALBUMIN-CREATININE RATIO (uACR)  2024    DIABETIC FOOT EXAM  2025    LIPID PANEL  2025    HEMOGLOBIN A1C  2025        Review of Systems   Constitutional:  Positive for fatigue. Negative for chills, diaphoresis and fever.   HENT:  Positive for congestion. Negative for sore throat.    Respiratory:  Positive for cough.    Cardiovascular:  Negative for chest pain.   Gastrointestinal:  Negative for abdominal pain, nausea and vomiting.   Genitourinary:  Positive for dysuria.   Musculoskeletal:  Negative for myalgias and neck pain.   Skin:  Positive for rash.   Neurological:  Negative for weakness, numbness and headaches.          Medical History: has a past medical history of Anemia (), Anxiety, Arthritis, Asthma (), CAD (2021), Cholelithiasis (), Chronic kidney disease (), Colon polyp (), Deep vein thrombosis (), Depression, Diabetes mellitus, Elevated cholesterol, Essential hypertension (2021), GERD (gastroesophageal reflux disease), Heart attack (), History of pulmonary embolism, Hypothyroid, Ingrown toenail (), Low back pain (), Lumbar radiculopathy (2018), Obesity (), Paroxysmal atrial fibrillation (2021), Pulmonary embolism (), Renal insufficiency (), Seasonal allergies, Sleep apnea, Urinary tract infection (), and Visual impairment.     Surgical History: has a past surgical history that includes Bladder surgery ();  section (); Colonoscopy (); Bebo Filter Insertion Jugular (); Hysterectomy (); Cardiac catheterization (); Esophagogastroduodenoscopy (N/A, 2022); Colonoscopy (N/A, 2022); ERCP (N/A, 2022); Breast biopsy (); Cardiac surgery; Cholecystectomy (N/A, 2022); Breast surgery; and ERCP (N/A, 2023).     Family History: family history includes Anemia in her mother; Arthritis in her father and mother;  Bleeding Disorder in her daughter; Cancer in her mother and sister; Colon cancer (age of onset: 71) in her mother; Colon polyps in her mother; Diabetes in her brother, brother, and mother; Hypertension in her brother and mother; Inflammatory bowel disease in her father; Kidney disease in her brother and mother; Miscarriages / Stillbirths in her mother; Osteoporosis in her father; Stroke in her brother, brother, father, and mother; Thyroid disease in her sister; Vision loss in her brother.     Social History: reports that she has never smoked. She has never used smokeless tobacco. She reports that she does not drink alcohol and does not use drugs.    Allergies: Patient has no known allergies.      Current Outpatient Medications:     albuterol sulfate HFA (ProAir HFA) 108 (90 Base) MCG/ACT inhaler, Inhale 2 puffs Every 6 (Six) Hours As Needed for Wheezing., Disp: 18 g, Rfl: 0    Ascorbic Acid (VITAMIN C PO), Take  by mouth., Disp: , Rfl:     aspirin 81 MG EC tablet, Take 1 tablet by mouth Daily., Disp: , Rfl:     Calcium Carb-Cholecalciferol (CALCIUM/VITAMIN D PO), Take  by mouth., Disp: , Rfl:     Estrogens Conjugated (Premarin) 0.625 MG/GM vaginal cream, Apply daily for 2 weeks then use 2 times weekly thereafter., Disp: 30 g, Rfl: 1    ezetimibe (ZETIA) 10 MG tablet, TAKE ONE TABLET BY MOUTH EVERY DAY, Disp: 90 tablet, Rfl: 3    famotidine (PEPCID) 40 MG tablet, TAKE ONE TABLET BY MOUTH EVERY NIGHT AT BEDTIME, Disp: 90 tablet, Rfl: 1    hydroCHLOROthiazide 12.5 MG tablet, Take 1 tablet by mouth Every Morning., Disp: 90 tablet, Rfl: 1    levothyroxine (SYNTHROID, LEVOTHROID) 88 MCG tablet, Take 1 tablet by mouth Daily., Disp: 90 tablet, Rfl: 1    metFORMIN (GLUCOPHAGE) 500 MG tablet, Take 1 tablet by mouth 3 times a day., Disp: 270 tablet, Rfl: 1    metoprolol succinate XL (TOPROL-XL) 25 MG 24 hr tablet, Take 1 tablet by mouth Every Night., Disp: 30 tablet, Rfl: 0    omeprazole (priLOSEC) 40 MG capsule, TAKE ONE  "CAPSULE BY MOUTH EVERY DAY, Disp: 90 capsule, Rfl: 1    Pradaxa 150 MG capsu, Take 1 capsule by mouth 2 (Two) Times a Day., Disp: 180 capsule, Rfl: 1    rosuvastatin (CRESTOR) 40 MG tablet, Take 1 tablet by mouth Every Night., Disp: 90 tablet, Rfl: 1    telmisartan (MICARDIS) 80 MG tablet, Take 1 tablet by mouth Every Night., Disp: 90 tablet, Rfl: 1    azithromycin (Zithromax Z-Raj) 250 MG tablet, Take 2 tablets by mouth on day 1, then 1 tablet daily on days 2-5, Disp: 6 tablet, Rfl: 0    fluconazole (Diflucan) 150 MG tablet, Take 1 tablet by mouth Every 72 (Seventy-Two) Hours As Needed (vaginal itching)., Disp: 3 tablet, Rfl: 0    promethazine-dextromethorphan (PROMETHAZINE-DM) 6.25-15 MG/5ML syrup, Take 5 mL by mouth 4 (Four) Times a Day As Needed for Cough., Disp: 120 mL, Rfl: 0      Immunization History   Administered Date(s) Administered    COVID-19 (PFIZER) Purple Cap Monovalent 04/03/2021, 04/26/2021    Flu Vaccine Split Quad 11/06/2019    FluMist 2-49yrs 12/07/2022    Fluad Quad 65+ 10/01/2021    Fluzone  >6mos 11/06/2019    Fluzone (or Fluarix & Flulaval for VFC) >6mos 11/06/2019    Fluzone High-Dose 65+YRS 03/03/2025    Fluzone High-Dose 65+yrs 12/07/2022, 03/04/2024    Influenza, Unspecified 10/01/2021    Pneumococcal Conjugate 13-Valent (PCV13) 06/30/2022    Pneumococcal Conjugate 20-Valent (PCV20) 06/30/2022    Pneumococcal Polysaccharide (PPSV23) 10/13/2017         Objective       Vitals:    03/03/25 0730   BP: 130/82   Pulse: 105   Temp: 98.8 °F (37.1 °C)   TempSrc: Temporal   SpO2: 95%   Weight: 106 kg (233 lb 6.4 oz)   Height: 152.4 cm (60\")      Body mass index is 45.58 kg/m².   Wt Readings from Last 3 Encounters:   03/03/25 105 kg (232 lb)   03/03/25 106 kg (233 lb 6.4 oz)   02/06/25 107 kg (235 lb 4.8 oz)      BP Readings from Last 3 Encounters:   03/03/25 136/62   03/03/25 130/82   02/06/25 140/82             Physical Exam  Vitals reviewed.   Constitutional:       Appearance: Normal appearance. " She is well-developed.   HENT:      Head: Normocephalic and atraumatic.      Right Ear: Tympanic membrane is retracted.      Left Ear: Tympanic membrane is not retracted.   Eyes:      Conjunctiva/sclera: Conjunctivae normal.      Pupils: Pupils are equal, round, and reactive to light.   Cardiovascular:      Rate and Rhythm: Normal rate and regular rhythm.      Heart sounds: Normal heart sounds. No murmur heard.  Pulmonary:      Effort: Pulmonary effort is normal.      Breath sounds: Rales (intermittent. bilateral bases.) present. No wheezing or rhonchi.   Abdominal:      General: Bowel sounds are normal. There is no distension.      Palpations: Abdomen is soft.      Tenderness: There is no abdominal tenderness.   Skin:     General: Skin is warm and dry.   Neurological:      Mental Status: She is alert and oriented to person, place, and time.   Psychiatric:         Mood and Affect: Mood and affect normal.         Behavior: Behavior normal.         Thought Content: Thought content normal.         Judgment: Judgment normal.         Physical Exam  Retraction noted in the right tympanic membrane. Left tympanic membrane appears normal. Oral exam was performed.  Intermittent rales present in the bilateral lung bases.    Vital Signs  Blood pressure is normal.      Result Review :   Results           Common labs          10/16/2024    09:27 11/22/2024    11:27 1/28/2025    09:33   Common Labs   Glucose  125     BUN  15     Creatinine  0.93     Sodium  144     Potassium  4.7     Chloride  106     Calcium  9.9     Albumin  4.0     Total Bilirubin  0.5     Alkaline Phosphatase  66     AST (SGOT)  24     ALT (SGPT)  28     WBC 4.77  5.05  5.08    Hemoglobin 12.5  13.1  13.0    Hematocrit 38.8  40.5  41.7    Platelets 187  248  210    Hemoglobin A1C  6.90                      Assessment and Plan        Diagnoses and all orders for this visit:    1. Type 2 diabetes mellitus without complication, without long-term current use of  insulin (Primary)  -     Microalbumin / Creatinine Urine Ratio - Urine, Clean Catch; Future  -     metFORMIN (GLUCOPHAGE) 500 MG tablet; Take 1 tablet by mouth 3 times a day.  Dispense: 270 tablet; Refill: 1  -     Microalbumin / Creatinine Urine Ratio - Urine, Clean Catch    2. Need for influenza vaccination  -     Fluzone High-Dose 65+yrs ([id number])    3. Acute cough  -     promethazine-dextromethorphan (PROMETHAZINE-DM) 6.25-15 MG/5ML syrup; Take 5 mL by mouth 4 (Four) Times a Day As Needed for Cough.  Dispense: 120 mL; Refill: 0    4. Diabetes mellitus without complication  -     Hemoglobin A1c; Future  -     Hemoglobin A1c    5. Essential hypertension, malignant  -     Hemoglobin A1c; Future  -     Hemoglobin A1c    6. Acute renal failure, unspecified acute renal failure type  -     Hemoglobin A1c; Future  -     Hemoglobin A1c    7. Dysuria  -     POC Urinalysis Dipstick, Automated  -     fluconazole (Diflucan) 150 MG tablet; Take 1 tablet by mouth Every 72 (Seventy-Two) Hours As Needed (vaginal itching).  Dispense: 3 tablet; Refill: 0  -     Urine Culture - Urine, Urine, Clean Catch; Future  -     Urine Culture - Urine, Urine, Clean Catch    8. Essential hypertension  -     hydroCHLOROthiazide 12.5 MG tablet; Take 1 tablet by mouth Every Morning.  Dispense: 90 tablet; Refill: 1  -     telmisartan (MICARDIS) 80 MG tablet; Take 1 tablet by mouth Every Night.  Dispense: 90 tablet; Refill: 1    9. Acquired hypothyroidism  -     levothyroxine (SYNTHROID, LEVOTHROID) 88 MCG tablet; Take 1 tablet by mouth Daily.  Dispense: 90 tablet; Refill: 1    10. Paroxysmal atrial fibrillation  -     Pradaxa 150 MG capsu; Take 1 capsule by mouth 2 (Two) Times a Day.  Dispense: 180 capsule; Refill: 1    11. Hyperlipidemia LDL goal <70  -     rosuvastatin (CRESTOR) 40 MG tablet; Take 1 tablet by mouth Every Night.  Dispense: 90 tablet; Refill: 1  -     Lipid Panel    12. Vaginal discharge  -     Gardnerella vaginalis, Trichomonas  vaginalis, Candida albicans, DNA - Swab, Vagina    13. Lower respiratory infection  -     azithromycin (Zithromax Z-Raj) 250 MG tablet; Take 2 tablets by mouth on day 1, then 1 tablet daily on days 2-5  Dispense: 6 tablet; Refill: 0    14. Other iron deficiency anemia  -     Folate      Assessment & Plan  1. Bronchitis.  She reports a cough and drainage since Friday morning, with itching inside her ears. There are no fever, chills, or headaches. She has intermittent rales in her bilateral bases and retraction in the right tympanic membrane. A prescription for Phenergan with codeine will be provided to help with her cough and aid sleep. Additionally, a Z-Raj (azithromycin) will be prescribed to address lower respiratory infection symptoms. She will receive her influenza vaccine today. Blood work will be conducted during this visit.    2. Suspected urinary tract infection.  She reports burning and pain during urination. A urine test will be conducted to confirm the presence of a urinary tract infection.    3. Atrophic vaginitis.  She has a history of atrophic vaginitis and was previously treated with a gel for bacterial vaginosis. A vaginal swab will be collected to screen for bacterial vaginosis and yeast. Diflucan (fluconazole) will be prescribed, to be taken every 3 days for a total of three doses, as needed. If the condition does not resolve, a follow-up appointment will be necessary.    4. Health maintenance.  Her blood pressure is normal. She will receive her influenza vaccine today. Blood work will be conducted during this visit.    Follow-up  The patient is scheduled for a follow-up visit in 6 months.    Return in about 6 months (around 9/3/2025) for Next scheduled follow up.    Patient was given instructions and counseling regarding her condition or for health maintenance advice. Please see specific information pulled into the AVS if appropriate.     TIEN Perez    Patient or patient representative  verbalized consent for the use of Ambient Listening during the visit with  TIEN Perez for chart documentation. 3/3/2025  10:52 EST

## 2025-03-03 ENCOUNTER — OFFICE VISIT (OUTPATIENT)
Dept: FAMILY MEDICINE CLINIC | Facility: CLINIC | Age: 70
End: 2025-03-03
Payer: MEDICARE

## 2025-03-03 ENCOUNTER — OFFICE VISIT (OUTPATIENT)
Dept: PODIATRY | Facility: CLINIC | Age: 70
End: 2025-03-03
Payer: MEDICARE

## 2025-03-03 VITALS
DIASTOLIC BLOOD PRESSURE: 62 MMHG | HEIGHT: 60 IN | WEIGHT: 232 LBS | SYSTOLIC BLOOD PRESSURE: 136 MMHG | TEMPERATURE: 98 F | OXYGEN SATURATION: 96 % | HEART RATE: 100 BPM | BODY MASS INDEX: 45.55 KG/M2

## 2025-03-03 VITALS
OXYGEN SATURATION: 95 % | TEMPERATURE: 98.8 F | DIASTOLIC BLOOD PRESSURE: 82 MMHG | BODY MASS INDEX: 45.82 KG/M2 | HEIGHT: 60 IN | SYSTOLIC BLOOD PRESSURE: 130 MMHG | HEART RATE: 105 BPM | WEIGHT: 233.4 LBS

## 2025-03-03 DIAGNOSIS — E11.9 NON-INSULIN DEPENDENT TYPE 2 DIABETES MELLITUS: ICD-10-CM

## 2025-03-03 DIAGNOSIS — I48.0 PAROXYSMAL ATRIAL FIBRILLATION: ICD-10-CM

## 2025-03-03 DIAGNOSIS — M79.672 FOOT PAIN, BILATERAL: ICD-10-CM

## 2025-03-03 DIAGNOSIS — M79.671 FOOT PAIN, BILATERAL: ICD-10-CM

## 2025-03-03 DIAGNOSIS — N17.9 ACUTE RENAL FAILURE, UNSPECIFIED ACUTE RENAL FAILURE TYPE: ICD-10-CM

## 2025-03-03 DIAGNOSIS — R05.1 ACUTE COUGH: ICD-10-CM

## 2025-03-03 DIAGNOSIS — R30.0 DYSURIA: ICD-10-CM

## 2025-03-03 DIAGNOSIS — D50.8 OTHER IRON DEFICIENCY ANEMIA: ICD-10-CM

## 2025-03-03 DIAGNOSIS — E11.9 DIABETES MELLITUS WITHOUT COMPLICATION: ICD-10-CM

## 2025-03-03 DIAGNOSIS — E03.9 ACQUIRED HYPOTHYROIDISM: ICD-10-CM

## 2025-03-03 DIAGNOSIS — I10 ESSENTIAL HYPERTENSION, MALIGNANT: ICD-10-CM

## 2025-03-03 DIAGNOSIS — J22 LOWER RESPIRATORY INFECTION: ICD-10-CM

## 2025-03-03 DIAGNOSIS — I10 ESSENTIAL HYPERTENSION: ICD-10-CM

## 2025-03-03 DIAGNOSIS — L60.0 ONYCHOCRYPTOSIS: ICD-10-CM

## 2025-03-03 DIAGNOSIS — B35.1 ONYCHOMYCOSIS: Primary | ICD-10-CM

## 2025-03-03 DIAGNOSIS — E78.5 HYPERLIPIDEMIA LDL GOAL <70: ICD-10-CM

## 2025-03-03 DIAGNOSIS — E11.9 TYPE 2 DIABETES MELLITUS WITHOUT COMPLICATION, WITHOUT LONG-TERM CURRENT USE OF INSULIN: Primary | ICD-10-CM

## 2025-03-03 DIAGNOSIS — Z23 NEED FOR INFLUENZA VACCINATION: ICD-10-CM

## 2025-03-03 DIAGNOSIS — N89.8 VAGINAL DISCHARGE: ICD-10-CM

## 2025-03-03 DIAGNOSIS — E11.8 DM FEET: ICD-10-CM

## 2025-03-03 LAB
ALBUMIN UR-MCNC: 12 MG/DL
BILIRUB BLD-MCNC: ABNORMAL MG/DL
CANDIDA SPECIES: NEGATIVE
CHOLEST SERPL-MCNC: 118 MG/DL (ref 0–200)
CLARITY, POC: ABNORMAL
COLOR UR: ABNORMAL
CREAT UR-MCNC: 178.5 MG/DL
EXPIRATION DATE: ABNORMAL
GARDNERELLA VAGINALIS: NEGATIVE
GLUCOSE UR STRIP-MCNC: NEGATIVE MG/DL
HBA1C MFR BLD: 7 % (ref 4.8–5.6)
HDLC SERPL-MCNC: 55 MG/DL (ref 40–60)
KETONES UR QL: ABNORMAL
LDLC SERPL CALC-MCNC: 46 MG/DL (ref 0–100)
LDLC/HDLC SERPL: 0.83 {RATIO}
LEUKOCYTE EST, POC: ABNORMAL
Lab: ABNORMAL
MICROALBUMIN/CREAT UR: 67.2 MG/G (ref 0–29)
NITRITE UR-MCNC: NEGATIVE MG/ML
PH UR: 5.5 [PH] (ref 5–8)
PROT UR STRIP-MCNC: ABNORMAL MG/DL
RBC # UR STRIP: ABNORMAL /UL
SP GR UR: 1.02 (ref 1–1.03)
T VAGINALIS DNA VAG QL PROBE+SIG AMP: NEGATIVE
TRIGL SERPL-MCNC: 88 MG/DL (ref 0–150)
UROBILINOGEN UR QL: ABNORMAL
VLDLC SERPL-MCNC: 17 MG/DL (ref 5–40)

## 2025-03-03 PROCEDURE — 81003 URINALYSIS AUTO W/O SCOPE: CPT | Performed by: NURSE PRACTITIONER

## 2025-03-03 PROCEDURE — 87660 TRICHOMONAS VAGIN DIR PROBE: CPT | Performed by: NURSE PRACTITIONER

## 2025-03-03 PROCEDURE — 87086 URINE CULTURE/COLONY COUNT: CPT | Performed by: NURSE PRACTITIONER

## 2025-03-03 PROCEDURE — 90662 IIV NO PRSV INCREASED AG IM: CPT | Performed by: NURSE PRACTITIONER

## 2025-03-03 PROCEDURE — 99214 OFFICE O/P EST MOD 30 MIN: CPT | Performed by: NURSE PRACTITIONER

## 2025-03-03 PROCEDURE — 3075F SYST BP GE 130 - 139MM HG: CPT | Performed by: NURSE PRACTITIONER

## 2025-03-03 PROCEDURE — G0008 ADMIN INFLUENZA VIRUS VAC: HCPCS | Performed by: NURSE PRACTITIONER

## 2025-03-03 PROCEDURE — 3079F DIAST BP 80-89 MM HG: CPT | Performed by: NURSE PRACTITIONER

## 2025-03-03 PROCEDURE — 87186 SC STD MICRODIL/AGAR DIL: CPT | Performed by: NURSE PRACTITIONER

## 2025-03-03 PROCEDURE — 87480 CANDIDA DNA DIR PROBE: CPT | Performed by: NURSE PRACTITIONER

## 2025-03-03 PROCEDURE — 87088 URINE BACTERIA CULTURE: CPT | Performed by: NURSE PRACTITIONER

## 2025-03-03 PROCEDURE — 87510 GARDNER VAG DNA DIR PROBE: CPT | Performed by: NURSE PRACTITIONER

## 2025-03-03 PROCEDURE — 80061 LIPID PANEL: CPT | Performed by: NURSE PRACTITIONER

## 2025-03-03 PROCEDURE — 1125F AMNT PAIN NOTED PAIN PRSNT: CPT | Performed by: NURSE PRACTITIONER

## 2025-03-03 PROCEDURE — 82043 UR ALBUMIN QUANTITATIVE: CPT | Performed by: NURSE PRACTITIONER

## 2025-03-03 PROCEDURE — 82570 ASSAY OF URINE CREATININE: CPT | Performed by: NURSE PRACTITIONER

## 2025-03-03 PROCEDURE — 83036 HEMOGLOBIN GLYCOSYLATED A1C: CPT | Performed by: NURSE PRACTITIONER

## 2025-03-03 RX ORDER — HYDROCHLOROTHIAZIDE 12.5 MG/1
12.5 TABLET ORAL EVERY MORNING
Qty: 90 TABLET | Refills: 1 | Status: SHIPPED | OUTPATIENT
Start: 2025-03-03

## 2025-03-03 RX ORDER — LEVOTHYROXINE SODIUM 88 UG/1
88 TABLET ORAL DAILY
Qty: 90 TABLET | Refills: 1 | Status: SHIPPED | OUTPATIENT
Start: 2025-03-03

## 2025-03-03 RX ORDER — DEXTROMETHORPHAN HYDROBROMIDE AND PROMETHAZINE HYDROCHLORIDE 15; 6.25 MG/5ML; MG/5ML
5 SYRUP ORAL 4 TIMES DAILY PRN
Qty: 120 ML | Refills: 0 | Status: SHIPPED | OUTPATIENT
Start: 2025-03-03

## 2025-03-03 RX ORDER — ROSUVASTATIN CALCIUM 40 MG/1
40 TABLET, COATED ORAL NIGHTLY
Qty: 90 TABLET | Refills: 1 | Status: SHIPPED | OUTPATIENT
Start: 2025-03-03

## 2025-03-03 RX ORDER — FLUCONAZOLE 150 MG/1
150 TABLET ORAL
Qty: 3 TABLET | Refills: 0 | Status: SHIPPED | OUTPATIENT
Start: 2025-03-03

## 2025-03-03 RX ORDER — TELMISARTAN 80 MG/1
80 TABLET ORAL NIGHTLY
Qty: 90 TABLET | Refills: 1 | Status: SHIPPED | OUTPATIENT
Start: 2025-03-03

## 2025-03-03 RX ORDER — AZITHROMYCIN 250 MG/1
TABLET, FILM COATED ORAL
Qty: 6 TABLET | Refills: 0 | Status: SHIPPED | OUTPATIENT
Start: 2025-03-03

## 2025-03-03 RX ORDER — ATENOLOL 100 MG/1
150 TABLET ORAL 2 TIMES DAILY
Qty: 180 CAPSULE | Refills: 1 | Status: SHIPPED | OUTPATIENT
Start: 2025-03-03

## 2025-03-03 NOTE — PROGRESS NOTES
[facility] - PODIATRY    Today's Date: 03/03/25    Patient Name: Gifty Hoover  MRN: [id number]  CSN: [id number]  PCP: Paris Ace APRN, Last PCP Visit: 3 March 2025  Referring Provider: No ref. provider found    SUBJECTIVE     Chief Complaint   Patient presents with    Left Foot - Follow-up, Nail Problem    Right Foot - Follow-up, Nail Problem       HPI: Gifty Hoover, a 69 y.o.female, presents to clinic for painful toenails:    New, Established, New Problem:  est  Location:  Toenails  Duration:   Greater than five years  Onset:  Gradual  Nature:  sore with palpation.  Stable, worsening, improving:  stable  Aggravating factors:  Pain with shoe gear and ambulation.  Previous Treatment:  Debridement    Patient controlling diabetes via: Oral medication    Patient denies any fevers, chills, nausea, vomiting, shortness of breath, nor any other constitutional signs nor symptoms.    Medical changes:  none    I have reviewed/confirmed previously documented HPI with no changes.     Past Medical History:   Diagnosis Date    Anemia 2022    NO CURRENT S/S    Anxiety     Arthritis     Asthma 2020    Shortness of breath after walking long periods of time.    CAD 09/14/2021    AGU[facility]O SEES PT    Cholelithiasis 2022    Chronic kidney disease 2022    NO DIALYSIS    Colon polyp 2008    BENIGN    Deep vein thrombosis 2011    Green filter in place.    Depression     Diabetes mellitus     Elevated cholesterol     Essential hypertension 09/14/2021    GERD (gastroesophageal reflux disease)     Heart attack 2009    NO STENTS/NO BYPASS    History of pulmonary embolism     2011, TAKE PRADAXA    Hypothyroid     Ingrown toenail 2022    Low back pain 2019    Lumbar radiculopathy 09/2018    describes sensory S1 radiculopathy but would favor right L5    Obesity 2020    Paroxysmal atrial fibrillation 09/14/2021    AGUAYO    Pulmonary embolism 2011    Renal insufficiency 2022    Seasonal allergies     Sleep apnea     BIPAP?     Urinary tract infection     Visual impairment     Glasses for nearsightedness     Past Surgical History:   Procedure Laterality Date    BLADDER SURGERY  2011    BREAST BIOPSY      Upper right breast BENIGN    BREAST SURGERY      CARDIAC CATHETERIZATION      NO INTERVENTION    CARDIAC SURGERY      R/T BLOOD CLOTS, NO CARDIAC INTERVENTION     SECTION      CHOLECYSTECTOMY N/A 2022    Procedure: laparoscopic cholecystectomy, possible open;  Surgeon: Krystal Dacosta MD;  Location: [facility] OR [facility];  Service: General;  Laterality: N/A;    COLONOSCOPY      COLONOSCOPY N/A 2022    Procedure: COLONOSCOPY;  Surgeon: Ta Vasquez MD;  Location: [facility] ENDOSCOPY;  Service: Gastroenterology;  Laterality: N/A;  DIVERTICULOSIS    ENDOSCOPY N/A 2022    Procedure: ESOPHAGOGASTRODUODENOSCOPY WITH BX;  Surgeon: Ta Vasquez MD;  Location: [facility] ENDOSCOPY;  Service: Gastroenterology;  Laterality: N/A;  SMALL HIATAL HERNIA    ERCP N/A 2022    Procedure: ENDOSCOPIC RETROGRADE CHOLANGIOPANCREATOGRAPHY WITH SPHINCTEROTOMY, STONE REMOVAL, STENT PLACEMENT;  Surgeon: Arden Garcia MD;  Location: [facility] ENDOSCOPY;  Service: Gastroenterology;  Laterality: N/A;  BILE DUCT STONES    ERCP N/A 2023    Procedure: ENDOSCOPIC RETROGRADE CHOLANGIOPANCREATOGRAPHY with stent removal, balloon dilation, stone removal;  Surgeon: Arden Garcia MD;  Location: [facility] ENDOSCOPY;  Service: Gastroenterology;  Laterality: N/A;  bile duct stones    NICHOLAS FILTER INSERTION 2011    HYSTERECTOMY       Family History   Problem Relation Age of Onset    Stroke Mother     Cancer Mother         Colon    Colon cancer Mother 71        still living at 73    Diabetes Mother     Arthritis Mother     Colon polyps Mother     Hypertension Mother     Kidney disease Mother     Miscarriages / Stillbirths Mother     Anemia Mother     Stroke Father     Arthritis Father          Padgets Disease    Osteoporosis Father     Inflammatory bowel disease Father     Cancer Sister         Eye Cancer    Thyroid disease Sister     Diabetes Brother     Stroke Brother     Bleeding Disorder Daughter     Diabetes Brother     Hypertension Brother     Kidney disease Brother     Stroke Brother     Vision loss Brother         vision loss    Malig Hyperthermia Neg Hx      Social History     Socioeconomic History    Marital status: Single   Tobacco Use    Smoking status: Never    Smokeless tobacco: Never   Vaping Use    Vaping status: Never Used   Substance and Sexual Activity    Alcohol use: Never    Drug use: Never    Sexual activity: Not Currently     Partners: Male     Birth control/protection: Hysterectomy, Surgical     No Known Allergies  Current Outpatient Medications   Medication Sig Dispense Refill    albuterol sulfate HFA (ProAir HFA) 108 (90 Base) MCG/ACT inhaler Inhale 2 puffs Every 6 (Six) Hours As Needed for Wheezing. 18 g 0    Ascorbic Acid (VITAMIN C PO) Take  by mouth.      aspirin 81 MG EC tablet Take 1 tablet by mouth Daily.      azithromycin (Zithromax Z-Raj) 250 MG tablet Take 2 tablets by mouth on day 1, then 1 tablet daily on days 2-5 6 tablet 0    Calcium Carb-Cholecalciferol (CALCIUM/VITAMIN D PO) Take  by mouth.      Estrogens Conjugated (Premarin) 0.625 MG/GM vaginal cream Apply daily for 2 weeks then use 2 times weekly thereafter. 30 g 1    ezetimibe (ZETIA) 10 MG tablet TAKE ONE TABLET BY MOUTH EVERY DAY 90 tablet 3    famotidine (PEPCID) 40 MG tablet TAKE ONE TABLET BY MOUTH EVERY NIGHT AT BEDTIME 90 tablet 1    fluconazole (Diflucan) 150 MG tablet Take 1 tablet by mouth Every 72 (Seventy-Two) Hours As Needed (vaginal itching). 3 tablet 0    hydroCHLOROthiazide 12.5 MG tablet Take 1 tablet by mouth Every Morning. 90 tablet 1    levothyroxine (SYNTHROID, LEVOTHROID) 88 MCG tablet Take 1 tablet by mouth Daily. 90 tablet 1    metFORMIN (GLUCOPHAGE) 500 MG tablet Take 1 tablet by mouth  3 times a day. 270 tablet 1    metoprolol succinate XL (TOPROL-XL) 25 MG 24 hr tablet Take 1 tablet by mouth Every Night. 30 tablet 0    omeprazole (priLOSEC) 40 MG capsule TAKE ONE CAPSULE BY MOUTH EVERY DAY 90 capsule 1    Pradaxa 150 MG capsu Take 1 capsule by mouth 2 (Two) Times a Day. 180 capsule 1    promethazine-dextromethorphan (PROMETHAZINE-DM) 6.25-15 MG/5ML syrup Take 5 mL by mouth 4 (Four) Times a Day As Needed for Cough. 120 mL 0    rosuvastatin (CRESTOR) 40 MG tablet Take 1 tablet by mouth Every Night. 90 tablet 1    telmisartan (MICARDIS) 80 MG tablet Take 1 tablet by mouth Every Night. 90 tablet 1     No current facility-administered medications for this visit.     Review of Systems   Constitutional: Negative.    Skin:         Painful toenails.   All other systems reviewed and are negative.      OBJECTIVE     Vitals:    25 0937   BP: 136/62   Pulse: 100   Temp: 98 °F (36.7 °C)   SpO2: 96%       Body mass index is 45.31 kg/m².    Lab Results   Component Value Date    HGBA1C 6.90 (H) 2024       Lab Results   Component Value Date    GLUCOSE 125 (H) 2024    CALCIUM 9.9 2024     2024    K 4.7 2024    CO2 26.0 2024     2024    BUN 15 2024    CREATININE 0.93 2024    EGFRIFNONA 33 (L) 2021    BCR 16.1 2024    ANIONGAP 12.0 2024       Patient seen in no apparent distress.      PHYSICAL EXAM:     Foot/Ankle Exam    GENERAL  Appearance:  obese and elderly  Orientation:  AAOx3  Affect:  appropriate  Gait:  unimpaired  Assistance:  independent  Right shoe gear: casual shoe  Left shoe gear: casual shoe    VASCULAR     Right Foot Vascularity   Dorsalis pedis:  1+  Posterior tibial:  1+  Skin temperature:  warm  Edema gradin+ and pitting  CFT:  < 3 seconds  Pedal hair growth:  Absent  Varicosities:  moderate varicosities     Left Foot Vascularity   Dorsalis pedis:  1+  Posterior tibial:  1+  Skin temperature:  warm  Edema  gradin+ and pitting  CFT:  < 3 seconds  Pedal hair growth:  Absent  Varicosities:  moderate varicosities     NEUROLOGIC     Right Foot Neurologic   Normal sensation    Light touch sensation: normal  Vibratory sensation: normal  Hot/Cold sensation: normal  Protective Sensation using Martinez-Maximo Monofilament:   Sites intact: 10  Sites tested: 10     Left Foot Neurologic   Normal sensation    Light touch sensation: normal  Vibratory sensation: normal  Hot/Cold sensation:  normal  Protective Sensation using Martinez-Maximo Monofilament:   Sites intact: 10  Sites tested: 10    MUSCLE STRENGTH     Right Foot Muscle Strength   Foot dorsiflexion:  4  Foot plantar flexion:  4  Foot inversion:  4  Foot eversion:  4     Left Foot Muscle Strength   Foot dorsiflexion:  4  Foot plantar flexion:  4  Foot inversion:  4  Foot eversion:  4    RANGE OF MOTION     Right Foot Range of Motion   Foot and ankle ROM within normal limits       Left Foot Range of Motion   Foot and ankle ROM within normal limits      DERMATOLOGIC      Right Foot Dermatologic   Skin  Right foot skin is intact.   Nails  1.  Positive for elongated, onychomycosis, abnormal thickness, subungual debris and ingrown toenail.  2.  Positive for elongated, onychomycosis, abnormal thickness, subungual debris and ingrown toenail.  3.  Positive for elongated, onychomycosis, abnormal thickness, subungual debris and ingrown toenail.  4.  Positive for elongated, onychomycosis, abnormal thickness, subungual debris and ingrown toenail.  5.  Positive for elongated, onychomycosis, abnormal thickness, subungual debris and ingrown toenail.     Left Foot Dermatologic   Skin  Left foot skin is intact.   Nails  1.  Positive for elongated, onychomycosis, abnormal thickness, subungual debris and ingrown toenail.  3.  Positive for elongated, onychomycosis, abnormal thickness, subungual debris and ingrown toenail.  4.  Positive for elongated, onychomycosis, abnormally thick,  subungual debris and ingrown toenail.  5.  Positive for elongated, onychomycosis, abnormally thick, subungual debris and ingrown toenail.    I have reexamined the patient the results are consistent with the previously documented exam.    ASSESSMENT/PLAN     Diagnoses and all orders for this visit:    1. Onychomycosis (Primary)    2. Onychocryptosis    3. Foot pain, bilateral    4. Non-insulin dependent type 2 diabetes mellitus    5. DM feet    Comprehensive lower extremity examination and evaluation was performed.    Discussed findings and treatment plan including risks, benefits, and treatment options with patient in detail. Patient agreed with treatment plan.    Medications and allergies reviewed.  Reviewed available blood glucose and HgB A1C lab values along with other pertinent labs.  These were discussed with the patient as to their importance of diabetic maintenance.    Toenails 1, 2, 3, 4, 5 on Right and 1, 3, 4, 5 on Left were debrided with nail nippers then filed with a Dremel nail yves.  Patient tolerated procedure well without complications.    An After Visit Summary was printed and given to the patient at discharge, including (if requested) any available informative/educational handouts regarding diagnosis, treatment, or medications. All questions were answered to patient/family satisfaction. Should symptoms fail to improve or worsen they agree to call or return to clinic or to go to the Emergency Department. Discussed the importance of following up with any needed screening tests/labs/specialist appointments and any requested follow-up recommended by me today. Importance of maintaining follow-up discussed and patient accepts that missed appointments can delay diagnosis and potentially lead to worsening of conditions.    Return in about 9 weeks (around 5/5/2025) for Toenail Care., or sooner if acute issues arise.    I have reviewed the assessment and plan and verified the accuracy of it. No changes to  assessment and plan since the information was documented. Isiah Cuevas DPM 03/03/25     I have dictated this note utilizing Dragon Dictation.  Please note that portions of this note were completed with a voice recognition program.  Part of this note may be an electronic transcription/translation of spoken language to printed text using the Dragon Dictation System.      This document has been electronically signed by Isiah Cuevas DPM on March 3, 2025 09:43 EST

## 2025-03-04 DIAGNOSIS — E11.9 TYPE 2 DIABETES MELLITUS WITHOUT COMPLICATION, WITHOUT LONG-TERM CURRENT USE OF INSULIN: Primary | ICD-10-CM

## 2025-03-04 RX ORDER — NITROFURANTOIN 25; 75 MG/1; MG/1
100 CAPSULE ORAL 2 TIMES DAILY
Qty: 14 CAPSULE | Refills: 0 | Status: SHIPPED | OUTPATIENT
Start: 2025-03-04

## 2025-03-05 LAB — BACTERIA SPEC AEROBE CULT: ABNORMAL

## 2025-03-21 ENCOUNTER — LAB (OUTPATIENT)
Dept: LAB | Facility: HOSPITAL | Age: 70
End: 2025-03-21
Payer: MEDICARE

## 2025-03-21 ENCOUNTER — TRANSCRIBE ORDERS (OUTPATIENT)
Dept: LAB | Facility: HOSPITAL | Age: 70
End: 2025-03-21
Payer: MEDICARE

## 2025-03-21 DIAGNOSIS — I10 ESSENTIAL HYPERTENSION, MALIGNANT: ICD-10-CM

## 2025-03-21 DIAGNOSIS — E55.9 VITAMIN D DEFICIENCY, UNSPECIFIED: ICD-10-CM

## 2025-03-21 DIAGNOSIS — D64.9 ANEMIA, UNSPECIFIED TYPE: ICD-10-CM

## 2025-03-21 DIAGNOSIS — E11.9 DIABETES MELLITUS WITHOUT COMPLICATION: ICD-10-CM

## 2025-03-21 DIAGNOSIS — N18.2 CHRONIC KIDNEY DISEASE, STAGE II (MILD): ICD-10-CM

## 2025-03-21 DIAGNOSIS — E53.8 B12 DEFICIENCY: ICD-10-CM

## 2025-03-21 DIAGNOSIS — D64.9 ANEMIA, UNSPECIFIED TYPE: Primary | ICD-10-CM

## 2025-03-21 LAB
25(OH)D3 SERPL-MCNC: 17.1 NG/ML (ref 30–100)
ALBUMIN SERPL-MCNC: 3.9 G/DL (ref 3.5–5.2)
ALBUMIN UR-MCNC: 205.6 MG/DL
ALBUMIN/GLOB SERPL: 1.4 G/DL
ALP SERPL-CCNC: 77 U/L (ref 39–117)
ALT SERPL W P-5'-P-CCNC: 21 U/L (ref 1–33)
ANION GAP SERPL CALCULATED.3IONS-SCNC: 11 MMOL/L (ref 5–15)
AST SERPL-CCNC: 25 U/L (ref 1–32)
BACTERIA UR QL AUTO: ABNORMAL /HPF
BASOPHILS # BLD AUTO: 0.03 10*3/MM3 (ref 0–0.2)
BASOPHILS NFR BLD AUTO: 0.6 % (ref 0–1.5)
BILIRUB SERPL-MCNC: 0.5 MG/DL (ref 0–1.2)
BILIRUB UR QL STRIP: NEGATIVE
BUN SERPL-MCNC: 17 MG/DL (ref 8–23)
BUN/CREAT SERPL: 19.5 (ref 7–25)
CALCIUM SPEC-SCNC: 9.4 MG/DL (ref 8.6–10.5)
CHLORIDE SERPL-SCNC: 103 MMOL/L (ref 98–107)
CLARITY UR: CLEAR
CO2 SERPL-SCNC: 25 MMOL/L (ref 22–29)
COLOR UR: YELLOW
CREAT SERPL-MCNC: 0.87 MG/DL (ref 0.57–1)
CREAT UR-MCNC: 38.6 MG/DL
CREAT UR-MCNC: 45.7 MG/DL
DEPRECATED RDW RBC AUTO: 42.8 FL (ref 37–54)
EGFRCR SERPLBLD CKD-EPI 2021: 72.2 ML/MIN/1.73
EOSINOPHIL # BLD AUTO: 0.12 10*3/MM3 (ref 0–0.4)
EOSINOPHIL NFR BLD AUTO: 2.5 % (ref 0.3–6.2)
ERYTHROCYTE [DISTWIDTH] IN BLOOD BY AUTOMATED COUNT: 12.5 % (ref 12.3–15.4)
FOLATE SERPL-MCNC: 10.5 NG/ML (ref 4.78–24.2)
GLOBULIN UR ELPH-MCNC: 2.8 GM/DL
GLUCOSE SERPL-MCNC: 125 MG/DL (ref 65–99)
GLUCOSE UR STRIP-MCNC: ABNORMAL MG/DL
HBA1C MFR BLD: 7.2 % (ref 4.8–5.6)
HCT VFR BLD AUTO: 39.9 % (ref 34–46.6)
HGB BLD-MCNC: 13 G/DL (ref 12–15.9)
HGB UR QL STRIP.AUTO: NEGATIVE
HYALINE CASTS UR QL AUTO: ABNORMAL /LPF
IMM GRANULOCYTES # BLD AUTO: 0.03 10*3/MM3 (ref 0–0.05)
IMM GRANULOCYTES NFR BLD AUTO: 0.6 % (ref 0–0.5)
IRON 24H UR-MRATE: 51 MCG/DL (ref 37–145)
IRON SATN MFR SERPL: 16 % (ref 20–50)
KETONES UR QL STRIP: NEGATIVE
LEUKOCYTE ESTERASE UR QL STRIP.AUTO: NEGATIVE
LYMPHOCYTES # BLD AUTO: 0.89 10*3/MM3 (ref 0.7–3.1)
LYMPHOCYTES NFR BLD AUTO: 18.5 % (ref 19.6–45.3)
MCH RBC QN AUTO: 30.7 PG (ref 26.6–33)
MCHC RBC AUTO-ENTMCNC: 32.6 G/DL (ref 31.5–35.7)
MCV RBC AUTO: 94.3 FL (ref 79–97)
MICROALBUMIN/CREAT UR: 5326.4 MG/G (ref 0–29)
MONOCYTES # BLD AUTO: 0.37 10*3/MM3 (ref 0.1–0.9)
MONOCYTES NFR BLD AUTO: 7.7 % (ref 5–12)
NEUTROPHILS NFR BLD AUTO: 3.37 10*3/MM3 (ref 1.7–7)
NEUTROPHILS NFR BLD AUTO: 70.1 % (ref 42.7–76)
NITRITE UR QL STRIP: NEGATIVE
NRBC BLD AUTO-RTO: 0 /100 WBC (ref 0–0.2)
PH UR STRIP.AUTO: 6.5 [PH] (ref 5–8)
PLATELET # BLD AUTO: 237 10*3/MM3 (ref 140–450)
PMV BLD AUTO: 10.8 FL (ref 6–12)
POTASSIUM SERPL-SCNC: 4 MMOL/L (ref 3.5–5.2)
PROT ?TM UR-MCNC: 356.3 MG/DL
PROT SERPL-MCNC: 6.7 G/DL (ref 6–8.5)
PROT UR QL STRIP: ABNORMAL
PROT/CREAT UR: 7.8 MG/G{CREAT}
RBC # BLD AUTO: 4.23 10*6/MM3 (ref 3.77–5.28)
RBC # UR STRIP: ABNORMAL /HPF
REF LAB TEST METHOD: ABNORMAL
SODIUM SERPL-SCNC: 139 MMOL/L (ref 136–145)
SP GR UR STRIP: 1.02 (ref 1–1.03)
SQUAMOUS #/AREA URNS HPF: ABNORMAL /HPF
TIBC SERPL-MCNC: 326 MCG/DL (ref 298–536)
TRANSFERRIN SERPL-MCNC: 219 MG/DL (ref 200–360)
UROBILINOGEN UR QL STRIP: ABNORMAL
VIT B12 BLD-MCNC: 299 PG/ML (ref 211–946)
WBC # UR STRIP: ABNORMAL /HPF
WBC NRBC COR # BLD AUTO: 4.81 10*3/MM3 (ref 3.4–10.8)

## 2025-03-21 PROCEDURE — 80053 COMPREHEN METABOLIC PANEL: CPT

## 2025-03-21 PROCEDURE — 82570 ASSAY OF URINE CREATININE: CPT

## 2025-03-21 PROCEDURE — 85025 COMPLETE CBC W/AUTO DIFF WBC: CPT

## 2025-03-21 PROCEDURE — 83540 ASSAY OF IRON: CPT

## 2025-03-21 PROCEDURE — 84466 ASSAY OF TRANSFERRIN: CPT

## 2025-03-21 PROCEDURE — 82043 UR ALBUMIN QUANTITATIVE: CPT

## 2025-03-21 PROCEDURE — 83036 HEMOGLOBIN GLYCOSYLATED A1C: CPT

## 2025-03-21 PROCEDURE — 84156 ASSAY OF PROTEIN URINE: CPT

## 2025-03-21 PROCEDURE — 82306 VITAMIN D 25 HYDROXY: CPT

## 2025-03-21 PROCEDURE — 82607 VITAMIN B-12: CPT

## 2025-03-21 PROCEDURE — 82746 ASSAY OF FOLIC ACID SERUM: CPT

## 2025-03-21 PROCEDURE — 36415 COLL VENOUS BLD VENIPUNCTURE: CPT

## 2025-03-21 PROCEDURE — 81001 URINALYSIS AUTO W/SCOPE: CPT

## 2025-03-24 ENCOUNTER — DOCUMENTATION (OUTPATIENT)
Dept: TELEMETRY | Facility: HOSPITAL | Age: 70
End: 2025-03-24
Payer: MEDICARE

## 2025-03-24 ENCOUNTER — TRANSCRIBE ORDERS (OUTPATIENT)
Dept: ADMINISTRATIVE | Facility: HOSPITAL | Age: 70
End: 2025-03-24
Payer: MEDICARE

## 2025-03-24 DIAGNOSIS — R80.8 OTHER PROTEINURIA: ICD-10-CM

## 2025-03-24 DIAGNOSIS — N18.2 CHRONIC KIDNEY DISEASE (CKD), STAGE II (MILD): Primary | ICD-10-CM

## 2025-03-24 DIAGNOSIS — I10 ESSENTIAL HYPERTENSION, MALIGNANT: ICD-10-CM

## 2025-03-24 DIAGNOSIS — N39.0 URINARY TRACT INFECTION WITHOUT HEMATURIA, SITE UNSPECIFIED: ICD-10-CM

## 2025-03-26 ENCOUNTER — LAB (OUTPATIENT)
Dept: LAB | Facility: HOSPITAL | Age: 70
End: 2025-03-26
Payer: MEDICARE

## 2025-03-26 DIAGNOSIS — R80.8 OTHER PROTEINURIA: ICD-10-CM

## 2025-03-26 DIAGNOSIS — N39.0 URINARY TRACT INFECTION WITHOUT HEMATURIA, SITE UNSPECIFIED: ICD-10-CM

## 2025-03-26 DIAGNOSIS — I10 ESSENTIAL HYPERTENSION, MALIGNANT: ICD-10-CM

## 2025-03-26 DIAGNOSIS — N18.2 CHRONIC KIDNEY DISEASE (CKD), STAGE II (MILD): ICD-10-CM

## 2025-03-26 LAB
ALBUMIN SERPL-MCNC: 4 G/DL (ref 3.5–5.2)
ALBUMIN/GLOB SERPL: 1.4 G/DL
ALP SERPL-CCNC: 71 U/L (ref 39–117)
ALT SERPL W P-5'-P-CCNC: 26 U/L (ref 1–33)
ANION GAP SERPL CALCULATED.3IONS-SCNC: 12.8 MMOL/L (ref 5–15)
AST SERPL-CCNC: 30 U/L (ref 1–32)
BILIRUB SERPL-MCNC: 0.5 MG/DL (ref 0–1.2)
BUN SERPL-MCNC: 14 MG/DL (ref 8–23)
BUN/CREAT SERPL: 15.2 (ref 7–25)
CALCIUM SPEC-SCNC: 9.5 MG/DL (ref 8.6–10.5)
CHLORIDE SERPL-SCNC: 103 MMOL/L (ref 98–107)
CO2 SERPL-SCNC: 25.2 MMOL/L (ref 22–29)
COLLECT DURATION TIME UR: 24 HRS
COLLECT DURATION TIME UR: 24 HRS
CREAT CL 24H UR+SERPL-VRATE: 61.6 ML/MIN (ref 88–128)
CREAT CL 24H UR+SERPL-VRATE: 88.7 L/24 HR (ref 126.7–184.3)
CREAT SERPL-MCNC: 0.92 MG/DL (ref 0.57–1)
CREAT SERPL-MCNC: 0.92 MG/DL (ref 0.57–1)
CREAT UR-MCNC: 0.92 MG/DL (ref 0.57–1)
CREAT UR-MCNC: 56.9 MG/DL
CREATINE 24H UR-MRATE: 0.94 G/24 HR (ref 0.7–1.6)
EGFRCR SERPLBLD CKD-EPI 2021: 67.5 ML/MIN/1.73
EGFRCR SERPLBLD CKD-EPI 2021: 67.5 ML/MIN/1.73
GLOBULIN UR ELPH-MCNC: 2.9 GM/DL
GLUCOSE SERPL-MCNC: 127 MG/DL (ref 65–99)
POTASSIUM SERPL-SCNC: 4.1 MMOL/L (ref 3.5–5.2)
PROT 24H UR-MRATE: 97.4 MG/24HOURS (ref 0–150)
PROT SERPL-MCNC: 6.9 G/DL (ref 6–8.5)
SODIUM SERPL-SCNC: 141 MMOL/L (ref 136–145)
SPECIMEN VOL 24H UR: 1650 ML
SPECIMEN VOL 24H UR: 1650 ML

## 2025-03-26 PROCEDURE — 84156 ASSAY OF PROTEIN URINE: CPT

## 2025-03-26 PROCEDURE — 82575 CREATININE CLEARANCE TEST: CPT

## 2025-03-26 PROCEDURE — 82565 ASSAY OF CREATININE: CPT

## 2025-03-26 PROCEDURE — 36415 COLL VENOUS BLD VENIPUNCTURE: CPT

## 2025-03-26 PROCEDURE — 80053 COMPREHEN METABOLIC PANEL: CPT

## 2025-04-21 RX ORDER — NITROFURANTOIN 25; 75 MG/1; MG/1
100 CAPSULE ORAL 2 TIMES DAILY
Qty: 14 CAPSULE | Refills: 0 | OUTPATIENT
Start: 2025-04-21

## 2025-05-05 ENCOUNTER — HOSPITAL ENCOUNTER (OUTPATIENT)
Dept: MAMMOGRAPHY | Facility: HOSPITAL | Age: 70
Discharge: HOME | End: 2025-05-05
Admitting: NURSE PRACTITIONER
Payer: MEDICARE

## 2025-05-05 DIAGNOSIS — Z12.31 ENCOUNTER FOR SCREENING MAMMOGRAM FOR MALIGNANT NEOPLASM OF BREAST: ICD-10-CM

## 2025-05-05 PROCEDURE — 77063 BREAST TOMOSYNTHESIS BI: CPT

## 2025-05-05 PROCEDURE — 77067 SCR MAMMO BI INCL CAD: CPT

## 2025-05-27 DIAGNOSIS — I48.0 PAROXYSMAL ATRIAL FIBRILLATION: ICD-10-CM

## 2025-05-27 RX ORDER — ATENOLOL 100 MG/1
150 TABLET ORAL 2 TIMES DAILY
Qty: 180 CAPSULE | Refills: 1 | Status: SHIPPED | OUTPATIENT
Start: 2025-05-27

## 2025-05-27 NOTE — TELEPHONE ENCOUNTER
**STATES ONCE THIS IS SENT SHE NEEDS A PRIOR AUTHORIZATION STARTED **    Caller: Gifty Hoover    Relationship: Self    Best call back number:   Telephone Information:   Mobile [phone]        Requested Prescriptions:   Requested Prescriptions     Pending Prescriptions Disp Refills    Pradaxa 150 MG capsu 180 capsule 1     Sig: Take 1 capsule by mouth 2 (Two) Times a Day.        Pharmacy where request should be sent: [facility] [place] [address] [facility] [address] - [phone] [facility] [phone] FX     Last office visit with prescribing clinician: 3/3/2025   Last telemedicine visit with prescribing clinician: Visit date not found   Next office visit with prescribing clinician: 9/8/2025         Does the patient have less than a 3 day supply:  [] Yes  [x] No      Summer Nathan Darling Rep   05/27/25 09:05 EDT

## 2025-06-03 ENCOUNTER — OFFICE VISIT (OUTPATIENT)
Dept: WOUND CARE | Facility: HOSPITAL | Age: 70
End: 2025-06-03
Payer: MEDICARE

## 2025-06-03 VITALS
HEART RATE: 103 BPM | SYSTOLIC BLOOD PRESSURE: 148 MMHG | DIASTOLIC BLOOD PRESSURE: 80 MMHG | TEMPERATURE: 98.2 F | RESPIRATION RATE: 18 BRPM

## 2025-06-03 DIAGNOSIS — I83.013 VENOUS STASIS ULCER OF RIGHT ANKLE LIMITED TO BREAKDOWN OF SKIN WITH VARICOSE VEINS: Primary | ICD-10-CM

## 2025-06-03 DIAGNOSIS — I87.2 VENOUS STASIS DERMATITIS OF BOTH LOWER EXTREMITIES: ICD-10-CM

## 2025-06-03 DIAGNOSIS — E11.8 CONTROLLED TYPE 2 DIABETES MELLITUS WITH COMPLICATION, WITHOUT LONG-TERM CURRENT USE OF INSULIN: ICD-10-CM

## 2025-06-03 DIAGNOSIS — I87.2 VENOUS INSUFFICIENCY OF BOTH LOWER EXTREMITIES: ICD-10-CM

## 2025-06-03 DIAGNOSIS — L97.311 VENOUS STASIS ULCER OF RIGHT ANKLE LIMITED TO BREAKDOWN OF SKIN WITH VARICOSE VEINS: Primary | ICD-10-CM

## 2025-06-03 PROCEDURE — 1159F MED LIST DOCD IN RCRD: CPT | Performed by: EMERGENCY MEDICINE

## 2025-06-03 PROCEDURE — 3077F SYST BP >= 140 MM HG: CPT | Performed by: EMERGENCY MEDICINE

## 2025-06-03 PROCEDURE — 99205 OFFICE O/P NEW HI 60 MIN: CPT | Performed by: EMERGENCY MEDICINE

## 2025-06-03 PROCEDURE — 3079F DIAST BP 80-89 MM HG: CPT | Performed by: EMERGENCY MEDICINE

## 2025-06-03 PROCEDURE — 1160F RVW MEDS BY RX/DR IN RCRD: CPT | Performed by: EMERGENCY MEDICINE

## 2025-06-03 NOTE — PROGRESS NOTES
Wound Location: Right leg    Wound Exudate: moderate  Wound Debridement: Mechanically debrided with normal saline & gauze  Wound Thickness: full    Cleanse with:  NS or antibacterial soap and water    Primary: Polymem Ag  Secondary: unna boot  Secured with: rolled gauze, elastic wrap     Instructions: Cleanse wound with ns or antibacterial soap and water, pat dry, apply Polymem Ag to wound base, apply unna boot, rolled gauze and elastic wrap. Wrap from base of toes to just below the knee. Change Twice weekly    Wound Location: Left leg    Wound Exudate: moderate  Wound Debridement: Mechanically debrided with normal saline & gauze  Wound Thickness: full    Cleanse with:  NS or antibacterial soap and water    Primary Dressing: unna boot  Secured with: rolled gauze, elastic wrap     Instructions: Cleanse wound with ns or antibacterial soap and water, pat dry, apply Polymem Ag to wound base, apply unna boot, rolled gauze and elastic wrap. Wrap from base of toes to just below the knee. Change Once weekly    Education: Educated patient/family member/CG on how to apply new dressings, verbal understanding provided. Patient/family member/CG instructed to call with any questions or concerns

## 2025-06-03 NOTE — PROGRESS NOTES
Chief Complaint  Wound Check (New pt here today for wound check to RLE, pt states she has had this wound before, it appeared this time approximately 1 month ago. She is currently treating her leg with Aquaphor and compression sock. )    Subjective      History of Present Illness    Gifty Hoover  is a 69 y.o. female     History of Present Illness  The patient is a 69-year-old female here for evaluation of a wound on her right medial lower leg.    She has a history of venous stasis ulcers, with the most recent episode occurring in 2017 or 2018. Since ceasing her employment several years ago, she has experienced fewer complications. She reports persistent swelling in her left leg, which is more pronounced than in her right leg. Over the past month or more, she has developed a wound on her right side that has been progressively enlarging. She has been managing it with regular cleaning, application of Aquaphor, and dressing changes. She also uses compression stockings for support. She reports significant drainage from the wound. She typically performs household chores in the morning and elevates her feet while seated, using a recliner for comfort. She sleeps in a bed with her feet elevated. She has not undergone any vein procedures such as ablation or stent placement. She reports no sensitivity to silver. She has previously used Unna boots, but they were removed due to severe burning sensations.    She has a history of blood clots and currently has a filter in her lower extremities. In 2011, she had blood clots in both lungs and underwent a minor procedure for her bladder. She is currently on Pradaxa. She has been tested for clotting disorders, which returned negative results. She has previously consulted with Dr. Carroll, a hematologist, and received iron infusions for anemia. She also sees Dr. Zavala, a cardiologist. She was previously diagnosed with superficial thrombophlebitis last year and was advised to elevate her  legs and apply heat.    She has diabetes, with her last A1c recorded at 6.1. She is on oral medication for diabetes and does not use insulin.    She takes Lasix as needed if she gains 2 to 3 pounds over her usual weight. She also takes hydrochlorothiazide daily.    SOCIAL HISTORY  She does not smoke.    ALLERGIES  The patient has no known allergies.    MEDICATIONS  Current: Pradaxa, Lasix, hydrochlorothiazide    Allergies:  Patient has no known allergies.      Current Outpatient Medications:     albuterol sulfate HFA (ProAir HFA) 108 (90 Base) MCG/ACT inhaler, Inhale 2 puffs Every 6 (Six) Hours As Needed for Wheezing., Disp: 18 g, Rfl: 0    Ascorbic Acid (VITAMIN C PO), Take  by mouth., Disp: , Rfl:     aspirin 81 MG EC tablet, Take 1 tablet by mouth Daily., Disp: , Rfl:     Calcium Carb-Cholecalciferol (CALCIUM/VITAMIN D PO), Take  by mouth., Disp: , Rfl:     Estrogens Conjugated (Premarin) 0.625 MG/GM vaginal cream, Apply daily for 2 weeks then use 2 times weekly thereafter., Disp: 30 g, Rfl: 1    ezetimibe (ZETIA) 10 MG tablet, TAKE ONE TABLET BY MOUTH EVERY DAY, Disp: 90 tablet, Rfl: 3    famotidine (PEPCID) 40 MG tablet, TAKE ONE TABLET BY MOUTH EVERY NIGHT AT BEDTIME, Disp: 90 tablet, Rfl: 1    hydroCHLOROthiazide 12.5 MG tablet, Take 1 tablet by mouth Every Morning., Disp: 90 tablet, Rfl: 1    levothyroxine (SYNTHROID, LEVOTHROID) 88 MCG tablet, Take 1 tablet by mouth Daily., Disp: 90 tablet, Rfl: 1    metFORMIN (GLUCOPHAGE) 500 MG tablet, Take 1 tablet by mouth 3 times a day., Disp: 270 tablet, Rfl: 1    omeprazole (priLOSEC) 40 MG capsule, TAKE ONE CAPSULE BY MOUTH EVERY DAY, Disp: 90 capsule, Rfl: 1    Pradaxa 150 MG capsu, Take 1 capsule by mouth 2 (Two) Times a Day., Disp: 180 capsule, Rfl: 1    rosuvastatin (CRESTOR) 40 MG tablet, Take 1 tablet by mouth Every Night., Disp: 90 tablet, Rfl: 1    telmisartan (MICARDIS) 80 MG tablet, Take 1 tablet by mouth Every Night., Disp: 90 tablet, Rfl: 1     azithromycin (Zithromax Z-Raj) 250 MG tablet, Take 2 tablets by mouth on day 1, then 1 tablet daily on days 2-5, Disp: 6 tablet, Rfl: 0    fluconazole (Diflucan) 150 MG tablet, Take 1 tablet by mouth Every 72 (Seventy-Two) Hours As Needed (vaginal itching)., Disp: 3 tablet, Rfl: 0    metoprolol succinate XL (TOPROL-XL) 25 MG 24 hr tablet, Take 1 tablet by mouth Every Night., Disp: 30 tablet, Rfl: 0    nitrofurantoin, macrocrystal-monohydrate, (Macrobid) 100 MG capsule, Take 1 capsule by mouth 2 (Two) Times a Day., Disp: 14 capsule, Rfl: 0    promethazine-dextromethorphan (PROMETHAZINE-DM) 6.25-15 MG/5ML syrup, Take 5 mL by mouth 4 (Four) Times a Day As Needed for Cough., Disp: 120 mL, Rfl: 0    Past Medical History:   Diagnosis Date    Anemia 2022    NO CURRENT S/S    Anxiety     Arthritis     Asthma 2020    Shortness of breath after walking long periods of time.    CAD 09/14/2021    [facility] SEES PT    Cholelithiasis 2022    Chronic kidney disease 2022    NO DIALYSIS    Colon polyp 2008    BENIGN    Deep vein thrombosis 2011    Green filter in place.    Depression     Diabetes mellitus     Elevated cholesterol     Essential hypertension 09/14/2021    GERD (gastroesophageal reflux disease)     Heart attack 2009    NO STENTS/NO BYPASS    History of pulmonary embolism     2011, TAKE PRADAXA    Hypothyroid     Ingrown toenail 2022    Low back pain 2019    Lumbar radiculopathy 09/2018    describes sensory S1 radiculopathy but would favor right L5    Obesity 2020    Paroxysmal atrial fibrillation 09/14/2021    [facility]    Pulmonary embolism 2011    Renal insufficiency 2022    Seasonal allergies     Sleep apnea     BIPAP?    Urinary tract infection 2022    Visual impairment     Glasses for nearsightedness     Past Surgical History:   Procedure Laterality Date    BLADDER SURGERY  2011    BREAST BIOPSY  2002    Upper right breast BENIGN    BREAST SURGERY      CARDIAC CATHETERIZATION  2011    NO INTERVENTION    CARDIAC SURGERY       R/T BLOOD CLOTS, NO CARDIAC INTERVENTION     SECTION  1982    CHOLECYSTECTOMY N/A 2022    Procedure: laparoscopic cholecystectomy, possible open;  Surgeon: Krystal Dacosta MD;  Location[place] [facility] [place] [facility] [place]  Service: General;  Laterality: N/A;    COLONOSCOPY  2018    COLONOSCOPY N/A 2022    Procedure: COLONOSCOPY;  Surgeon: Ta Vasquez MD;  Location: [facility] ENDOSCOPY;  Service: Gastroenterology;  Laterality: N/A;  DIVERTICULOSIS    ENDOSCOPY N/A 2022    Procedure: ESOPHAGOGASTRODUODENOSCOPY WITH BX;  Surgeon: Ta Vasquez MD;  Location: [facility] ENDOSCOPY;  Service: Gastroenterology;  Laterality: N/A;  SMALL HIATAL HERNIA    ERCP N/A 2022    Procedure: ENDOSCOPIC RETROGRADE CHOLANGIOPANCREATOGRAPHY WITH SPHINCTEROTOMY, STONE REMOVAL, STENT PLACEMENT;  Surgeon: Arden Garcia MD;  Location: [facility] ENDOSCOPY;  Service: Gastroenterology;  Laterality: N/A;  BILE DUCT STONES    ERCP N/A 2023    Procedure: ENDOSCOPIC RETROGRADE CHOLANGIOPANCREATOGRAPHY with stent removal, balloon dilation, stone removal;  Surgeon: Arden Garcia MD;  Location: [facility] ENDOSCOPY;  Service: Gastroenterology;  Laterality: N/A;  bile duct stones    NICHOLAS FILTER INSERTION   2011    HYSTERECTOMY       Social History     Socioeconomic History    Marital status: Single   Tobacco Use    Smoking status: Never    Smokeless tobacco: Never   Vaping Use    Vaping status: Never Used   Substance and Sexual Activity    Alcohol use: Never    Drug use: Never    Sexual activity: Not Currently     Partners: Male     Birth control/protection: Hysterectomy, Surgical           Objective     Vitals:    25 1355   BP: 148/80   BP Location: Right arm   Patient Position: Sitting   Cuff Size: Adult   Pulse: 103   Resp: 18  Comment: 95% RA   Temp: 98.2 °F (36.8 °C)   TempSrc: Temporal   PainSc: 5   Comment: shooting and itching pain   PainLoc: Leg     There is no height or weight  on file to calculate BMI.    STEADI Fall Risk Assessment has not been completed.     Review of Systems     ROS:  Per HPI.     I have reviewed the HPI and ROS as documented by MA/RN. Cindy Gonsalez MD    Physical Exam     NAD  AAOx3, pleasant, cooperative      Physical Exam  There is a large ulceration along the right medial ankle with a smaller ulcer superior to that. Minimal slough. Extensive edema and venous stasis discoloration are present in both lower extremities. The edema in the right lower extremity is relatively well controlled due to a compression wrap. There is no evidence of infection in the wound.         Result Review :  The following data was reviewed by: Cindy Gonsalez MD on 06/03/2025:    PCP note, podiatry note, venous duplex 08/2024, hemoglobin A1c 7.2, CMP               Assessment and Plan   Diagnoses and all orders for this visit:    1. Venous stasis ulcer of right ankle limited to breakdown of skin with varicose veins (Primary)  -     Venous w Reflux Lower Extremity - Bilateral CAR; Future  -     Bandage UNNABoot 4IN 10YD    2. Venous insufficiency of both lower extremities  -     Venous w Reflux Lower Extremity - Bilateral CAR; Future  -     Bandage UNNABoot 4IN 10YD    3. Venous stasis dermatitis of both lower extremities  -     Venous w Reflux Lower Extremity - Bilateral CAR; Future  -     Bandage UNNABoot 4IN 10YD    4. Controlled type 2 diabetes mellitus with complication, without long-term current use of insulin    Other orders  -     Gelocast Unnas boot 2 each        Assessment & Plan  1. Right medial lower leg wound.  The wound on the right medial lower leg has been present for about a month and is increasing in size. There is significant drainage and minimal slough. The patient has been using Aquaphor and dressing the wound while wearing compression stockings. An Unna boot will be applied to both legs, with PolyMem silver on the right ankle. Dressing changes will be scheduled twice  weekly initially, then reduced to once weekly as drainage decreases. The patient is advised to elevate her feet above heart level when sitting and to maintain good blood sugar control to aid healing. Aggressive moisturizing is recommended once the wound heals to keep the skin healthy and pliable. If PolyMem silver causes discomfort or is ineffective, medical-grade honey can be considered for use under the Unna boot.  Patient will come to the wound clinic twice weekly for dressing changes.  Therapy plan entered.    2. Stasis dermatitis.  The patient has stasis dermatitis on the left leg without ulcers. The left leg will be wrapped with an Unna boot once weekly to better control edema and stasis. Once the condition improves, she can return to using compression stockings.    3. Diabetes mellitus.  The patient's last A1c was around 6.1. Good blood sugar control is crucial for wound healing and preventing infections. She is advised to continue her current diabetes management regimen and maintain a healthy diet and exercise routine.    4. History of blood clots.  The patient has a history of blood clots and is currently on Pradaxa. An ultrasound will be ordered to assess for reflux and insufficiency in the veins. If significant issues are found, referral to a specialist for potential stent placement or ablation will be considered.    5. Medication management.  The patient takes hydrochlorothiazide daily and Lasix as needed for fluid management. She is advised to continue these medications as prescribed by her PCP and Cardiologist.    Follow-up  The patient will follow up in 2 weeks.      Patient was given instructions and counseling regarding their condition or for health maintenance advice, as well as the wound care plan and recommendations. They understand and agree with the plan.  They will follow back up here in the clinic but are instructed to contact us in the interim should they have any new, returning, or  worsening symptoms or concerns. Please see specific information pulled into the AVS if appropriate.     Dragon Dictation utilized for chart completion.    I spent 62 minutes in both face-to-face and nonface-to-face time for patient care today including, but not limited to, review of old records, reviewing old images, history and physical exam, reviewing test results, counseling patient, entering orders, coordinating care, and documenting.      Follow Up   Return in about 2 weeks (around 6/17/2025).      Cindy Gonsalez MD    Patient or patient representative verbalized consent for the use of Ambient Listening during the visit with  Cindy Gonsalez MD for chart documentation. 6/3/2025  15:35 EDT

## 2025-06-04 ENCOUNTER — OFFICE VISIT (OUTPATIENT)
Dept: PODIATRY | Facility: CLINIC | Age: 70
End: 2025-06-04
Payer: MEDICARE

## 2025-06-04 VITALS
HEART RATE: 94 BPM | BODY MASS INDEX: 45.55 KG/M2 | HEIGHT: 60 IN | OXYGEN SATURATION: 97 % | WEIGHT: 232 LBS | DIASTOLIC BLOOD PRESSURE: 75 MMHG | SYSTOLIC BLOOD PRESSURE: 154 MMHG

## 2025-06-04 DIAGNOSIS — L60.0 ONYCHOCRYPTOSIS: ICD-10-CM

## 2025-06-04 DIAGNOSIS — E11.9 NON-INSULIN DEPENDENT TYPE 2 DIABETES MELLITUS: Primary | ICD-10-CM

## 2025-06-04 DIAGNOSIS — M79.672 FOOT PAIN, BILATERAL: ICD-10-CM

## 2025-06-04 DIAGNOSIS — M79.671 FOOT PAIN, BILATERAL: ICD-10-CM

## 2025-06-04 DIAGNOSIS — B35.1 ONYCHOMYCOSIS: ICD-10-CM

## 2025-06-04 DIAGNOSIS — E11.8 DM FEET: ICD-10-CM

## 2025-06-04 NOTE — PROGRESS NOTES
[facility] - PODIATRY    Today's Date: 06/04/25    Patient Name: Gifty Hoover  MRN: [id number]  CSN: [id number]  PCP: Paris Ace APRN, Last PCP Visit: 5/5/2025  Referring Provider: No ref. provider found    SUBJECTIVE     Chief Complaint   Patient presents with    Left Foot - Nail Problem    Right Foot - Nail Problem       HPI: Gifty Hoover, a 69 y.o.female, presents to clinic for painful toenails:    New, Established, New Problem:  est  Location:  Toenails  Duration:   Greater than five years  Onset:  Gradual  Nature:  sore with palpation.  Stable, worsening, improving:  stable  Aggravating factors:  Pain with shoe gear and ambulation.  Previous Treatment:  Debridement    Patient controlling diabetes via: Oral medication    Patient denies any fevers, chills, nausea, vomiting, shortness of breath, nor any other constitutional signs nor symptoms.    Medical changes:  Unna Boot in place [facility] b/l LE.    I have reviewed/confirmed previously documented HPI with no changes.     Past Medical History:   Diagnosis Date    Anemia 2022    NO CURRENT S/S    Anxiety     Arthritis     Asthma 2020    Shortness of breath after walking long periods of time.    CAD 09/14/2021    AGUAYO SEES PT    Cholelithiasis 2022    Chronic kidney disease 2022    NO DIALYSIS    Colon polyp 2008    BENIGN    Deep vein thrombosis 2011    Green filter in place.    Depression     Diabetes mellitus     Elevated cholesterol     Essential hypertension 09/14/2021    GERD (gastroesophageal reflux disease)     Heart attack 2009    NO STENTS/NO BYPASS    History of pulmonary embolism     2011, TAKE PRADAXA    Hypothyroid     Ingrown toenail 2022    Low back pain 2019    Lumbar radiculopathy 09/2018    describes sensory S1 radiculopathy but would favor right L5    Obesity 2020    Paroxysmal atrial fibrillation 09/14/2021    AGUAYO    Pulmonary embolism 2011    Renal insufficiency 2022    Seasonal allergies     Sleep apnea     BIPAP?     Urinary tract infection     Visual impairment     Glasses for nearsightedness     Past Surgical History:   Procedure Laterality Date    BLADDER SURGERY  2011    BREAST BIOPSY      Upper right breast BENIGN    BREAST SURGERY      CARDIAC CATHETERIZATION      NO INTERVENTION    CARDIAC SURGERY      R/T BLOOD CLOTS, NO CARDIAC INTERVENTION     SECTION      CHOLECYSTECTOMY N/A 2022    Procedure: laparoscopic cholecystectomy, possible open;  Surgeon: Krystal Dacosta MD;  Location[place] [facility] [place] [facility] [place]  Service: General;  Laterality: N/A;    COLONOSCOPY      COLONOSCOPY N/A 2022    Procedure: COLONOSCOPY;  Surgeon: Ta Vasquez MD;  Location: [facility] ENDOSCOPY;  Service: Gastroenterology;  Laterality: N/A;  DIVERTICULOSIS    ENDOSCOPY N/A 2022    Procedure: ESOPHAGOGASTRODUODENOSCOPY WITH BX;  Surgeon: Ta Vasquez MD;  Location: [facility] ENDOSCOPY;  Service: Gastroenterology;  Laterality: N/A;  SMALL HIATAL HERNIA    ERCP N/A 2022    Procedure: ENDOSCOPIC RETROGRADE CHOLANGIOPANCREATOGRAPHY WITH SPHINCTEROTOMY, STONE REMOVAL, STENT PLACEMENT;  Surgeon: Arden Garcia MD;  Location: [facility] ENDOSCOPY;  Service: Gastroenterology;  Laterality: N/A;  BILE DUCT STONES    ERCP N/A 2023    Procedure: ENDOSCOPIC RETROGRADE CHOLANGIOPANCREATOGRAPHY with stent removal, balloon dilation, stone removal;  Surgeon: Arden Garcia MD;  Location: [facility] ENDOSCOPY;  Service: Gastroenterology;  Laterality: N/A;  bile duct stones    NICHOLAS FILTER INSERTION 2011    HYSTERECTOMY      UPPER GASTROINTESTINAL ENDOSCOPY  2022     Family History   Problem Relation Age of Onset    Stroke Mother     Cancer Mother         Colon    Colon cancer Mother 71        still living at 73    Diabetes Mother     Arthritis Mother     Colon polyps Mother     Hypertension Mother     Kidney disease Mother     Miscarriages / Stillbirths Mother     Anemia  Mother     Stroke Father     Arthritis Father         Padgets Disease    Osteoporosis Father     Inflammatory bowel disease Father     Other Father         Parkinson  and padgets disease    Cancer Sister         Eye Cancer    Thyroid disease Sister     Diabetes Brother     Stroke Brother     Bleeding Disorder Daughter     Diabetes Brother     Hypertension Brother     Kidney disease Brother     Stroke Brother     Stroke Brother     Vision loss Brother         vision loss    Hypertension Brother     Malig Hyperthermia Neg Hx      Social History     Socioeconomic History    Marital status: Single   Tobacco Use    Smoking status: Never    Smokeless tobacco: Never   Vaping Use    Vaping status: Never Used   Substance and Sexual Activity    Alcohol use: Never    Drug use: Never    Sexual activity: Not Currently     Partners: Male     Birth control/protection: Hysterectomy, Surgical     No Known Allergies  Current Outpatient Medications   Medication Sig Dispense Refill    albuterol sulfate HFA (ProAir HFA) 108 (90 Base) MCG/ACT inhaler Inhale 2 puffs Every 6 (Six) Hours As Needed for Wheezing. 18 g 0    Ascorbic Acid (VITAMIN C PO) Take  by mouth.      aspirin 81 MG EC tablet Take 1 tablet by mouth Daily.      Calcium Carb-Cholecalciferol (CALCIUM/VITAMIN D PO) Take  by mouth.      Estrogens Conjugated (Premarin) 0.625 MG/GM vaginal cream Apply daily for 2 weeks then use 2 times weekly thereafter. 30 g 1    ezetimibe (ZETIA) 10 MG tablet TAKE ONE TABLET BY MOUTH EVERY DAY 90 tablet 3    famotidine (PEPCID) 40 MG tablet TAKE ONE TABLET BY MOUTH EVERY NIGHT AT BEDTIME 90 tablet 1    hydroCHLOROthiazide 12.5 MG tablet Take 1 tablet by mouth Every Morning. 90 tablet 1    levothyroxine (SYNTHROID, LEVOTHROID) 88 MCG tablet Take 1 tablet by mouth Daily. 90 tablet 1    metFORMIN (GLUCOPHAGE) 500 MG tablet Take 1 tablet by mouth 3 times a day. 270 tablet 1    omeprazole (priLOSEC) 40 MG capsule TAKE ONE CAPSULE BY MOUTH EVERY DAY  90 capsule 1    Pradaxa 150 MG capsu Take 1 capsule by mouth 2 (Two) Times a Day. 180 capsule 1    rosuvastatin (CRESTOR) 40 MG tablet Take 1 tablet by mouth Every Night. 90 tablet 1    telmisartan (MICARDIS) 80 MG tablet Take 1 tablet by mouth Every Night. 90 tablet 1     No current facility-administered medications for this visit.     Review of Systems   Constitutional: Negative.    Skin:         Painful toenails.   All other systems reviewed and are negative.      OBJECTIVE     Vitals:    25 1029   BP: 154/75   Pulse: 94   SpO2: 97%       Body mass index is 45.31 kg/m².    Lab Results   Component Value Date    HGBA1C 7.20 (H) 2025       Lab Results   Component Value Date    GLUCOSE 127 (H) 2025    CALCIUM 9.5 2025     2025    K 4.1 2025    CO2 25.2 2025     2025    BUN 14 2025    CREATININE 0.92 2025    CREATININE 0.92 2025    EGFRIFNONA 33 (L) 2021    BCR 15.2 2025    ANIONGAP 12.8 2025       Patient seen in no apparent distress.      PHYSICAL EXAM:     Foot/Ankle Exam    GENERAL  Appearance:  obese and elderly  Orientation:  AAOx3  Affect:  appropriate  Gait:  unimpaired  Assistance:  independent  Right shoe gear: casual shoe  Left shoe gear: casual shoe    VASCULAR     Right Foot Vascularity   Dorsalis pedis:  1+  Right PT grade: Unna Boot in place.  Skin temperature:  warm  Edema gradin+ and pitting  CFT:  < 3 seconds  Pedal hair growth:  Absent  Varicosities:  moderate varicosities     Left Foot Vascularity   Dorsalis pedis:  1+  Left PT grade: Unna Boot in place.  Skin temperature:  warm  Edema gradin+ and pitting  CFT:  < 3 seconds  Pedal hair growth:  Absent  Varicosities:  moderate varicosities     NEUROLOGIC     Right Foot Neurologic   Normal sensation    Light touch sensation: normal  Vibratory sensation: normal  Hot/Cold sensation: normal  Protective Sensation using [place]-Maximo Monofilament:    Sites intact: 10  Sites tested: 10     Left Foot Neurologic   Normal sensation    Light touch sensation: normal  Vibratory sensation: normal  Hot/Cold sensation:  normal  Protective Sensation using [place]-Maximo Monofilament:   Sites intact: 10  Sites tested: 10    MUSCLE STRENGTH     Right Foot Muscle Strength   Foot dorsiflexion:  4  Foot plantar flexion:  4  Foot inversion:  4  Foot eversion:  4     Left Foot Muscle Strength   Foot dorsiflexion:  4  Foot plantar flexion:  4  Foot inversion:  4  Foot eversion:  4    RANGE OF MOTION     Right Foot Range of Motion   Foot and ankle ROM within normal limits       Left Foot Range of Motion   Foot and ankle ROM within normal limits      DERMATOLOGIC      Right Foot Dermatologic   Skin  Right foot skin is intact.   Nails  1.  Positive for elongated, onychomycosis, abnormal thickness, subungual debris and ingrown toenail.  2.  Positive for elongated, onychomycosis, abnormal thickness, subungual debris and ingrown toenail.  3.  Positive for elongated, onychomycosis, abnormal thickness, subungual debris and ingrown toenail.  4.  Positive for elongated, onychomycosis, abnormal thickness, subungual debris and ingrown toenail.  5.  Positive for elongated, onychomycosis, abnormal thickness, subungual debris and ingrown toenail.     Left Foot Dermatologic   Skin  Left foot skin is intact.   Nails  1.  Positive for elongated, onychomycosis, abnormal thickness, subungual debris and ingrown toenail.  3.  Positive for elongated, onychomycosis, abnormal thickness, subungual debris and ingrown toenail.  4.  Positive for elongated, onychomycosis, abnormally thick, subungual debris and ingrown toenail.  5.  Positive for elongated, onychomycosis, abnormally thick, subungual debris and ingrown toenail.    I have reexamined the patient the results are consistent with the previously documented exam.    ASSESSMENT/PLAN     Diagnoses and all orders for this visit:    1. Non-insulin  dependent type 2 diabetes mellitus (Primary)    2. Foot pain, bilateral    3. Onychocryptosis    4. Onychomycosis    5. DM feet    Comprehensive lower extremity examination and evaluation was performed.    Discussed findings and treatment plan including risks, benefits, and treatment options with patient in detail. Patient agreed with treatment plan.    Medications and allergies reviewed.  Reviewed available blood glucose and HgB A1C lab values along with other pertinent labs.  These were discussed with the patient as to their importance of diabetic maintenance.    Toenails 1, 2, 3, 4, 5 on Right and 1, 3, 4, 5 on Left were debrided with nail nippers then filed with a Dremel nail yves.  Patient tolerated procedure well without complications.    An After Visit Summary was printed and given to the patient at discharge, including (if requested) any available informative/educational handouts regarding diagnosis, treatment, or medications. All questions were answered to patient/family satisfaction. Should symptoms fail to improve or worsen they agree to call or return to clinic or to go to the Emergency Department. Discussed the importance of following up with any needed screening tests/labs/specialist appointments and any requested follow-up recommended by me today. Importance of maintaining follow-up discussed and patient accepts that missed appointments can delay diagnosis and potentially lead to worsening of conditions.    Return in about 9 weeks (around 8/6/2025) for Toenail Care., or sooner if acute issues arise.    I have reviewed the assessment and plan and verified the accuracy of it. No changes to assessment and plan since the information was documented. Isiah Cuevas, ERROL 06/04/25     I have dictated this note utilizing Dragon Dictation.  Please note that portions of this note were completed with a voice recognition program.  Part of this note may be an electronic transcription/translation of spoken  language to printed text using the Dragon Dictation System.      This document has been electronically signed by Isiah Cuevas DPM on June 4, 2025 10:45 EDT

## 2025-06-06 ENCOUNTER — CLINICAL SUPPORT (OUTPATIENT)
Dept: WOUND CARE | Facility: HOSPITAL | Age: 70
End: 2025-06-06
Payer: MEDICARE

## 2025-06-06 VITALS
TEMPERATURE: 97.6 F | SYSTOLIC BLOOD PRESSURE: 141 MMHG | RESPIRATION RATE: 16 BRPM | HEART RATE: 92 BPM | DIASTOLIC BLOOD PRESSURE: 83 MMHG

## 2025-06-06 DIAGNOSIS — L97.311 VENOUS STASIS ULCER OF RIGHT ANKLE LIMITED TO BREAKDOWN OF SKIN WITH VARICOSE VEINS: ICD-10-CM

## 2025-06-06 DIAGNOSIS — I87.2 VENOUS STASIS DERMATITIS OF BOTH LOWER EXTREMITIES: Primary | ICD-10-CM

## 2025-06-06 DIAGNOSIS — I83.013 VENOUS STASIS ULCER OF RIGHT ANKLE LIMITED TO BREAKDOWN OF SKIN WITH VARICOSE VEINS: ICD-10-CM

## 2025-06-06 RX ADMIN — Medication 2 EACH: at 12:01

## 2025-06-06 NOTE — PROGRESS NOTES
Pt arrived to clinic with bilateral leg wraps in place. Pt requested only right leg wrap be done today.  RLE wrap removed and cleanse wound with ns, pat dry, apply Polymem Ag to wound base, applied unna boot, rolled gauze and elastic wrap. Wrapped from base of toes to just below the knee. Pt tolerated well.

## 2025-06-10 ENCOUNTER — CLINICAL SUPPORT (OUTPATIENT)
Dept: WOUND CARE | Facility: HOSPITAL | Age: 70
End: 2025-06-10
Payer: MEDICARE

## 2025-06-10 VITALS
DIASTOLIC BLOOD PRESSURE: 79 MMHG | TEMPERATURE: 97.5 F | HEART RATE: 90 BPM | RESPIRATION RATE: 16 BRPM | SYSTOLIC BLOOD PRESSURE: 142 MMHG

## 2025-06-10 DIAGNOSIS — I87.2 VENOUS STASIS DERMATITIS OF BOTH LOWER EXTREMITIES: Primary | ICD-10-CM

## 2025-06-10 DIAGNOSIS — I83.013 VENOUS STASIS ULCER OF RIGHT ANKLE LIMITED TO BREAKDOWN OF SKIN WITH VARICOSE VEINS: ICD-10-CM

## 2025-06-10 DIAGNOSIS — L97.311 VENOUS STASIS ULCER OF RIGHT ANKLE LIMITED TO BREAKDOWN OF SKIN WITH VARICOSE VEINS: ICD-10-CM

## 2025-06-10 RX ADMIN — Medication 2 EACH: at 11:44

## 2025-06-10 NOTE — PROGRESS NOTES
Pt arrived to clinic with bilateral leg wraps in place. BLE wraps removed and cleansed wound with ns, legs cleansed with soap and water, pat dry, apply Polymem Ag to RLE wound base, applied unna boot, rolled gauze and elastic wrap to BLE from base of toes to just below the knees. Pt tolerated well.

## 2025-06-13 ENCOUNTER — CLINICAL SUPPORT (OUTPATIENT)
Dept: WOUND CARE | Facility: HOSPITAL | Age: 70
End: 2025-06-13
Payer: MEDICARE

## 2025-06-13 VITALS
TEMPERATURE: 97.1 F | SYSTOLIC BLOOD PRESSURE: 146 MMHG | DIASTOLIC BLOOD PRESSURE: 74 MMHG | RESPIRATION RATE: 16 BRPM | HEART RATE: 88 BPM

## 2025-06-13 DIAGNOSIS — I87.2 VENOUS STASIS DERMATITIS OF BOTH LOWER EXTREMITIES: Primary | ICD-10-CM

## 2025-06-13 DIAGNOSIS — I83.013 VENOUS STASIS ULCER OF RIGHT ANKLE LIMITED TO BREAKDOWN OF SKIN WITH VARICOSE VEINS: ICD-10-CM

## 2025-06-13 DIAGNOSIS — L97.311 VENOUS STASIS ULCER OF RIGHT ANKLE LIMITED TO BREAKDOWN OF SKIN WITH VARICOSE VEINS: ICD-10-CM

## 2025-06-13 PROCEDURE — G0463 HOSPITAL OUTPT CLINIC VISIT: HCPCS | Performed by: EMERGENCY MEDICINE

## 2025-06-13 NOTE — PROGRESS NOTES
Pt arrived to clinic with bilateral leg wraps in place. BLE wraps removed and cleansed wound with ns, legs cleansed with soap and water, pat dry.  Small red areas noted to BLE and pt c/o itching to BLE.  Spoke to Yari Britt PA-C and obtained new orders.  Applied Triamcinolone cream to red areas to BLE, Polymem Ag to RLE wound base, secured with rolled gauze and elastic wrap to BLE from base of toes to just below the knees. Pt tolerated well.     Pt has ultrasound on Wed 6/18/25 and changed appt to see provider from 6/17/25 to 6/18/25 (for wound check in ONS) so she could have her legs wrapped after her ultrasound appt.

## 2025-06-18 ENCOUNTER — OFFICE VISIT (OUTPATIENT)
Dept: WOUND CARE | Facility: HOSPITAL | Age: 70
End: 2025-06-18
Payer: MEDICARE

## 2025-06-18 ENCOUNTER — HOSPITAL ENCOUNTER (OUTPATIENT)
Dept: CARDIOLOGY | Facility: HOSPITAL | Age: 70
Discharge: HOME | End: 2025-06-18
Payer: MEDICARE

## 2025-06-18 VITALS
HEART RATE: 96 BPM | TEMPERATURE: 98.3 F | DIASTOLIC BLOOD PRESSURE: 74 MMHG | SYSTOLIC BLOOD PRESSURE: 116 MMHG | RESPIRATION RATE: 18 BRPM

## 2025-06-18 DIAGNOSIS — L97.311 VENOUS STASIS ULCER OF RIGHT ANKLE LIMITED TO BREAKDOWN OF SKIN WITH VARICOSE VEINS: ICD-10-CM

## 2025-06-18 DIAGNOSIS — I83.013 VENOUS STASIS ULCER OF RIGHT ANKLE LIMITED TO BREAKDOWN OF SKIN WITH VARICOSE VEINS: ICD-10-CM

## 2025-06-18 DIAGNOSIS — I80.01 THROMBOPHLEBITIS OF SUPERFICIAL VEINS OF RIGHT LOWER EXTREMITY: ICD-10-CM

## 2025-06-18 DIAGNOSIS — I83.013 VENOUS STASIS ULCER OF RIGHT ANKLE LIMITED TO BREAKDOWN OF SKIN WITH VARICOSE VEINS: Primary | ICD-10-CM

## 2025-06-18 DIAGNOSIS — I87.2 VENOUS INSUFFICIENCY OF BOTH LOWER EXTREMITIES: ICD-10-CM

## 2025-06-18 DIAGNOSIS — E11.8 CONTROLLED TYPE 2 DIABETES MELLITUS WITH COMPLICATION, WITHOUT LONG-TERM CURRENT USE OF INSULIN: ICD-10-CM

## 2025-06-18 DIAGNOSIS — I87.2 VENOUS REFLUX: ICD-10-CM

## 2025-06-18 DIAGNOSIS — I87.2 VENOUS STASIS DERMATITIS OF BOTH LOWER EXTREMITIES: ICD-10-CM

## 2025-06-18 DIAGNOSIS — L97.311 VENOUS STASIS ULCER OF RIGHT ANKLE LIMITED TO BREAKDOWN OF SKIN WITH VARICOSE VEINS: Primary | ICD-10-CM

## 2025-06-18 LAB
BH CV LEFT LOWER VAS COMMON FEMORAL REFLUX TIME: 2 SEC
BH CV LEFT LOWER VAS GSV MID REFLUX TIME: 1.3 SEC
BH CV LEFT LOWER VAS GSV MID TRANSVERSE DIAMETER: 1 CM
BH CV LEFT LOWER VAS GSV PROX REFLUX TIME: 1.26 SEC
BH CV LEFT LOWER VAS GSV PROX TRANSVERSE DIAMETER: 1 CM
BH CV LEFT LOWER VAS MID FEMORAL REFLUX TIME: 1.06 SEC
BH CV LEFT LOWER VAS VARICOSITY BK REFLUX TIME: 1.24 SEC
BH CV LOW VAS LEFT GSV MID THIGH VESSEL: 1
BH CV LOW VAS LEFT VARICOSITY BK VESSEL: 1
BH CV LOW VAS RIGHT GREATER SAPH AK VESSEL: 1
BH CV LOW VAS RIGHT GREATER SAPH BK VESSEL: 1
BH CV LOW VAS RIGHT LESSER SAPH VESSEL: 1
BH CV LOW VAS RIGHT VARICOSITY AK VESSEL: 1
BH CV LOWER VAS LEFT GSV DIST THIGH COMPRESSIBILTY: NORMAL
BH CV LOWER VAS LEFT GSV MID CALF COMPRESSIBILTY: NORMAL
BH CV LOWER VAS LEFT GSV MID THIGH COMPRESSIBILTY: NORMAL
BH CV LOWER VASCULAR LEFT COMMON FEMORAL AUGMENT: NORMAL
BH CV LOWER VASCULAR LEFT COMMON FEMORAL COMPETENT: NORMAL
BH CV LOWER VASCULAR LEFT COMMON FEMORAL COMPRESS: NORMAL
BH CV LOWER VASCULAR LEFT COMMON FEMORAL PHASIC: NORMAL
BH CV LOWER VASCULAR LEFT COMMON FEMORAL SPONT: NORMAL
BH CV LOWER VASCULAR LEFT DISTAL FEMORAL COMPRESS: NORMAL
BH CV LOWER VASCULAR LEFT GASTRONEMIUS COMPRESS: NORMAL
BH CV LOWER VASCULAR LEFT GREATER SAPH AK COMPETENT: NORMAL
BH CV LOWER VASCULAR LEFT GREATER SAPH AK COMPRESS: NORMAL
BH CV LOWER VASCULAR LEFT GREATER SAPH BK COMPETENT: NORMAL
BH CV LOWER VASCULAR LEFT GREATER SAPH BK COMPRESS: NORMAL
BH CV LOWER VASCULAR LEFT GSV DIST THIGH COMPETENT: NORMAL
BH CV LOWER VASCULAR LEFT GSV MID CALF COMPETENT: NORMAL
BH CV LOWER VASCULAR LEFT GSV MID THIGH COMPETENT: NORMAL
BH CV LOWER VASCULAR LEFT GSV MID THIGH THROMBUS: NORMAL
BH CV LOWER VASCULAR LEFT LESSER SAPH COMPETENT: NORMAL
BH CV LOWER VASCULAR LEFT LESSER SAPH COMPRESS: NORMAL
BH CV LOWER VASCULAR LEFT MID FEMORAL AUGMENT: NORMAL
BH CV LOWER VASCULAR LEFT MID FEMORAL COMPETENT: NORMAL
BH CV LOWER VASCULAR LEFT MID FEMORAL COMPRESS: NORMAL
BH CV LOWER VASCULAR LEFT MID FEMORAL PHASIC: NORMAL
BH CV LOWER VASCULAR LEFT MID FEMORAL SPONT: NORMAL
BH CV LOWER VASCULAR LEFT PERONEAL COMPRESS: NORMAL
BH CV LOWER VASCULAR LEFT POPLITEAL AUGMENT: NORMAL
BH CV LOWER VASCULAR LEFT POPLITEAL COMPETENT: NORMAL
BH CV LOWER VASCULAR LEFT POPLITEAL COMPRESS: NORMAL
BH CV LOWER VASCULAR LEFT POPLITEAL PHASIC: NORMAL
BH CV LOWER VASCULAR LEFT POPLITEAL SPONT: NORMAL
BH CV LOWER VASCULAR LEFT POSTERIOR TIBIAL COMPRESS: NORMAL
BH CV LOWER VASCULAR LEFT PROXIMAL FEMORAL COMPRESS: NORMAL
BH CV LOWER VASCULAR LEFT SAPHENOFEMORAL JUNCTION AUGMENT: NORMAL
BH CV LOWER VASCULAR LEFT SAPHENOFEMORAL JUNCTION COMPETENT: NORMAL
BH CV LOWER VASCULAR LEFT SAPHENOFEMORAL JUNCTION COMPRESS: NORMAL
BH CV LOWER VASCULAR LEFT SAPHENOFEMORAL JUNCTION PHASIC: NORMAL
BH CV LOWER VASCULAR LEFT SAPHENOFEMORAL JUNCTION SPONT: NORMAL
BH CV LOWER VASCULAR LEFT VARICOSITY BK COMPETENT: NORMAL
BH CV LOWER VASCULAR LEFT VARICOSITY BK COMPRESS: NORMAL
BH CV LOWER VASCULAR LEFT VARICOSITY BK THROMBUS: NORMAL
BH CV LOWER VASCULAR RIGHT COMMON FEMORAL AUGMENT: NORMAL
BH CV LOWER VASCULAR RIGHT COMMON FEMORAL COMPETENT: NORMAL
BH CV LOWER VASCULAR RIGHT COMMON FEMORAL COMPRESS: NORMAL
BH CV LOWER VASCULAR RIGHT COMMON FEMORAL PHASIC: NORMAL
BH CV LOWER VASCULAR RIGHT COMMON FEMORAL SPONT: NORMAL
BH CV LOWER VASCULAR RIGHT DISTAL FEMORAL COMPRESS: NORMAL
BH CV LOWER VASCULAR RIGHT GASTRONEMIUS COMPRESS: NORMAL
BH CV LOWER VASCULAR RIGHT GREATER SAPH AK COMPETENT: NORMAL
BH CV LOWER VASCULAR RIGHT GREATER SAPH AK THROMBUS: NORMAL
BH CV LOWER VASCULAR RIGHT GREATER SAPH BK COMPETENT: NORMAL
BH CV LOWER VASCULAR RIGHT GREATER SAPH BK COMPRESS: NORMAL
BH CV LOWER VASCULAR RIGHT GREATER SAPH BK THROMBUS: NORMAL
BH CV LOWER VASCULAR RIGHT LESSER SAPH COMPETENT: NORMAL
BH CV LOWER VASCULAR RIGHT LESSER SAPH COMPRESS: NORMAL
BH CV LOWER VASCULAR RIGHT LESSER SAPH THROMBUS: NORMAL
BH CV LOWER VASCULAR RIGHT MID FEMORAL AUGMENT: NORMAL
BH CV LOWER VASCULAR RIGHT MID FEMORAL COMPETENT: NORMAL
BH CV LOWER VASCULAR RIGHT MID FEMORAL COMPRESS: NORMAL
BH CV LOWER VASCULAR RIGHT MID FEMORAL PHASIC: NORMAL
BH CV LOWER VASCULAR RIGHT MID FEMORAL SPONT: NORMAL
BH CV LOWER VASCULAR RIGHT PERONEAL COMPRESS: NORMAL
BH CV LOWER VASCULAR RIGHT POPLITEAL AUGMENT: NORMAL
BH CV LOWER VASCULAR RIGHT POPLITEAL COMPETENT: NORMAL
BH CV LOWER VASCULAR RIGHT POPLITEAL COMPRESS: NORMAL
BH CV LOWER VASCULAR RIGHT POPLITEAL PHASIC: NORMAL
BH CV LOWER VASCULAR RIGHT POPLITEAL SPONT: NORMAL
BH CV LOWER VASCULAR RIGHT POSTERIOR TIBIAL COMPRESS: NORMAL
BH CV LOWER VASCULAR RIGHT PROXIMAL FEMORAL COMPRESS: NORMAL
BH CV LOWER VASCULAR RIGHT SAPHENOFEMORAL JUNCTION AUGMENT: NORMAL
BH CV LOWER VASCULAR RIGHT SAPHENOFEMORAL JUNCTION COMPETENT: NORMAL
BH CV LOWER VASCULAR RIGHT SAPHENOFEMORAL JUNCTION COMPRESS: NORMAL
BH CV LOWER VASCULAR RIGHT SAPHENOFEMORAL JUNCTION PHASIC: NORMAL
BH CV LOWER VASCULAR RIGHT SAPHENOFEMORAL JUNCTION SPONT: NORMAL
BH CV LOWER VASCULAR RIGHT VARICOSITY AK COMPETENT: NORMAL
BH CV LOWER VASCULAR RIGHT VARICOSITY AK COMPRESS: NORMAL
BH CV LOWER VASCULAR RIGHT VARICOSITY AK THROMBUS: NORMAL
BH CV RIGHT LOWER VAS COMMON FEMORAL REFLUX COLOR FLOW TIME: 2.96 SEC
BH CV RIGHT LOWER VAS GSV KNEE REFLUX TIME: 1.83 SEC
BH CV RIGHT LOWER VAS GSV PROX THIGH REFLUX TIME: 2.45 SEC
BH CV RIGHT LOWER VAS GSV PROX THIGH TRANS DIAMETER: 1.2 CM
BH CV RIGHT LOWER VAS VARICOSITY AK REFLUX TIME: 2.46 SEC
BH CV VAS RIGHT GSV PROXIMAL HIDDEN LRR COMPRESSIBILTY: NORMAL

## 2025-06-18 PROCEDURE — G0463 HOSPITAL OUTPT CLINIC VISIT: HCPCS | Performed by: EMERGENCY MEDICINE

## 2025-06-18 PROCEDURE — 1159F MED LIST DOCD IN RCRD: CPT | Performed by: EMERGENCY MEDICINE

## 2025-06-18 PROCEDURE — 3078F DIAST BP <80 MM HG: CPT | Performed by: EMERGENCY MEDICINE

## 2025-06-18 PROCEDURE — 3074F SYST BP LT 130 MM HG: CPT | Performed by: EMERGENCY MEDICINE

## 2025-06-18 PROCEDURE — 1160F RVW MEDS BY RX/DR IN RCRD: CPT | Performed by: EMERGENCY MEDICINE

## 2025-06-18 PROCEDURE — 93970 EXTREMITY STUDY: CPT

## 2025-06-18 RX ORDER — TRIAMCINOLONE ACETONIDE 1 MG/G
1 OINTMENT TOPICAL 2 TIMES DAILY
Qty: 80 G | Refills: 0 | Status: SHIPPED | OUTPATIENT
Start: 2025-06-18

## 2025-06-18 NOTE — PROGRESS NOTES
Chief Complaint  Wound Check (Pt here today for evaluation of BLE, pt had US on BLE this AM, pt was unable to tolerate unna boots dt irritation and rash, now applying rolled gauze and elastic wrap 2x weekly. )    Subjective      History of Present Illness    Gifty Hoover  is a 69 y.o. female     History of Present Illness  The patient is a 69-year-old female who presents for a recheck of wounds on her bilateral lower extremities.    She has been utilizing PolyMem silver for wound care but experienced an adverse reaction to the Unna boot last Friday, resulting in a breakout so has been getting 2 layer wraps instead. She reports significant drainage from the wounds. She has not undergone any vein procedures in the past but has had skin grafts performed around non-healing wounds. She is currently on anticoagulant therapy, managed by her cardiologist, Dr. Zavala. She applies Premarin cream once weekly and does not use any oral estrogen or estrogen patches.      Allergies:  Patient has no known allergies.      Current Outpatient Medications:     albuterol sulfate HFA (ProAir HFA) 108 (90 Base) MCG/ACT inhaler, Inhale 2 puffs Every 6 (Six) Hours As Needed for Wheezing., Disp: 18 g, Rfl: 0    Ascorbic Acid (VITAMIN C PO), Take  by mouth., Disp: , Rfl:     aspirin 81 MG EC tablet, Take 1 tablet by mouth Daily., Disp: , Rfl:     Calcium Carb-Cholecalciferol (CALCIUM/VITAMIN D PO), Take  by mouth., Disp: , Rfl:     Estrogens Conjugated (Premarin) 0.625 MG/GM vaginal cream, Apply daily for 2 weeks then use 2 times weekly thereafter., Disp: 30 g, Rfl: 1    ezetimibe (ZETIA) 10 MG tablet, TAKE ONE TABLET BY MOUTH EVERY DAY, Disp: 90 tablet, Rfl: 3    famotidine (PEPCID) 40 MG tablet, TAKE ONE TABLET BY MOUTH EVERY NIGHT AT BEDTIME, Disp: 90 tablet, Rfl: 1    hydroCHLOROthiazide 12.5 MG tablet, Take 1 tablet by mouth Every Morning., Disp: 90 tablet, Rfl: 1    levothyroxine (SYNTHROID, LEVOTHROID) 88 MCG tablet, Take 1 tablet by  mouth Daily., Disp: 90 tablet, Rfl: 1    metFORMIN (GLUCOPHAGE) 500 MG tablet, Take 1 tablet by mouth 3 times a day., Disp: 270 tablet, Rfl: 1    omeprazole (priLOSEC) 40 MG capsule, TAKE ONE CAPSULE BY MOUTH EVERY DAY, Disp: 90 capsule, Rfl: 1    Pradaxa 150 MG capsu, Take 1 capsule by mouth 2 (Two) Times a Day., Disp: 180 capsule, Rfl: 1    rosuvastatin (CRESTOR) 40 MG tablet, Take 1 tablet by mouth Every Night., Disp: 90 tablet, Rfl: 1    telmisartan (MICARDIS) 80 MG tablet, Take 1 tablet by mouth Every Night., Disp: 90 tablet, Rfl: 1    triamcinolone (KENALOG) 0.1 % ointment, Apply 1 Application topically to the appropriate area as directed 2 (Two) Times a Day., Disp: 80 g, Rfl: 0    Past Medical History:   Diagnosis Date    Anemia 2022    NO CURRENT S/S    Anxiety     Arthritis     Asthma 2020    Shortness of breath after walking long periods of time.    CAD 09/14/2021    AGUAYO SEES PT    Cholelithiasis 2022    Chronic kidney disease 2022    NO DIALYSIS    Colon polyp 2008    BENIGN    Deep vein thrombosis 2011    Green filter in place.    Depression     Diabetes mellitus     Elevated cholesterol     Essential hypertension 09/14/2021    GERD (gastroesophageal reflux disease)     Heart attack 2009    NO STENTS/NO BYPASS    History of pulmonary embolism     2011, TAKE PRADAXA    Hypothyroid     Ingrown toenail 2022    Low back pain 2019    Lumbar radiculopathy 09/2018    describes sensory S1 radiculopathy but would favor right L5    Obesity 2020    Paroxysmal atrial fibrillation 09/14/2021    AGUAYO    Pulmonary embolism 2011    Renal insufficiency 2022    Seasonal allergies     Sleep apnea     BIPAP?    Urinary tract infection 2022    Visual impairment     Glasses for nearsightedness     Past Surgical History:   Procedure Laterality Date    BLADDER SURGERY  2011    BREAST BIOPSY  2002    Upper right breast BENIGN    BREAST SURGERY      CARDIAC CATHETERIZATION  2011    NO INTERVENTION    CARDIAC SURGERY      R/T  BLOOD CLOTS, NO CARDIAC INTERVENTION     SECTION      CHOLECYSTECTOMY N/A 2022    Procedure: laparoscopic cholecystectomy, possible open;  Surgeon: Krystal Dacosta MD;  [place] [facility] [place] [facility] [place]  Service: General;  Laterality: N/A;    COLONOSCOPY      COLONOSCOPY N/A 2022    Procedure: COLONOSCOPY;  Surgeon: Ta Vasquez MD;  Location: [facility] ENDOSCOPY;  Service: Gastroenterology;  Laterality: N/A;  DIVERTICULOSIS    ENDOSCOPY N/A 2022    Procedure: ESOPHAGOGASTRODUODENOSCOPY WITH BX;  Surgeon: Ta Vasquez MD;  Location: [facility] ENDOSCOPY;  Service: Gastroenterology;  Laterality: N/A;  SMALL HIATAL HERNIA    ERCP N/A 2022    Procedure: ENDOSCOPIC RETROGRADE CHOLANGIOPANCREATOGRAPHY WITH SPHINCTEROTOMY, STONE REMOVAL, STENT PLACEMENT;  Surgeon: Arden Garcia MD;  Location: [facility] ENDOSCOPY;  Service: Gastroenterology;  Laterality: N/A;  BILE DUCT STONES    ERCP N/A 2023    Procedure: ENDOSCOPIC RETROGRADE CHOLANGIOPANCREATOGRAPHY with stent removal, balloon dilation, stone removal;  Surgeon: Arden Garcia MD;  Location: [facility] ENDOSCOPY;  Service: Gastroenterology;  Laterality: N/A;  bile duct stones    NICHOLAS FILTER INSERTION 2011    HYSTERECTOMY      UPPER GASTROINTESTINAL ENDOSCOPY  2022     Social History     Socioeconomic History    Marital status: Single   Tobacco Use    Smoking status: Never    Smokeless tobacco: Never   Vaping Use    Vaping status: Never Used   Substance and Sexual Activity    Alcohol use: Never    Drug use: Never    Sexual activity: Not Currently     Partners: Male     Birth control/protection: Hysterectomy, Surgical           Objective     Vitals:    25 1316   BP: 116/74   BP Location: Right arm   Patient Position: Sitting   Cuff Size: Adult   Pulse: 96   Resp: 18  Comment: 96% rA   Temp: 98.3 °F (36.8 °C)   TempSrc: Temporal   PainSc: 0-No pain     There is no height or  weight on file to calculate BMI.    STEADI Fall Risk Assessment has not been completed.     Review of Systems     ROS:  Per HPI.     I have reviewed the HPI and ROS as documented by MA/RN. Cindy Gonsalez MD    Physical Exam     NAD  AAOx3, pleasant, cooperative    Physical Exam  The patient's stasis dermatitis is worsening, particularly on the right lower extremity. The wound itself has some increased purple discoloration surrounding it and throughout the base, but no slough and overall appears healthy. There is mild-to-moderate maceration and skin breakdown from drainage.  Small wound left mid anterior lower leg.  Edema control is improving bilaterally.                       Result Review :  The following data was reviewed by: Cindy Gonsalez MD on 06/18/2025:    Prior notes and images.   Patient's venous ultrasound was discussed with the ultrasound tech but the official read has not yet been finalized.                 Assessment and Plan   Diagnoses and all orders for this visit:    1. Venous stasis ulcer of right ankle limited to breakdown of skin with varicose veins (Primary)  -     Ambulatory Referral to Vascular Surgery    2. Venous stasis dermatitis of both lower extremities  -     triamcinolone (KENALOG) 0.1 % ointment; Apply 1 Application topically to the appropriate area as directed 2 (Two) Times a Day.  Dispense: 80 g; Refill: 0    3. Venous insufficiency of both lower extremities  -     Ambulatory Referral to Vascular Surgery    4. Venous reflux  -     Ambulatory Referral to Vascular Surgery    5. Thrombophlebitis of superficial veins of right lower extremity    6. Controlled type 2 diabetes mellitus with complication, without long-term current use of insulin        Assessment & Plan  1. Venous reflux and insufficiency.  The ultrasound results indicate venous reflux and insufficiency. A referral to the vein center at [facility] will be initiated for further evaluation and potential procedures  such as ablation or stenting. The patient is advised to continue with compression therapy and elevation of the affected limb.    2. Superficial thrombophlebitis.  A superficial clot in the upper calf was noted. The patient is already on a blood thinner managed by her cardiologist, Dr. Zavala. The condition will be monitored, and she is advised to use compression and heat for treatment.    3. Stasis dermatitis.  The patient has worsening stasis dermatitis, particularly in the right lower extremity. A prescription for a steroid ointment will be provided to manage inflammation. Vaseline can be used as a moisturizer for dry areas.     4.  Venous stasis ulcer right ankle.    The wound shows increased purple discoloration but appears healthy overall in the base and is getting smaller. Collagen with silver will be applied to the base of the wound, followed by coverage with PolyMem silver. The possibility of using PuraPly grafts was discussed, and insurance approval will be sought. If approved, the patient will undergo weekly grafting for up to 10 weeks, with daily changes of the outer dressing.  Due to increasing drainage we will have her come to the clinic 3 days a week instead of 2 days a week for dressing changes at this time.    Follow-up  The patient will follow up in 2 weeks.      Patient was given instructions and counseling regarding their condition or for health maintenance advice, as well as the wound care plan and recommendations. They understand and agree with the plan.  They will follow back up here in the clinic but are instructed to contact us in the interim should they have any new, returning, or worsening symptoms or concerns. Please see specific information pulled into the AVS if appropriate.     Dragon Dictation utilized for chart completion.    I spent 32 minutes in both face-to-face and nonface-to-face time for patient care today including, but not limited to, review of old records, reviewing old images,  history and physical exam, reviewing test results, counseling patient and family, entering orders, coordinating care, and documenting.      Follow Up   Return in about 2 weeks (around 7/2/2025).      Cindy Gonsalez MD    Patient or patient representative verbalized consent for the use of Ambient Listening during the visit with  Cindy Gonsalez MD for chart documentation. 6/18/2025  14:07 EDT

## 2025-06-20 ENCOUNTER — CLINICAL SUPPORT (OUTPATIENT)
Dept: WOUND CARE | Facility: HOSPITAL | Age: 70
End: 2025-06-20
Payer: MEDICARE

## 2025-06-20 VITALS
HEART RATE: 104 BPM | RESPIRATION RATE: 18 BRPM | DIASTOLIC BLOOD PRESSURE: 75 MMHG | TEMPERATURE: 97.8 F | SYSTOLIC BLOOD PRESSURE: 142 MMHG

## 2025-06-20 DIAGNOSIS — I87.2 VENOUS REFLUX: ICD-10-CM

## 2025-06-20 DIAGNOSIS — K21.9 GASTROESOPHAGEAL REFLUX DISEASE WITHOUT ESOPHAGITIS: ICD-10-CM

## 2025-06-20 DIAGNOSIS — I83.013 VENOUS STASIS ULCER OF RIGHT ANKLE LIMITED TO BREAKDOWN OF SKIN WITH VARICOSE VEINS: Primary | ICD-10-CM

## 2025-06-20 DIAGNOSIS — I87.2 VENOUS STASIS DERMATITIS OF BOTH LOWER EXTREMITIES: ICD-10-CM

## 2025-06-20 DIAGNOSIS — I87.2 VENOUS INSUFFICIENCY OF BOTH LOWER EXTREMITIES: ICD-10-CM

## 2025-06-20 DIAGNOSIS — L97.311 VENOUS STASIS ULCER OF RIGHT ANKLE LIMITED TO BREAKDOWN OF SKIN WITH VARICOSE VEINS: Primary | ICD-10-CM

## 2025-06-20 PROCEDURE — G0463 HOSPITAL OUTPT CLINIC VISIT: HCPCS | Performed by: EMERGENCY MEDICINE

## 2025-06-20 RX ORDER — OMEPRAZOLE 40 MG/1
40 CAPSULE, DELAYED RELEASE ORAL DAILY
Qty: 90 CAPSULE | Refills: 1 | Status: SHIPPED | OUTPATIENT
Start: 2025-06-20

## 2025-06-23 ENCOUNTER — PATIENT ROUNDING (BHMG ONLY) (OUTPATIENT)
Dept: WOUND CARE | Facility: HOSPITAL | Age: 70
End: 2025-06-23
Payer: MEDICARE

## 2025-06-23 ENCOUNTER — CLINICAL SUPPORT (OUTPATIENT)
Dept: WOUND CARE | Facility: HOSPITAL | Age: 70
End: 2025-06-23
Payer: MEDICARE

## 2025-06-23 VITALS
SYSTOLIC BLOOD PRESSURE: 135 MMHG | TEMPERATURE: 97.4 F | DIASTOLIC BLOOD PRESSURE: 76 MMHG | RESPIRATION RATE: 18 BRPM | HEART RATE: 89 BPM

## 2025-06-23 DIAGNOSIS — I83.013 VENOUS STASIS ULCER OF RIGHT ANKLE LIMITED TO BREAKDOWN OF SKIN WITH VARICOSE VEINS: ICD-10-CM

## 2025-06-23 DIAGNOSIS — L97.311 VENOUS STASIS ULCER OF RIGHT ANKLE LIMITED TO BREAKDOWN OF SKIN WITH VARICOSE VEINS: ICD-10-CM

## 2025-06-23 DIAGNOSIS — I87.2 VENOUS STASIS DERMATITIS OF BOTH LOWER EXTREMITIES: Primary | ICD-10-CM

## 2025-06-23 PROCEDURE — G0463 HOSPITAL OUTPT CLINIC VISIT: HCPCS | Performed by: EMERGENCY MEDICINE

## 2025-06-23 NOTE — PROGRESS NOTES
Did we do a good job with your visit?  Yes      Do you have your medications and/or supplies?  Yes      Do you have a follow-up appointment?  Yes      Do you have any suggestions to improve our service? None

## 2025-06-23 NOTE — PROGRESS NOTES
Pt arrived to clinic with bilateral leg wraps in place. BLE wraps removed and cleansed wound with ns, legs cleansed with soap and water, pat dry, Triamcinolone and Aquaphor to BLE. Apply collagen and Polymem Ag to RLE wound base, rolled gauze and elastic wrap to BLE from base of toes to just below the knees. Pt tolerated well.

## 2025-06-25 ENCOUNTER — CLINICAL SUPPORT (OUTPATIENT)
Dept: WOUND CARE | Facility: HOSPITAL | Age: 70
End: 2025-06-25
Payer: MEDICARE

## 2025-06-25 VITALS
HEART RATE: 85 BPM | SYSTOLIC BLOOD PRESSURE: 132 MMHG | TEMPERATURE: 97.7 F | DIASTOLIC BLOOD PRESSURE: 50 MMHG | RESPIRATION RATE: 16 BRPM

## 2025-06-25 DIAGNOSIS — I83.013 VENOUS STASIS ULCER OF RIGHT ANKLE LIMITED TO BREAKDOWN OF SKIN WITH VARICOSE VEINS: ICD-10-CM

## 2025-06-25 DIAGNOSIS — I87.2 VENOUS STASIS DERMATITIS OF BOTH LOWER EXTREMITIES: Primary | ICD-10-CM

## 2025-06-25 DIAGNOSIS — L97.311 VENOUS STASIS ULCER OF RIGHT ANKLE LIMITED TO BREAKDOWN OF SKIN WITH VARICOSE VEINS: ICD-10-CM

## 2025-06-25 PROCEDURE — G0463 HOSPITAL OUTPT CLINIC VISIT: HCPCS | Performed by: EMERGENCY MEDICINE

## 2025-06-27 ENCOUNTER — CLINICAL SUPPORT (OUTPATIENT)
Dept: WOUND CARE | Facility: HOSPITAL | Age: 70
End: 2025-06-27
Payer: MEDICARE

## 2025-06-27 ENCOUNTER — PATIENT ROUNDING (BHMG ONLY) (OUTPATIENT)
Dept: WOUND CARE | Facility: HOSPITAL | Age: 70
End: 2025-06-27
Payer: MEDICARE

## 2025-06-27 VITALS
TEMPERATURE: 97.6 F | HEART RATE: 92 BPM | DIASTOLIC BLOOD PRESSURE: 72 MMHG | RESPIRATION RATE: 18 BRPM | SYSTOLIC BLOOD PRESSURE: 130 MMHG

## 2025-06-27 DIAGNOSIS — L97.311 VENOUS STASIS ULCER OF RIGHT ANKLE LIMITED TO BREAKDOWN OF SKIN WITH VARICOSE VEINS: ICD-10-CM

## 2025-06-27 DIAGNOSIS — I83.013 VENOUS STASIS ULCER OF RIGHT ANKLE LIMITED TO BREAKDOWN OF SKIN WITH VARICOSE VEINS: ICD-10-CM

## 2025-06-27 DIAGNOSIS — I87.2 VENOUS STASIS DERMATITIS OF BOTH LOWER EXTREMITIES: Primary | ICD-10-CM

## 2025-06-27 PROCEDURE — G0463 HOSPITAL OUTPT CLINIC VISIT: HCPCS | Performed by: EMERGENCY MEDICINE

## 2025-06-27 NOTE — PROGRESS NOTES
Pt arrived to clinic today with dressing in place, dressings removed, BLE cleansed with NS and antibacterial soap and water and blotted dry. Right medial ankle was 100% epithelialized, provider at clinic notified and VO provided to hold off on the collagen ag and polymem ag today and only apply triamcinolone and moisturizer to BLE today and reassess on Monday (6/30/25).   BLE wrapped from base of toes to just below the knees.

## 2025-06-27 NOTE — PROGRESS NOTES
Wound Location: Right leg    Wound Exudate: moderate  Wound Debridement: Mechanically debrided with normal saline & gauze  Wound Thickness: full    Cleanse with:  NS or antibacterial soap and water    Primary: rolled gauze,  Secured with:  elastic wrap     Instructions: Cleanse wound with ns or antibacterial soap and water, pat dry, apply triamcinolone & moisturizer to RLE, apply rolled gauze and elastic wrap. Wrap from base of toes to just below the knee. Change Twice weekly    Wound Location: Left leg    Wound Exudate: moderate  Wound Debridement: Mechanically debrided with normal saline & gauze  Wound Thickness: full    Cleanse with:  NS or antibacterial soap and water    Primary Dressing: rolled gauze  Secured with:  elastic wrap     Instructions: Cleanse wound with ns or antibacterial soap and water, pat dry, apply triamcinolone & moisturizer to LLE, apply rolled gauze and elastic wrap. Wrap from base of toes to just below the knee. Change Once weekly    Education: Educated patient/family member/CG on how to apply new dressings, verbal understanding provided. Patient/family member/CG instructed to call with any questions or concerns

## 2025-06-30 ENCOUNTER — TELEPHONE (OUTPATIENT)
Dept: WOUND CARE | Facility: HOSPITAL | Age: 70
End: 2025-06-30
Payer: MEDICARE

## 2025-06-30 ENCOUNTER — CLINICAL SUPPORT (OUTPATIENT)
Dept: WOUND CARE | Facility: HOSPITAL | Age: 70
End: 2025-06-30
Payer: MEDICARE

## 2025-06-30 VITALS
SYSTOLIC BLOOD PRESSURE: 140 MMHG | DIASTOLIC BLOOD PRESSURE: 76 MMHG | HEART RATE: 83 BPM | RESPIRATION RATE: 16 BRPM | TEMPERATURE: 97.1 F

## 2025-06-30 DIAGNOSIS — I83.013 VENOUS STASIS ULCER OF RIGHT ANKLE LIMITED TO BREAKDOWN OF SKIN WITH VARICOSE VEINS: ICD-10-CM

## 2025-06-30 DIAGNOSIS — I87.2 VENOUS STASIS DERMATITIS OF BOTH LOWER EXTREMITIES: Primary | ICD-10-CM

## 2025-06-30 DIAGNOSIS — L97.311 VENOUS STASIS ULCER OF RIGHT ANKLE LIMITED TO BREAKDOWN OF SKIN WITH VARICOSE VEINS: ICD-10-CM

## 2025-06-30 PROCEDURE — G0463 HOSPITAL OUTPT CLINIC VISIT: HCPCS | Performed by: EMERGENCY MEDICINE

## 2025-06-30 NOTE — TELEPHONE ENCOUNTER
Called pt and let her know, per provider, since her wound has healed, she can start using her compression stockings and only needs to return to clinic as needed. Pt verbalized understanding.

## 2025-06-30 NOTE — PROGRESS NOTES
Pt arrived to clinic today with dressing in place, dressings removed, BLE cleansed with NS and antibacterial soap and water and blotted dry. Right medial ankle wound healed, did not apply the collagen ag and polymem ag today and only apply triamcinolone and moisturizer to BLE today.   BLE wrapped from base of toes to just below the knees with kerlix and elastic wrap.  Pt tolerated well.

## 2025-07-15 ENCOUNTER — TELEPHONE (OUTPATIENT)
Dept: ONCOLOGY | Facility: HOSPITAL | Age: 70
End: 2025-07-15
Payer: MEDICARE

## 2025-07-15 NOTE — TELEPHONE ENCOUNTER
SPOKE WITH PATIENT AND MOVED 7/31/25 APPT WITH APRN TO 8/6/258 DUE TO A PROVIDER BEING OUT OF THE OFFICE. PT WILL KEEP LAB APPT AS IS.

## 2025-07-29 ENCOUNTER — LAB (OUTPATIENT)
Dept: ONCOLOGY | Facility: HOSPITAL | Age: 70
End: 2025-07-29
Payer: MEDICARE

## 2025-07-29 DIAGNOSIS — D50.8 OTHER IRON DEFICIENCY ANEMIA: ICD-10-CM

## 2025-07-29 LAB
BASOPHILS # BLD AUTO: 0.03 10*3/MM3 (ref 0–0.2)
BASOPHILS NFR BLD AUTO: 0.6 % (ref 0–1.5)
DEPRECATED RDW RBC AUTO: 48 FL (ref 37–54)
EOSINOPHIL # BLD AUTO: 0.22 10*3/MM3 (ref 0–0.4)
EOSINOPHIL NFR BLD AUTO: 4.3 % (ref 0.3–6.2)
ERYTHROCYTE [DISTWIDTH] IN BLOOD BY AUTOMATED COUNT: 13.8 % (ref 12.3–15.4)
FERRITIN SERPL-MCNC: 348.3 NG/ML (ref 13–150)
HCT VFR BLD AUTO: 40.6 % (ref 34–46.6)
HGB BLD-MCNC: 12.8 G/DL (ref 12–15.9)
IMM GRANULOCYTES # BLD AUTO: 0.02 10*3/MM3 (ref 0–0.05)
IMM GRANULOCYTES NFR BLD AUTO: 0.4 % (ref 0–0.5)
IRON 24H UR-MRATE: 60 MCG/DL (ref 37–145)
IRON SATN MFR SERPL: 17 % (ref 20–50)
LYMPHOCYTES # BLD AUTO: 1.04 10*3/MM3 (ref 0.7–3.1)
LYMPHOCYTES NFR BLD AUTO: 20.2 % (ref 19.6–45.3)
MCH RBC QN AUTO: 30.2 PG (ref 26.6–33)
MCHC RBC AUTO-ENTMCNC: 31.5 G/DL (ref 31.5–35.7)
MCV RBC AUTO: 95.8 FL (ref 79–97)
MONOCYTES # BLD AUTO: 0.38 10*3/MM3 (ref 0.1–0.9)
MONOCYTES NFR BLD AUTO: 7.4 % (ref 5–12)
NEUTROPHILS NFR BLD AUTO: 3.46 10*3/MM3 (ref 1.7–7)
NEUTROPHILS NFR BLD AUTO: 67.1 % (ref 42.7–76)
NRBC BLD AUTO-RTO: 0 /100 WBC (ref 0–0.2)
PLATELET # BLD AUTO: 217 10*3/MM3 (ref 140–450)
PMV BLD AUTO: 10.7 FL (ref 6–12)
RBC # BLD AUTO: 4.24 10*6/MM3 (ref 3.77–5.28)
TIBC SERPL-MCNC: 352 MCG/DL (ref 298–536)
TRANSFERRIN SERPL-MCNC: 236 MG/DL (ref 200–360)
VIT B12 BLD-MCNC: 170 PG/ML (ref 211–946)
WBC NRBC COR # BLD AUTO: 5.15 10*3/MM3 (ref 3.4–10.8)

## 2025-07-29 PROCEDURE — 84466 ASSAY OF TRANSFERRIN: CPT

## 2025-07-29 PROCEDURE — 82607 VITAMIN B-12: CPT

## 2025-07-29 PROCEDURE — 36415 COLL VENOUS BLD VENIPUNCTURE: CPT

## 2025-07-29 PROCEDURE — 82728 ASSAY OF FERRITIN: CPT

## 2025-07-29 PROCEDURE — 83540 ASSAY OF IRON: CPT

## 2025-07-29 PROCEDURE — 85025 COMPLETE CBC W/AUTO DIFF WBC: CPT

## 2025-07-30 ENCOUNTER — OFFICE VISIT (OUTPATIENT)
Age: 70
End: 2025-07-30
Payer: MEDICARE

## 2025-07-30 VITALS
HEART RATE: 119 BPM | WEIGHT: 232 LBS | RESPIRATION RATE: 17 BRPM | SYSTOLIC BLOOD PRESSURE: 141 MMHG | DIASTOLIC BLOOD PRESSURE: 84 MMHG | HEIGHT: 60 IN | BODY MASS INDEX: 45.55 KG/M2

## 2025-07-30 DIAGNOSIS — Z86.718 HISTORY OF THROMBOEMBOLISM OF VEIN: ICD-10-CM

## 2025-07-30 DIAGNOSIS — I87.093 POST-PHLEBITIC DERMATOSIS OF BOTH LOWER EXTREMITIES: ICD-10-CM

## 2025-07-30 DIAGNOSIS — I87.8 VENOUS STASIS: ICD-10-CM

## 2025-07-30 DIAGNOSIS — I87.2 VENOUS (PERIPHERAL) INSUFFICIENCY: Primary | ICD-10-CM

## 2025-07-30 DIAGNOSIS — I87.333 CHRONIC VENOUS HYPERTENSION WITH ULCER AND INFLAMMATION INVOLVING BOTH SIDES: ICD-10-CM

## 2025-07-30 DIAGNOSIS — I80.202: ICD-10-CM

## 2025-07-30 PROBLEM — I87.339 CHRONIC VENOUS HYPERTENSION WITH ULCER AND INFLAMMATION: Status: ACTIVE | Noted: 2025-07-30

## 2025-07-30 NOTE — PROGRESS NOTES
Patient Name: Gifty Hoover    MRN: [id number] Encounter Date: 07/30/2025      Consulting Service: Vascular Surgery    Referring Provider: Cindy Gonsalez MD       CHIEF COMPLAINT:  Chief Complaint   Patient presents with    Varicose Veins       Subjective    HPI: Gifty Hoover is a 69 y.o. female being seen for evaluation/management of follow up for vein mapping of bilateral lower extremity.   Patient has been compliant with medical grade compression stocking use as well as elevation.   Despite best medical therapy symptoms persist.  At this point in time I discussed with the patient the options for intervention versus continued medical therapy.  Based on current anatomy and covered endovenous options I have recommended Venaseal.    While the patient was then I discussed in detail at length the procedure itself as well as the risk benefits and complications thereof.  Risks include but are not limited to bleeding, infection, nerve injury, skin injury, failure to clear the veins, failure to clear the pain, deep vein thrombosis and associated pulmonary emboli. For Venoseal delayed hypersensitivity patient was discussed. Patient understands and will schedule at their convenience.                                                                                                                                                                                                                                                       Patient is also being seen for evaluation/management of complaints of symptomatic varicose veins, venous insufficiency, and lower extremity edema of bilateral lower extremity.   Symptoms include Taylor symptoms: Edema/Swelling, Varicose veins, Spider veins, Ulcers, Rash/Irritation, Pigmentation, Bulging veins, and Throbbing.  Patient describes the discomfort from the veins as affecting their daily life.   Patient has positive family history of the varicose veins and positive family history of DVT.  At  this point in time attempts at symptomatic control using elevation, compression and nonsteroidals have been attempted.  Prior venous interventions include: none.    PAST MEDICAL HISTORY:   Past Medical History:   Diagnosis Date    Anemia     NO CURRENT S/S    Anxiety     Arthritis     Asthma     Shortness of breath after walking long periods of time.    CAD 2021    [facility] SEES PT    Cholelithiasis     Chronic kidney disease     NO DIALYSIS    Colon polyp     BENIGN    Deep vein thrombosis     Green filter in place.    Depression     Diabetes mellitus     Elevated cholesterol     Essential hypertension 2021    GERD (gastroesophageal reflux disease)     Heart attack     NO STENTS/NO BYPASS    History of pulmonary embolism     , TAKE PRADAXA    Hypothyroid     Ingrown toenail     Low back pain     Lumbar radiculopathy 2018    describes sensory S1 radiculopathy but would favor right L5    Obesity     Paroxysmal atrial fibrillation 2021    [facility]O    Pulmonary embolism     Renal insufficiency     Seasonal allergies     Sleep apnea     BIPAP?    Urinary tract infection     Visual impairment     Glasses for nearsightedness      PAST SURGICAL HISTORY:   Past Surgical History:   Procedure Laterality Date    BLADDER SURGERY  2011    BREAST BIOPSY      Upper right breast BENIGN    BREAST SURGERY      CARDIAC CATHETERIZATION      NO INTERVENTION    CARDIAC SURGERY      R/T BLOOD CLOTS, NO CARDIAC INTERVENTION     SECTION      CHOLECYSTECTOMY N/A 2022    Procedure: laparoscopic cholecystectomy, possible open;  Surgeon: Krystal Dacosta MD;  Location: [facility];  Service: General;  Laterality: N/A;    COLONOSCOPY      COLONOSCOPY N/A 2022    Procedure: COLONOSCOPY;  Surgeon: Ta Vasquez MD;  Location: [facility] ENDOSCOPY;  Service: Gastroenterology;  Laterality: N/A;  DIVERTICULOSIS    ENDOSCOPY N/A  11/22/2022    Procedure: ESOPHAGOGASTRODUODENOSCOPY WITH BX;  Surgeon: Ta Vasquez MD;  Location: [facility] ENDOSCOPY;  Service: Gastroenterology;  Laterality: N/A;  SMALL HIATAL HERNIA    ERCP N/A 11/25/2022    Procedure: ENDOSCOPIC RETROGRADE CHOLANGIOPANCREATOGRAPHY WITH SPHINCTEROTOMY, STONE REMOVAL, STENT PLACEMENT;  Surgeon: Arden Garcia MD;  Location: [facility] ENDOSCOPY;  Service: Gastroenterology;  Laterality: N/A;  BILE DUCT STONES    ERCP N/A 01/12/2023    Procedure: ENDOSCOPIC RETROGRADE CHOLANGIOPANCREATOGRAPHY with stent removal, balloon dilation, stone removal;  Surgeon: Arden Garcia MD;  Location: [facility] ENDOSCOPY;  Service: Gastroenterology;  Laterality: N/A;  bile duct stones    NICHOLAS FILTER INSERTION JUGULAR 2011 HYSTERECTOMY 1983    UPPER GASTROINTESTINAL ENDOSCOPY  November 2022      FAMILY HISTORY:   Family History   Problem Relation Age of Onset    Stroke Mother     Cancer Mother         Colon    Colon cancer Mother 71        still living at 73    Diabetes Mother     Arthritis Mother     Colon polyps Mother     Hypertension Mother     Kidney disease Mother     Miscarriages / Stillbirths Mother     Anemia Mother     Stroke Father     Arthritis Father         Padgets Disease    Osteoporosis Father     Inflammatory bowel disease Father     Other Father         Parkinson  and padgets disease    Cancer Sister         Eye Cancer    Thyroid disease Sister     Diabetes Brother     Stroke Brother     Bleeding Disorder Daughter     Diabetes Brother     Hypertension Brother     Kidney disease Brother     Stroke Brother     Stroke Brother     Vision loss Brother         vision loss    Hypertension Brother     Malig Hyperthermia Neg Hx       SOCIAL HISTORY:   Social History     Tobacco Use    Smoking status: Never     Passive exposure: Never    Smokeless tobacco: Never   Vaping Use    Vaping status: Never Used   Substance Use Topics    Alcohol use: Never    Drug use: Never     "  MEDICATIONS:   Current Outpatient Medications on File Prior to Visit   Medication Sig Dispense Refill    albuterol sulfate HFA (ProAir HFA) 108 (90 Base) MCG/ACT inhaler Inhale 2 puffs Every 6 (Six) Hours As Needed for Wheezing. 18 g 0    Ascorbic Acid (VITAMIN C PO) Take  by mouth.      aspirin 81 MG EC tablet Take 1 tablet by mouth Daily.      Calcium Carb-Cholecalciferol (CALCIUM/VITAMIN D PO) Take  by mouth.      Estrogens Conjugated (Premarin) 0.625 MG/GM vaginal cream Apply daily for 2 weeks then use 2 times weekly thereafter. 30 g 1    ezetimibe (ZETIA) 10 MG tablet TAKE ONE TABLET BY MOUTH EVERY DAY 90 tablet 3    famotidine (PEPCID) 40 MG tablet TAKE ONE TABLET BY MOUTH EVERY NIGHT AT BEDTIME 90 tablet 1    hydroCHLOROthiazide 12.5 MG tablet Take 1 tablet by mouth Every Morning. 90 tablet 1    levothyroxine (SYNTHROID, LEVOTHROID) 88 MCG tablet Take 1 tablet by mouth Daily. 90 tablet 1    metFORMIN (GLUCOPHAGE) 500 MG tablet Take 1 tablet by mouth 3 times a day. 270 tablet 1    omeprazole (priLOSEC) 40 MG capsule TAKE 1 CAPSULE BY MOUTH EVERY DAY 90 capsule 1    Pradaxa 150 MG capsu Take 1 capsule by mouth 2 (Two) Times a Day. 180 capsule 1    rosuvastatin (CRESTOR) 40 MG tablet Take 1 tablet by mouth Every Night. 90 tablet 1    telmisartan (MICARDIS) 80 MG tablet Take 1 tablet by mouth Every Night. 90 tablet 1    triamcinolone (KENALOG) 0.1 % ointment Apply 1 Application topically to the appropriate area as directed 2 (Two) Times a Day. 80 g 0     No current facility-administered medications on file prior to visit.       ALLERGIES: Patient has no known allergies.       Objective   Vitals:    07/30/25 1124   BP: 141/84   BP Location: Left arm   Patient Position: Sitting   Cuff Size: Adult   Pulse: 119   Resp: 17   Weight: 105 kg (232 lb)   Height: 152 cm (59.84\")     Body mass index is 45.55 kg/m².          PHYSICAL EXAM:   Physical Exam  Constitutional:       Appearance: Normal appearance.   HENT:      " Head: Normocephalic and atraumatic.      Nose: Nose normal.   Eyes:      Extraocular Movements: Extraocular movements intact.      Pupils: Pupils are equal, round, and reactive to light.   Cardiovascular:      Rate and Rhythm: Normal rate.      Pulses: Normal pulses.      Heart sounds: Normal heart sounds.      Comments: Bilateral severe stasis injury both legs with stigmata of prior ulcers noted.  Bilateral severe varicosities right worse than left over both legs.  Pulmonary:      Effort: Pulmonary effort is normal.      Breath sounds: Normal breath sounds.   Abdominal:      General: Abdomen is flat. Bowel sounds are normal.      Palpations: Abdomen is soft.   Musculoskeletal:         General: Normal range of motion.      Cervical back: Normal range of motion and neck supple.      Right lower leg: 3+ Edema present.      Left lower leg: 3+ Edema present.   Skin:     General: Skin is warm and dry.   Neurological:      General: No focal deficit present.      Mental Status: She is alert and oriented to person, place, and time. Mental status is at baseline.   Psychiatric:         Mood and Affect: Mood normal.         Thought Content: Thought content normal.          Result Review   LABS:    CBC          1/28/2025    09:33 3/21/2025    12:06 7/29/2025    10:08   CBC   WBC 5.08  4.81  5.15    RBC 4.28  4.23  4.24    Hemoglobin 13.0  13.0  12.8    Hematocrit 41.7  39.9  40.6    MCV 97.4  94.3  95.8    MCH 30.4  30.7  30.2    MCHC 31.2  32.6  31.5    RDW 13.2  12.5  13.8    Platelets 210  237  217      BMP          11/22/2024    11:27 3/21/2025    12:06 3/26/2025    11:23   BMP   BUN 15  17  14    Creatinine 0.93  0.87  0.92     0.92    Sodium 144  139  141    Potassium 4.7  4.0  4.1    Chloride 106  103  103    CO2 26.0  25.0  25.2    Calcium 9.9  9.4  9.5      Lipid Panel          3/3/2025    08:10   Lipid Panel   Total Cholesterol 118    Triglycerides 88    HDL Cholesterol 55    VLDL Cholesterol 17    LDL Cholesterol  46     LDL/HDL Ratio 0.83       INR          8/22/2024    06:32   Common Labs   INR 1.59       A1C Last 3 Results          11/22/2024    11:27 3/3/2025    08:10 3/21/2025    12:06   HGBA1C Last 3 Results   Hemoglobin A1C 6.90  7.00  7.20         Results Review:       I reviewed the patient's new clinical results.    The following radiologic or non-invasive studies have been reviewed by me: Lower extremity venous scan reviewed from [facility] showing as below some acute and chronic superficial clotting but no residual deep vein clots.  There is some right deep vein residual insufficiency and some high-grade superficial reflux.  The right has greater and lesser saphenous reflux the left greater saphenous reflux.  Venous w Reflux Lower Extremity - Bilateral CAR 06/18/2025    Interpretation Summary    There is superficial venous insufficiency of the right lower extremity in the following vein(s): great saphenous at thigh, great saphenous at knee and varicose vein(s) above knee.    There is acute superficial thrombophlebitis in the right great saphenous at proximal calf. There is chronic superficial thrombophlebitis in the right great saphenous a proximal thigh, small saphenous at proximal calf and varicose vein(s) above knee.    There is superficial venous insufficiency of the left lower extremity in the following vein(s): great saphenous at thigh and varicose vein(s) below knee.    There is chronic superficial thrombophlebitis in the left great saphenous at mid thigh. There is subacute superficial thrombophlebitis in the left varicose vein(s) below knee.    All other veins appear normal, bilaterally.    Right inguinal lymphadenopathy.     No radiology results for the last 30 days.                ASSESSMENT/PLAN:   Diagnoses and all orders for this visit:    1. Venous (peripheral) insufficiency (Primary)  -     Duplex Venous Lower Extremity - Right CAR; Future  -     Chemical Adhesive Venous Ablation Unilateral (Venaseal);  Future  -     Duplex Venous Lower Extremity - Left CAR; Future  -     Chemical Adhesive Venous Ablation Unilateral (Venaseal); Future    2. Deep vein phlebitis and thrombophlebitis of left lower extremity  -     Duplex Venous Lower Extremity - Right CAR; Future  -     Chemical Adhesive Venous Ablation Unilateral (Venaseal); Future  -     Duplex Venous Lower Extremity - Left CAR; Future  -     Chemical Adhesive Venous Ablation Unilateral (Venaseal); Future    3. History of thromboembolism of vein    4. Post-phlebitic dermatosis of both lower extremities    5. Venous stasis  -     Duplex Venous Lower Extremity - Right CAR; Future  -     Chemical Adhesive Venous Ablation Unilateral (Venaseal); Future  -     Duplex Venous Lower Extremity - Left CAR; Future  -     Chemical Adhesive Venous Ablation Unilateral (Venaseal); Future    6. Chronic venous hypertension with ulcer and inflammation involving both sides  -     Duplex Venous Lower Extremity - Right CAR; Future  -     Chemical Adhesive Venous Ablation Unilateral (Venaseal); Future  -     Duplex Venous Lower Extremity - Left CAR; Future  -     Chemical Adhesive Venous Ablation Unilateral (Venaseal); Future       69 y.o. female with bilateral reflux right greater and lesser left greater with some acute on chronic thrombus in some of these veins as well.  They appear to have segments that are large enough and continuous enough for ablation with VenaSeal and we have discussed that is a possibility to augment her compression use and avoid hopefully more ulcers.  She is now healed the right sided ulcer and she is referred by the heart wound care center to see if we could prevent her from getting more.  She is committed to wearing the 30 to 40 mm tort compression stockings in the lower legs long-term but I do believe that ablation of the superficial systems would likely aid in her ability to stay away from further stasis injury.    I discussed the plan with the patient and  family who are agreeable to the plan of care at this point. Thank you for this consult.   Follow Up  Return in about 6 weeks (around 9/10/2025).    Josep Taylor MD   07/30/25

## 2025-08-05 ENCOUNTER — TRANSCRIBE ORDERS (OUTPATIENT)
Facility: HOSPITAL | Age: 70
End: 2025-08-05
Payer: MEDICARE

## 2025-08-05 ENCOUNTER — LAB (OUTPATIENT)
Facility: HOSPITAL | Age: 70
End: 2025-08-05
Payer: MEDICARE

## 2025-08-05 DIAGNOSIS — N39.0 URINARY TRACT INFECTION WITHOUT HEMATURIA, SITE UNSPECIFIED: ICD-10-CM

## 2025-08-05 DIAGNOSIS — N18.2 CHRONIC RENAL DISEASE, STAGE II: Primary | ICD-10-CM

## 2025-08-05 DIAGNOSIS — E11.9 DIABETES MELLITUS, STABLE: ICD-10-CM

## 2025-08-05 DIAGNOSIS — I10 BENIGN HYPERTENSION: ICD-10-CM

## 2025-08-05 DIAGNOSIS — E11.9 TYPE 2 DIABETES MELLITUS WITHOUT COMPLICATION, WITHOUT LONG-TERM CURRENT USE OF INSULIN: ICD-10-CM

## 2025-08-05 DIAGNOSIS — N18.2 CHRONIC RENAL DISEASE, STAGE II: ICD-10-CM

## 2025-08-05 LAB
ALBUMIN SERPL-MCNC: 4 G/DL (ref 3.5–5.2)
ALBUMIN UR-MCNC: 12.1 MG/DL
ALBUMIN/GLOB SERPL: 1.4 G/DL
ALP SERPL-CCNC: 68 U/L (ref 39–117)
ALT SERPL W P-5'-P-CCNC: 35 U/L (ref 1–33)
ANION GAP SERPL CALCULATED.3IONS-SCNC: 13 MMOL/L (ref 5–15)
AST SERPL-CCNC: 32 U/L (ref 1–32)
BACTERIA UR QL AUTO: ABNORMAL /HPF
BASOPHILS # BLD AUTO: 0.03 10*3/MM3 (ref 0–0.2)
BASOPHILS NFR BLD AUTO: 0.6 % (ref 0–1.5)
BILIRUB SERPL-MCNC: 0.7 MG/DL (ref 0–1.2)
BILIRUB UR QL STRIP: NEGATIVE
BUN SERPL-MCNC: 13 MG/DL (ref 8–23)
BUN/CREAT SERPL: 13.5 (ref 7–25)
CALCIUM SPEC-SCNC: 9.5 MG/DL (ref 8.6–10.5)
CHLORIDE SERPL-SCNC: 105 MMOL/L (ref 98–107)
CLARITY UR: ABNORMAL
CO2 SERPL-SCNC: 24 MMOL/L (ref 22–29)
COLOR UR: ABNORMAL
CREAT SERPL-MCNC: 0.96 MG/DL (ref 0.57–1)
CREAT UR-MCNC: 185 MG/DL
CREAT UR-MCNC: 192.4 MG/DL
DEPRECATED RDW RBC AUTO: 43.2 FL (ref 37–54)
EGFRCR SERPLBLD CKD-EPI 2021: 64.2 ML/MIN/1.73
EOSINOPHIL # BLD AUTO: 0.13 10*3/MM3 (ref 0–0.4)
EOSINOPHIL NFR BLD AUTO: 2.8 % (ref 0.3–6.2)
ERYTHROCYTE [DISTWIDTH] IN BLOOD BY AUTOMATED COUNT: 12.5 % (ref 12.3–15.4)
GLOBULIN UR ELPH-MCNC: 2.8 GM/DL
GLUCOSE SERPL-MCNC: 194 MG/DL (ref 65–99)
GLUCOSE UR STRIP-MCNC: NEGATIVE MG/DL
HBA1C MFR BLD: 8.2 % (ref 4.8–5.6)
HCT VFR BLD AUTO: 39.4 % (ref 34–46.6)
HGB BLD-MCNC: 12.8 G/DL (ref 12–15.9)
HGB UR QL STRIP.AUTO: ABNORMAL
HYALINE CASTS UR QL AUTO: ABNORMAL /LPF
IMM GRANULOCYTES # BLD AUTO: 0.02 10*3/MM3 (ref 0–0.05)
IMM GRANULOCYTES NFR BLD AUTO: 0.4 % (ref 0–0.5)
KETONES UR QL STRIP: ABNORMAL
LEUKOCYTE ESTERASE UR QL STRIP.AUTO: ABNORMAL
LYMPHOCYTES # BLD AUTO: 0.87 10*3/MM3 (ref 0.7–3.1)
LYMPHOCYTES NFR BLD AUTO: 18.5 % (ref 19.6–45.3)
MCH RBC QN AUTO: 30.6 PG (ref 26.6–33)
MCHC RBC AUTO-ENTMCNC: 32.5 G/DL (ref 31.5–35.7)
MCV RBC AUTO: 94.3 FL (ref 79–97)
MICROALBUMIN/CREAT UR: 65.4 MG/G (ref 0–29)
MONOCYTES # BLD AUTO: 0.41 10*3/MM3 (ref 0.1–0.9)
MONOCYTES NFR BLD AUTO: 8.7 % (ref 5–12)
NEUTROPHILS NFR BLD AUTO: 3.25 10*3/MM3 (ref 1.7–7)
NEUTROPHILS NFR BLD AUTO: 69 % (ref 42.7–76)
NITRITE UR QL STRIP: NEGATIVE
NRBC BLD AUTO-RTO: 0 /100 WBC (ref 0–0.2)
PH UR STRIP.AUTO: 6.5 [PH] (ref 5–8)
PLATELET # BLD AUTO: 147 10*3/MM3 (ref 140–450)
PMV BLD AUTO: 11.4 FL (ref 6–12)
POTASSIUM SERPL-SCNC: 4.1 MMOL/L (ref 3.5–5.2)
PROT ?TM UR-MCNC: 61.5 MG/DL
PROT SERPL-MCNC: 6.8 G/DL (ref 6–8.5)
PROT UR QL STRIP: ABNORMAL
PROT/CREAT UR: 0.32 MG/G{CREAT}
RBC # BLD AUTO: 4.18 10*6/MM3 (ref 3.77–5.28)
RBC # UR STRIP: ABNORMAL /HPF
REF LAB TEST METHOD: ABNORMAL
SODIUM SERPL-SCNC: 142 MMOL/L (ref 136–145)
SP GR UR STRIP: 1.02 (ref 1–1.03)
SQUAMOUS #/AREA URNS HPF: ABNORMAL /HPF
UROBILINOGEN UR QL STRIP: ABNORMAL
WBC # UR STRIP: ABNORMAL /HPF
WBC NRBC COR # BLD AUTO: 4.71 10*3/MM3 (ref 3.4–10.8)

## 2025-08-05 PROCEDURE — 82043 UR ALBUMIN QUANTITATIVE: CPT | Performed by: NURSE PRACTITIONER

## 2025-08-05 PROCEDURE — 85025 COMPLETE CBC W/AUTO DIFF WBC: CPT

## 2025-08-05 PROCEDURE — 81001 URINALYSIS AUTO W/SCOPE: CPT

## 2025-08-05 PROCEDURE — 80053 COMPREHEN METABOLIC PANEL: CPT

## 2025-08-05 PROCEDURE — 36415 COLL VENOUS BLD VENIPUNCTURE: CPT

## 2025-08-05 PROCEDURE — 83036 HEMOGLOBIN GLYCOSYLATED A1C: CPT

## 2025-08-05 PROCEDURE — 84156 ASSAY OF PROTEIN URINE: CPT

## 2025-08-05 PROCEDURE — 82570 ASSAY OF URINE CREATININE: CPT

## 2025-08-06 ENCOUNTER — OFFICE VISIT (OUTPATIENT)
Dept: CARDIOLOGY | Facility: CLINIC | Age: 70
End: 2025-08-06
Payer: MEDICARE

## 2025-08-06 ENCOUNTER — OFFICE VISIT (OUTPATIENT)
Dept: ONCOLOGY | Facility: HOSPITAL | Age: 70
End: 2025-08-06
Payer: MEDICARE

## 2025-08-06 VITALS
TEMPERATURE: 97.7 F | OXYGEN SATURATION: 94 % | HEART RATE: 85 BPM | WEIGHT: 235.4 LBS | RESPIRATION RATE: 18 BRPM | DIASTOLIC BLOOD PRESSURE: 73 MMHG | HEIGHT: 60 IN | SYSTOLIC BLOOD PRESSURE: 143 MMHG | BODY MASS INDEX: 46.22 KG/M2

## 2025-08-06 VITALS — SYSTOLIC BLOOD PRESSURE: 147 MMHG | HEART RATE: 86 BPM | DIASTOLIC BLOOD PRESSURE: 88 MMHG

## 2025-08-06 DIAGNOSIS — I10 ESSENTIAL HYPERTENSION: Primary | ICD-10-CM

## 2025-08-06 DIAGNOSIS — E53.8 B12 DEFICIENCY: ICD-10-CM

## 2025-08-06 DIAGNOSIS — E78.5 HYPERLIPIDEMIA LDL GOAL <70: ICD-10-CM

## 2025-08-06 DIAGNOSIS — I48.0 PAROXYSMAL ATRIAL FIBRILLATION: ICD-10-CM

## 2025-08-06 DIAGNOSIS — D50.8 OTHER IRON DEFICIENCY ANEMIA: Primary | ICD-10-CM

## 2025-08-06 PROCEDURE — G0463 HOSPITAL OUTPT CLINIC VISIT: HCPCS | Performed by: NURSE PRACTITIONER

## 2025-08-06 RX ORDER — HYDROCHLOROTHIAZIDE 25 MG/1
25 TABLET ORAL DAILY
Qty: 90 TABLET | Refills: 3 | Status: SHIPPED | OUTPATIENT
Start: 2025-08-06

## 2025-08-07 ENCOUNTER — DOCUMENTATION (OUTPATIENT)
Dept: TELEMETRY | Facility: HOSPITAL | Age: 70
End: 2025-08-07
Payer: MEDICARE

## 2025-08-07 DIAGNOSIS — N30.01 ACUTE CYSTITIS WITH HEMATURIA: ICD-10-CM

## 2025-08-07 DIAGNOSIS — R30.0 DYSURIA: Primary | ICD-10-CM

## 2025-08-15 ENCOUNTER — TELEPHONE (OUTPATIENT)
Dept: ONCOLOGY | Facility: HOSPITAL | Age: 70
End: 2025-08-15
Payer: MEDICARE

## 2025-08-21 ENCOUNTER — HOSPITAL ENCOUNTER (OUTPATIENT)
Dept: ONCOLOGY | Facility: HOSPITAL | Age: 70
Discharge: HOME | End: 2025-08-21
Admitting: NURSE PRACTITIONER
Payer: MEDICARE

## 2025-08-21 VITALS
HEART RATE: 99 BPM | TEMPERATURE: 97.9 F | OXYGEN SATURATION: 99 % | DIASTOLIC BLOOD PRESSURE: 82 MMHG | SYSTOLIC BLOOD PRESSURE: 124 MMHG

## 2025-08-21 DIAGNOSIS — E53.8 B12 DEFICIENCY: Primary | ICD-10-CM

## 2025-08-21 PROCEDURE — 96372 THER/PROPH/DIAG INJ SC/IM: CPT

## 2025-08-21 PROCEDURE — 25010000002 CYANOCOBALAMIN PER 1000 MCG: Performed by: NURSE PRACTITIONER

## 2025-08-21 RX ORDER — CYANOCOBALAMIN 1000 UG/ML
1000 INJECTION, SOLUTION INTRAMUSCULAR; SUBCUTANEOUS ONCE
Status: COMPLETED | OUTPATIENT
Start: 2025-08-21 | End: 2025-08-21

## 2025-08-21 RX ADMIN — CYANOCOBALAMIN 1000 MCG: 1000 INJECTION, SOLUTION INTRAMUSCULAR at 12:59

## 2025-08-27 ENCOUNTER — OFFICE VISIT (OUTPATIENT)
Dept: PODIATRY | Facility: CLINIC | Age: 70
End: 2025-08-27
Payer: MEDICARE

## 2025-08-27 VITALS
HEIGHT: 60 IN | BODY MASS INDEX: 44.76 KG/M2 | SYSTOLIC BLOOD PRESSURE: 112 MMHG | WEIGHT: 228 LBS | OXYGEN SATURATION: 93 % | DIASTOLIC BLOOD PRESSURE: 77 MMHG | TEMPERATURE: 97.5 F | HEART RATE: 86 BPM

## 2025-08-27 DIAGNOSIS — E11.8 DM FEET: ICD-10-CM

## 2025-08-27 DIAGNOSIS — M79.672 FOOT PAIN, BILATERAL: ICD-10-CM

## 2025-08-27 DIAGNOSIS — E11.9 NON-INSULIN DEPENDENT TYPE 2 DIABETES MELLITUS: ICD-10-CM

## 2025-08-27 DIAGNOSIS — L60.0 ONYCHOCRYPTOSIS: ICD-10-CM

## 2025-08-27 DIAGNOSIS — B35.1 ONYCHOMYCOSIS: Primary | ICD-10-CM

## 2025-08-27 DIAGNOSIS — M79.671 FOOT PAIN, BILATERAL: ICD-10-CM

## (undated) DEVICE — DEV LK WIREGUIDE FUSN OLYMP SCP

## (undated) DEVICE — Device: Brand: DEFENDO AIR/WATER/SUCTION AND BIOPSY VALVE

## (undated) DEVICE — GW JAG STR .035IN 450CM

## (undated) DEVICE — LINER SURG CANSTR SXN S/RIGD 1500CC

## (undated) DEVICE — JACKSON-PRATT 100CC BULB RESERVOIR: Brand: CARDINAL HEALTH

## (undated) DEVICE — ENDOPATH ETS-FLEX45 ARTICULATING ENDOSCOPIC LINEAR CUTTER, NO RELOAD: Brand: ENDOPATH

## (undated) DEVICE — LAPAROVUE VISIBILITY SYSTEM LAPAROSCOPIC SOLUTIONS: Brand: LAPAROVUE

## (undated) DEVICE — Device

## (undated) DEVICE — NON-WOVEN ADHESIVE WOUND DRESSING: Brand: PRIMAPORE ADHESIVE DRESSING 10*8CM

## (undated) DEVICE — BLCK/BITE BLOX WO/DENTL/RIM W/STRAP 54F

## (undated) DEVICE — ROTATING HEMOSTATIC VALVE: Brand: ROTATING HEMOSTATIC VALVE

## (undated) DEVICE — RETRIEVAL BALLOON CATHETER: Brand: EXTRACTOR™ PRO RX

## (undated) DEVICE — SINGLE-USE BIOPSY FORCEPS: Brand: RADIAL JAW 4

## (undated) DEVICE — GW HIPRFM BIL JAGWIRE REVOLUTION STR .025IN 450CM

## (undated) DEVICE — SPHINCT CANNULATOME2 2L DOME/TP .035IN 6F 2.8MM 200CM

## (undated) DEVICE — ESOPHAGEAL/PYLORIC/COLONIC/BILIARY WIREGUIDED BALLOON DILATATION CATHETER: Brand: CRE™ PRO

## (undated) DEVICE — GENERAL LAPAROSCOPY-LF: Brand: MEDLINE INDUSTRIES, INC.

## (undated) DEVICE — CONN JET HYDRA H20 AUXILIARY DISP

## (undated) DEVICE — RX DELIVERY SYSTEM: Brand: NAVIFLEX™ RX DELIVERY SYSTEM

## (undated) DEVICE — SOLIDIFIER LIQLOC PLS 1500CC BT

## (undated) DEVICE — TISSUE RETRIEVAL SYSTEM: Brand: INZII RETRIEVAL SYSTEM

## (undated) DEVICE — ELECTRD BLD EDGE COAT 3IN

## (undated) DEVICE — BLAKE SILICONE DRAIN, 19 FR ROUND, HUBLESS WITH 1/4" TROCAR: Brand: BLAKE

## (undated) DEVICE — NON-WOVEN ADHESIVE WOUND DRESSING: Brand: PRIMAPORE ADHESIVE WOUND DRSG 7.2*5CM

## (undated) DEVICE — ADHS LIQ MASTISOL 2/3ML

## (undated) DEVICE — ENDOPATH XCEL WITH OPTIVIEW TECHNOLOGY UNIVERSAL TROCAR STABILITY SLEEVES: Brand: ENDOPATH XCEL OPTIVIEW

## (undated) DEVICE — STRIP,CLOSURE,WOUND,MEDI-STRIP,1/2X4: Brand: MEDLINE

## (undated) DEVICE — PAD GRND REM POLYHESIVE A/ DISP

## (undated) DEVICE — SOL IRR NACL 0.9PCT BT 1000ML

## (undated) DEVICE — SOL IRR H2O BTL 1000ML STRL

## (undated) DEVICE — SOL IRRG H2O PL/BG 1000ML STRL

## (undated) DEVICE — 2, DISPOSABLE SUCTION/IRRIGATOR WITHOUT DISPOSABLE TIP: Brand: STRYKEFLOW

## (undated) DEVICE — GOWN,REINFRCE,POLY,SIRUS,BREATH SLV,XXLG: Brand: MEDLINE

## (undated) DEVICE — ENDOPATH PNEUMONEEDLE INSUFFLATION NEEDLES WITH LUER LOCK CONNECTORS 120MM: Brand: ENDOPATH

## (undated) DEVICE — SNAR POLYP CAPTIFLX WD OVL 27MM 240CM

## (undated) DEVICE — GOWN,REINFORCE,POLY,SIRUS,BREATH SLV,XLG: Brand: MEDLINE

## (undated) DEVICE — SOL IRR NACL 0.9PCT 3000ML

## (undated) DEVICE — STERILE POLYISOPRENE POWDER-FREE SURGICAL GLOVES WITH EMOLLIENT COATING: Brand: PROTEXIS

## (undated) DEVICE — ENDOPATH XCEL WITH OPTIVIEW TECHNOLOGY BLADELESS TROCARS WITH STABILITY SLEEVES: Brand: ENDOPATH XCEL OPTIVIEW

## (undated) DEVICE — GW JAGWIRE HIPRFM STFF SHFT STR .035IN 450CM

## (undated) DEVICE — ROTATING HEMOSTATIC VALVE .096": Brand: RHV

## (undated) DEVICE — PENCL E/S SMOKEEVAC W/TELESCP CANN

## (undated) DEVICE — SUT MERSILENE POLYSTR UR6 BR 0 75CM GRN

## (undated) DEVICE — LAPAROSCOPIC SCISSORS: Brand: EPIX LAPAROSCOPIC SCISSORS

## (undated) DEVICE — GLV SURG SENSICARE PI ORTHO SZ8 LF STRL

## (undated) DEVICE — ANTIBACTERIAL UNDYED BRAIDED (POLYGLACTIN 910), SYNTHETIC ABSORBABLE SUTURE: Brand: COATED VICRYL

## (undated) DEVICE — SLV SCD KN/LEN ADJ EXPRSS BLENDED MD 1P/U